# Patient Record
Sex: FEMALE | Race: BLACK OR AFRICAN AMERICAN | NOT HISPANIC OR LATINO | URBAN - METROPOLITAN AREA
[De-identification: names, ages, dates, MRNs, and addresses within clinical notes are randomized per-mention and may not be internally consistent; named-entity substitution may affect disease eponyms.]

---

## 2024-07-06 ENCOUNTER — HOSPITAL ENCOUNTER (INPATIENT)
Facility: HOSPITAL | Age: 79
End: 2024-07-06
Attending: GENERAL PRACTICE | Admitting: INTERNAL MEDICINE
Payer: COMMERCIAL

## 2024-07-06 ENCOUNTER — APPOINTMENT (OUTPATIENT)
Dept: RADIOLOGY | Facility: HOSPITAL | Age: 79
DRG: 233 | End: 2024-07-06
Payer: COMMERCIAL

## 2024-07-06 ENCOUNTER — APPOINTMENT (OUTPATIENT)
Dept: CARDIOLOGY | Facility: HOSPITAL | Age: 79
End: 2024-07-06
Payer: COMMERCIAL

## 2024-07-06 DIAGNOSIS — I25.119 ATHEROSCLEROTIC HEART DISEASE OF NATIVE CORONARY ARTERY WITH UNSPECIFIED ANGINA PECTORIS (CMS-HCC): ICD-10-CM

## 2024-07-06 DIAGNOSIS — I79.8 OTHER DISORDERS OF ARTERIES, ARTERIOLES AND CAPILLARIES IN DISEASES CLASSIFIED ELSEWHERE (CMS-HCC): ICD-10-CM

## 2024-07-06 DIAGNOSIS — I25.110 ATHEROSCLEROTIC HEART DISEASE OF NATIVE CORONARY ARTERY WITH UNSTABLE ANGINA PECTORIS (MULTI): ICD-10-CM

## 2024-07-06 DIAGNOSIS — I21.4 NSTEMI (NON-ST ELEVATED MYOCARDIAL INFARCTION) (MULTI): Primary | ICD-10-CM

## 2024-07-06 DIAGNOSIS — I65.23 OCCLUSION AND STENOSIS OF BILATERAL CAROTID ARTERIES: ICD-10-CM

## 2024-07-06 DIAGNOSIS — Z95.1 S/P CABG (CORONARY ARTERY BYPASS GRAFT): ICD-10-CM

## 2024-07-06 LAB
ALBUMIN SERPL BCP-MCNC: 3.5 G/DL (ref 3.4–5)
ALP SERPL-CCNC: 91 U/L (ref 33–136)
ALT SERPL W P-5'-P-CCNC: 11 U/L (ref 7–45)
ANION GAP SERPL CALC-SCNC: 16 MMOL/L (ref 10–20)
AST SERPL W P-5'-P-CCNC: 17 U/L (ref 9–39)
BASOPHILS # BLD AUTO: 0.02 X10*3/UL (ref 0–0.1)
BASOPHILS NFR BLD AUTO: 0.3 %
BILIRUB SERPL-MCNC: 0.3 MG/DL (ref 0–1.2)
BNP SERPL-MCNC: 283 PG/ML (ref 0–99)
BUN SERPL-MCNC: 36 MG/DL (ref 6–23)
CALCIUM SERPL-MCNC: 9.6 MG/DL (ref 8.6–10.3)
CARDIAC TROPONIN I PNL SERPL HS: 39 NG/L (ref 0–13)
CARDIAC TROPONIN I PNL SERPL HS: 69 NG/L (ref 0–13)
CHLORIDE SERPL-SCNC: 103 MMOL/L (ref 98–107)
CO2 SERPL-SCNC: 25 MMOL/L (ref 21–32)
CREAT SERPL-MCNC: 9.36 MG/DL (ref 0.5–1.05)
EGFRCR SERPLBLD CKD-EPI 2021: 4 ML/MIN/1.73M*2
EOSINOPHIL # BLD AUTO: 0.12 X10*3/UL (ref 0–0.4)
EOSINOPHIL NFR BLD AUTO: 1.6 %
ERYTHROCYTE [DISTWIDTH] IN BLOOD BY AUTOMATED COUNT: 16 % (ref 11.5–14.5)
GLUCOSE SERPL-MCNC: 121 MG/DL (ref 74–99)
HCT VFR BLD AUTO: 29.1 % (ref 36–46)
HGB BLD-MCNC: 9.6 G/DL (ref 12–16)
IMM GRANULOCYTES # BLD AUTO: 0.04 X10*3/UL (ref 0–0.5)
IMM GRANULOCYTES NFR BLD AUTO: 0.5 % (ref 0–0.9)
LIPASE SERPL-CCNC: 74 U/L (ref 9–82)
LYMPHOCYTES # BLD AUTO: 1.22 X10*3/UL (ref 0.8–3)
LYMPHOCYTES NFR BLD AUTO: 16.3 %
MCH RBC QN AUTO: 26.7 PG (ref 26–34)
MCHC RBC AUTO-ENTMCNC: 33 G/DL (ref 32–36)
MCV RBC AUTO: 81 FL (ref 80–100)
MONOCYTES # BLD AUTO: 0.38 X10*3/UL (ref 0.05–0.8)
MONOCYTES NFR BLD AUTO: 5.1 %
NEUTROPHILS # BLD AUTO: 5.72 X10*3/UL (ref 1.6–5.5)
NEUTROPHILS NFR BLD AUTO: 76.2 %
NRBC BLD-RTO: 0 /100 WBCS (ref 0–0)
PLATELET # BLD AUTO: 261 X10*3/UL (ref 150–450)
POTASSIUM SERPL-SCNC: 2.9 MMOL/L (ref 3.5–5.3)
PROT SERPL-MCNC: 7.1 G/DL (ref 6.4–8.2)
RBC # BLD AUTO: 3.59 X10*6/UL (ref 4–5.2)
SODIUM SERPL-SCNC: 141 MMOL/L (ref 136–145)
WBC # BLD AUTO: 7.5 X10*3/UL (ref 4.4–11.3)

## 2024-07-06 PROCEDURE — 2500000004 HC RX 250 GENERAL PHARMACY W/ HCPCS (ALT 636 FOR OP/ED): Performed by: GENERAL PRACTICE

## 2024-07-06 PROCEDURE — 83690 ASSAY OF LIPASE: CPT | Performed by: GENERAL PRACTICE

## 2024-07-06 PROCEDURE — 71250 CT THORAX DX C-: CPT | Mod: FOREIGN READ | Performed by: RADIOLOGY

## 2024-07-06 PROCEDURE — 99285 EMERGENCY DEPT VISIT HI MDM: CPT | Mod: 25

## 2024-07-06 PROCEDURE — 84484 ASSAY OF TROPONIN QUANT: CPT | Performed by: GENERAL PRACTICE

## 2024-07-06 PROCEDURE — 71250 CT THORAX DX C-: CPT

## 2024-07-06 PROCEDURE — 80053 COMPREHEN METABOLIC PANEL: CPT | Performed by: GENERAL PRACTICE

## 2024-07-06 PROCEDURE — 36415 COLL VENOUS BLD VENIPUNCTURE: CPT | Performed by: GENERAL PRACTICE

## 2024-07-06 PROCEDURE — 83880 ASSAY OF NATRIURETIC PEPTIDE: CPT | Performed by: GENERAL PRACTICE

## 2024-07-06 PROCEDURE — 96374 THER/PROPH/DIAG INJ IV PUSH: CPT

## 2024-07-06 PROCEDURE — 74176 CT ABD & PELVIS W/O CONTRAST: CPT | Mod: FOREIGN READ | Performed by: RADIOLOGY

## 2024-07-06 PROCEDURE — 85025 COMPLETE CBC W/AUTO DIFF WBC: CPT | Performed by: GENERAL PRACTICE

## 2024-07-06 PROCEDURE — 93005 ELECTROCARDIOGRAM TRACING: CPT

## 2024-07-06 RX ORDER — ONDANSETRON HYDROCHLORIDE 2 MG/ML
4 INJECTION, SOLUTION INTRAVENOUS ONCE
Status: COMPLETED | OUTPATIENT
Start: 2024-07-06 | End: 2024-07-06

## 2024-07-06 RX ORDER — POTASSIUM CHLORIDE 20 MEQ/1
40 TABLET, EXTENDED RELEASE ORAL ONCE
Status: DISCONTINUED | OUTPATIENT
Start: 2024-07-06 | End: 2024-07-11

## 2024-07-06 RX ADMIN — ONDANSETRON 4 MG: 2 INJECTION INTRAMUSCULAR; INTRAVENOUS at 22:03

## 2024-07-06 ASSESSMENT — COLUMBIA-SUICIDE SEVERITY RATING SCALE - C-SSRS
2. HAVE YOU ACTUALLY HAD ANY THOUGHTS OF KILLING YOURSELF?: NO
6. HAVE YOU EVER DONE ANYTHING, STARTED TO DO ANYTHING, OR PREPARED TO DO ANYTHING TO END YOUR LIFE?: NO
1. IN THE PAST MONTH, HAVE YOU WISHED YOU WERE DEAD OR WISHED YOU COULD GO TO SLEEP AND NOT WAKE UP?: NO

## 2024-07-06 ASSESSMENT — PAIN - FUNCTIONAL ASSESSMENT: PAIN_FUNCTIONAL_ASSESSMENT: 0-10

## 2024-07-06 ASSESSMENT — PAIN DESCRIPTION - DESCRIPTORS: DESCRIPTORS: OTHER (COMMENT)

## 2024-07-06 ASSESSMENT — PAIN SCALES - GENERAL: PAINLEVEL_OUTOF10: 0 - NO PAIN

## 2024-07-06 NOTE — Clinical Note
Closure device placed in the right radial artery. Site closed by radial compression system. 14ml air

## 2024-07-07 ENCOUNTER — APPOINTMENT (OUTPATIENT)
Dept: CARDIOLOGY | Facility: HOSPITAL | Age: 79
DRG: 233 | End: 2024-07-07
Payer: COMMERCIAL

## 2024-07-07 VITALS
BODY MASS INDEX: 19.06 KG/M2 | HEART RATE: 84 BPM | HEIGHT: 63 IN | RESPIRATION RATE: 16 BRPM | DIASTOLIC BLOOD PRESSURE: 70 MMHG | WEIGHT: 107.58 LBS | OXYGEN SATURATION: 100 % | TEMPERATURE: 98.5 F | SYSTOLIC BLOOD PRESSURE: 164 MMHG

## 2024-07-07 PROBLEM — I21.4 NSTEMI (NON-ST ELEVATED MYOCARDIAL INFARCTION) (MULTI): Status: ACTIVE | Noted: 2024-07-07

## 2024-07-07 LAB
ANION GAP SERPL CALC-SCNC: 16 MMOL/L (ref 10–20)
APPEARANCE UR: CLEAR
BACTERIA #/AREA URNS AUTO: ABNORMAL /HPF
BASOPHILS # BLD AUTO: 0.02 X10*3/UL (ref 0–0.1)
BASOPHILS NFR BLD AUTO: 0.3 %
BILIRUB UR STRIP.AUTO-MCNC: NEGATIVE MG/DL
BUN SERPL-MCNC: 42 MG/DL (ref 6–23)
CALCIUM SERPL-MCNC: 9.2 MG/DL (ref 8.6–10.3)
CARDIAC TROPONIN I PNL SERPL HS: 1411 NG/L (ref 0–13)
CARDIAC TROPONIN I PNL SERPL HS: ABNORMAL NG/L (ref 0–13)
CHLORIDE SERPL-SCNC: 103 MMOL/L (ref 98–107)
CHOLEST SERPL-MCNC: 129 MG/DL (ref 0–199)
CHOLESTEROL/HDL RATIO: 3.1
CO2 SERPL-SCNC: 25 MMOL/L (ref 21–32)
COLOR UR: ABNORMAL
CREAT SERPL-MCNC: 10.24 MG/DL (ref 0.5–1.05)
EGFRCR SERPLBLD CKD-EPI 2021: 4 ML/MIN/1.73M*2
EOSINOPHIL # BLD AUTO: 0.08 X10*3/UL (ref 0–0.4)
EOSINOPHIL NFR BLD AUTO: 1.4 %
ERYTHROCYTE [DISTWIDTH] IN BLOOD BY AUTOMATED COUNT: 16.1 % (ref 11.5–14.5)
GLUCOSE SERPL-MCNC: 69 MG/DL (ref 74–99)
GLUCOSE UR STRIP.AUTO-MCNC: ABNORMAL MG/DL
HCT VFR BLD AUTO: 25.6 % (ref 36–46)
HDLC SERPL-MCNC: 42.1 MG/DL
HGB BLD-MCNC: 8.4 G/DL (ref 12–16)
HOLD SPECIMEN: NORMAL
IMM GRANULOCYTES # BLD AUTO: 0.01 X10*3/UL (ref 0–0.5)
IMM GRANULOCYTES NFR BLD AUTO: 0.2 % (ref 0–0.9)
KETONES UR STRIP.AUTO-MCNC: NEGATIVE MG/DL
LDLC SERPL CALC-MCNC: 74 MG/DL
LEUKOCYTE ESTERASE UR QL STRIP.AUTO: NEGATIVE
LYMPHOCYTES # BLD AUTO: 1.09 X10*3/UL (ref 0.8–3)
LYMPHOCYTES NFR BLD AUTO: 19.1 %
MAGNESIUM SERPL-MCNC: 1.5 MG/DL (ref 1.6–2.4)
MAGNESIUM SERPL-MCNC: 1.6 MG/DL (ref 1.6–2.4)
MCH RBC QN AUTO: 27.2 PG (ref 26–34)
MCHC RBC AUTO-ENTMCNC: 32.8 G/DL (ref 32–36)
MCV RBC AUTO: 83 FL (ref 80–100)
MONOCYTES # BLD AUTO: 0.29 X10*3/UL (ref 0.05–0.8)
MONOCYTES NFR BLD AUTO: 5.1 %
NEUTROPHILS # BLD AUTO: 4.23 X10*3/UL (ref 1.6–5.5)
NEUTROPHILS NFR BLD AUTO: 73.9 %
NITRITE UR QL STRIP.AUTO: NEGATIVE
NON HDL CHOLESTEROL: 87 MG/DL (ref 0–149)
NRBC BLD-RTO: 0 /100 WBCS (ref 0–0)
PH UR STRIP.AUTO: 7.5 [PH]
PLATELET # BLD AUTO: 224 X10*3/UL (ref 150–450)
POTASSIUM SERPL-SCNC: 3 MMOL/L (ref 3.5–5.3)
PROT UR STRIP.AUTO-MCNC: ABNORMAL MG/DL
RBC # BLD AUTO: 3.09 X10*6/UL (ref 4–5.2)
RBC # UR STRIP.AUTO: ABNORMAL /UL
RBC #/AREA URNS AUTO: ABNORMAL /HPF
SODIUM SERPL-SCNC: 141 MMOL/L (ref 136–145)
SP GR UR STRIP.AUTO: 1.01
SQUAMOUS #/AREA URNS AUTO: ABNORMAL /HPF
TRIGL SERPL-MCNC: 64 MG/DL (ref 0–149)
UFH PPP CHRO-ACNC: 0.3 IU/ML
UFH PPP CHRO-ACNC: 0.4 IU/ML
UROBILINOGEN UR STRIP.AUTO-MCNC: NORMAL MG/DL
VLDL: 13 MG/DL (ref 0–40)
WBC # BLD AUTO: 5.7 X10*3/UL (ref 4.4–11.3)
WBC #/AREA URNS AUTO: ABNORMAL /HPF

## 2024-07-07 PROCEDURE — 2500000001 HC RX 250 WO HCPCS SELF ADMINISTERED DRUGS (ALT 637 FOR MEDICARE OP)

## 2024-07-07 PROCEDURE — 85520 HEPARIN ASSAY: CPT | Performed by: INTERNAL MEDICINE

## 2024-07-07 PROCEDURE — 99221 1ST HOSP IP/OBS SF/LOW 40: CPT | Performed by: INTERNAL MEDICINE

## 2024-07-07 PROCEDURE — 82374 ASSAY BLOOD CARBON DIOXIDE: CPT | Performed by: INTERNAL MEDICINE

## 2024-07-07 PROCEDURE — 84484 ASSAY OF TROPONIN QUANT: CPT | Performed by: GENERAL PRACTICE

## 2024-07-07 PROCEDURE — 93005 ELECTROCARDIOGRAM TRACING: CPT

## 2024-07-07 PROCEDURE — 2500000004 HC RX 250 GENERAL PHARMACY W/ HCPCS (ALT 636 FOR OP/ED): Performed by: INTERNAL MEDICINE

## 2024-07-07 PROCEDURE — 81001 URINALYSIS AUTO W/SCOPE: CPT | Performed by: GENERAL PRACTICE

## 2024-07-07 PROCEDURE — 36415 COLL VENOUS BLD VENIPUNCTURE: CPT | Performed by: EMERGENCY MEDICINE

## 2024-07-07 PROCEDURE — 83718 ASSAY OF LIPOPROTEIN: CPT

## 2024-07-07 PROCEDURE — 84484 ASSAY OF TROPONIN QUANT: CPT

## 2024-07-07 PROCEDURE — 83735 ASSAY OF MAGNESIUM: CPT | Performed by: GENERAL PRACTICE

## 2024-07-07 PROCEDURE — 6350000001 HC RX 635 EPOETIN >10,000 UNITS: Mod: JZ | Performed by: INTERNAL MEDICINE

## 2024-07-07 PROCEDURE — 2060000001 HC INTERMEDIATE ICU ROOM DAILY

## 2024-07-07 PROCEDURE — 36415 COLL VENOUS BLD VENIPUNCTURE: CPT | Performed by: GENERAL PRACTICE

## 2024-07-07 PROCEDURE — 2500000004 HC RX 250 GENERAL PHARMACY W/ HCPCS (ALT 636 FOR OP/ED): Performed by: EMERGENCY MEDICINE

## 2024-07-07 PROCEDURE — 85520 HEPARIN ASSAY: CPT | Performed by: EMERGENCY MEDICINE

## 2024-07-07 PROCEDURE — 96375 TX/PRO/DX INJ NEW DRUG ADDON: CPT

## 2024-07-07 PROCEDURE — 83735 ASSAY OF MAGNESIUM: CPT | Performed by: INTERNAL MEDICINE

## 2024-07-07 PROCEDURE — 2500000001 HC RX 250 WO HCPCS SELF ADMINISTERED DRUGS (ALT 637 FOR MEDICARE OP): Performed by: INTERNAL MEDICINE

## 2024-07-07 PROCEDURE — 85025 COMPLETE CBC W/AUTO DIFF WBC: CPT | Performed by: INTERNAL MEDICINE

## 2024-07-07 PROCEDURE — 99223 1ST HOSP IP/OBS HIGH 75: CPT | Performed by: STUDENT IN AN ORGANIZED HEALTH CARE EDUCATION/TRAINING PROGRAM

## 2024-07-07 RX ORDER — AMLODIPINE BESYLATE 10 MG/1
10 TABLET ORAL DAILY
Status: DISCONTINUED | OUTPATIENT
Start: 2024-07-07 | End: 2024-07-17

## 2024-07-07 RX ORDER — POTASSIUM CHLORIDE 20 MEQ/1
20 TABLET, EXTENDED RELEASE ORAL ONCE
Status: DISCONTINUED | OUTPATIENT
Start: 2024-07-07 | End: 2024-07-07

## 2024-07-07 RX ORDER — ACETAMINOPHEN 325 MG/1
650 TABLET ORAL EVERY 4 HOURS PRN
Status: DISCONTINUED | OUTPATIENT
Start: 2024-07-07 | End: 2024-07-17

## 2024-07-07 RX ORDER — ATORVASTATIN CALCIUM 20 MG/1
20 TABLET, FILM COATED ORAL DAILY
COMMUNITY
End: 2024-07-20 | Stop reason: HOSPADM

## 2024-07-07 RX ORDER — ATORVASTATIN CALCIUM 20 MG/1
20 TABLET, FILM COATED ORAL DAILY
Status: DISCONTINUED | OUTPATIENT
Start: 2024-07-07 | End: 2024-07-09

## 2024-07-07 RX ORDER — POLYETHYLENE GLYCOL 3350 17 G/17G
17 POWDER, FOR SOLUTION ORAL DAILY
Status: DISCONTINUED | OUTPATIENT
Start: 2024-07-07 | End: 2024-07-17

## 2024-07-07 RX ORDER — AMLODIPINE BESYLATE 10 MG/1
TABLET ORAL DAILY
Status: ON HOLD | COMMUNITY

## 2024-07-07 RX ORDER — POTASSIUM CHLORIDE 1.5 G/1.58G
POWDER, FOR SOLUTION ORAL
Status: COMPLETED
Start: 2024-07-07 | End: 2024-07-07

## 2024-07-07 RX ORDER — NAPROXEN SODIUM 220 MG/1
81 TABLET, FILM COATED ORAL DAILY
Status: DISCONTINUED | OUTPATIENT
Start: 2024-07-08 | End: 2024-07-09

## 2024-07-07 RX ORDER — LOSARTAN POTASSIUM 50 MG/1
100 TABLET ORAL DAILY
COMMUNITY
End: 2024-07-23 | Stop reason: ENTERED-IN-ERROR

## 2024-07-07 RX ORDER — ACETAMINOPHEN 650 MG/1
650 SUPPOSITORY RECTAL EVERY 4 HOURS PRN
Status: DISCONTINUED | OUTPATIENT
Start: 2024-07-07 | End: 2024-07-17

## 2024-07-07 RX ORDER — METOPROLOL TARTRATE 25 MG/1
12.5 TABLET, FILM COATED ORAL 2 TIMES DAILY
Status: DISCONTINUED | OUTPATIENT
Start: 2024-07-07 | End: 2024-07-08

## 2024-07-07 RX ORDER — HEPARIN SODIUM 10000 [USP'U]/100ML
0-4000 INJECTION, SOLUTION INTRAVENOUS CONTINUOUS
Status: DISCONTINUED | OUTPATIENT
Start: 2024-07-07 | End: 2024-07-08

## 2024-07-07 RX ORDER — LOSARTAN POTASSIUM 50 MG/1
100 TABLET ORAL DAILY
Status: DISCONTINUED | OUTPATIENT
Start: 2024-07-07 | End: 2024-07-17

## 2024-07-07 RX ORDER — ACETAMINOPHEN 160 MG/5ML
650 SOLUTION ORAL EVERY 4 HOURS PRN
Status: DISCONTINUED | OUTPATIENT
Start: 2024-07-07 | End: 2024-07-17

## 2024-07-07 RX ADMIN — POLYETHYLENE GLYCOL 3350 17 G: 17 POWDER, FOR SOLUTION ORAL at 08:33

## 2024-07-07 RX ADMIN — METOPROLOL TARTRATE 12.5 MG: 25 TABLET, FILM COATED ORAL at 22:00

## 2024-07-07 RX ADMIN — HEPARIN SODIUM 600 UNITS/HR: 10000 INJECTION, SOLUTION INTRAVENOUS at 02:26

## 2024-07-07 RX ADMIN — POTASSIUM CHLORIDE 40 MEQ: 1.5 POWDER, FOR SOLUTION ORAL at 02:20

## 2024-07-07 RX ADMIN — AMLODIPINE BESYLATE 10 MG: 10 TABLET ORAL at 08:33

## 2024-07-07 RX ADMIN — EPOETIN ALFA-EPBX 10000 UNITS: 10000 INJECTION, SOLUTION INTRAVENOUS; SUBCUTANEOUS at 16:19

## 2024-07-07 RX ADMIN — SODIUM CHLORIDE, SODIUM LACTATE, CALCIUM CHLORIDE, MAGNESIUM CHLORIDE AND DEXTROSE: 1.5; 538; 448; 18.3; 5.08 INJECTION, SOLUTION INTRAPERITONEAL at 11:16

## 2024-07-07 RX ADMIN — SODIUM CHLORIDE, SODIUM LACTATE, CALCIUM CHLORIDE, MAGNESIUM CHLORIDE AND DEXTROSE: 1.5; 538; 448; 18.3; 5.08 INJECTION, SOLUTION INTRAPERITONEAL at 18:16

## 2024-07-07 RX ADMIN — LOSARTAN POTASSIUM 100 MG: 50 TABLET, FILM COATED ORAL at 08:33

## 2024-07-07 SDOH — SOCIAL STABILITY: SOCIAL INSECURITY: WERE YOU ABLE TO COMPLETE ALL THE BEHAVIORAL HEALTH SCREENINGS?: YES

## 2024-07-07 SDOH — SOCIAL STABILITY: SOCIAL INSECURITY: HAS ANYONE EVER THREATENED TO HURT YOUR FAMILY OR YOUR PETS?: NO

## 2024-07-07 SDOH — SOCIAL STABILITY: SOCIAL INSECURITY: ARE YOU OR HAVE YOU BEEN THREATENED OR ABUSED PHYSICALLY, EMOTIONALLY, OR SEXUALLY BY ANYONE?: NO

## 2024-07-07 SDOH — SOCIAL STABILITY: SOCIAL INSECURITY: DOES ANYONE TRY TO KEEP YOU FROM HAVING/CONTACTING OTHER FRIENDS OR DOING THINGS OUTSIDE YOUR HOME?: NO

## 2024-07-07 SDOH — SOCIAL STABILITY: SOCIAL INSECURITY: HAVE YOU HAD ANY THOUGHTS OF HARMING ANYONE ELSE?: NO

## 2024-07-07 SDOH — SOCIAL STABILITY: SOCIAL INSECURITY: HAVE YOU HAD THOUGHTS OF HARMING ANYONE ELSE?: NO

## 2024-07-07 SDOH — SOCIAL STABILITY: SOCIAL INSECURITY: ABUSE: ADULT

## 2024-07-07 SDOH — SOCIAL STABILITY: SOCIAL INSECURITY: ARE THERE ANY APPARENT SIGNS OF INJURIES/BEHAVIORS THAT COULD BE RELATED TO ABUSE/NEGLECT?: NO

## 2024-07-07 SDOH — SOCIAL STABILITY: SOCIAL INSECURITY: DO YOU FEEL ANYONE HAS EXPLOITED OR TAKEN ADVANTAGE OF YOU FINANCIALLY OR OF YOUR PERSONAL PROPERTY?: NO

## 2024-07-07 SDOH — SOCIAL STABILITY: SOCIAL INSECURITY: DO YOU FEEL UNSAFE GOING BACK TO THE PLACE WHERE YOU ARE LIVING?: NO

## 2024-07-07 ASSESSMENT — ACTIVITIES OF DAILY LIVING (ADL)
BATHING: NEEDS ASSISTANCE
PATIENT'S MEMORY ADEQUATE TO SAFELY COMPLETE DAILY ACTIVITIES?: YES
HEARING - LEFT EAR: FUNCTIONAL
ADEQUATE_TO_COMPLETE_ADL: YES
GROOMING: INDEPENDENT
TOILETING: INDEPENDENT
LACK_OF_TRANSPORTATION: NO
HEARING - LEFT EAR: HEARING AID
PATIENT'S MEMORY ADEQUATE TO SAFELY COMPLETE DAILY ACTIVITIES?: YES
HEARING - RIGHT EAR: HEARING AID
WALKS IN HOME: INDEPENDENT
JUDGMENT_ADEQUATE_SAFELY_COMPLETE_DAILY_ACTIVITIES: YES
HEARING - RIGHT EAR: FUNCTIONAL
WALKS IN HOME: INDEPENDENT
GROOMING: NEEDS ASSISTANCE
ASSISTIVE_DEVICE: DENTURES LOWER;DENTURES UPPER;EYEGLASSES;HEARING AID - RIGHT;HEARING AID - LEFT
JUDGMENT_ADEQUATE_SAFELY_COMPLETE_DAILY_ACTIVITIES: YES
BATHING: INDEPENDENT
FEEDING YOURSELF: INDEPENDENT
ADEQUATE_TO_COMPLETE_ADL: YES
DRESSING YOURSELF: NEEDS ASSISTANCE
FEEDING YOURSELF: INDEPENDENT
DRESSING YOURSELF: INDEPENDENT

## 2024-07-07 ASSESSMENT — LIFESTYLE VARIABLES
HOW OFTEN DO YOU HAVE 6 OR MORE DRINKS ON ONE OCCASION: NEVER
AUDIT-C TOTAL SCORE: 0
SKIP TO QUESTIONS 9-10: 1
HOW OFTEN DO YOU HAVE A DRINK CONTAINING ALCOHOL: NEVER
AUDIT-C TOTAL SCORE: 0
HOW MANY STANDARD DRINKS CONTAINING ALCOHOL DO YOU HAVE ON A TYPICAL DAY: PATIENT DOES NOT DRINK

## 2024-07-07 ASSESSMENT — COGNITIVE AND FUNCTIONAL STATUS - GENERAL
CLIMB 3 TO 5 STEPS WITH RAILING: A LITTLE
WALKING IN HOSPITAL ROOM: A LITTLE
CLIMB 3 TO 5 STEPS WITH RAILING: A LITTLE
MOBILITY SCORE: 21
MOBILITY SCORE: 20
PATIENT BASELINE BEDBOUND: NO
MOBILITY SCORE: 21
HELP NEEDED FOR BATHING: A LITTLE
STANDING UP FROM CHAIR USING ARMS: A LITTLE
TOILETING: A LITTLE
WALKING IN HOSPITAL ROOM: A LITTLE
WALKING IN HOSPITAL ROOM: A LITTLE
STANDING UP FROM CHAIR USING ARMS: A LITTLE
TOILETING: A LITTLE
CLIMB 3 TO 5 STEPS WITH RAILING: A LITTLE
TOILETING: A LITTLE
DRESSING REGULAR LOWER BODY CLOTHING: A LITTLE
DAILY ACTIVITIY SCORE: 22
DAILY ACTIVITIY SCORE: 22
DRESSING REGULAR LOWER BODY CLOTHING: A LITTLE
DAILY ACTIVITIY SCORE: 21
MOVING TO AND FROM BED TO CHAIR: A LITTLE
STANDING UP FROM CHAIR USING ARMS: A LITTLE
DRESSING REGULAR LOWER BODY CLOTHING: A LITTLE

## 2024-07-07 ASSESSMENT — ENCOUNTER SYMPTOMS
RESPIRATORY NEGATIVE: 1
CONSTITUTIONAL NEGATIVE: 1
VOMITING: 1
EYES NEGATIVE: 1
CARDIOVASCULAR NEGATIVE: 1
NAUSEA: 1
MUSCULOSKELETAL NEGATIVE: 1
NEUROLOGICAL NEGATIVE: 1

## 2024-07-07 ASSESSMENT — PAIN - FUNCTIONAL ASSESSMENT
PAIN_FUNCTIONAL_ASSESSMENT: 0-10

## 2024-07-07 ASSESSMENT — PATIENT HEALTH QUESTIONNAIRE - PHQ9
2. FEELING DOWN, DEPRESSED OR HOPELESS: NOT AT ALL
1. LITTLE INTEREST OR PLEASURE IN DOING THINGS: NOT AT ALL
SUM OF ALL RESPONSES TO PHQ9 QUESTIONS 1 & 2: 0

## 2024-07-07 ASSESSMENT — PAIN SCALES - GENERAL
PAINLEVEL_OUTOF10: 0 - NO PAIN

## 2024-07-07 NOTE — ED PROVIDER NOTES
HPI   Chief Complaint   Patient presents with    Chest Pain     Began at 2040 today     Weakness, Gen       HPI: 79-year-old female with a history of ESRD on peritoneal dialysis presents with nausea, vomiting and chest pain.  She also reports that she has not had a bowel movement in approximately 5 days.  Her symptoms began with nausea and vomiting earlier today which was followed by chest pressure.  She had only 1 episode of nausea and vomiting and by the time she arrived to the ED her chest pain has largely subsided.  She denies rectal bleeding and does report that she is passing gas      Limitations to history: None  Independent Historians: Patient  External Records Reviewed: HIE, outpatient notes, inpatient notes  ------------------------------------------------------------------------------------------------------------------------------------------  ROS: a ten point review of systems was performed and was negative except as per HPI.  ------------------------------------------------------------------------------------------------------------------------------------------  PMH / PSH: as per HPI, otherwise reviewed in EMR  MEDS: as per HPI, otherwise reviewed in EMR  ALLERGIES: as per HPI, otherwise reviewed in EMR  SocH:  as per HPI, otherwise reviewed in EMR  FH:  as per HPI, otherwise reviewed in EMR  ------------------------------------------------------------------------------------------------------------------------------------------  Physical Exam:  VS: As documented in the triage note and EMR flowsheet from this visit was reviewed  General: Well appearing. No acute distress.   Eyes:  Extraocular movements grossly intact. No scleral icterus. No discharge  HEENT:  Normocephalic.  Atraumatic  Neck: Moves neck freely. No gross masses  CV: Regular rhythm. No murmurs, rubs or gallops   Resp: Clear to auscultation bilaterally. No respiratory distress.    GI: Soft, no masses, nontender. No rebound tenderness or  guarding.  Peritoneal dialysis catheter entry site is without discharge or surrounding erythema  MSK: Symmetric muscle bulk. No deformities. No lower extremity edema.    Skin: Warm, dry, intact.   Neuro: No focal deficits.  A&O x3.   Psych: Appropriate for situation  ------------------------------------------------------------------------------------------------------------------------------------------  Hospital Course / Medical Decision Making:  Independent Interpretations: CT chest, abd, pelvis  EKG as interpreted by me: EKG #1 shows a normal sinus rhythm 86 bpm with mild ST depressions in V5 and V6.  No ST elevation EKG #2 shows normal sinus rhythm 88 bpm with no signs of acute ischemia    MDM: 79-year-old female with a history of ESRD on peritoneal dialysis presents with nausea, vomiting and chest pain.  Troponin is mildly elevated.  There are mild ST depressions on the initial EKG which did disappear on a subsequent EKG. CT shows evidence of constipation.  No bowel obstruction.  I conducted shared decision-making with the patient and her family and due to her elevated troponins and chest pressure she was admitted for further management.    Discussion of Management with Other Providers:   I discussed the patient/results with: Emergency medicine team    Final diagnosis and disposition as below.    Results for orders placed or performed during the hospital encounter of 07/06/24  -CBC and Auto Differential:        Result                      Value             Ref Range           WBC                         7.5               4.4 - 11.3 x*       nRBC                        0.0               0.0 - 0.0 /1*       RBC                         3.59 (L)          4.00 - 5.20 *       Hemoglobin                  9.6 (L)           12.0 - 16.0 *       Hematocrit                  29.1 (L)          36.0 - 46.0 %       MCV                         81                80 - 100 fL         MCH                         26.7               26.0 - 34.0 *       MCHC                        33.0              32.0 - 36.0 *       RDW                         16.0 (H)          11.5 - 14.5 %       Platelets                   261               150 - 450 x1*       Neutrophils %               76.2              40.0 - 80.0 %       Immature Granulocytes *     0.5               0.0 - 0.9 %         Lymphocytes %               16.3              13.0 - 44.0 %       Monocytes %                 5.1               2.0 - 10.0 %        Eosinophils %               1.6               0.0 - 6.0 %         Basophils %                 0.3               0.0 - 2.0 %         Neutrophils Absolute        5.72 (H)          1.60 - 5.50 *       Immature Granulocytes *     0.04              0.00 - 0.50 *       Lymphocytes Absolute        1.22              0.80 - 3.00 *       Monocytes Absolute          0.38              0.05 - 0.80 *       Eosinophils Absolute        0.12              0.00 - 0.40 *       Basophils Absolute          0.02              0.00 - 0.10 *  -Comprehensive metabolic panel:        Result                      Value             Ref Range           Glucose                     121 (H)           74 - 99 mg/dL       Sodium                      141               136 - 145 mm*       Potassium                   2.9 (LL)          3.5 - 5.3 mm*       Chloride                    103               98 - 107 mmo*       Bicarbonate                 25                21 - 32 mmol*       Anion Gap                   16                10 - 20 mmol*       Urea Nitrogen               36 (H)            6 - 23 mg/dL        Creatinine                  9.36 (H)          0.50 - 1.05 *       eGFR                        4 (L)             >60 mL/min/1*       Calcium                     9.6               8.6 - 10.3 m*       Albumin                     3.5               3.4 - 5.0 g/*       Alkaline Phosphatase        91                33 - 136 U/L        Total Protein               7.1                6.4 - 8.2 g/*       AST                         17                9 - 39 U/L          Bilirubin, Total            0.3               0.0 - 1.2 mg*       ALT                         11                7 - 45 U/L     -Troponin I, High Sensitivity:        Result                      Value             Ref Range           Troponin I, High Sensi*     39 (H)            0 - 13 ng/L    -Lipase:        Result                      Value             Ref Range           Lipase                      74                9 - 82 U/L     -B-type natriuretic peptide:        Result                      Value             Ref Range           BNP                         283 (H)           0 - 99 pg/mL   -Troponin I, High Sensitivity:        Result                      Value             Ref Range           Troponin I, High Sensi*     69 (HH)           0 - 13 ng/L    CT chest abdomen pelvis wo IV contrast   Final Result    1.  No distinct acute intrathoracic process identified.    2.  Minimal streaky bibasilar atelectasis with pleural based right    lower lobe pulmonary nodule measuring 1.2 cm.  Suggest continued    follow-up as per clinical guidelines.    3.  Enlarged and heterogeneous appearance of the thyroid gland with    multiple bilateral nodules, left greater than right.  Suggest    nonemergent follow-up with ultrasound as clinically warranted.    4.  Nonobstructing nephrolithiasis of the left kidney.  No ureteral    calculus or evidence for obstructive uropathy bilaterally.    5.  Colonic diverticulosis without CT evidence for acute    diverticulitis.    6.  Large amount of fecal material within the rectosigmoid colon and    rectal vault.  No evidence of bowel obstruction.    Fleischner Society 2017 Guidelines for Management of Incidentally    Detected Pulmonary Nodules in Adults:    A.  SOLID NODULES*    Nodule Type and Size:    Single:    *Low Risk**     < 6 mm - No routine follow-up    6-8 mm - CT at 6-12 months, then consider CT at  18-24 months    > 8 mm - Consider CT at 3 months, PET/CT, or tissue sampling    *High Risk**    < 6 mm - Optional CT at 12 months    6-8 mm - CT at 6-12 months, then CT at 18-24 months    > 8 mm - Consider CT at 3 months, PET/CT, or tissue sampling    Multiple:    *Low Risk**     < 6 mm - No routine follow-up    6-8 mm - CT at 3-6 months, then consider CT at 18-24 months    > 8 mm - CT at 3-6 months, then consider CT at 18-24 months    *High Risk**    < 6 mm - Optional CT at 12 months    6-8 mm - CT at 3-6 months, then at 18-24 months    > 8 mm - CT at 3-6 months, then at 18-24 months    B. SUBSOLID NODULES*    Nodule Type and Size:    Single:      *Ground glass    < 6 mm - No routine follow-up    > 6 mm - CT at 6-12 months to confirm persistence, then CT every 2    years until 5 years    *Part Solid    < 6 mm - No routine follow-up    > 6 mm - CT at 3-6 months to confirm persistence.  If unchanged and    solid component remains < 6 mm, annual CT should be performed for 5    years.    Multiple:    < 6 mm - CT at 3-6 months.  If stable, consider CT at 2 and 4 years.    > 6 mm - CT at 3-6 months.  Subsequent management based on the most    suspicious nodule(s).    Note - These recommendations do not apply to lung cancer screening,    patients with immunosuppression, or patients with known primary    cancer.    * Dimensions are average of long and short axes, rounded to the    nearest millimeter.    ** Consider all relevant risk factors.    Signed by Girish Oshea MD                               Rahat Coma Scale Score: 14                     Patient History   No past medical history on file.  No past surgical history on file.  No family history on file.  Social History     Tobacco Use    Smoking status: Not on file    Smokeless tobacco: Not on file   Substance Use Topics    Alcohol use: Not on file    Drug use: Not on file       Physical Exam   ED Triage Vitals [07/06/24 2036]   Temperature Heart Rate Respirations  BP   36.6 °C (97.8 °F) 84 13 150/81      Pulse Ox Temp Source Heart Rate Source Patient Position   98 % Oral Monitor Lying      BP Location FiO2 (%)     Right arm --       Physical Exam    ED Course & MDM   Diagnoses as of 07/13/24 1230   NSTEMI (non-ST elevated myocardial infarction) (Multi)       Medical Decision Making      Procedure  Procedures     Josesito Trevino,   07/13/24 1238

## 2024-07-07 NOTE — H&P
INTERNAL MEDICINE     HISTORY AND PHYSICAL      Chief Complaint:  Nausea, dizziness    History Of Present Illness  Isis Geronimo is a 79 y.o. female presenting with nausea and dizziness yesterday while with family.  She had no chest pain to her recollection.  She had at least 1 bout of emesis.  She came to the emergency room for evaluation.  Lab work included troponin which was found to be significantly elevated.  Patient reports no prior cardiac history.  She was placed on a heparin drip and admitted for further treatment.  She continues to report no chest pain.     Past Medical History  She has a past medical history of Arthritis, Hypertension, and Kidney disease.  She has end-stage renal disease on daily peritoneal dialysis.    Surgical History  Dialysis access procedures only.     Social History  She reports that she has been smoking cigarettes. She has been exposed to tobacco smoke. She does not have any smokeless tobacco history on file. She reports that she does not currently use alcohol. No history on file for drug use.  Lives at home alone.    Family History  Hypertension     Allergies  Chlorothiazide, Metoprolol, Ibuprofen, and Penicillins    Home Medications:  Prior to Admission medications    Medication Sig Start Date End Date Taking? Authorizing Provider   amLODIPine (Norvasc) 10 mg tablet Take by mouth once daily.    Historical Provider, MD   atorvastatin (Lipitor) 20 mg tablet Take 1 tablet (20 mg) by mouth once daily.    Historical Provider, MD   losartan (Cozaar) 50 mg tablet Take 2 tablets (100 mg) by mouth once daily.    Historical Provider, MD        Review of Systems   Constitutional: Negative.    HENT: Negative.     Eyes: Negative.    Respiratory: Negative.     Cardiovascular: Negative.    Gastrointestinal:  Positive for nausea and vomiting.   Musculoskeletal: Negative.    Skin: Negative.    Neurological: Negative.        Last Recorded Vitals  Blood pressure 157/71, pulse 79, temperature  "36.7 °C (98 °F), temperature source Temporal, resp. rate 16, height 1.6 m (5' 3\"), weight 48.8 kg (107 lb 9.4 oz), SpO2 100%.    Physical Exam    Constitutional:       Appearance: Normal appearance.   HENT:      Head: Normocephalic and atraumatic.   Eyes:      Extraocular Movements: Extraocular movements intact.      Pupils: Pupils are equal, round, and reactive to light.   Cardiovascular:      Rate and Rhythm: Normal rate and regular rhythm.      Pulses: Normal pulses.      Heart sounds: Normal heart sounds.   Pulmonary:      Effort: Pulmonary effort is normal.      Breath sounds: Normal breath sounds.   Abdominal:      General: Abdomen is flat. Bowel sounds are normal.      Palpations: Abdomen is soft.   Musculoskeletal:         General: Normal range of motion.      Cervical back: Normal range of motion and neck supple.   Skin:     General: Skin is warm and dry.   Neurological:      General: No focal deficit present.      Mental Status: Alert and oriented to person, place, and time. Mental status is at baseline.       Relevant Results    Results for orders placed or performed during the hospital encounter of 07/06/24 (from the past 24 hour(s))   CBC and Auto Differential   Result Value Ref Range    WBC 7.5 4.4 - 11.3 x10*3/uL    nRBC 0.0 0.0 - 0.0 /100 WBCs    RBC 3.59 (L) 4.00 - 5.20 x10*6/uL    Hemoglobin 9.6 (L) 12.0 - 16.0 g/dL    Hematocrit 29.1 (L) 36.0 - 46.0 %    MCV 81 80 - 100 fL    MCH 26.7 26.0 - 34.0 pg    MCHC 33.0 32.0 - 36.0 g/dL    RDW 16.0 (H) 11.5 - 14.5 %    Platelets 261 150 - 450 x10*3/uL    Neutrophils % 76.2 40.0 - 80.0 %    Immature Granulocytes %, Automated 0.5 0.0 - 0.9 %    Lymphocytes % 16.3 13.0 - 44.0 %    Monocytes % 5.1 2.0 - 10.0 %    Eosinophils % 1.6 0.0 - 6.0 %    Basophils % 0.3 0.0 - 2.0 %    Neutrophils Absolute 5.72 (H) 1.60 - 5.50 x10*3/uL    Immature Granulocytes Absolute, Automated 0.04 0.00 - 0.50 x10*3/uL    Lymphocytes Absolute 1.22 0.80 - 3.00 x10*3/uL    Monocytes " Absolute 0.38 0.05 - 0.80 x10*3/uL    Eosinophils Absolute 0.12 0.00 - 0.40 x10*3/uL    Basophils Absolute 0.02 0.00 - 0.10 x10*3/uL   Comprehensive metabolic panel   Result Value Ref Range    Glucose 121 (H) 74 - 99 mg/dL    Sodium 141 136 - 145 mmol/L    Potassium 2.9 (LL) 3.5 - 5.3 mmol/L    Chloride 103 98 - 107 mmol/L    Bicarbonate 25 21 - 32 mmol/L    Anion Gap 16 10 - 20 mmol/L    Urea Nitrogen 36 (H) 6 - 23 mg/dL    Creatinine 9.36 (H) 0.50 - 1.05 mg/dL    eGFR 4 (L) >60 mL/min/1.73m*2    Calcium 9.6 8.6 - 10.3 mg/dL    Albumin 3.5 3.4 - 5.0 g/dL    Alkaline Phosphatase 91 33 - 136 U/L    Total Protein 7.1 6.4 - 8.2 g/dL    AST 17 9 - 39 U/L    Bilirubin, Total 0.3 0.0 - 1.2 mg/dL    ALT 11 7 - 45 U/L   Troponin I, High Sensitivity   Result Value Ref Range    Troponin I, High Sensitivity 39 (H) 0 - 13 ng/L   Lipase   Result Value Ref Range    Lipase 74 9 - 82 U/L   B-type natriuretic peptide   Result Value Ref Range     (H) 0 - 99 pg/mL   Troponin I, High Sensitivity   Result Value Ref Range    Troponin I, High Sensitivity 69 (HH) 0 - 13 ng/L   Troponin I, High Sensitivity   Result Value Ref Range    Troponin I, High Sensitivity 1,411 (HH) 0 - 13 ng/L   Magnesium   Result Value Ref Range    Magnesium 1.50 (L) 1.60 - 2.40 mg/dL   Urinalysis with Reflex Culture and Microscopic   Result Value Ref Range    Color, Urine Light-Yellow Light-Yellow, Yellow, Dark-Yellow    Appearance, Urine Clear Clear    Specific Gravity, Urine 1.009 1.005 - 1.035    pH, Urine 7.5 5.0, 5.5, 6.0, 6.5, 7.0, 7.5, 8.0    Protein, Urine 100 (2+) (A) NEGATIVE, 10 (TRACE), 20 (TRACE) mg/dL    Glucose, Urine 70 (1+) (A) Normal mg/dL    Blood, Urine 0.1 (1+) (A) NEGATIVE    Ketones, Urine NEGATIVE NEGATIVE mg/dL    Bilirubin, Urine NEGATIVE NEGATIVE    Urobilinogen, Urine Normal Normal mg/dL    Nitrite, Urine NEGATIVE NEGATIVE    Leukocyte Esterase, Urine NEGATIVE NEGATIVE   Urinalysis Microscopic   Result Value Ref Range    WBC,  Urine NONE 1-5, NONE /HPF    RBC, Urine NONE NONE, 1-2, 3-5 /HPF    Squamous Epithelial Cells, Urine 1-9 (SPARSE) Reference range not established. /HPF    Bacteria, Urine 1+ (A) NONE SEEN /HPF   Heparin Assay, UFH   Result Value Ref Range    Heparin Unfractionated 0.4 See Comment Below for Therapeutic Ranges IU/mL   CBC and Auto Differential   Result Value Ref Range    WBC 5.7 4.4 - 11.3 x10*3/uL    nRBC 0.0 0.0 - 0.0 /100 WBCs    RBC 3.09 (L) 4.00 - 5.20 x10*6/uL    Hemoglobin 8.4 (L) 12.0 - 16.0 g/dL    Hematocrit 25.6 (L) 36.0 - 46.0 %    MCV 83 80 - 100 fL    MCH 27.2 26.0 - 34.0 pg    MCHC 32.8 32.0 - 36.0 g/dL    RDW 16.1 (H) 11.5 - 14.5 %    Platelets 224 150 - 450 x10*3/uL    Neutrophils % 73.9 40.0 - 80.0 %    Immature Granulocytes %, Automated 0.2 0.0 - 0.9 %    Lymphocytes % 19.1 13.0 - 44.0 %    Monocytes % 5.1 2.0 - 10.0 %    Eosinophils % 1.4 0.0 - 6.0 %    Basophils % 0.3 0.0 - 2.0 %    Neutrophils Absolute 4.23 1.60 - 5.50 x10*3/uL    Immature Granulocytes Absolute, Automated 0.01 0.00 - 0.50 x10*3/uL    Lymphocytes Absolute 1.09 0.80 - 3.00 x10*3/uL    Monocytes Absolute 0.29 0.05 - 0.80 x10*3/uL    Eosinophils Absolute 0.08 0.00 - 0.40 x10*3/uL    Basophils Absolute 0.02 0.00 - 0.10 x10*3/uL   Basic metabolic panel   Result Value Ref Range    Glucose 69 (L) 74 - 99 mg/dL    Sodium 141 136 - 145 mmol/L    Potassium 3.0 (L) 3.5 - 5.3 mmol/L    Chloride 103 98 - 107 mmol/L    Bicarbonate 25 21 - 32 mmol/L    Anion Gap 16 10 - 20 mmol/L    Urea Nitrogen 42 (H) 6 - 23 mg/dL    Creatinine 10.24 (H) 0.50 - 1.05 mg/dL    eGFR 4 (L) >60 mL/min/1.73m*2    Calcium 9.2 8.6 - 10.3 mg/dL   Magnesium   Result Value Ref Range    Magnesium 1.60 1.60 - 2.40 mg/dL         Principal Problem:    NSTEMI (non-ST elevated myocardial infarction) (Multi)        Problem list:  Principal Problem:    NSTEMI (non-ST elevated myocardial infarction) (Multi)      ASSESSMENT AND PLAN:    Non-ST elevation MI -continue with heparin  drip.  Cardiology on consult.  ESRD -renal consult, continue with peritoneal dialysis daily.  Hypertension -monitor with resumption of home meds  Nausea -likely related to the above, resolved.    Patient seen with daughter at bedside.      Jacob Staples MD  07/07/2411:26 AM

## 2024-07-07 NOTE — PROGRESS NOTES
Patient is a 79-year-old female who presented to the emergency room with chief complaint of nausea, vomiting and chest pressure. She was initially seen and evaluated by Dr. Trevino and signed out to me pending a third troponin due to concern for possible NSTEMI.  Patient's initial EKG concerning for ST depressions in V5 and V6 per my conversation with Dr. Trevino and she was also found to be hypokalemic.  Repeat EKG showed improvements.  Please see his initial physician note for details.  Patient is currently without pain but her third troponin went from 69-1,400.  Given patient history and risk factors and per my conversation with Dr. Berry if she is started on heparinization as she denied any contraindications.  Patient remains in stable condition at this time and without chest pain awaiting admission.    Araseli العلي MD

## 2024-07-07 NOTE — CONSULTS
"    History Of Present Illness:    Isis Geronimo is a 79 y.o. female with a past medical history of ESRD on daily Peritoneal dialysis, Anemia, RA, nonsustained VT, hypertension, hyperlipidemia, vertigo, emphysema, carpal tunnel syndrome.  Patient presents with complaints of N/V/constipation and right chest reported gas pain.  Cardiology consulted for further evaluation of NSTEMI HS trop 39/69/1411/19,016.     [place] patient today [place] [facility] patient had her daughter present and gives permission for her to participate in history obtaining.  Patient is alert and oriented.  She reports that prior to yesterday that she was at her normal state of health.  She does admit that for the last 3-4 days she has been without a bowel movement/constipation.  She states that yesterday she ate breakfast and later went to a cookout.  At the party she had chicken and shortly after she developed right chest \"gas discomfort\" followed by emesis that was brown in color. D/t vomiting, family was concerned and decided to bring her to the ED for further evaluation.  While in the ED, patient was found to be hypokalemic K 2.8> potassium supplementation given. Troponins drawn that were initially low level elevation but then bumped to 1411.  The decision was made to consult cardiology for NSTEMI and patient was started on heparin infusion.  Currently, patient denies any complaints of chest pain.  States that she never had chest pain; it was gas pain that has subsided since she had a large BM while here in the hospital.     In addition, patient denies any past medical history of cardiac surgeries or coronary percutaneous interventions.  She reports compliance with daily peritoneal dialysis, her last session was 7/5/2024, she missed yesterday because she was in the hospital.     ED course:  Initial EKG: Sinus rhythm with ST and T wave abnormality, heart rate 86  Arrival vital signs: Afebrile 97.8, HR 84, /81, 98% on room air  Current " vital signs: /69, HR 61, 99% on room air  Pertinent imaging/Labs: HS trop 39/69/1411/19,016, WBC 5.7, Hgb 8.4, , , K 2.9/3.0, CR 10.24, EGFR 4, mag 1.60  ED medications: Heparin bolus and infusion, potassium supplementation        Home CV meds:  Norvasc 10 mg p.o. daily, atorvastatin 20 mg p.o. daily losartan 100 mg p.o. daily        All other systems reviewed and negative unless as mentioned in HPI.       Past Medical/ Surgical History:  ESRD on daily Peritoneal dialysis,  Anemia  Rheumatoid arthritis  Nonsustained ventricular tachycardia  Hypertension  Hyperlipidemia  Vertigo  Emphysema  Diverticular disease    Social History:  Current smoker 1 pack /week  Denies alcohol or illicit drug use     Family History:  Reviewed, not pertinent to presenting problem          Past Cardiology Tests (Last 3 Years):  Echocardiogram 3/31/2022:  CONCLUSIONS:   - Technically difficult exam due to COPD.   - Exam indication: Murmur   - The left ventricle is normal in size. There is moderate left ventricular   hypertrophy. Left ventricular systolic function is normal. EF = 62 ± 5% (2D   biplane) Grade I left ventricular diastolic dysfunction.   - The right ventricle is normal in size. Right ventricular systolic function is   normal.   - The patient has not had a prior CC echocardiographic exam for comparison.       Electronically signed by Star Barboza MD on 3/31/2022 at 4:42:04 PM          Allergies:  Chlorothiazide, Metoprolol, Ibuprofen, and Penicillins    ROS:  10 point review of systems including (Constitutional, Eyes, ENMT, Respiratory, Cardiac, Gastrointestinal, Neurological, Psychiatric, and Hematologic) was performed and is otherwise negative.    Objective Data:  Last Recorded Vitals:  Vitals:    07/07/24 0442 07/07/24 0845 07/07/24 0934 07/07/24 1125   BP:  157/71  158/69   BP Location:  Right arm  Right arm   Patient Position:  Lying  Lying   Pulse:  79  61   Resp:  16  16   Temp:  36.7 °C (98 °F)  37.4  "°C (99.3 °F)   TempSrc:  Temporal  Temporal   SpO2:  100%  99%   Weight: 48.8 kg (107 lb 9.4 oz)  48.8 kg (107 lb 9.4 oz)    Height: 1.6 m (5' 3\")        Medical Gas Therapy: None (Room air)  Weight  Av.9 kg (107 lb 11.6 oz)  Min: 48.8 kg (107 lb 9.4 oz)  Max: 49 kg (108 lb)      LABS:  CMP:  Results from last 7 days   Lab Units 24  08248 24   SODIUM mmol/L 141  --  141   POTASSIUM mmol/L 3.0*  --  2.9*   CHLORIDE mmol/L 103  --  103   CO2 mmol/L 25  --  25   ANION GAP mmol/L 16  --  16   BUN mg/dL 42*  --  36*   CREATININE mg/dL 10.24*  --  9.36*   EGFR mL/min/1.73m*2 4*  --  4*   MAGNESIUM mg/dL 1.60 1.50*  --    ALBUMIN g/dL  --   --  3.5   ALT U/L  --   --  11   AST U/L  --   --  17   BILIRUBIN TOTAL mg/dL  --   --  0.3   LIPASE U/L  --   --  74     CBC:  Results from last 7 days   Lab Units 24   WBC AUTO x10*3/uL 5.7 7.5   HEMOGLOBIN g/dL 8.4* 9.6*   HEMATOCRIT % 25.6* 29.1*   PLATELETS AUTO x10*3/uL 224 261   MCV fL 83 81     COAG:     ABO: No results found for: \"ABO\"  HEME/ENDO:     CARDIAC:   Results from last 7 days   Lab Units 24  0038 24   TROPHS ng/L 1,411* 69* 39*   BNP pg/mL  --   --  283*             Last I/O:    Intake/Output Summary (Last 24 hours) at 2024 1220  Last data filed at 2024 1136  Gross per 24 hour   Intake 50.89 ml   Output 501 ml   Net -450.11 ml     Net IO Since Admission: -450.11 mL [24 1220]         Inpatient Medications:  Scheduled medications   Medication Dose Route Frequency    amLODIPine  10 mg oral Daily    atorvastatin  20 mg oral Daily    dextrose 1.5% - LOW calcium 2.5mEq/L 2,000 mL peritoneal dialysate   intraperitoneal 4x daily    epoetin shawn or biosimilar  10,000 Units subcutaneous Once    losartan  100 mg oral Daily    polyethylene glycol  17 g oral Daily    potassium chloride CR  40 mEq oral Once     PRN medications   Medication    acetaminophen    Or    " acetaminophen    Or    acetaminophen    heparin     Continuous Medications   Medication Dose Last Rate    heparin  0-4,000 Units/hr 600 Units/hr (07/07/24 0226)       Inpatient Medications:  Scheduled medications   Medication Dose Route Frequency    amLODIPine  10 mg oral Daily    atorvastatin  20 mg oral Daily    dextrose 1.5% - LOW calcium 2.5mEq/L 2,000 mL peritoneal dialysate   intraperitoneal 4x daily    epoetin shawn or biosimilar  10,000 Units subcutaneous Once    losartan  100 mg oral Daily    polyethylene glycol  17 g oral Daily    potassium chloride CR  40 mEq oral Once     PRN medications   Medication    acetaminophen    Or    acetaminophen    Or    acetaminophen    heparin     Continuous Medications   Medication Dose Last Rate    heparin  0-4,000 Units/hr 600 Units/hr (07/07/24 0226)     Outpatient Medications:  Current Outpatient Medications   Medication Instructions    amLODIPine (Norvasc) 10 mg tablet oral, Daily    atorvastatin (LIPITOR) 20 mg, oral, Daily    losartan (COZAAR) 100 mg, oral, Daily       Physical Exam:  General: Agitated elderly female, alert and oriented, NAD  HEENT:  Pupils equal and reactive.  Normocephalic.  Moist mucosa.    Neck:  No thyromegaly.  Normal Jugular Venous Pressure.  Cardiovascular:  Regular rate and rhythm.  + Cardiac murmur.  Normal S1 and S2.  Pulmonary:  Clear to auscultation bilaterally.  Abdomen:  Soft. Non-tender.   Non-distended.  Positive bowel sounds.  Lower Extremities:  2+ pedal pulses. No LE edema.  Neurologic:  Cranial nerves intact.  No focal deficit.   Skin: Skin warm and dry, normal skin turgor.   Psychiatric: Agitated     Assessment/Plan   Isis Geronimo is a 79 y.o. female with a past medical history of ESRD on daily Peritoneal dialysis, Anemia, RA, nonsustained VT, hypertension, hyperlipidemia, vertigo, emphysema, carpal tunnel syndrome.  Patient presents with complaints of N/V/constipation and right chest reported gas pain.  Cardiology consulted  for further evaluation of NSTEMI HS trop 39/69/1411/19,016.     I reviewed EKGs, labs and all imaging reports  I reviewed telemetry, sinus rhythm with short runs of NSVT (3 beats)    1.  NSTEMI  CT of chest abdomen pelvis without IV contrast reveals  No pleural effusions, extensive atherosclerotic calcifications of the abdominal aorta and branch vessels, no aneurysms    2.  Hypertension. Suboptimal> monitoring    3.  Hyperlipidemia. Reports was not taking prescribed statin  Lipid panel pending  C/w statin as ordered    4.  Anemia> hgb 8.4 defer management to primary    5. ESRD on daily peritoneal dialysis> volume management per nephrology      Recommendations:  NSTEMI  Patient will benefit from ischemic evaluation  NPO after MN; will plan for left heart cath tomorrow pending scheduling and no further worsening of anemia >discussed with patient and she is in agreement with proceeding   C/w heparin infusion, BB, statin and aspirin as ordered> patient reports GI bleeding on ASA in past, willing to take tomorrow prior to [facility] and pending results will discuss continuation  Anemia will need to be addressed> consider blood transfusion/GI evaluation> will defer to primary  Echocardiogram ordered and pending    Code Status:  Full Code    Amaris Vann, APRN-CNP    Inpatient consult to Cardiology  Consult performed by: Dontae DICKEY MD  Consult ordered by: Jacob Staples MD  Reason for consult: NSTEMI        Thank you for allowing me to participate in their care.  Please feel free to call me with any further questions or concerns.    Dontae Gonzalez MD, FACC, [facility]  Division of Cardiovascular Medicine  System Director, Nuclear Cardiology   Medical Director, [facility] [place], [facility]   Clinical , [employer]  [email or web address]   Office:  [phone]          Both the ROSA and I  have had a face to face encounter with the patient today. I have examined the patient and edited the documented physical examination as necessary.  I personally reviewed the patient's vital signs, telemetry, recent labs, medications, orders, EKGs, and pertinent cardiac imaging/ echocardiography.  I have reviewed the ROSA's encounter note, approve the ROSA's documentation and have edited the note to reflect my diagnostic and therapeutic plan.

## 2024-07-07 NOTE — ED PROCEDURE NOTE
Procedure  Critical Care    Performed by: Araseli لاعلي MD  Authorized by: Josesito Trevino DO    Critical care provider statement:     Critical care time (minutes):  25    Critical care was necessary to treat or prevent imminent or life-threatening deterioration of the following conditions: NSTEMI.    Critical care was time spent personally by me on the following activities:  Blood draw for specimens, ordering and review of laboratory studies, pulse oximetry and re-evaluation of patient's condition               Araseli العلي MD  07/07/24 0215

## 2024-07-07 NOTE — ED NOTES
Pt's Daughters:     Jocelyn [phone]    Lisa [phone]     Rose Calloway, RN  07/07/24 9845

## 2024-07-07 NOTE — CONSULTS
CONSULT: NEPHROLOGY SERVICE    REASON FOR CONSULT: ESKD  Admit Date: 7/6/2024  8:28 PM       HPI: Patient is a 79 y.o. female admitted 7/6/2024 with h/o ESKD on PD, HTN, coming with nausea/vomiting and CP, noted to have elevated trop, felling better now  No SOB/edema, clear PD fluid     APD under care od Dr Zarate, [facility] home unit transportation Bl.     Past Medical History:   Diagnosis Date    Arthritis     Hypertension     Kidney disease      Allergies: Chlorothiazide, Metoprolol, Ibuprofen, and Penicillins     History reviewed. No pertinent surgical history.    No family history on file.    Social History  She reports that she has been smoking cigarettes. She has been exposed to tobacco smoke. She does not have any smokeless tobacco history on file. She reports that she does not currently use alcohol. No history on file for drug use.    Review of Systems  As above    CURRENT HOSP MEDS:    Current Facility-Administered Medications:     acetaminophen (Tylenol) tablet 650 mg, 650 mg, oral, q4h PRN **OR** acetaminophen (Tylenol) oral liquid 650 mg, 650 mg, oral, q4h PRN **OR** acetaminophen (Tylenol) suppository 650 mg, 650 mg, rectal, q4h PRN, Jacob Staples MD    amLODIPine (Norvasc) tablet 10 mg, 10 mg, oral, Daily, Jacob Staples MD, 10 mg at 07/07/24 0833    atorvastatin (Lipitor) tablet 20 mg, 20 mg, oral, Daily, Jacob Staples MD    heparin 25,000 Units in dextrose 5% 250 mL (100 Units/mL) infusion (premix), 0-4,000 Units/hr, intravenous, Continuous, Araseli العلي MD, Last Rate: 6 mL/hr at 07/07/24 0226, 600 Units/hr at 07/07/24 0226    heparin bolus from bag 1,500-3,000 Units, 1,500-3,000 Units, intravenous, q4h PRN, Araseli العلي MD    losartan (Cozaar) tablet 100 mg, 100 mg, oral, Daily, Jacob Staples MD, 100 mg at 07/07/24 0833    polyethylene glycol (Glycolax, Miralax) packet 17 g, 17 g, oral, Daily, Jacob Staples MD, 17 g at 07/07/24 7304    potassium  "chloride CR (Klor-Con M20) ER tablet 40 mEq, 40 mEq, oral, Once, Josesito M Magdalenafe, DO     PHYSICAL EXAM:  /71 (BP Location: Right arm, Patient Position: Lying)   Pulse 79   Temp 36.7 °C (98 °F) (Temporal)   Resp 16   Ht 1.6 m (5' 3\")   Wt 48.8 kg (107 lb 9.4 oz)   SpO2 100%   BMI 19.06 kg/m²     Intake/Output Summary (Last 24 hours) at 7/7/2024 0928  Last data filed at 7/7/2024 0615  Gross per 24 hour   Intake 50.89 ml   Output 300 ml   Net -249.11 ml     Gen: AAO, NAD  Neck: No JVD  Cardiac: RRR  Resp: clear BS  Abd: Soft, non tender, +BS, non distended   PD site OK  Ext: No edema   Neuro: moves 4 ext  Peripheral Pulses: Capillary refill <2secs, strong peripheral pulses.  Skin: Skin color, texture, turgor normal, no suspicious rashes or lesions.    LABS:   Results for orders placed or performed during the hospital encounter of 07/06/24 (from the past 24 hour(s))   CBC and Auto Differential   Result Value Ref Range    WBC 7.5 4.4 - 11.3 x10*3/uL    nRBC 0.0 0.0 - 0.0 /100 WBCs    RBC 3.59 (L) 4.00 - 5.20 x10*6/uL    Hemoglobin 9.6 (L) 12.0 - 16.0 g/dL    Hematocrit 29.1 (L) 36.0 - 46.0 %    MCV 81 80 - 100 fL    MCH 26.7 26.0 - 34.0 pg    MCHC 33.0 32.0 - 36.0 g/dL    RDW 16.0 (H) 11.5 - 14.5 %    Platelets 261 150 - 450 x10*3/uL    Neutrophils % 76.2 40.0 - 80.0 %    Immature Granulocytes %, Automated 0.5 0.0 - 0.9 %    Lymphocytes % 16.3 13.0 - 44.0 %    Monocytes % 5.1 2.0 - 10.0 %    Eosinophils % 1.6 0.0 - 6.0 %    Basophils % 0.3 0.0 - 2.0 %    Neutrophils Absolute 5.72 (H) 1.60 - 5.50 x10*3/uL    Immature Granulocytes Absolute, Automated 0.04 0.00 - 0.50 x10*3/uL    Lymphocytes Absolute 1.22 0.80 - 3.00 x10*3/uL    Monocytes Absolute 0.38 0.05 - 0.80 x10*3/uL    Eosinophils Absolute 0.12 0.00 - 0.40 x10*3/uL    Basophils Absolute 0.02 0.00 - 0.10 x10*3/uL   Comprehensive metabolic panel   Result Value Ref Range    Glucose 121 (H) 74 - 99 mg/dL    Sodium 141 136 - 145 mmol/L    Potassium 2.9 (LL) 3.5 " - 5.3 mmol/L    Chloride 103 98 - 107 mmol/L    Bicarbonate 25 21 - 32 mmol/L    Anion Gap 16 10 - 20 mmol/L    Urea Nitrogen 36 (H) 6 - 23 mg/dL    Creatinine 9.36 (H) 0.50 - 1.05 mg/dL    eGFR 4 (L) >60 mL/min/1.73m*2    Calcium 9.6 8.6 - 10.3 mg/dL    Albumin 3.5 3.4 - 5.0 g/dL    Alkaline Phosphatase 91 33 - 136 U/L    Total Protein 7.1 6.4 - 8.2 g/dL    AST 17 9 - 39 U/L    Bilirubin, Total 0.3 0.0 - 1.2 mg/dL    ALT 11 7 - 45 U/L   Troponin I, High Sensitivity   Result Value Ref Range    Troponin I, High Sensitivity 39 (H) 0 - 13 ng/L   Lipase   Result Value Ref Range    Lipase 74 9 - 82 U/L   B-type natriuretic peptide   Result Value Ref Range     (H) 0 - 99 pg/mL   Troponin I, High Sensitivity   Result Value Ref Range    Troponin I, High Sensitivity 69 (HH) 0 - 13 ng/L   Troponin I, High Sensitivity   Result Value Ref Range    Troponin I, High Sensitivity 1,411 (HH) 0 - 13 ng/L   Magnesium   Result Value Ref Range    Magnesium 1.50 (L) 1.60 - 2.40 mg/dL   Urinalysis with Reflex Culture and Microscopic   Result Value Ref Range    Color, Urine Light-Yellow Light-Yellow, Yellow, Dark-Yellow    Appearance, Urine Clear Clear    Specific Gravity, Urine 1.009 1.005 - 1.035    pH, Urine 7.5 5.0, 5.5, 6.0, 6.5, 7.0, 7.5, 8.0    Protein, Urine 100 (2+) (A) NEGATIVE, 10 (TRACE), 20 (TRACE) mg/dL    Glucose, Urine 70 (1+) (A) Normal mg/dL    Blood, Urine 0.1 (1+) (A) NEGATIVE    Ketones, Urine NEGATIVE NEGATIVE mg/dL    Bilirubin, Urine NEGATIVE NEGATIVE    Urobilinogen, Urine Normal Normal mg/dL    Nitrite, Urine NEGATIVE NEGATIVE    Leukocyte Esterase, Urine NEGATIVE NEGATIVE   Urinalysis Microscopic   Result Value Ref Range    WBC, Urine NONE 1-5, NONE /HPF    RBC, Urine NONE NONE, 1-2, 3-5 /HPF    Squamous Epithelial Cells, Urine 1-9 (SPARSE) Reference range not established. /HPF    Bacteria, Urine 1+ (A) NONE SEEN /HPF   Heparin Assay, UFH   Result Value Ref Range    Heparin Unfractionated 0.4 See Comment  Below for Therapeutic Ranges IU/mL   CBC and Auto Differential   Result Value Ref Range    WBC 5.7 4.4 - 11.3 x10*3/uL    nRBC 0.0 0.0 - 0.0 /100 WBCs    RBC 3.09 (L) 4.00 - 5.20 x10*6/uL    Hemoglobin 8.4 (L) 12.0 - 16.0 g/dL    Hematocrit 25.6 (L) 36.0 - 46.0 %    MCV 83 80 - 100 fL    MCH 27.2 26.0 - 34.0 pg    MCHC 32.8 32.0 - 36.0 g/dL    RDW 16.1 (H) 11.5 - 14.5 %    Platelets 224 150 - 450 x10*3/uL    Neutrophils % 73.9 40.0 - 80.0 %    Immature Granulocytes %, Automated 0.2 0.0 - 0.9 %    Lymphocytes % 19.1 13.0 - 44.0 %    Monocytes % 5.1 2.0 - 10.0 %    Eosinophils % 1.4 0.0 - 6.0 %    Basophils % 0.3 0.0 - 2.0 %    Neutrophils Absolute 4.23 1.60 - 5.50 x10*3/uL    Immature Granulocytes Absolute, Automated 0.01 0.00 - 0.50 x10*3/uL    Lymphocytes Absolute 1.09 0.80 - 3.00 x10*3/uL    Monocytes Absolute 0.29 0.05 - 0.80 x10*3/uL    Eosinophils Absolute 0.08 0.00 - 0.40 x10*3/uL    Basophils Absolute 0.02 0.00 - 0.10 x10*3/uL   Basic metabolic panel   Result Value Ref Range    Glucose 69 (L) 74 - 99 mg/dL    Sodium 141 136 - 145 mmol/L    Potassium 3.0 (L) 3.5 - 5.3 mmol/L    Chloride 103 98 - 107 mmol/L    Bicarbonate 25 21 - 32 mmol/L    Anion Gap 16 10 - 20 mmol/L    Urea Nitrogen 42 (H) 6 - 23 mg/dL    Creatinine 10.24 (H) 0.50 - 1.05 mg/dL    eGFR 4 (L) >60 mL/min/1.73m*2    Calcium 9.2 8.6 - 10.3 mg/dL   Magnesium   Result Value Ref Range    Magnesium 1.60 1.60 - 2.40 mg/dL       DATA:   Diagnostic tests reviewed for today's visit:    Labs and meds    ASSESSMENT AND PLAN:  - ESKD on PD: APD at home, 8h/3 cycles, dry abdomen during day, can't recall volume well but seems low, good UO  Euvolemic on exam  No signs of peritonitis  HTN: suboptimal control  Mild hypoK  Hb 8.4    PLAN:  - CAPD here, giving epogen, KCL today, stable from my stand point       Greatly appreciate the opportunity to assist in the care of this patient. Will continue to follow.     Signature: Lyle Florez MD  Division of  Nephrology and Hypertension

## 2024-07-07 NOTE — ED TRIAGE NOTES
Pt arrived to ED via EMS from home. Pt has complaint of generalized weakness and constipation x3 days. . Pt does nightly peritoneal dialysis at home. Pt is confused to place and time

## 2024-07-08 ENCOUNTER — APPOINTMENT (OUTPATIENT)
Dept: CARDIOLOGY | Facility: HOSPITAL | Age: 79
End: 2024-07-08
Payer: COMMERCIAL

## 2024-07-08 LAB
ALBUMIN SERPL BCP-MCNC: 2.9 G/DL (ref 3.4–5)
ANION GAP SERPL CALC-SCNC: 13 MMOL/L (ref 10–20)
AORTIC VALVE MEAN GRADIENT: 3 MMHG
AORTIC VALVE PEAK VELOCITY: 1.09 M/S
AV PEAK GRADIENT: 4.8 MMHG
AVA (PEAK VEL): 2.27 CM2
AVA (VTI): 2.03 CM2
BUN SERPL-MCNC: 40 MG/DL (ref 6–23)
CALCIUM SERPL-MCNC: 8.4 MG/DL (ref 8.6–10.3)
CARDIAC TROPONIN I PNL SERPL HS: ABNORMAL NG/L (ref 0–13)
CARDIAC TROPONIN I PNL SERPL HS: ABNORMAL NG/L (ref 0–13)
CHLORIDE SERPL-SCNC: 100 MMOL/L (ref 98–107)
CO2 SERPL-SCNC: 28 MMOL/L (ref 21–32)
CREAT SERPL-MCNC: 9.46 MG/DL (ref 0.5–1.05)
EGFRCR SERPLBLD CKD-EPI 2021: 4 ML/MIN/1.73M*2
EJECTION FRACTION APICAL 4 CHAMBER: 55.8
EJECTION FRACTION: 61 %
ERYTHROCYTE [DISTWIDTH] IN BLOOD BY AUTOMATED COUNT: 15.7 % (ref 11.5–14.5)
GLUCOSE SERPL-MCNC: 75 MG/DL (ref 74–99)
HCT VFR BLD AUTO: 25.8 % (ref 36–46)
HGB BLD-MCNC: 8.4 G/DL (ref 12–16)
LEFT VENTRICLE INTERNAL DIMENSION DIASTOLE: 4.1 CM (ref 3.5–6)
LEFT VENTRICULAR OUTFLOW TRACT DIAMETER: 2.07 CM
MCH RBC QN AUTO: 26.4 PG (ref 26–34)
MCHC RBC AUTO-ENTMCNC: 32.6 G/DL (ref 32–36)
MCV RBC AUTO: 81 FL (ref 80–100)
MITRAL VALVE E/A RATIO: 0.67
MITRAL VALVE E/E' RATIO: 12.4
NRBC BLD-RTO: 0 /100 WBCS (ref 0–0)
PHOSPHATE SERPL-MCNC: 4.2 MG/DL (ref 2.5–4.9)
PLATELET # BLD AUTO: 244 X10*3/UL (ref 150–450)
POTASSIUM SERPL-SCNC: 3 MMOL/L (ref 3.5–5.3)
POTASSIUM SERPL-SCNC: 3.5 MMOL/L (ref 3.5–5.3)
RBC # BLD AUTO: 3.18 X10*6/UL (ref 4–5.2)
SODIUM SERPL-SCNC: 138 MMOL/L (ref 136–145)
UFH PPP CHRO-ACNC: 0.3 IU/ML
WBC # BLD AUTO: 5.2 X10*3/UL (ref 4.4–11.3)

## 2024-07-08 PROCEDURE — 2500000002 HC RX 250 W HCPCS SELF ADMINISTERED DRUGS (ALT 637 FOR MEDICARE OP, ALT 636 FOR OP/ED): Performed by: NURSE PRACTITIONER

## 2024-07-08 PROCEDURE — B2111ZZ FLUOROSCOPY OF MULTIPLE CORONARY ARTERIES USING LOW OSMOLAR CONTRAST: ICD-10-PCS | Performed by: INTERNAL MEDICINE

## 2024-07-08 PROCEDURE — 36415 COLL VENOUS BLD VENIPUNCTURE: CPT | Performed by: INTERNAL MEDICINE

## 2024-07-08 PROCEDURE — 93306 TTE W/DOPPLER COMPLETE: CPT

## 2024-07-08 PROCEDURE — C1887 CATHETER, GUIDING: HCPCS | Performed by: INTERNAL MEDICINE

## 2024-07-08 PROCEDURE — 84484 ASSAY OF TROPONIN QUANT: CPT | Performed by: NURSE PRACTITIONER

## 2024-07-08 PROCEDURE — 2500000001 HC RX 250 WO HCPCS SELF ADMINISTERED DRUGS (ALT 637 FOR MEDICARE OP): Performed by: INTERNAL MEDICINE

## 2024-07-08 PROCEDURE — 99152 MOD SED SAME PHYS/QHP 5/>YRS: CPT | Performed by: INTERNAL MEDICINE

## 2024-07-08 PROCEDURE — 2060000001 HC INTERMEDIATE ICU ROOM DAILY

## 2024-07-08 PROCEDURE — 2720000007 HC OR 272 NO HCPCS: Performed by: INTERNAL MEDICINE

## 2024-07-08 PROCEDURE — 2500000001 HC RX 250 WO HCPCS SELF ADMINISTERED DRUGS (ALT 637 FOR MEDICARE OP)

## 2024-07-08 PROCEDURE — 93458 L HRT ARTERY/VENTRICLE ANGIO: CPT | Performed by: INTERNAL MEDICINE

## 2024-07-08 PROCEDURE — 2500000001 HC RX 250 WO HCPCS SELF ADMINISTERED DRUGS (ALT 637 FOR MEDICARE OP): Performed by: NURSE PRACTITIONER

## 2024-07-08 PROCEDURE — C1760 CLOSURE DEV, VASC: HCPCS | Performed by: INTERNAL MEDICINE

## 2024-07-08 PROCEDURE — 99222 1ST HOSP IP/OBS MODERATE 55: CPT | Performed by: NURSE PRACTITIONER

## 2024-07-08 PROCEDURE — 99153 MOD SED SAME PHYS/QHP EA: CPT | Performed by: INTERNAL MEDICINE

## 2024-07-08 PROCEDURE — 2550000001 HC RX 255 CONTRASTS: Performed by: INTERNAL MEDICINE

## 2024-07-08 PROCEDURE — 2500000005 HC RX 250 GENERAL PHARMACY W/O HCPCS: Performed by: NURSE PRACTITIONER

## 2024-07-08 PROCEDURE — 85027 COMPLETE CBC AUTOMATED: CPT | Performed by: INTERNAL MEDICINE

## 2024-07-08 PROCEDURE — 2500000004 HC RX 250 GENERAL PHARMACY W/ HCPCS (ALT 636 FOR OP/ED): Performed by: INTERNAL MEDICINE

## 2024-07-08 PROCEDURE — 2500000005 HC RX 250 GENERAL PHARMACY W/O HCPCS: Performed by: INTERNAL MEDICINE

## 2024-07-08 PROCEDURE — C1894 INTRO/SHEATH, NON-LASER: HCPCS | Performed by: INTERNAL MEDICINE

## 2024-07-08 PROCEDURE — C1769 GUIDE WIRE: HCPCS | Performed by: INTERNAL MEDICINE

## 2024-07-08 PROCEDURE — 2500000004 HC RX 250 GENERAL PHARMACY W/ HCPCS (ALT 636 FOR OP/ED): Performed by: NURSE PRACTITIONER

## 2024-07-08 PROCEDURE — 80069 RENAL FUNCTION PANEL: CPT | Performed by: INTERNAL MEDICINE

## 2024-07-08 PROCEDURE — 85520 HEPARIN ASSAY: CPT | Performed by: EMERGENCY MEDICINE

## 2024-07-08 PROCEDURE — 4A023N7 MEASUREMENT OF CARDIAC SAMPLING AND PRESSURE, LEFT HEART, PERCUTANEOUS APPROACH: ICD-10-PCS | Performed by: INTERNAL MEDICINE

## 2024-07-08 PROCEDURE — 93306 TTE W/DOPPLER COMPLETE: CPT | Performed by: STUDENT IN AN ORGANIZED HEALTH CARE EDUCATION/TRAINING PROGRAM

## 2024-07-08 PROCEDURE — 99233 SBSQ HOSP IP/OBS HIGH 50: CPT | Performed by: STUDENT IN AN ORGANIZED HEALTH CARE EDUCATION/TRAINING PROGRAM

## 2024-07-08 PROCEDURE — 36415 COLL VENOUS BLD VENIPUNCTURE: CPT | Performed by: NURSE PRACTITIONER

## 2024-07-08 PROCEDURE — 84132 ASSAY OF SERUM POTASSIUM: CPT | Performed by: NURSE PRACTITIONER

## 2024-07-08 RX ORDER — CARVEDILOL 12.5 MG/1
12.5 TABLET ORAL 2 TIMES DAILY
Status: DISCONTINUED | OUTPATIENT
Start: 2024-07-08 | End: 2024-07-09

## 2024-07-08 RX ORDER — LIDOCAINE HYDROCHLORIDE 10 MG/ML
INJECTION, SOLUTION EPIDURAL; INFILTRATION; INTRACAUDAL; PERINEURAL AS NEEDED
Status: DISCONTINUED | OUTPATIENT
Start: 2024-07-08 | End: 2024-07-08 | Stop reason: HOSPADM

## 2024-07-08 RX ORDER — HEPARIN SODIUM 1000 [USP'U]/ML
INJECTION, SOLUTION INTRAVENOUS; SUBCUTANEOUS AS NEEDED
Status: DISCONTINUED | OUTPATIENT
Start: 2024-07-08 | End: 2024-07-08 | Stop reason: HOSPADM

## 2024-07-08 RX ORDER — FENTANYL CITRATE 50 UG/ML
INJECTION, SOLUTION INTRAMUSCULAR; INTRAVENOUS AS NEEDED
Status: DISCONTINUED | OUTPATIENT
Start: 2024-07-08 | End: 2024-07-08 | Stop reason: HOSPADM

## 2024-07-08 RX ORDER — VERAPAMIL HYDROCHLORIDE 2.5 MG/ML
INJECTION, SOLUTION INTRAVENOUS AS NEEDED
Status: DISCONTINUED | OUTPATIENT
Start: 2024-07-08 | End: 2024-07-08 | Stop reason: HOSPADM

## 2024-07-08 RX ORDER — POTASSIUM CHLORIDE 20 MEQ/1
20 TABLET, EXTENDED RELEASE ORAL ONCE
Status: COMPLETED | OUTPATIENT
Start: 2024-07-08 | End: 2024-07-08

## 2024-07-08 RX ORDER — POTASSIUM CHLORIDE 20 MEQ/1
40 TABLET, EXTENDED RELEASE ORAL ONCE
Status: COMPLETED | OUTPATIENT
Start: 2024-07-08 | End: 2024-07-08

## 2024-07-08 RX ORDER — MIDAZOLAM HYDROCHLORIDE 1 MG/ML
INJECTION, SOLUTION INTRAMUSCULAR; INTRAVENOUS AS NEEDED
Status: DISCONTINUED | OUTPATIENT
Start: 2024-07-08 | End: 2024-07-08 | Stop reason: HOSPADM

## 2024-07-08 RX ADMIN — CARVEDILOL 12.5 MG: 12.5 TABLET, FILM COATED ORAL at 22:09

## 2024-07-08 RX ADMIN — METOPROLOL TARTRATE 12.5 MG: 25 TABLET, FILM COATED ORAL at 08:50

## 2024-07-08 RX ADMIN — SODIUM CHLORIDE, SODIUM LACTATE, CALCIUM CHLORIDE, MAGNESIUM CHLORIDE AND DEXTROSE: 1.5; 538; 448; 18.3; 5.08 INJECTION, SOLUTION INTRAPERITONEAL at 14:41

## 2024-07-08 RX ADMIN — Medication 1 L/MIN: at 15:00

## 2024-07-08 RX ADMIN — SODIUM CHLORIDE, SODIUM LACTATE, CALCIUM CHLORIDE, MAGNESIUM CHLORIDE AND DEXTROSE: 1.5; 538; 448; 18.3; 5.08 INJECTION, SOLUTION INTRAPERITONEAL at 22:51

## 2024-07-08 RX ADMIN — POTASSIUM CHLORIDE 20 MEQ: 1500 TABLET, EXTENDED RELEASE ORAL at 12:30

## 2024-07-08 RX ADMIN — AMLODIPINE BESYLATE 10 MG: 10 TABLET ORAL at 08:50

## 2024-07-08 RX ADMIN — ASPIRIN 81 MG 81 MG: 81 TABLET ORAL at 08:50

## 2024-07-08 RX ADMIN — POTASSIUM CHLORIDE 40 MEQ: 1500 TABLET, EXTENDED RELEASE ORAL at 08:50

## 2024-07-08 RX ADMIN — LOSARTAN POTASSIUM 100 MG: 50 TABLET, FILM COATED ORAL at 08:50

## 2024-07-08 RX ADMIN — ATORVASTATIN CALCIUM 20 MG: 20 TABLET, FILM COATED ORAL at 08:50

## 2024-07-08 ASSESSMENT — ENCOUNTER SYMPTOMS
PSYCHIATRIC NEGATIVE: 1
RESPIRATORY NEGATIVE: 1
NAUSEA: 1
HEMATOLOGIC/LYMPHATIC NEGATIVE: 1
DIZZINESS: 1
ENDOCRINE NEGATIVE: 1
VOMITING: 1
CARDIOVASCULAR NEGATIVE: 1
EYES NEGATIVE: 1
ALLERGIC/IMMUNOLOGIC NEGATIVE: 1
MUSCULOSKELETAL NEGATIVE: 1
CONSTITUTIONAL NEGATIVE: 1

## 2024-07-08 ASSESSMENT — PAIN - FUNCTIONAL ASSESSMENT
PAIN_FUNCTIONAL_ASSESSMENT: 0-10

## 2024-07-08 ASSESSMENT — COGNITIVE AND FUNCTIONAL STATUS - GENERAL
MOBILITY SCORE: 20
CLIMB 3 TO 5 STEPS WITH RAILING: A LITTLE
MOVING TO AND FROM BED TO CHAIR: A LITTLE
DAILY ACTIVITIY SCORE: 21
HELP NEEDED FOR BATHING: A LITTLE
DRESSING REGULAR LOWER BODY CLOTHING: A LITTLE
TOILETING: A LITTLE
WALKING IN HOSPITAL ROOM: A LITTLE
STANDING UP FROM CHAIR USING ARMS: A LITTLE

## 2024-07-08 ASSESSMENT — PAIN SCALES - GENERAL
PAINLEVEL_OUTOF10: 0 - NO PAIN

## 2024-07-08 ASSESSMENT — ACTIVITIES OF DAILY LIVING (ADL): LACK_OF_TRANSPORTATION: NO

## 2024-07-08 NOTE — PROGRESS NOTES
Isis Geronimo is a 79 y.o. female on day 1 of admission presenting with NSTEMI (non-ST elevated myocardial infarction) (Multi).       07/08/24 1113   Discharge Planning   Living Arrangements Alone   Support Systems Children   Assistance Needed Independent   Type of Residence Private residence   Do you have animals or pets at home? No   Who is requesting discharge planning? Provider   Patient expects to be discharged to: Home   Does the patient need discharge transport arranged? No   Financial Resource Strain   How hard is it for you to pay for the very basics like food, housing, medical care, and heating? Not hard   Housing Stability   In the last 12 months, was there a time when you were not able to pay the mortgage or rent on time? N   In the last 12 months, how many places have you lived? 1   In the last 12 months, was there a time when you did not have a steady place to sleep or slept in a shelter (including now)? N   Transportation Needs   In the past 12 months, has lack of transportation kept you from medical appointments or from getting medications? no   In the past 12 months, has lack of transportation kept you from meetings, work, or from getting things needed for daily living? No     Patient admitted from home for NSTEMI, down for Left heart cath this morning. Will follow up with patient when she returns to floor to assess for any discharge needs.     Dimple Cuevas RN

## 2024-07-08 NOTE — CONSULTS
".Subjective   Isis Geronimo is a 79 y.o. female with PMH of ESRD on PD, Anemia, RA, Emphysema Current smoker,  Vertigo, HTN, HLD. She came to ED w/complaints of n/v, and abdominal pain, constipation, in ED work up significant for elevated  troponin which [place] at [address]. TTE showed an LVEF of 60% with LV diastolic dysfunction, moderate concentric LVH and aortic valve sclerosis, Today she underwent LHC today with Dr Juárez, which showed Severe MVD. Cardiac Surgery consulted for evaluation for CABG.    Pt seen laying in bed, family at bedside. NAD, RA denies chest pain or SOB  Pt is independent with ADL's  lives alone and manages her medications and daily PD. Per family she is neurologically intact, however patient appears confused, which according to family she has been since admission.          PMH  Past Medical History:   Diagnosis Date    Arthritis     Hypertension     Kidney disease        PSH  History reviewed. No pertinent surgical history.    Family History  No family history on file.    Social History    Living situation: alone      Allergies   Allergen Reactions    Chlorothiazide Other    Metoprolol Headache    Ibuprofen Unknown and Rash     \"makes me feel faint\"    Penicillins Rash         Current Facility-Administered Medications:     acetaminophen (Tylenol) tablet 650 mg, 650 mg, oral, q4h PRN **OR** acetaminophen (Tylenol) oral liquid 650 mg, 650 mg, oral, q4h PRN **OR** acetaminophen (Tylenol) suppository 650 mg, 650 mg, rectal, q4h PRN, RACHELLE Winston    amLODIPine (Norvasc) tablet 10 mg, 10 mg, oral, Daily, RACHELLE Winston, 10 mg at 07/08/24 0850    aspirin chewable tablet 81 mg, 81 mg, oral, Daily, ALEXIS Winston-CNP, 81 mg at 07/08/24 0850    atorvastatin (Lipitor) tablet 20 mg, 20 mg, oral, Daily, ALEXIS Winston-CNP, 20 mg at 07/08/24 0850    carvedilol (Coreg) tablet 12.5 mg, 12.5 mg, oral, BID, ALEXIS Winston-CNP    dextrose 1.5% - LOW " calcium 2.5mEq/L 2,000 mL peritoneal dialysate, , intraperitoneal, 4x daily, RACHELLE Winston, Given at 07/08/24 1441    losartan (Cozaar) tablet 100 mg, 100 mg, oral, Daily, RACHELLE Winston, 100 mg at 07/08/24 0850    oxygen (O2) therapy, , inhalation, Continuous - 02/gases, RACHELLE Winston, 1 L/min at 07/08/24 1500    perflutren lipid microspheres (Definity) injection 0.5-10 mL of dilution, 0.5-10 mL of dilution, intravenous, Once in imaging, RACHELLE Winston    perflutren protein A microsphere (Optison) injection 0.5 mL, 0.5 mL, intravenous, Once in imaging, RACHELLE Winston    polyethylene glycol (Glycolax, Miralax) packet 17 g, 17 g, oral, Daily, RACHELLE Winston, 17 g at 07/07/24 0833    potassium chloride CR (Klor-Con M20) ER tablet 40 mEq, 40 mEq, oral, Once, RACHELLE Winston    sulfur hexafluoride microsphr (Lumason) injection 24.28 mg, 2 mL, intravenous, Once in imaging, RACHELLE Winston       Objective   Cardiac Studies  Cardiac catheterization, ECG, and ECHO   EF: >50%  Vessels: Left main disease: RCA and LAD    Physical Exam  Physical Exam  Constitutional:       Comments: Frail elderly   Pulmonary:      Effort: Pulmonary effort is normal. No respiratory distress.      Breath sounds: No stridor.      Comments: RA NAD  Musculoskeletal:         General: No swelling.   Neurological:      General: No focal deficit present.      Mental Status: She is disoriented.   Psychiatric:         Behavior: Behavior normal.         Cognition and Memory: She exhibits impaired recent memory.          LABS:  CMP:  Results from last 7 days   Lab Units 07/08/24  1006 07/08/24  0547 07/07/24  0830 07/07/24  0038 07/06/24  2053   SODIUM mmol/L  --  138 141  --  141   POTASSIUM mmol/L 3.5 3.0* 3.0*  --  2.9*   CHLORIDE mmol/L  --  100 103  --  103   CO2 mmol/L  --  28 25  --  25   ANION GAP mmol/L  --  13 16  --  16   BUN  mg/dL  --  40* 42*  --  36*   CREATININE mg/dL  --  9.46* 10.24*  --  9.36*   EGFR mL/min/1.73m*2  --  4* 4*  --  4*   MAGNESIUM mg/dL  --   --  1.60 1.50*  --    ALBUMIN g/dL  --  2.9*  --   --  3.5   ALT U/L  --   --   --   --  11   AST U/L  --   --   --   --  17   BILIRUBIN TOTAL mg/dL  --   --   --   --  0.3   LIPASE U/L  --   --   --   --  74     CBC:  Results from last 7 days   Lab Units 07/08/24  0547 07/07/24  0830 07/06/24  2053   WBC AUTO x10*3/uL 5.2 5.7 7.5   HEMOGLOBIN g/dL 8.4* 8.4* 9.6*   HEMATOCRIT % 25.8* 25.6* 29.1*   PLATELETS AUTO x10*3/uL 244 224 261   MCV fL 81 83 81     COAG:     HEME/ENDO:     CARDIAC:   Results from last 7 days   Lab Units 07/08/24  1006 07/08/24  0547 07/07/24  0830 07/07/24  0038 07/06/24  2155 07/06/24  2053   TROPHS ng/L 18,955* 21,094* 19,016* 1,411* 69* 39*      MICRO: .No results found for the last 90 days.      Transthoracic Echo (TTE) Complete   Final Result      CT chest abdomen pelvis wo IV contrast   Final Result   1.  No distinct acute intrathoracic process identified.   2.  Minimal streaky bibasilar atelectasis with pleural based right   lower lobe pulmonary nodule measuring 1.2 cm.  Suggest continued   follow-up as per clinical guidelines.   3.  Enlarged and heterogeneous appearance of the thyroid gland with   multiple bilateral nodules, left greater than right.  Suggest   nonemergent follow-up with ultrasound as clinically warranted.   4.  Nonobstructing nephrolithiasis of the left kidney.  No ureteral   calculus or evidence for obstructive uropathy bilaterally.   5.  Colonic diverticulosis without CT evidence for acute   diverticulitis.   6.  Large amount of fecal material within the rectosigmoid colon and   rectal vault.  No evidence of bowel obstruction.   Fleischner Society 2017 Guidelines for Management of Incidentally   Detected Pulmonary Nodules in Adults:   A.  SOLID NODULES*   Nodule Type and Size:   Single:   *Low Risk**    < 6 mm - No routine  follow-up   6-8 mm - CT at 6-12 months, then consider CT at 18-24 months   > 8 mm - Consider CT at 3 months, PET/CT, or tissue sampling   *High Risk**   < 6 mm - Optional CT at 12 months   6-8 mm - CT at 6-12 months, then CT at 18-24 months   > 8 mm - Consider CT at 3 months, PET/CT, or tissue sampling   Multiple:   *Low Risk**    < 6 mm - No routine follow-up   6-8 mm - CT at 3-6 months, then consider CT at 18-24 months   > 8 mm - CT at 3-6 months, then consider CT at 18-24 months   *High Risk**   < 6 mm - Optional CT at 12 months   6-8 mm - CT at 3-6 months, then at 18-24 months   > 8 mm - CT at 3-6 months, then at 18-24 months   B. SUBSOLID NODULES*   Nodule Type and Size:   Single:     *Ground glass   < 6 mm - No routine follow-up   > 6 mm - CT at 6-12 months to confirm persistence, then CT every 2   years until 5 years   *Part Solid   < 6 mm - No routine follow-up   > 6 mm - CT at 3-6 months to confirm persistence.  If unchanged and   solid component remains < 6 mm, annual CT should be performed for 5   years.   Multiple:   < 6 mm - CT at 3-6 months.  If stable, consider CT at 2 and 4 years.   > 6 mm - CT at 3-6 months.  Subsequent management based on the most   suspicious nodule(s).   Note - These recommendations do not apply to lung cancer screening,   patients with immunosuppression, or patients with known primary   cancer.   * Dimensions are average of long and short axes, rounded to the   nearest millimeter.   ** Consider all relevant risk factors.   Signed by Girish Oshea MD      Cardiac Catheterization Procedure    (Results Pending)        Assessment/Plan       CV: HTN, HLD, CAD with MVD  - continue asa 81 mg / statin / beta blocker  - Hold all P2Y12 inhibitors, oral anticoagulation, NSAIDs and alternative/herbal supplements.  - Hold ace/ arb 72 hours prior to surgery  - Preoperative education initiated, questions answered   - Notify cardiac surgery ROSA for chest pain/pressure, syncope or near syncope,  uncontrolled pain, or any new concerning symptoms      Pulm: Current smoker 45 year history, Emphysema   - bedside spirometry with RA ABG  - Respiratory Therapy for preop teaching     RENAL: ESRDS on PD  - RFP     GI: Gerd  - LFTs     HEME: Chronic Anemia  - CBC with diff & coags  - T & S; will need T & C 4 units prbc, 2 units plts, 2 units ffp on hold day before surgery  - Hold heparin infusion 4 hours prior to surgery     ENDO:   - A1C  - TSH     ID:   - ua & urine culture  - nasal swab for staph aures  - peridex mouthwash night before & morning of surgery  - chlorhexidine baths day before & morning of surgery  - bactroban nasally after nasal swab obtained    Pre Op Imaging:  ECHO, CT Chest w/o con, Carotids, and CXR      Dispo: Due to frailty, multiple co-morbidities, and age patient is high risk for surgical intervention, patient to be discussed at CHIP meeting tomorrow 7/9.   Discussed with Dr Cristina Clifton    Time spent 70 minutes     Shani Bain, APRN-CNP

## 2024-07-08 NOTE — POST-PROCEDURE NOTE
Physician Transition of Care Summary  Invasive Cardiovascular Lab    Procedure Date: 7/8/2024  Attending:    * Nicola Juárez - Primary  Resident/Fellow/Other Assistant: Surgeons and Role:  * No surgeons found with a matching role *    Indications:   Pre-op Diagnosis     * NSTEMI (non-ST elevated myocardial infarction) (Multi) [I21.4]    Post-procedure diagnosis:   Post-op Diagnosis     * NSTEMI (non-ST elevated myocardial infarction) (Multi) [I21.4]    Procedure(s):   Left Heart Cath with Coronary Angiography and LV  86025 - SD CATH PLMT L HRT & ARTS W/NJX & ANGIO IMG S&I        Procedure Findings:   Severe multivessel CAD.     Description of the Procedure:   R radial    Complications:   none    Stents/Implants:   Implants       No implant documentation for this case.            Anticoagulation/Antiplatelet Plan:   asa    Estimated Blood Loss:   5 mL    Anesthesia: Moderate Sedation Anesthesia Staff: No anesthesia staff entered.    Any Specimen(s) Removed:   No specimens collected during this procedure.    Disposition:   SDU      Electronically signed by: Nicola Juárez MD, 7/8/2024 2:13 PM

## 2024-07-08 NOTE — H&P
"History Of Present Illness  Isis Geronimo is a 79 y.o. female with PMHx significant for ESRD on daily Peritoneal dialysis, Anemia, RA, nonsustained VT, hypertension, hyperlipidemia, vertigo, emphysema, carpal tunnel syndrome presenting with elevated troponin; here for LHC with  7/8/24. Patient was admitted to [facility] after she presented 7/6/24 with c/o constipation, n/v, dizziness, right chest \"gas discomfort\". While in the ED, patient was found to be hypokalemic K 2.8> potassium supplementation given. HsTroponins drawn that were initially low level elevation (69) but then increased to 1411/19,016/21,094/ 18,955. Cardiology consulted for NSTEMI and patient was on ACS protocol including Heparin gtt. TTE 7/8/24 LVEF 61%, impaired relaxation diastolic dysfunction, moderate concentric LVH, IVSd 1.6 cm, increased LV mass, normal RV systolic fx, aortic valve sclerosis.     Past Medical History:  Past Medical History:   Diagnosis Date    Arthritis     Hypertension     Kidney disease         Past Surgical History:  History reviewed. No pertinent surgical history.       Social History:   reports that she has been smoking cigarettes. She has been exposed to tobacco smoke. She does not have any smokeless tobacco history on file. She reports that she does not currently use alcohol.     Family History:  No family history on file.     Allergies:  Allergies   Allergen Reactions    Chlorothiazide Other    Metoprolol Headache    Ibuprofen Unknown and Rash     \"makes me feel faint\"    Penicillins Rash        Home Medications:  Current Outpatient Medications   Medication Instructions    amLODIPine (Norvasc) 10 mg tablet oral, Daily    atorvastatin (LIPITOR) 20 mg, oral, Daily    losartan (COZAAR) 100 mg, oral, Daily       Inpatient Medications:  Scheduled medications   Medication Dose Route Frequency    amLODIPine  10 mg oral Daily    aspirin  81 mg oral Daily    atorvastatin  20 mg oral Daily    dextrose 1.5% - LOW calcium " "2.5mEq/L 2,000 mL peritoneal dialysate   intraperitoneal 4x daily    losartan  100 mg oral Daily    metoprolol tartrate  12.5 mg oral BID    perflutren lipid microspheres  0.5-10 mL of dilution intravenous Once in imaging    perflutren protein A microsphere  0.5 mL intravenous Once in imaging    polyethylene glycol  17 g oral Daily    potassium chloride CR  40 mEq oral Once    sulfur hexafluoride microsphr  2 mL intravenous Once in imaging     PRN medications   Medication    acetaminophen    Or    acetaminophen    Or    acetaminophen    heparin     Continuous Medications   Medication Dose Last Rate    heparin  0-4,000 Units/hr 600 Units/hr (07/08/24 0707)         Review of Systems   Constitutional: Negative.    HENT: Negative.     Eyes: Negative.    Respiratory: Negative.     Cardiovascular: Negative.    Gastrointestinal:  Positive for nausea and vomiting.   Endocrine: Negative.    Genitourinary: Negative.         On PD   Musculoskeletal: Negative.    Skin: Negative.    Allergic/Immunologic: Negative.    Neurological:  Positive for dizziness.   Hematological: Negative.    Psychiatric/Behavioral: Negative.            Physical Exam  General:  Patient is awake, alert, and oriented.  Patient is in no acute distress.  HEENT:  Pupils equal and reactive.  Normocephalic.  Moist mucosa.    Neck:  No JVD.   Cardiovascular:  Regular rate and rhythm.  +Murmur. Radial pulses 2+.   Pulmonary:  Clear to auscultation bilaterally.  Abdomen:  Soft. Non-tender.   Non-distended.  Positive bowel sounds.  Lower Extremities:  Pedal pulses 2+ No LE edema.  Neurologic:  Cranial nerves II-XII grossly intact.   No focal deficit.   Skin: Skin warm and dry, no lesions. Normal skin turgor.   Psychiatric: Normal affect.     Sedation Plan    ASA 3     Mallampati class: III.         Last Recorded Vitals  Blood pressure (!) 181/83, pulse 67, temperature 36.6 °C (97.9 °F), temperature source Temporal, resp. rate 18, height 1.6 m (5' 3\"), weight 49.9 " "kg (110 lb), SpO2 98%.         Vitals from the Past 24 Hours  Heart Rate:  [61-85]   Temp:  [36.6 °C (97.8 °F)-37.4 °C (99.3 °F)]   Resp:  [16-18]   BP: (152-181)/(63-83)   Weight:  [49.9 kg (110 lb)]   SpO2:  [97 %-100 %]          Relevant Results    Labs    CBC:   Recent Labs     07/08/24  0547 07/07/24 0830 07/06/24 2053   WBC 5.2 5.7 7.5   HGB 8.4* 8.4* 9.6*   HCT 25.8* 25.6* 29.1*    224 261   MCV 81 83 81     BMP/CMP:   Recent Labs     07/08/24  1006 07/08/24 0547 07/07/24 0830 07/06/24 2053   NA  --  138 141 141   K 3.5 3.0* 3.0* 2.9*   CL  --  100 103 103   BUN  --  40* 42* 36*   CREATININE  --  9.46* 10.24* 9.36*   CO2  --  28 25 25   CALCIUM  --  8.4* 9.2 9.6   PROT  --   --   --  7.1   BILITOT  --   --   --  0.3   ALKPHOS  --   --   --  91   ALT  --   --   --  11   AST  --   --   --  17   GLUCOSE  --  75 69* 121*      Lipid Panel:   Recent Labs     07/07/24  0038   CHOL 129   HDL 42.1   CHHDL 3.1   VLDL 13   TRIG 64   NHDL 87     Cardiac   Results from last 7 days   Lab Units 07/08/24  1006 07/08/24  0547 07/07/24 0830 07/07/24  0038 07/06/24 2155 07/06/24 2053   TROPHS ng/L 18,955* 21,094* 19,016* 1,411* 69* 39*   BNP pg/mL  --   --   --   --   --  283*      Hemoglobin A1C: No results for input(s): \"HGBA1C\" in the last 57896 hours.  TSH/ Free T4: No results for input(s): \"TSH\", \"FREET4\" in the last 94972 hours.  Iron:   Recent Labs     07/06/24 2053   *     Coag:     ABO: No results found for: \"ABO\"    Past Cardiology Tests (Last 3 Years):    EKG:  Recent Labs     07/07/24  0125 07/06/24 2050 06/21/16  1528   ATRRATE 88 86 56   VENTRATE 88 86 56   PRINT 164 158 140   QRSDUR 100 94 84   QTCFRED 452 449 428   QTCCALCB 481 476 422     Encounter Date: 07/06/24   Electrocardiogram, 12-lead PRN ACS symptoms   Result Value    Ventricular Rate 86    Atrial Rate 86    NV Interval 158    QRS Duration 94    QT Interval 398    QTC Calculation(Bazett) 476    P Axis 41    R Axis 30    T Axis " "127    QRS Count 14    Q Onset 218    P Onset 139    P Offset 191    T Offset 417    QTC Fredericia 449    Narrative    Normal sinus rhythm  Septal infarct , age undetermined  ST & T wave abnormality, consider lateral ischemia  Abnormal ECG  When compared with ECG of 21-JUN-2016 15:28,  Vent. rate has increased BY  30 BPM  ST now depressed in Inferior leads  ST now depressed in Lateral leads  T wave inversion no longer evident in Inferior leads     Echo:  Echocardiogram 7/8/24:   CONCLUSIONS:   1. Left ventricular ejection fraction is normal, calculated by Rasmussen's biplane at 61%.   2. Spectral Doppler shows an impaired relaxation pattern of left ventricular diastolic filling.   3. There is moderate concentric left ventricular hypertrophy.   4. Increased LV mass.   5. Moderately increased left ventricular septal thickness.   6. There is normal right ventricular global systolic function.   7. Aortic valve sclerosis.    Ejection Fractions:  No results found for: \"EF\"  Cath:  Coronary Angiography: No results found for this or any previous visit from the past 1800 days.    Right Heart Cath: No results found for this or any previous visit from the past 1800 days.    Stress Test:  Nuclear:No results found for this or any previous visit from the past 1800 days.    Metabolic Stress: No results found for this or any previous visit from the past 1800 days.    Cardiac Imaging:  Cardiac Scoring: No results found for this or any previous visit from the past 1800 days.    Cardiac MRI: No results found for this or any previous visit from the past 1800 days.         Assessment/Plan  Assessment/Plan   Principal Problem:    NSTEMI (non-ST elevated myocardial infarction) (Multi)        #Elevated Troponin   -TTE pre C  -[facility] 7/8/24 with Dr. Juárez  -Further mgmt per inpatient primary and consulting teams. Cardiology following.   -Heparin gtt discontinued pre-procedure in holding room     #Hypokalemia  -40 meq KCL PO pre-procedure, K " 3-->3.5, additional 20 meq KCL given PO    I spent 30 minutes in the professional and overall care of this patient.      Tamiko Eduardo, APRN-CNP

## 2024-07-08 NOTE — H&P (VIEW-ONLY)
".Subjective   Isis Geronimo is a 79 y.o. female with PMH of ESRD on PD, Anemia, RA, Emphysema Current smoker,  Vertigo, HTN, HLD. She came to ED w/complaints of n/v, and abdominal pain, constipation, in ED work up significant for elevated  troponin which peaked at [address]. TTE showed an LVEF of 60% with LV diastolic dysfunction, moderate concentric LVH and aortic valve sclerosis, Today she underwent LHC today with Dr Juárez, which showed Severe MVD. Cardiac Surgery consulted for evaluation for CABG.    Pt seen laying in bed, family at bedside. NAD, RA denies chest pain or SOB  Pt is independent with ADL's  lives alone and manages her medications and daily PD. Per family she is neurologically intact, however patient appears confused, which according to family she has been since admission.          PMH  Past Medical History:   Diagnosis Date    Arthritis     Hypertension     Kidney disease        PSH  History reviewed. No pertinent surgical history.    Family History  No family history on file.    Social History    Living situation: alone      Allergies   Allergen Reactions    Chlorothiazide Other    Metoprolol Headache    Ibuprofen Unknown and Rash     \"makes me feel faint\"    Penicillins Rash         Current Facility-Administered Medications:     acetaminophen (Tylenol) tablet 650 mg, 650 mg, oral, q4h PRN **OR** acetaminophen (Tylenol) oral liquid 650 mg, 650 mg, oral, q4h PRN **OR** acetaminophen (Tylenol) suppository 650 mg, 650 mg, rectal, q4h PRN, RACHELLE Winston    amLODIPine (Norvasc) tablet 10 mg, 10 mg, oral, Daily, RACHELLE Winston, 10 mg at 07/08/24 0850    aspirin chewable tablet 81 mg, 81 mg, oral, Daily, ALEXIS Winston-CNP, 81 mg at 07/08/24 0850    atorvastatin (Lipitor) tablet 20 mg, 20 mg, oral, Daily, ALEXIS Winston-CNP, 20 mg at 07/08/24 0850    carvedilol (Coreg) tablet 12.5 mg, 12.5 mg, oral, BID, ALEXIS Winston-CNP    dextrose 1.5% - LOW " calcium 2.5mEq/L 2,000 mL peritoneal dialysate, , intraperitoneal, 4x daily, RACHELLE Winston, Given at 07/08/24 1441    losartan (Cozaar) tablet 100 mg, 100 mg, oral, Daily, RACHELLE Winston, 100 mg at 07/08/24 0850    oxygen (O2) therapy, , inhalation, Continuous - 02/gases, RACHELLE Winston, 1 L/min at 07/08/24 1500    perflutren lipid microspheres (Definity) injection 0.5-10 mL of dilution, 0.5-10 mL of dilution, intravenous, Once in imaging, RACHELLE Winston    perflutren protein A microsphere (Optison) injection 0.5 mL, 0.5 mL, intravenous, Once in imaging, RACHELLE Winston    polyethylene glycol (Glycolax, Miralax) packet 17 g, 17 g, oral, Daily, RACHELLE Winston, 17 g at 07/07/24 0833    potassium chloride CR (Klor-Con M20) ER tablet 40 mEq, 40 mEq, oral, Once, RACHELLE Winston    sulfur hexafluoride microsphr (Lumason) injection 24.28 mg, 2 mL, intravenous, Once in imaging, RACHELLE Winston       Objective   Cardiac Studies  Cardiac catheterization, ECG, and ECHO   EF: >50%  Vessels: Left main disease: RCA and LAD    Physical Exam  Physical Exam  Constitutional:       Comments: Frail elderly   Pulmonary:      Effort: Pulmonary effort is normal. No respiratory distress.      Breath sounds: No stridor.      Comments: RA NAD  Musculoskeletal:         General: No swelling.   Neurological:      General: No focal deficit present.      Mental Status: She is disoriented.   Psychiatric:         Behavior: Behavior normal.         Cognition and Memory: She exhibits impaired recent memory.          LABS:  CMP:  Results from last 7 days   Lab Units 07/08/24  1006 07/08/24  0547 07/07/24  0830 07/07/24  0038 07/06/24  2053   SODIUM mmol/L  --  138 141  --  141   POTASSIUM mmol/L 3.5 3.0* 3.0*  --  2.9*   CHLORIDE mmol/L  --  100 103  --  103   CO2 mmol/L  --  28 25  --  25   ANION GAP mmol/L  --  13 16  --  16   BUN  mg/dL  --  40* 42*  --  36*   CREATININE mg/dL  --  9.46* 10.24*  --  9.36*   EGFR mL/min/1.73m*2  --  4* 4*  --  4*   MAGNESIUM mg/dL  --   --  1.60 1.50*  --    ALBUMIN g/dL  --  2.9*  --   --  3.5   ALT U/L  --   --   --   --  11   AST U/L  --   --   --   --  17   BILIRUBIN TOTAL mg/dL  --   --   --   --  0.3   LIPASE U/L  --   --   --   --  74     CBC:  Results from last 7 days   Lab Units 07/08/24  0547 07/07/24  0830 07/06/24  2053   WBC AUTO x10*3/uL 5.2 5.7 7.5   HEMOGLOBIN g/dL 8.4* 8.4* 9.6*   HEMATOCRIT % 25.8* 25.6* 29.1*   PLATELETS AUTO x10*3/uL 244 224 261   MCV fL 81 83 81     COAG:     HEME/ENDO:     CARDIAC:   Results from last 7 days   Lab Units 07/08/24  1006 07/08/24  0547 07/07/24  0830 07/07/24  0038 07/06/24  2155 07/06/24  2053   TROPHS ng/L 18,955* 21,094* 19,016* 1,411* 69* 39*      MICRO: .No results found for the last 90 days.      Transthoracic Echo (TTE) Complete   Final Result      CT chest abdomen pelvis wo IV contrast   Final Result   1.  No distinct acute intrathoracic process identified.   2.  Minimal streaky bibasilar atelectasis with pleural based right   lower lobe pulmonary nodule measuring 1.2 cm.  Suggest continued   follow-up as per clinical guidelines.   3.  Enlarged and heterogeneous appearance of the thyroid gland with   multiple bilateral nodules, left greater than right.  Suggest   nonemergent follow-up with ultrasound as clinically warranted.   4.  Nonobstructing nephrolithiasis of the left kidney.  No ureteral   calculus or evidence for obstructive uropathy bilaterally.   5.  Colonic diverticulosis without CT evidence for acute   diverticulitis.   6.  Large amount of fecal material within the rectosigmoid colon and   rectal vault.  No evidence of bowel obstruction.   Fleischner Society 2017 Guidelines for Management of Incidentally   Detected Pulmonary Nodules in Adults:   A.  SOLID NODULES*   Nodule Type and Size:   Single:   *Low Risk**    < 6 mm - No routine  follow-up   6-8 mm - CT at 6-12 months, then consider CT at 18-24 months   > 8 mm - Consider CT at 3 months, PET/CT, or tissue sampling   *High Risk**   < 6 mm - Optional CT at 12 months   6-8 mm - CT at 6-12 months, then CT at 18-24 months   > 8 mm - Consider CT at 3 months, PET/CT, or tissue sampling   Multiple:   *Low Risk**    < 6 mm - No routine follow-up   6-8 mm - CT at 3-6 months, then consider CT at 18-24 months   > 8 mm - CT at 3-6 months, then consider CT at 18-24 months   *High Risk**   < 6 mm - Optional CT at 12 months   6-8 mm - CT at 3-6 months, then at 18-24 months   > 8 mm - CT at 3-6 months, then at 18-24 months   B. SUBSOLID NODULES*   Nodule Type and Size:   Single:     *Ground glass   < 6 mm - No routine follow-up   > 6 mm - CT at 6-12 months to confirm persistence, then CT every 2   years until 5 years   *Part Solid   < 6 mm - No routine follow-up   > 6 mm - CT at 3-6 months to confirm persistence.  If unchanged and   solid component remains < 6 mm, annual CT should be performed for 5   years.   Multiple:   < 6 mm - CT at 3-6 months.  If stable, consider CT at 2 and 4 years.   > 6 mm - CT at 3-6 months.  Subsequent management based on the most   suspicious nodule(s).   Note - These recommendations do not apply to lung cancer screening,   patients with immunosuppression, or patients with known primary   cancer.   * Dimensions are average of long and short axes, rounded to the   nearest millimeter.   ** Consider all relevant risk factors.   Signed by Girish Oshea MD      Cardiac Catheterization Procedure    (Results Pending)        Assessment/Plan       CV: HTN, HLD, CAD with MVD  - continue asa 81 mg / statin / beta blocker  - Hold all P2Y12 inhibitors, oral anticoagulation, NSAIDs and alternative/herbal supplements.  - Hold ace/ arb 72 hours prior to surgery  - Preoperative education initiated, questions answered   - Notify cardiac surgery ROSA for chest pain/pressure, syncope or near syncope,  uncontrolled pain, or any new concerning symptoms      Pulm: Current smoker 45 year history, Emphysema   - bedside spirometry with RA ABG  - Respiratory Therapy for preop teaching     RENAL: ESRDS on PD  - RFP     GI: Gerd  - LFTs     HEME: Chronic Anemia  - CBC with diff & coags  - T & S; will need T & C 4 units prbc, 2 units plts, 2 units ffp on hold day before surgery  - Hold heparin infusion 4 hours prior to surgery     ENDO:   - A1C  - TSH     ID:   - ua & urine culture  - nasal swab for staph aures  - peridex mouthwash night before & morning of surgery  - chlorhexidine baths day before & morning of surgery  - bactroban nasally after nasal swab obtained    Pre Op Imaging:  ECHO, CT Chest w/o con, Carotids, and CXR      Dispo: Due to frailty, multiple co-morbidities, and age patient is high risk for surgical intervention, patient to be discussed at CHIP meeting tomorrow 7/9.   Discussed with Dr Cristina Clifton    Time spent 70 minutes     Shani Bain, APRN-CNP

## 2024-07-08 NOTE — PROGRESS NOTES
Isis Geronimo is a 79 y.o. female on day 1 of admission presenting with NSTEMI (non-ST elevated myocardial infarction) (Multi).      Subjective   Seen and examined.   Her abdomen was drained prior to cardiac intervention.   Denies CP or SOB.        Objective     Peritoneal Dialysis  Exchange Number: [id number]  Dianeal Solution: Dextrose 1.5% in 2500 mL  Dianeal Additive: Potassium  Bag Weight (g): 2000 g  Peritoneal Input Status: Completed  Peritoneal Output Status: Started  Effluent Appearance: Yellow  Effluent Volume Out (mL): 1400 ml    Vitals 24HR  Heart Rate:  [61-85]   Temp:  [36.6 °C (97.8 °F)-37.1 °C (98.7 °F)]   Resp:  [16-18]   BP: (152-181)/(63-83)   Weight:  [49.9 kg (110 lb)]   SpO2:  [97 %-100 %]     Intake/Output last 3 Shifts:    Intake/Output Summary (Last 24 hours) at 7/8/2024 1239  Last data filed at 7/8/2024 1000  Gross per 24 hour   Intake --   Output 3780 ml   Net -3780 ml       Physical Exam  General: Alert and oriented, in NAD  HEENT:  Pupils equal and reactive. Moist mucosa.    Neck: Normal Jugular Venous Pressure.  Cardiovascular: Regular rate and rhythm. Normal S1 and S2.  Pulmonary:  Clear to auscultation bilaterally.  No wheezes, rales or rhonchi  Abdomen:  Soft. Non-tender. Non-distended. Positive bowel sounds.  Lower Extremities:  2+ pedal pulses. No LE edema.  Neurologic:  Cranial nerves intact.  No focal deficit.   Skin: Skin warm and dry, normal skin turgor.   Psychiatric: Appropriate mood and behavior    Scheduled Medications  amLODIPine, 10 mg, oral, Daily  aspirin, 81 mg, oral, Daily  atorvastatin, 20 mg, oral, Daily  dextrose 1.5% - LOW calcium 2.5mEq/L 2,000 mL peritoneal dialysate, , intraperitoneal, 4x daily  losartan, 100 mg, oral, Daily  metoprolol tartrate, 12.5 mg, oral, BID  perflutren lipid microspheres, 0.5-10 mL of dilution, intravenous, Once in imaging  perflutren protein A microsphere, 0.5 mL, intravenous, Once in imaging  polyethylene glycol, 17 g, oral, Daily  potassium  chloride CR, 40 mEq, oral, Once  sulfur hexafluoride microsphr, 2 mL, intravenous, Once in imaging      Continuous medications  heparin, 0-4,000 Units/hr, Last Rate: 600 Units/hr (07/08/24 0707)        PRN medications: acetaminophen **OR** acetaminophen **OR** acetaminophen, heparin     Relevant Results  Results from last 7 days   Lab Units 07/08/24  0547 07/07/24  0830 07/06/24  2053   WBC AUTO x10*3/uL 5.2 5.7 7.5   HEMOGLOBIN g/dL 8.4* 8.4* 9.6*   HEMATOCRIT % 25.8* 25.6* 29.1*   PLATELETS AUTO x10*3/uL 244 224 261   NEUTROS PCT AUTO %  --  73.9 76.2   LYMPHS PCT AUTO %  --  19.1 16.3   MONOS PCT AUTO %  --  5.1 5.1   EOS PCT AUTO %  --  1.4 1.6     Results from last 7 days   Lab Units 07/08/24  1006 07/08/24  0547 07/07/24  0830 07/06/24 2053   SODIUM mmol/L  --  138 141 141   POTASSIUM mmol/L 3.5 3.0* 3.0* 2.9*   CHLORIDE mmol/L  --  100 103 103   CO2 mmol/L  --  28 25 25   BUN mg/dL  --  40* 42* 36*   CREATININE mg/dL  --  9.46* 10.24* 9.36*   GLUCOSE mg/dL  --  75 69* 121*   CALCIUM mg/dL  --  8.4* 9.2 9.6       Transthoracic Echo (TTE) Complete   Final Result      CT chest abdomen pelvis wo IV contrast   Final Result   1.  No distinct acute intrathoracic process identified.   2.  Minimal streaky bibasilar atelectasis with pleural based right   lower lobe pulmonary nodule measuring 1.2 cm.  Suggest continued   follow-up as per clinical guidelines.   3.  Enlarged and heterogeneous appearance of the thyroid gland with   multiple bilateral nodules, left greater than right.  Suggest   nonemergent follow-up with ultrasound as clinically warranted.   4.  Nonobstructing nephrolithiasis of the left kidney.  No ureteral   calculus or evidence for obstructive uropathy bilaterally.   5.  Colonic diverticulosis without CT evidence for acute   diverticulitis.   6.  Large amount of fecal material within the rectosigmoid colon and   rectal vault.  No evidence of bowel obstruction.   Fleischner Society 2017 Guidelines for  Management of Incidentally   Detected Pulmonary Nodules in Adults:   A.  SOLID NODULES*   Nodule Type and Size:   Single:   *Low Risk**    < 6 mm - No routine follow-up   6-8 mm - CT at 6-12 months, then consider CT at 18-24 months   > 8 mm - Consider CT at 3 months, PET/CT, or tissue sampling   *High Risk**   < 6 mm - Optional CT at 12 months   6-8 mm - CT at 6-12 months, then CT at 18-24 months   > 8 mm - Consider CT at 3 months, PET/CT, or tissue sampling   Multiple:   *Low Risk**    < 6 mm - No routine follow-up   6-8 mm - CT at 3-6 months, then consider CT at 18-24 months   > 8 mm - CT at 3-6 months, then consider CT at 18-24 months   *High Risk**   < 6 mm - Optional CT at 12 months   6-8 mm - CT at 3-6 months, then at 18-24 months   > 8 mm - CT at 3-6 months, then at 18-24 months   B. SUBSOLID NODULES*   Nodule Type and Size:   Single:     *Ground glass   < 6 mm - No routine follow-up   > 6 mm - CT at 6-12 months to confirm persistence, then CT every 2   years until 5 years   *Part Solid   < 6 mm - No routine follow-up   > 6 mm - CT at 3-6 months to confirm persistence.  If unchanged and   solid component remains < 6 mm, annual CT should be performed for 5   years.   Multiple:   < 6 mm - CT at 3-6 months.  If stable, consider CT at 2 and 4 years.   > 6 mm - CT at 3-6 months.  Subsequent management based on the most   suspicious nodule(s).   Note - These recommendations do not apply to lung cancer screening,   patients with immunosuppression, or patients with known primary   cancer.   * Dimensions are average of long and short axes, rounded to the   nearest millimeter.   ** Consider all relevant risk factors.   Signed by Girish Oshea MD      Cardiac Catheterization Procedure    (Results Pending)            Assessment/Plan      Isis Geronimo is a 79-year-old female with a past medical history of end-stage renal disease on peritoneal dialysis under the care of Dr. Zarate ([facility] transportation  donny). She has a history of anemia, rheumatoid arthritis, nonsustained V. tach, hypertension, hyperlipidemia, vertigo, emphysema, carpal tunnel syndrome who presented with nausea, emesis, constipation and right-sided chest discomfort.  She was admitted to [facility] with NSTEMI with high-sensitivity troponin peak of 21,094. 2 D ECHO showed an LVEF of 61% with LV diastolic dysfunction, moderate concentric LVH and aortic valve sclerosis.  She was seen by cardiology.  Heparin infusion, beta-blocker, statin and aspirin given.  She is planned for left heart cath tentatively today.  She was seen by Dr. Florez, she was ordered for daily exchanges x 6 hours of 1.5 % dextrose with a fill volume of 1.5 L each exchange.  We will have her sent to cardiac cath dry.  Will resume peritoneal dialysis after the procedure per the current prescription. Potassium replacement was given. I will order erythropoietin.  Nephrology will follow closely with her care.           Principal Problem:    NSTEMI (non-ST elevated myocardial infarction) (Multi)      I spent 40 minutes in the professional and overall care of this patient.      Gab Lynn, DO

## 2024-07-08 NOTE — PROGRESS NOTES
SUBJECTIVE DATA:  No issues/events overnight  Denies chest pain      Last Recorded Vitals:  Vitals:    24 0847 24 0900 24 1000 24 1053   BP: 152/63   (!) 181/83   BP Location: Right arm      Patient Position: Lying      Pulse: 64 73 69 67   Resp: 17   18   Temp: 36.6 °C (97.8 °F)   36.6 °C (97.9 °F)   TempSrc: Temporal   Temporal   SpO2: 99%   98%   Weight:       Height:           Last I/O:  I/O last 3 completed shifts:  In: 50.9 (1 mL/kg) [I.V.:50.9 (1 mL/kg)]  Out: 2681 (53.7 mL/kg) [Urine:780 (0.4 mL/kg/hr); Other:1900; Stool:1]  Weight: 49.9 kg     Objective   Most Recent Testing/Imaging:  Labs  CBC- 2024:  5:47 AM  5.2 8.4 244    25.8      BMP- 2024:  5:47 AM  138 40 100 75   3.5 9.46 28    Estimated Creatinine Clearance: 3.8 mL/min (A) (by C-G formula based on SCr of 9.46 mg/dL (H)).     CA: 8.4 PROTIEN: 7.1 ALT: 11 Total Bili: 0.3 M.60   PHOS: 4.2 ALBUMIN: 2.9 AST: 17   Alk Phos: 91      COAGS- No results in last year.  _   _ _     CV Labs  Troponin I, High Sensitivity   Date/Time Value Ref Range Status   2024 05:47 AM 21,094 (HH) 0 - 13 ng/L Final   2024 08:30 AM 19,016 (HH) 0 - 13 ng/L Final   2024 12:38 AM 1,411 (HH) 0 - 13 ng/L Final   2024 09:55 PM 69 (HH) 0 - 13 ng/L Final   2024 08:53 PM 39 (H) 0 - 13 ng/L Final     BNP   Date/Time Value Ref Range Status   2024 08:53  (H) 0 - 99 pg/mL Final     LDL Calculated   Date/Time Value Ref Range Status   2024 12:38 AM 74 <=99 mg/dL Final     VLDL   Date/Time Value Ref Range Status   2024 12:38 AM 13 0 - 40 mg/dL Final     CT chest abdomen pelvis wo IV contrast 2024  1. No distinct acute intrathoracic process identified.  2. Minimal streaky bibasilar atelectasis with pleural based right lower lobe pulmonary nodule measuring 1.2 cm.  Suggest continued follow-up as per clinical guidelines.  3. Enlarged and heterogeneous appearance of the thyroid gland with multiple  bilateral nodules, left greater than right.  Suggest non-emergent follow-up with ultrasound as clinically warranted.  4. Non-obstructing nephrolithiasis of the left kidney.  No ureteral calculus or evidence for obstructive uropathy bilaterally.  5. Colonic diverticulosis without CT evidence for acute diverticulitis.  6. Large amount of fecal material within the rectosigmoid colon and rectal vault. No evidence of bowel obstruction.    Cardiovascular Testing:  EKG:   EKG 12 Lead 7/6/24      Encounter Date: 07/06/24   Electrocardiogram, 12-lead PRN ACS symptoms   Result Value    Ventricular Rate 86    Atrial Rate 86    WI Interval 158    QRS Duration 94    QT Interval 398    QTC Calculation(Bazett) 476    P Axis 41    R Axis 30    T Axis 127    QRS Count 14    Q Onset 218    P Onset 139    P Offset 191    T Offset 417    QTC Fredericia 449    Narrative    Normal sinus rhythm  Septal infarct , age undetermined  ST & T wave abnormality, consider lateral ischemia  Abnormal ECG  When compared with ECG of 21-JUN-2016 15:28,  Vent. rate has increased BY  30 BPM  ST now depressed in Inferior leads  ST now depressed in Lateral leads  T wave inversion no longer evident in Inferior leads     Echo:  Transthoracic Echocardiogram 7/7/24   1. Left ventricular ejection fraction is normal, calculated by Rasmussen's biplane at 61%.   2. Spectral Doppler shows an impaired relaxation pattern of left ventricular diastolic filling.   3. There is moderate concentric left ventricular hypertrophy.   4. Increased LV mass.   5. Moderately increased left ventricular septal thickness.   6. There is normal right ventricular global systolic function.   7. Aortic valve sclerosis.    Transthoracic Echocardiogram 3/31/22  -The left ventricle is normal in size. There is moderate left ventricular   hypertrophy. Left ventricular systolic function is normal. EF = 62 ± 5% (2D   biplane) Grade I left ventricular diastolic dysfunction.   -The right ventricle  is normal in size. Right ventricular systolic function is   normal.     Cath:  Left Heart Catheterization 7/7/24  Pending    Stress Test: No results found for the last 3 years.   Cardiac Imaging: No results found for the last 3 years.    Inpatient Medications:  Scheduled medications   Medication Dose Route Frequency    amLODIPine  10 mg oral Daily    aspirin  81 mg oral Daily    atorvastatin  20 mg oral Daily    dextrose 1.5% - LOW calcium 2.5mEq/L 2,000 mL peritoneal dialysate   intraperitoneal 4x daily    losartan  100 mg oral Daily    metoprolol tartrate  12.5 mg oral BID    perflutren lipid microspheres  0.5-10 mL of dilution intravenous Once in imaging    perflutren protein A microsphere  0.5 mL intravenous Once in imaging    polyethylene glycol  17 g oral Daily    potassium chloride CR  40 mEq oral Once    sulfur hexafluoride microsphr  2 mL intravenous Once in imaging     PRN medications   Medication    acetaminophen    Or    acetaminophen    Or    acetaminophen    heparin     Continuous Medications   Medication Dose Last Rate    heparin  0-4,000 Units/hr 600 Units/hr (07/08/24 0707)     Outpatient Medications:  Current Outpatient Medications   Medication Instructions    amLODIPine (Norvasc) 10 mg tablet oral, Daily    atorvastatin (LIPITOR) 20 mg, oral, Daily    losartan (COZAAR) 100 mg, oral, Daily     Physical Exam:   GENERAL: alert, cooperative, pleasant; in no acute distress  SKIN: warm and dry, cap refill <2  NECK: PEERL, no JVD or hepatojugular reflex  CARDIAC: Regular rate and rhythm, S1S2, no murmurs or abnormal heart sounds  RESPIRATORY: Normal respiratory effort, no abnormal breath sounds  ABDOMEN: soft, non-distended, non-tender with palpation  EXTREMITIES: No lower extremity edema, normal pulses all 4 extremities  NEURO: Alert and oriented, mental status at baseline, no focal deficits  PSYCH: Behavior and mood as expected    Tele: NSR 70-80s  Code Status: Full Code    Assessment/Plan   Isis SMITH  Juanpablo is a 79 y.o. female with a past medical history of HTN, HLD, NSVT, ESRD on daily peritoneal dialysis, anemia, RA, vertigo, COPD, and carpal tunnel syndrome, who presented with N/V/constipation and right chest pain attributed to gas pain. Troponin was noted to be elevated 30>>69>>[id number]>>[id number] and cardiology consulted for further evaluation of NSTEMI.    #NSTEMI- Troponin peaked at 21,094, patient denies chest pain  #HTN- Suboptimal BP control on home Norvasc 10mg and Losartan 100mg  #HLD-On statin therapy    7/8/2024:   1. Left ventricular ejection fraction is normal, calculated by Rasmussen's biplane at 61%.   2. Spectral Doppler shows an impaired relaxation pattern of left ventricular diastolic filling.   3. There is moderate concentric left ventricular hypertrophy.   4. Increased LV mass.   5. Moderately increased left ventricular septal thickness.   6. There is normal right ventricular global systolic function.   7. Aortic valve sclerosis.       RECOMMENDATIONS:  -LHC today, Today she underwent LHC today with Dr Juárez, which showed Severe MVD. Cardiac Surgery consulted for evaluation for CABG.   -BB and ASA started, patient reports hx of GIB in the past while on ASA but willing to take and further discuss pending cath results  -Anemia evaluation per primary team  -c/w home CV medications as above, will change BB to Coreg given poor BP control      Tamy Santos, APRN-CNP      Thank you for allowing me to participate in their care.  Please feel free to call me with any further questions or concerns.    Dontae Gonzalez MD, FACC, [facility]  Division of Cardiovascular Medicine  System Director, Nuclear Cardiology   Medical Director, [facility] [place], [facility]   Clinical , [employer]  [email or web address]   Office:  [phone]          Both the ROSA and I have had a face to  face encounter with the patient today. I have examined the patient and edited the documented physical examination as necessary.  I personally reviewed the patient's vital signs, telemetry, recent labs, medications, orders, EKGs, and pertinent cardiac imaging/ echocardiography.  I have reviewed the ROSA's encounter note, approve the ROSA's documentation and have edited the note to reflect my diagnostic and therapeutic plan.

## 2024-07-09 ENCOUNTER — APPOINTMENT (OUTPATIENT)
Dept: VASCULAR MEDICINE | Facility: HOSPITAL | Age: 79
DRG: 233 | End: 2024-07-09
Payer: COMMERCIAL

## 2024-07-09 LAB
ANION GAP BLDA CALCULATED.4IONS-SCNC: 9 MMO/L (ref 10–25)
ANION GAP SERPL CALC-SCNC: 14 MMOL/L (ref 10–20)
BASE EXCESS BLDA CALC-SCNC: 5.1 MMOL/L (ref -2–3)
BASOPHILS # BLD AUTO: 0.02 X10*3/UL (ref 0–0.1)
BASOPHILS NFR BLD AUTO: 0.4 %
BODY TEMPERATURE: 37 DEGREES CELSIUS
BUN SERPL-MCNC: 38 MG/DL (ref 6–23)
CA-I BLDA-SCNC: 1.07 MMOL/L (ref 1.1–1.33)
CALCIUM SERPL-MCNC: 8.4 MG/DL (ref 8.6–10.3)
CHLORIDE BLDA-SCNC: 104 MMOL/L (ref 98–107)
CHLORIDE SERPL-SCNC: 103 MMOL/L (ref 98–107)
CO2 SERPL-SCNC: 25 MMOL/L (ref 21–32)
CREAT SERPL-MCNC: 9.11 MG/DL (ref 0.5–1.05)
EGFRCR SERPLBLD CKD-EPI 2021: 4 ML/MIN/1.73M*2
EOSINOPHIL # BLD AUTO: 0.14 X10*3/UL (ref 0–0.4)
EOSINOPHIL NFR BLD AUTO: 2.8 %
ERYTHROCYTE [DISTWIDTH] IN BLOOD BY AUTOMATED COUNT: 16.2 % (ref 11.5–14.5)
GLUCOSE BLDA-MCNC: 148 MG/DL (ref 74–99)
GLUCOSE SERPL-MCNC: 71 MG/DL (ref 74–99)
HCO3 BLDA-SCNC: 27.8 MMOL/L (ref 22–26)
HCT VFR BLD AUTO: 25.9 % (ref 36–46)
HCT VFR BLD EST: ABNORMAL %
HGB BLD-MCNC: 8.4 G/DL (ref 12–16)
HGB BLDA-MCNC: ABNORMAL G/DL
IMM GRANULOCYTES # BLD AUTO: 0.02 X10*3/UL (ref 0–0.5)
IMM GRANULOCYTES NFR BLD AUTO: 0.4 % (ref 0–0.9)
INHALED O2 CONCENTRATION: 21 %
LACTATE BLDA-SCNC: 1.6 MMOL/L (ref 0.4–2)
LYMPHOCYTES # BLD AUTO: 0.96 X10*3/UL (ref 0.8–3)
LYMPHOCYTES NFR BLD AUTO: 19.2 %
MCH RBC QN AUTO: 27.1 PG (ref 26–34)
MCHC RBC AUTO-ENTMCNC: 32.4 G/DL (ref 32–36)
MCV RBC AUTO: 84 FL (ref 80–100)
MONOCYTES # BLD AUTO: 0.25 X10*3/UL (ref 0.05–0.8)
MONOCYTES NFR BLD AUTO: 5 %
NEUTROPHILS # BLD AUTO: 3.6 X10*3/UL (ref 1.6–5.5)
NEUTROPHILS NFR BLD AUTO: 72.2 %
NRBC BLD-RTO: 0 /100 WBCS (ref 0–0)
OXYHGB MFR BLDA: ABNORMAL %
PCO2 BLDA: 34 MM HG (ref 38–42)
PH BLDA: 7.52 PH (ref 7.38–7.42)
PLATELET # BLD AUTO: 226 X10*3/UL (ref 150–450)
PO2 BLDA: 72 MM HG (ref 85–95)
POTASSIUM BLDA-SCNC: 3.4 MMOL/L (ref 3.5–5.3)
POTASSIUM SERPL-SCNC: 3.5 MMOL/L (ref 3.5–5.3)
RBC # BLD AUTO: 3.1 X10*6/UL (ref 4–5.2)
SAO2 % BLDA: ABNORMAL %
SODIUM BLDA-SCNC: 137 MMOL/L (ref 136–145)
SODIUM SERPL-SCNC: 138 MMOL/L (ref 136–145)
WBC # BLD AUTO: 5 X10*3/UL (ref 4.4–11.3)

## 2024-07-09 PROCEDURE — 36415 COLL VENOUS BLD VENIPUNCTURE: CPT | Performed by: NURSE PRACTITIONER

## 2024-07-09 PROCEDURE — 93880 EXTRACRANIAL BILAT STUDY: CPT | Performed by: INTERNAL MEDICINE

## 2024-07-09 PROCEDURE — 3E1M39Z IRRIGATION OF PERITONEAL CAVITY USING DIALYSATE, PERCUTANEOUS APPROACH: ICD-10-PCS | Performed by: INTERNAL MEDICINE

## 2024-07-09 PROCEDURE — 2060000001 HC INTERMEDIATE ICU ROOM DAILY

## 2024-07-09 PROCEDURE — 2500000001 HC RX 250 WO HCPCS SELF ADMINISTERED DRUGS (ALT 637 FOR MEDICARE OP): Performed by: NURSE PRACTITIONER

## 2024-07-09 PROCEDURE — 36600 WITHDRAWAL OF ARTERIAL BLOOD: CPT

## 2024-07-09 PROCEDURE — 85025 COMPLETE CBC W/AUTO DIFF WBC: CPT | Performed by: NURSE PRACTITIONER

## 2024-07-09 PROCEDURE — 2500000004 HC RX 250 GENERAL PHARMACY W/ HCPCS (ALT 636 FOR OP/ED): Performed by: NURSE PRACTITIONER

## 2024-07-09 PROCEDURE — 99233 SBSQ HOSP IP/OBS HIGH 50: CPT | Performed by: STUDENT IN AN ORGANIZED HEALTH CARE EDUCATION/TRAINING PROGRAM

## 2024-07-09 PROCEDURE — 94010 BREATHING CAPACITY TEST: CPT

## 2024-07-09 PROCEDURE — 84132 ASSAY OF SERUM POTASSIUM: CPT | Performed by: NURSE PRACTITIONER

## 2024-07-09 PROCEDURE — 2500000004 HC RX 250 GENERAL PHARMACY W/ HCPCS (ALT 636 FOR OP/ED): Performed by: INTERNAL MEDICINE

## 2024-07-09 PROCEDURE — 80048 BASIC METABOLIC PNL TOTAL CA: CPT | Performed by: NURSE PRACTITIONER

## 2024-07-09 PROCEDURE — 93880 EXTRACRANIAL BILAT STUDY: CPT

## 2024-07-09 RX ORDER — ATORVASTATIN CALCIUM 80 MG/1
80 TABLET, FILM COATED ORAL DAILY
Status: DISCONTINUED | OUTPATIENT
Start: 2024-07-10 | End: 2024-07-20 | Stop reason: HOSPADM

## 2024-07-09 RX ORDER — ASPIRIN 81 MG/1
81 TABLET ORAL DAILY
Status: DISCONTINUED | OUTPATIENT
Start: 2024-07-09 | End: 2024-07-20 | Stop reason: HOSPADM

## 2024-07-09 RX ORDER — CARVEDILOL 25 MG/1
25 TABLET ORAL 2 TIMES DAILY
Status: DISCONTINUED | OUTPATIENT
Start: 2024-07-09 | End: 2024-07-17

## 2024-07-09 RX ADMIN — ASPIRIN 81 MG 81 MG: 81 TABLET ORAL at 10:22

## 2024-07-09 RX ADMIN — SODIUM CHLORIDE, SODIUM LACTATE, CALCIUM CHLORIDE, MAGNESIUM CHLORIDE AND DEXTROSE: 1.5; 538; 448; 18.3; 5.08 INJECTION, SOLUTION INTRAPERITONEAL at 14:22

## 2024-07-09 RX ADMIN — SODIUM CHLORIDE, SODIUM LACTATE, CALCIUM CHLORIDE, MAGNESIUM CHLORIDE AND DEXTROSE: 1.5; 538; 448; 18.3; 5.08 INJECTION, SOLUTION INTRAPERITONEAL at 08:39

## 2024-07-09 RX ADMIN — LOSARTAN POTASSIUM 100 MG: 50 TABLET, FILM COATED ORAL at 10:22

## 2024-07-09 RX ADMIN — SODIUM CHLORIDE, SODIUM LACTATE, CALCIUM CHLORIDE, MAGNESIUM CHLORIDE AND DEXTROSE: 1.5; 538; 448; 18.3; 5.08 INJECTION, SOLUTION INTRAPERITONEAL at 20:04

## 2024-07-09 RX ADMIN — CARVEDILOL 12.5 MG: 12.5 TABLET, FILM COATED ORAL at 10:22

## 2024-07-09 RX ADMIN — ATORVASTATIN CALCIUM 20 MG: 20 TABLET, FILM COATED ORAL at 10:22

## 2024-07-09 RX ADMIN — CARVEDILOL 25 MG: 25 TABLET, FILM COATED ORAL at 20:25

## 2024-07-09 RX ADMIN — AMLODIPINE BESYLATE 10 MG: 10 TABLET ORAL at 10:22

## 2024-07-09 SDOH — SOCIAL STABILITY: SOCIAL NETWORK
IN A TYPICAL WEEK, HOW MANY TIMES DO YOU TALK ON THE PHONE WITH FAMILY, FRIENDS, OR NEIGHBORS?: MORE THAN THREE TIMES A WEEK

## 2024-07-09 SDOH — HEALTH STABILITY: MENTAL HEALTH
STRESS IS WHEN SOMEONE FEELS TENSE, NERVOUS, ANXIOUS, OR CAN'T SLEEP AT NIGHT BECAUSE THEIR MIND IS TROUBLED. HOW STRESSED ARE YOU?: ONLY A LITTLE

## 2024-07-09 SDOH — SOCIAL STABILITY: SOCIAL INSECURITY
WITHIN THE LAST YEAR, HAVE YOU BEEN KICKED, HIT, SLAPPED, OR OTHERWISE PHYSICALLY HURT BY YOUR PARTNER OR EX-PARTNER?: NO

## 2024-07-09 SDOH — HEALTH STABILITY: MENTAL HEALTH
HOW OFTEN DO YOU NEED TO HAVE SOMEONE HELP YOU WHEN YOU READ INSTRUCTIONS, PAMPHLETS, OR OTHER WRITTEN MATERIAL FROM YOUR DOCTOR OR PHARMACY?: NEVER

## 2024-07-09 SDOH — SOCIAL STABILITY: SOCIAL NETWORK: HOW OFTEN DO YOU ATTEND CHURCH OR RELIGIOUS SERVICES?: MORE THAN 4 TIMES PER YEAR

## 2024-07-09 SDOH — ECONOMIC STABILITY: FOOD INSECURITY: WITHIN THE PAST 12 MONTHS, THE FOOD YOU BOUGHT JUST DIDN'T LAST AND YOU DIDN'T HAVE MONEY TO GET MORE.: NEVER TRUE

## 2024-07-09 SDOH — HEALTH STABILITY: MENTAL HEALTH: HOW MANY STANDARD DRINKS CONTAINING ALCOHOL DO YOU HAVE ON A TYPICAL DAY?: PATIENT DOES NOT DRINK

## 2024-07-09 SDOH — SOCIAL STABILITY: SOCIAL NETWORK
DO YOU BELONG TO ANY CLUBS OR ORGANIZATIONS SUCH AS CHURCH GROUPS UNIONS, FRATERNAL OR ATHLETIC GROUPS, OR SCHOOL GROUPS?: NO

## 2024-07-09 SDOH — SOCIAL STABILITY: SOCIAL INSECURITY: WITHIN THE LAST YEAR, HAVE YOU BEEN AFRAID OF YOUR PARTNER OR EX-PARTNER?: NO

## 2024-07-09 SDOH — SOCIAL STABILITY: SOCIAL INSECURITY: WITHIN THE LAST YEAR, HAVE YOU BEEN HUMILIATED OR EMOTIONALLY ABUSED IN OTHER WAYS BY YOUR PARTNER OR EX-PARTNER?: NO

## 2024-07-09 SDOH — HEALTH STABILITY: MENTAL HEALTH: HOW OFTEN DO YOU HAVE A DRINK CONTAINING ALCOHOL?: MONTHLY OR LESS

## 2024-07-09 SDOH — ECONOMIC STABILITY: INCOME INSECURITY: IN THE PAST 12 MONTHS, HAS THE ELECTRIC, GAS, OIL, OR WATER COMPANY THREATENED TO SHUT OFF SERVICE IN YOUR HOME?: NO

## 2024-07-09 SDOH — ECONOMIC STABILITY: FOOD INSECURITY: WITHIN THE PAST 12 MONTHS, YOU WORRIED THAT YOUR FOOD WOULD RUN OUT BEFORE YOU GOT MONEY TO BUY MORE.: NEVER TRUE

## 2024-07-09 SDOH — SOCIAL STABILITY: SOCIAL NETWORK: HOW OFTEN DO YOU ATTENT MEETINGS OF THE CLUB OR ORGANIZATION YOU BELONG TO?: NEVER

## 2024-07-09 SDOH — HEALTH STABILITY: MENTAL HEALTH: HOW OFTEN DO YOU HAVE 6 OR MORE DRINKS ON ONE OCCASION?: NEVER

## 2024-07-09 SDOH — SOCIAL STABILITY: SOCIAL NETWORK: HOW OFTEN DO YOU GET TOGETHER WITH FRIENDS OR RELATIVES?: MORE THAN THREE TIMES A WEEK

## 2024-07-09 SDOH — SOCIAL STABILITY: SOCIAL INSECURITY
WITHIN THE LAST YEAR, HAVE TO BEEN RAPED OR FORCED TO HAVE ANY KIND OF SEXUAL ACTIVITY BY YOUR PARTNER OR EX-PARTNER?: NO

## 2024-07-09 SDOH — HEALTH STABILITY: PHYSICAL HEALTH: ON AVERAGE, HOW MANY MINUTES DO YOU ENGAGE IN EXERCISE AT THIS LEVEL?: 20 MIN

## 2024-07-09 SDOH — HEALTH STABILITY: PHYSICAL HEALTH: ON AVERAGE, HOW MANY DAYS PER WEEK DO YOU ENGAGE IN MODERATE TO STRENUOUS EXERCISE (LIKE A BRISK WALK)?: 1 DAY

## 2024-07-09 ASSESSMENT — COGNITIVE AND FUNCTIONAL STATUS - GENERAL
MOBILITY SCORE: 24
DAILY ACTIVITIY SCORE: 24

## 2024-07-09 ASSESSMENT — PAIN SCALES - GENERAL
PAINLEVEL_OUTOF10: 0 - NO PAIN

## 2024-07-09 ASSESSMENT — PAIN - FUNCTIONAL ASSESSMENT
PAIN_FUNCTIONAL_ASSESSMENT: 0-10

## 2024-07-09 ASSESSMENT — LIFESTYLE VARIABLES
SKIP TO QUESTIONS 9-10: 1
AUDIT-C TOTAL SCORE: 1

## 2024-07-09 NOTE — CONSULTS
Nutrition Assessment Note  Nutrition Assessment      Reason for Assessment  Reason for Assessment: Admission nursing screening MST- 2 wt loss and appetite changes    Chart reviewed and pt visited.    Isis is a 79 y.o. female admitted for NSTEMI    Past Medical History:   Diagnosis Date    Arthritis     Hypertension     Kidney disease      Pt states:   -no food allergies  -no difficulties chewing/swallowing  -UBW about 108# (bedscale reads 116# today)  -appetite changes since starting dialysis  -wt changes since starting dialysis  -agreeable to nutritional supplements    Scheduled medications  amLODIPine, 10 mg, oral, Daily  aspirin, 81 mg, oral, Daily  atorvastatin, 20 mg, oral, Daily  carvedilol, 25 mg, oral, BID  dextrose 1.5% - LOW calcium 2.5mEq/L 2,000 mL peritoneal dialysate, , intraperitoneal, 4x daily  epoetin shawn or biosimilar, 150 Units/kg, subcutaneous, Every Sunday  losartan, 100 mg, oral, Daily  oxygen, , inhalation, Continuous - 02/gases  perflutren lipid microspheres, 0.5-10 mL of dilution, intravenous, Once in imaging  perflutren protein A microsphere, 0.5 mL, intravenous, Once in imaging  polyethylene glycol, 17 g, oral, Daily  potassium chloride CR, 40 mEq, oral, Once  sulfur hexafluoride microsphr, 2 mL, intravenous, Once in imaging      Continuous medications     PRN medications  PRN medications: acetaminophen **OR** acetaminophen **OR** acetaminophen     Latest Reference Range & Units 07/08/24 05:47 07/08/24 10:06 07/09/24 06:54   GLUCOSE 74 - 99 mg/dL 75  71 (L)   SODIUM 136 - 145 mmol/L 138  138   POTASSIUM 3.5 - 5.3 mmol/L 3.0 (L) 3.5 3.5   CHLORIDE 98 - 107 mmol/L 100  103   Bicarbonate 21 - 32 mmol/L 28  25   Anion Gap 10 - 20 mmol/L 13  14   Blood Urea Nitrogen 6 - 23 mg/dL 40 (H)  38 (H)   Creatinine 0.50 - 1.05 mg/dL 9.46 (H)  9.11 (H)   EGFR >60 mL/min/1.73m*2 4 (L)  4 (L)   Calcium 8.6 - 10.3 mg/dL 8.4 (L)  8.4 (L)   PHOSPHORUS 2.5 - 4.9 mg/dL 4.2     Albumin 3.4 - 5.0 g/dL 2.9 (L)    "  Troponin I, High Sensitivity 0 - 13 ng/L 21,094 (HH) 18,955 (HH)    (HH): Data is critically high  (L): Data is abnormally low  (H): Data is abnormally high    Dietary Orders (From admission, onward)       Start     Ordered    07/08/24 1440  Adult diet Cardiac, Renal; 70 gm fat; 2 - 3 grams Sodium; Potassium Restricted 2 gm (50mEq)  Diet effective now        Question Answer Comment   Diet type Cardiac    Diet type Renal    Fat restriction: 70 gm fat    Sodium restriction: 2 - 3 grams Sodium    Potassium restriction: Potassium Restricted 2 gm (50mEq)        07/08/24 1439                  History:  Food and Nutrient History  Energy Intake: Fair 50-75 %, Good > 75 %  Food and Nutrient History: Pt states that she generally eats 2 meal daily at 100%  Vitamin/Herbal Supplement Use: Drinks Boost once daily       Anthropometrics:  Height: 160 cm (5' 2.99\")  Weight: 52.6 kg (116 lb)  BMI (Calculated): 20.55    Weight Change: 5.45    Wt Readings from Last 1 Encounters:   07/09/24 52.6 kg (116 lb)     Weight         7/6/2024 2036 7/7/2024  0442 7/7/2024  0934 7/8/2024  0434 7/9/2024  1330    Weight: 49 kg (108 lb) 48.8 kg (107 lb 9.4 oz) 48.8 kg (107 lb 9.4 oz) 49.9 kg (110 lb) 52.6 kg (116 lb)          Weight Change  Significant Weight Loss: No       IBW/kg (Dietitian Calculated): 52.3 kg  Percent of IBW: 101 %       Energy Needs:  Estimated Energy Needs  Total Energy Estimated Needs (kCal): 1575 kCal  Total Estimated Energy Need per Day (kCal/kg): 1850 kCal/kg  Method for Estimating Needs: 30-35kcals/kg    Estimated Protein Needs  Total Protein Estimated Needs (g): 65 g  Total Protein Estimated Needs (g/kg): 70 g/kg  Method for Estimating Needs: 1.2-1.3g/kg    Estimated Fluid Needs  Method for Estimating Needs: 1mL/kcal or MD recommendations       Nutrition Focused Physical Findings:  Subcutaneous Fat Loss  Orbital Fat Pads: Well nourished (slightly bulging fat pads)  Buccal Fat Pads: Well nourished (full, rounded " cheeks)  Triceps: Well nourished (ample fat tissue)    Muscle Wasting  Temporalis: Mild-Moderate (slight depression)  Pectoralis (Clavicular Region): Well nourished (clavicle not visible)  Deltoid/Trapezius: Well nourished (rounded appearance at arm, shoulder, neck)  Interosseous: Well nourished (muscle bulges)    Edema  Edema: none       Physical Findings (Nutrition Deficiency/Toxicity)  Skin: Negative       Nutrition Diagnosis        Patient has Nutrition Diagnosis: Yes  Nutrition Diagnosis 1: Increased nutrient needs  Diagnosis Status (1): New  Related to (1): chronic illness  As Evidenced by (1): need for dialysis       Nutrition Interventions/Recommendations      Food and/or Nutrient Delivery Interventions  Meals and Snacks: Mineral-modified diet, General healthful diet, Fat-modified diet     Medical Food Supplement: Commercial beverage  Goal: Nepro once daily    Additional Interventions: Should appetite remain sub-optimal, consider appetite stimulant.    Nutrition Monitoring and Evaluation   Food and Nutrient Related History  Energy Intake: Estimated energy intake  Criteria: >75% of EEN consumed via po    Fluid Intake: Estimated fluid intake    Amount of Food: Estimated amout of food, Medical food intake  Criteria: >75% of meals and nutritional supplements    Anthropometrics: Body Composition/Growth/Weight History  Weight: Estimated dry weight, Weight change    Weight Change: Weight gain, Weight loss, Weight change percentage    Body Mass: Body mass index (BMI)       Biochemical Data, Medical Tests and Procedures  Electrolyte and Renal Panel: BUN, Calcium, ionized, Calcium, serum, Creatinine, Potassium, Magnesium, Phosphorus, Sodium  Criteria: As clinically indicated       Glucose/Endocrine Profile: Glucose, casual  Criteria: As clinically indicated    Nutrition Focused Physical Findings     Digestive System: Vomiting, Nausea, Decrease in appetite    Muscles: Muscle atrophy       Other: overall appearance and  I/Os       Follow Up  Time Spent (min): 60 minutes  Last Date of Nutrition Visit: 07/09/24  Nutrition Follow-Up Needed?: Dietitian to reassess per policy  Follow up Comment: LEODAN Marvin

## 2024-07-09 NOTE — PROGRESS NOTES
INTERNAL MEDICINE PROGRESS NOTE      HPI:    Events noted.  Patient reports no chest pain.    Vital signs in last 24 hours:  Temp:  [36.6 °C (97.9 °F)-36.9 °C (98.5 °F)] 36.8 °C (98.2 °F)  Heart Rate:  [54-77] 77  Resp:  [10-18] 16  BP: (101-168)/(62-89) 166/65    Physical Examination:  Physical Exam      Constitutional:       Appearance: Normal appearance.   HENT:      Head: Normocephalic and atraumatic.   Eyes:      Extraocular Movements: Extraocular movements intact.      Pupils: Pupils are equal, round, and reactive to light.   Cardiovascular:      Rate and Rhythm: Normal rate and regular rhythm.      Pulses: Normal pulses.      Heart sounds: Normal heart sounds.   Pulmonary:      Effort: Pulmonary effort is normal.      Breath sounds: Normal breath sounds.   Abdominal:      General: Abdomen is flat. Bowel sounds are normal.      Palpations: Abdomen is soft.   Musculoskeletal:         General: Normal range of motion.      Cervical back: Normal range of motion and neck supple.   Skin:     General: Skin is warm and dry.   Neurological:      General: No focal deficit present.      Mental Status: Alert and oriented to person, place, and time. Mental status is at baseline.         Medications:    Current Facility-Administered Medications:     acetaminophen (Tylenol) tablet 650 mg, 650 mg, oral, q4h PRN **OR** acetaminophen (Tylenol) oral liquid 650 mg, 650 mg, oral, q4h PRN **OR** acetaminophen (Tylenol) suppository 650 mg, 650 mg, rectal, q4h PRN, RACHELLE Winston    amLODIPine (Norvasc) tablet 10 mg, 10 mg, oral, Daily, RACHELLE Winston, 10 mg at 07/09/24 1022    aspirin chewable tablet 81 mg, 81 mg, oral, Daily, RACHELLE Winston, 81 mg at 07/09/24 1022    atorvastatin (Lipitor) tablet 20 mg, 20 mg, oral, Daily, RACHELLE Winston, 20 mg at 07/09/24 1022    carvedilol (Coreg) tablet 12.5 mg, 12.5 mg, oral, BID, RACHELLE Winston, 12.5 mg at  "07/09/24 1022    dextrose 1.5% - LOW calcium 2.5mEq/L 2,000 mL peritoneal dialysate, , intraperitoneal, 4x daily, Gab Lynn DO    epoetin shawn-epbx (Retacrit) injection 8,000 Units, 150 Units/kg, subcutaneous, Every Sunday, Gab Lynn DO    losartan (Cozaar) tablet 100 mg, 100 mg, oral, Daily, ALEXIS Winston-CNP, 100 mg at 07/09/24 1022    oxygen (O2) therapy, , inhalation, Continuous - 02/gases, ALEXIS Winston-CNP, 1 L/min at 07/08/24 1500    perflutren lipid microspheres (Definity) injection 0.5-10 mL of dilution, 0.5-10 mL of dilution, intravenous, Once in imaging, RACHELLE Winston    perflutren protein A microsphere (Optison) injection 0.5 mL, 0.5 mL, intravenous, Once in imaging, RACHELLE Winston    polyethylene glycol (Glycolax, Miralax) packet 17 g, 17 g, oral, Daily, ALEXIS Winston-CNP, 17 g at 07/07/24 0833    potassium chloride CR (Klor-Con M20) ER tablet 40 mEq, 40 mEq, oral, Once, RACHELLE Winston    sulfur hexafluoride microsphr (Lumason) injection 24.28 mg, 2 mL, intravenous, Once in imaging, RACHELLE Winston    Laboratory Findings:  Lab Results   Component Value Date    WBC 5.0 07/09/2024    HGB 8.4 (L) 07/09/2024    HCT 25.9 (L) 07/09/2024    MCV 84 07/09/2024     07/09/2024     No results found for: \"INR\", \"PROTIME\"  Lab Results   Component Value Date    GLUCOSE 71 (L) 07/09/2024    CALCIUM 8.4 (L) 07/09/2024     07/09/2024    K 3.5 07/09/2024    CO2 25 07/09/2024     07/09/2024    BUN 38 (H) 07/09/2024    CREATININE 9.11 (H) 07/09/2024       Assessment and Plan:     Non-ST elevation MI -severe multivessel CAD noted.  Cardiothoracic surgery consulted, CABG being discussed.  ESRD -renal on consult, continue with peritoneal dialysis daily.  Hypertension -continue with antihypertensives.  Nausea -likely related to the above, resolved.     Patient seen with family at bedside       Jacob OLIVA" MD [place]  07/09/24  11:59 AM

## 2024-07-09 NOTE — PROCEDURES
"Patient receiving peritoneal dialysis.  Ms. Geronimo underwent left heart cath yesterday with findings of severe multivessel coronary artery disease.  CT surgery was consulted for revascularization options.  She is currently chest pain-free.  Denies shortness of breath as well.  I will continue the 1.5% dextrose dwells x 4 daily exchanges, 4 hours each dwell with a fill volume of 1.5 L each. I will leave her dry at night. I will order erythropoietin.  Her phosphorus level is acceptable not on a binder.  Blood pressures are acceptable.  She is tolerating PD.  Continue per orders.    /67 (BP Location: Right arm, Patient Position: Lying)   Pulse 77   Temp 36.8 °C (98.2 °F) (Skin)   Resp 17   Ht 1.6 m (5' 3\")   Wt 49.9 kg (110 lb)   SpO2 100%   BMI 19.49 kg/m²     General: Alert and oriented, in NAD  HEENT:  Pupils equal and reactive. Moist mucosa.    Neck: Normal Jugular Venous Pressure.  Cardiovascular: Regular rate and rhythm. Normal S1 and S2.  Pulmonary:  Clear to auscultation bilaterally.  No wheezes, rales or rhonchi  Abdomen:  Soft. Non-tender. Non-distended. Positive bowel sounds.  Lower Extremities:  2+ pedal pulses. No LE edema.  Neurologic:  Cranial nerves intact.  No focal deficit.   Skin: Skin warm and dry, normal skin turgor.     "

## 2024-07-09 NOTE — PROGRESS NOTES
"SUBJECTIVE DATA:  Cardiac surgery consulted for CABG eval d/t mvCAD, plan to present at [facility] meeting      Last Recorded Vitals:  Vitals:    24 1100 24 1119 24 1200 24 1330   BP:  166/65     BP Location:       Patient Position:       Pulse: 79  72    Resp:  16     Temp:  36.8 °C (98.2 °F)     TempSrc:  Oral     SpO2:  100%     Weight:    52.6 kg (116 lb)   Height:    1.6 m (5' 2.99\")       Last I/O:  I/O last 3 completed shifts:  In: - (0 mL/kg)   Out: 7585 (152 mL/kg) [Urine:880 (0.5 mL/kg/hr); Other:6700; Blood:5]  Weight: 49.9 kg     Objective   Most Recent Testing/Imaging:  Labs  CBC- 2024:  6:54 AM  5.0 8.4 226    25.9      BMP- 2024:  6:54 AM  138 38 103 71   3.5 9.11 25    Estimated Creatinine Clearance: 4.1 mL/min (A) (by C-G formula based on SCr of 9.11 mg/dL (H)).     CA: 8.4 PROTIEN: 7.1 ALT: 11 Total Bili: 0.3 M.60   PHOS: 4.2 ALBUMIN: 2.9 AST: 17   Alk Phos: 91      COAGS- No results in last year.  _   _ _     CV Labs  Troponin I, High Sensitivity   Date/Time Value Ref Range Status   2024 05:47 AM 21,094 (HH) 0 - 13 ng/L Final   2024 08:30 AM 19,016 (HH) 0 - 13 ng/L Final   2024 12:38 AM 1,411 (HH) 0 - 13 ng/L Final   2024 09:55 PM 69 (HH) 0 - 13 ng/L Final   2024 08:53 PM 39 (H) 0 - 13 ng/L Final     BNP   Date/Time Value Ref Range Status   2024 08:53  (H) 0 - 99 pg/mL Final     LDL Calculated   Date/Time Value Ref Range Status   2024 12:38 AM 74 <=99 mg/dL Final     VLDL   Date/Time Value Ref Range Status   2024 12:38 AM 13 0 - 40 mg/dL Final     CT chest abdomen pelvis wo IV contrast 2024  1. No distinct acute intrathoracic process identified.  2. Minimal streaky bibasilar atelectasis with pleural based right lower lobe pulmonary nodule measuring 1.2 cm.  Suggest continued follow-up as per clinical guidelines.  3. Enlarged and heterogeneous appearance of the thyroid gland with multiple bilateral nodules, " left greater than right.  Suggest non-emergent follow-up with ultrasound as clinically warranted.  4. Non-obstructing nephrolithiasis of the left kidney.  No ureteral calculus or evidence for obstructive uropathy bilaterally.  5. Colonic diverticulosis without CT evidence for acute diverticulitis.  6. Large amount of fecal material within the rectosigmoid colon and rectal vault. No evidence of bowel obstruction.    Cardiovascular Testing:  EKG:   EKG 12 Lead 7/6/24      Encounter Date: 07/06/24   Electrocardiogram, 12-lead PRN ACS symptoms   Result Value    Ventricular Rate 86    Atrial Rate 86    MT Interval 158    QRS Duration 94    QT Interval 398    QTC Calculation(Bazett) 476    P Axis 41    R Axis 30    T Axis 127    QRS Count 14    Q Onset 218    P Onset 139    P Offset 191    T Offset 417    QTC Fredericia 449    Narrative    Normal sinus rhythm  Septal infarct , age undetermined  ST & T wave abnormality, consider lateral ischemia  Abnormal ECG  When compared with ECG of 21-JUN-2016 15:28,  Vent. rate has increased BY  30 BPM  ST now depressed in Inferior leads  ST now depressed in Lateral leads  T wave inversion no longer evident in Inferior leads     Echo:  Transthoracic Echocardiogram 7/7/24   1. Left ventricular ejection fraction is normal, calculated by Rasmussen's biplane at 61%.   2. Spectral Doppler shows an impaired relaxation pattern of left ventricular diastolic filling.   3. There is moderate concentric left ventricular hypertrophy.   4. Increased LV mass.   5. Moderately increased left ventricular septal thickness.   6. There is normal right ventricular global systolic function.   7. Aortic valve sclerosis.    Transthoracic Echocardiogram 3/31/22  -The left ventricle is normal in size. There is moderate left ventricular   hypertrophy. Left ventricular systolic function is normal. EF = 62 ± 5% (2D   biplane) Grade I left ventricular diastolic dysfunction.   -The right ventricle is normal in size.  Right ventricular systolic function is   normal.     Cath:  Left Heart Catheterization 7/7/24  Pending    Stress Test: No results found for the last 3 years.   Cardiac Imaging: No results found for the last 3 years.    Inpatient Medications:  Scheduled medications   Medication Dose Route Frequency    amLODIPine  10 mg oral Daily    aspirin  81 mg oral Daily    atorvastatin  20 mg oral Daily    carvedilol  25 mg oral BID    dextrose 1.5% - LOW calcium 2.5mEq/L 2,000 mL peritoneal dialysate   intraperitoneal 4x daily    epoetin shawn or biosimilar  150 Units/kg subcutaneous Every Sunday    losartan  100 mg oral Daily    oxygen   inhalation Continuous - 02/gases    perflutren lipid microspheres  0.5-10 mL of dilution intravenous Once in imaging    perflutren protein A microsphere  0.5 mL intravenous Once in imaging    polyethylene glycol  17 g oral Daily    potassium chloride CR  40 mEq oral Once    sulfur hexafluoride microsphr  2 mL intravenous Once in imaging     PRN medications   Medication    acetaminophen    Or    acetaminophen    Or    acetaminophen     Continuous Medications   Medication Dose Last Rate     Outpatient Medications:  Current Outpatient Medications   Medication Instructions    amLODIPine (Norvasc) 10 mg tablet oral, Daily    atorvastatin (LIPITOR) 20 mg, oral, Daily    losartan (COZAAR) 100 mg, oral, Daily     Physical Exam:   GENERAL: alert, cooperative, pleasant; in no acute distress  SKIN: warm and dry, cap refill <2  NECK: PEERL, no JVD or hepatojugular reflex  CARDIAC: Regular rate and rhythm, S1S2, no murmurs or abnormal heart sounds  RESPIRATORY: Normal respiratory effort, no abnormal breath sounds  ABDOMEN: soft, non-distended, non-tender with palpation  EXTREMITIES: No lower extremity edema, normal pulses all 4 extremities  NEURO: Alert and oriented, mental status at baseline, no focal deficits  PSYCH: Behavior and mood as expected    Tele: NSR 70-80s  Code Status: Full  Code    Assessment/Plan   Isis Geronimo is a 79 y.o. female with a past medical history of HTN, HLD, NSVT, ESRD on daily peritoneal dialysis, anemia, RA, vertigo, COPD, and carpal tunnel syndrome, who presented with N/V/constipation and right chest pain attributed to gas pain. Troponin was noted to be elevated 30>>69>>[id number]>>[id number] and cardiology consulted for further evaluation of NSTEMI.    #NSTEMI- Troponin peaked at 21,094, patient denies chest pain  #mvCAD- c/w ASA, statin, and BB  -Cardiac surgery consult for consideration of CABG  #HTN- Suboptimal BP control on home Norvasc 10mg and Losartan 100mg  -BB changed to Coreg, uptitrate   #HLD-On statin therapy    TTE 7/8/2024:   1. Left ventricular ejection fraction is normal, calculated by Rasmussen's biplane at 61%.   2. Spectral Doppler shows an impaired relaxation pattern of left ventricular diastolic filling.   3. There is moderate concentric left ventricular hypertrophy.   4. Increased LV mass.   5. Moderately increased left ventricular septal thickness.   6. There is normal right ventricular global systolic function.   7. Aortic valve sclerosis.     [facility] 7/8/24: Multivessel CAD      RECOMMENDATIONS:  -Severe mvCAD, cardiac surgery consulted for evaluation for CABG  -Patient to be discussed at [facility] meeting this evening  -c/w ASA, patient agreeable to take  -Increase statin to high-intensity  -Coreg increased to 25mg BID given high BPs  -Anemia evaluation per primary team    Tamy Santos, APRN-CNP      Thank you for allowing me to participate in their care.  Please feel free to call me with any further questions or concerns.    Dontae Gonzalez MD, FACC, [facility]  Division of Cardiovascular Medicine  System Director, Nuclear Cardiology   Medical Director, [facility], [facility]   Clinical , [facility] [employer] [facility]  [email or web address]   Office:  [phone]          Both the ROSA and I have had a face to face encounter with the patient today. I have examined the patient and edited the documented physical examination as necessary.  I personally reviewed the patient's vital signs, telemetry, recent labs, medications, orders, EKGs, and pertinent cardiac imaging/ echocardiography.  I have reviewed the ROSA's encounter note, approve the ROSA's documentation and have edited the note to reflect my diagnostic and therapeutic plan.

## 2024-07-09 NOTE — SIGNIFICANT EVENT
Isis Geronimo is a 79 y.o. female with PMH of ESRD on PD, Anemia, RA, Emphysema Current smoker,  Vertigo, HTN, HLD. She came to ED w/complaints of n/v, and abdominal pain, constipation, in ED work up significant for elevated  troponin which [place] at [address]. TTE showed an LVEF of 60% with LV diastolic dysfunction, moderate concentric LVH and aortic valve sclerosis, Today she underwent LHC today with Dr Juárez, which showed Severe MVD. Cardiac Surgery consulted for evaluation for CABG.     No issues over night, denies chest pain  Obtain bedside PFT   Mrs Geronimo and her daughter updated on continued plan for case review this evening at [facility]    Update: Case  to be presented at  Wednesday's meeting    TTE: 7/8   1. Left ventricular ejection fraction is normal, calculated by Rasmussen's biplane at 61%.   2. Spectral Doppler shows an impaired relaxation pattern of left ventricular diastolic filling.   3. There is moderate concentric left ventricular hypertrophy.   4. Increased LV mass.   5. Moderately increased left ventricular septal thickness.   6. There is normal right ventricular global systolic function.   7. Aortic valve sclerosis    LHC: 7/8  Severe MVD: RCA, LAD, LMCA    Carotid duplex complete 7/9  Right Proximal ICA 50-69% % Subclavian artery  >50%  occlusion  Left Proximal ICA >70%,  ECA >50% occlusion with Elevated Velocities in Subclavian      CT Chest :7/6  Moderate atherosclerotic calcification of the thoracic aorta and Extensive atherosclerotic calcification of abdominal aorta and branch vessels without aneurysm.  Coronary arterial calcifications are noted.    Shani Bain, APRN-CNP

## 2024-07-09 NOTE — PROGRESS NOTES
Isis Geronimo is a 79 y.o. female on day 2 of admission presenting with NSTEMI (non-ST elevated myocardial infarction) (Multi).     07/09/24 0650   Discharge Planning   Patient expects to be discharged to: Home     Received message from [facility]  for patient asking for discharge summary upon DC from hospital:   Sunita-- p: [phone] f: [phone]    Patient lives home alone, independent, has 5 children and they assist with her transportation and grocery shopping etc. Does her PD independently at home nightly. Follows with DR. Leiva. Patient states she does not wish to have any open heart surgery and would like a less invasive alternative if that is possible. Will update care team regarding her preferences.    Dimple Cuevas RN

## 2024-07-10 ENCOUNTER — DOCUMENTATION (OUTPATIENT)
Dept: CARDIOLOGY | Facility: HOSPITAL | Age: 79
End: 2024-07-10
Payer: COMMERCIAL

## 2024-07-10 LAB
ANION GAP SERPL CALC-SCNC: 12 MMOL/L (ref 10–20)
BASOPHILS # BLD AUTO: 0.01 X10*3/UL (ref 0–0.1)
BASOPHILS NFR BLD AUTO: 0.1 %
BUN SERPL-MCNC: 34 MG/DL (ref 6–23)
CALCIUM SERPL-MCNC: 8.4 MG/DL (ref 8.6–10.3)
CHLORIDE SERPL-SCNC: 104 MMOL/L (ref 98–107)
CO2 SERPL-SCNC: 27 MMOL/L (ref 21–32)
CREAT SERPL-MCNC: 8.54 MG/DL (ref 0.5–1.05)
EGFRCR SERPLBLD CKD-EPI 2021: 4 ML/MIN/1.73M*2
EOSINOPHIL # BLD AUTO: 0.1 X10*3/UL (ref 0–0.4)
EOSINOPHIL NFR BLD AUTO: 1.5 %
ERYTHROCYTE [DISTWIDTH] IN BLOOD BY AUTOMATED COUNT: 16 % (ref 11.5–14.5)
GLUCOSE SERPL-MCNC: 84 MG/DL (ref 74–99)
HCT VFR BLD AUTO: 26 % (ref 36–46)
HGB BLD-MCNC: 8.4 G/DL (ref 12–16)
IMM GRANULOCYTES # BLD AUTO: 0.04 X10*3/UL (ref 0–0.5)
IMM GRANULOCYTES NFR BLD AUTO: 0.6 % (ref 0–0.9)
LYMPHOCYTES # BLD AUTO: 0.77 X10*3/UL (ref 0.8–3)
LYMPHOCYTES NFR BLD AUTO: 11.2 %
MCH RBC QN AUTO: 26.3 PG (ref 26–34)
MCHC RBC AUTO-ENTMCNC: 32.3 G/DL (ref 32–36)
MCV RBC AUTO: 82 FL (ref 80–100)
MONOCYTES # BLD AUTO: 0.39 X10*3/UL (ref 0.05–0.8)
MONOCYTES NFR BLD AUTO: 5.7 %
NEUTROPHILS # BLD AUTO: 5.55 X10*3/UL (ref 1.6–5.5)
NEUTROPHILS NFR BLD AUTO: 80.9 %
NRBC BLD-RTO: 0 /100 WBCS (ref 0–0)
PLATELET # BLD AUTO: 231 X10*3/UL (ref 150–450)
POTASSIUM SERPL-SCNC: 3.4 MMOL/L (ref 3.5–5.3)
RBC # BLD AUTO: 3.19 X10*6/UL (ref 4–5.2)
SODIUM SERPL-SCNC: 140 MMOL/L (ref 136–145)
WBC # BLD AUTO: 6.9 X10*3/UL (ref 4.4–11.3)

## 2024-07-10 PROCEDURE — 85025 COMPLETE CBC W/AUTO DIFF WBC: CPT | Performed by: INTERNAL MEDICINE

## 2024-07-10 PROCEDURE — 36415 COLL VENOUS BLD VENIPUNCTURE: CPT | Performed by: INTERNAL MEDICINE

## 2024-07-10 PROCEDURE — 2500000004 HC RX 250 GENERAL PHARMACY W/ HCPCS (ALT 636 FOR OP/ED): Performed by: INTERNAL MEDICINE

## 2024-07-10 PROCEDURE — 99233 SBSQ HOSP IP/OBS HIGH 50: CPT | Performed by: STUDENT IN AN ORGANIZED HEALTH CARE EDUCATION/TRAINING PROGRAM

## 2024-07-10 PROCEDURE — 2500000001 HC RX 250 WO HCPCS SELF ADMINISTERED DRUGS (ALT 637 FOR MEDICARE OP): Performed by: NURSE PRACTITIONER

## 2024-07-10 PROCEDURE — 2500000002 HC RX 250 W HCPCS SELF ADMINISTERED DRUGS (ALT 637 FOR MEDICARE OP, ALT 636 FOR OP/ED): Performed by: INTERNAL MEDICINE

## 2024-07-10 PROCEDURE — 80048 BASIC METABOLIC PNL TOTAL CA: CPT | Performed by: INTERNAL MEDICINE

## 2024-07-10 PROCEDURE — 2060000001 HC INTERMEDIATE ICU ROOM DAILY

## 2024-07-10 PROCEDURE — 2500000001 HC RX 250 WO HCPCS SELF ADMINISTERED DRUGS (ALT 637 FOR MEDICARE OP): Performed by: INTERNAL MEDICINE

## 2024-07-10 RX ORDER — HYDRALAZINE HYDROCHLORIDE 20 MG/ML
10 INJECTION INTRAMUSCULAR; INTRAVENOUS ONCE
Status: COMPLETED | OUTPATIENT
Start: 2024-07-10 | End: 2024-07-10

## 2024-07-10 RX ORDER — HYDRALAZINE HYDROCHLORIDE 25 MG/1
25 TABLET, FILM COATED ORAL 2 TIMES DAILY
Status: DISCONTINUED | OUTPATIENT
Start: 2024-07-10 | End: 2024-07-11

## 2024-07-10 RX ORDER — POTASSIUM CHLORIDE 20 MEQ/1
20 TABLET, EXTENDED RELEASE ORAL 2 TIMES DAILY
Status: COMPLETED | OUTPATIENT
Start: 2024-07-10 | End: 2024-07-10

## 2024-07-10 RX ADMIN — LOSARTAN POTASSIUM 100 MG: 50 TABLET, FILM COATED ORAL at 08:32

## 2024-07-10 RX ADMIN — POTASSIUM CHLORIDE 20 MEQ: 1500 TABLET, EXTENDED RELEASE ORAL at 20:50

## 2024-07-10 RX ADMIN — HYDRALAZINE HYDROCHLORIDE 25 MG: 25 TABLET ORAL at 08:32

## 2024-07-10 RX ADMIN — POTASSIUM CHLORIDE 20 MEQ: 1500 TABLET, EXTENDED RELEASE ORAL at 10:54

## 2024-07-10 RX ADMIN — SODIUM CHLORIDE, SODIUM LACTATE, CALCIUM CHLORIDE, MAGNESIUM CHLORIDE AND DEXTROSE: 1.5; 538; 448; 18.3; 5.08 INJECTION, SOLUTION INTRAPERITONEAL at 13:08

## 2024-07-10 RX ADMIN — SODIUM CHLORIDE, SODIUM LACTATE, CALCIUM CHLORIDE, MAGNESIUM CHLORIDE AND DEXTROSE: 1.5; 538; 448; 18.3; 5.08 INJECTION, SOLUTION INTRAPERITONEAL at 09:04

## 2024-07-10 RX ADMIN — CARVEDILOL 25 MG: 25 TABLET, FILM COATED ORAL at 20:50

## 2024-07-10 RX ADMIN — ACETAMINOPHEN 650 MG: 325 TABLET ORAL at 13:08

## 2024-07-10 RX ADMIN — CARVEDILOL 25 MG: 25 TABLET, FILM COATED ORAL at 08:32

## 2024-07-10 RX ADMIN — HYDRALAZINE HYDROCHLORIDE 25 MG: 25 TABLET ORAL at 20:50

## 2024-07-10 RX ADMIN — ASPIRIN 81 MG: 81 TABLET, COATED ORAL at 08:33

## 2024-07-10 RX ADMIN — ATORVASTATIN CALCIUM 80 MG: 80 TABLET, FILM COATED ORAL at 08:32

## 2024-07-10 RX ADMIN — SODIUM CHLORIDE, SODIUM LACTATE, CALCIUM CHLORIDE, MAGNESIUM CHLORIDE AND DEXTROSE: 1.5; 538; 448; 18.3; 5.08 INJECTION, SOLUTION INTRAPERITONEAL at 19:25

## 2024-07-10 RX ADMIN — HYDRALAZINE HYDROCHLORIDE 10 MG: 20 INJECTION INTRAMUSCULAR; INTRAVENOUS at 00:40

## 2024-07-10 RX ADMIN — AMLODIPINE BESYLATE 10 MG: 10 TABLET ORAL at 08:33

## 2024-07-10 ASSESSMENT — COGNITIVE AND FUNCTIONAL STATUS - GENERAL
MOBILITY SCORE: 24
DAILY ACTIVITIY SCORE: 24

## 2024-07-10 ASSESSMENT — PAIN DESCRIPTION - LOCATION: LOCATION: ABDOMEN

## 2024-07-10 ASSESSMENT — PAIN SCALES - GENERAL
PAINLEVEL_OUTOF10: 4
PAINLEVEL_OUTOF10: 0 - NO PAIN

## 2024-07-10 ASSESSMENT — PAIN DESCRIPTION - ORIENTATION: ORIENTATION: RIGHT

## 2024-07-10 ASSESSMENT — PAIN - FUNCTIONAL ASSESSMENT: PAIN_FUNCTIONAL_ASSESSMENT: 0-10

## 2024-07-10 NOTE — PROGRESS NOTES
Coronary angiography was reviewed by interventional cardiology and cardiac surgery teams at the high risk coronary intervention meeting on 7/10/2024.  Decision was made that CABG would be the best option for treatment, with the plan of [place] to LAD and possible graph to OM.

## 2024-07-10 NOTE — PROGRESS NOTES
INTERNAL MEDICINE PROGRESS NOTE      HPI:    Patient continues to complain of no chest pain.  Blood pressure elevated overnight.    Vital signs in last 24 hours:  Temp:  [35.7 °C (96.2 °F)-37.2 °C (98.9 °F)] 37.2 °C (98.9 °F)  Heart Rate:  [72-95] 72  Resp:  [16-20] 17  BP: (146-174)/(55-82) 159/65    Physical Examination:  Physical Exam    Constitutional:       Appearance: Elderly, well-built, in no distress.  HENT:      Head: Normocephalic and atraumatic.   Eyes:      Extraocular Movements: Extraocular movements intact.      Pupils: Pupils are equal, round, and reactive to light.   Cardiovascular:      Rate and Rhythm: Normal rate and regular rhythm.      Pulses: Normal pulses.      Heart sounds: Normal heart sounds.   Pulmonary:      Effort: Pulmonary effort is normal.      Breath sounds: Normal breath sounds.   Abdominal:      General: Abdomen is flat. Bowel sounds are normal.      Palpations: Abdomen is soft.   Musculoskeletal:         General: Normal range of motion.      Cervical back: Normal range of motion and neck supple.   Skin:     General: Skin is warm and dry.   Neurological:      General: No focal deficit present.      Mental Status: Alert and oriented to person, place, and time. Mental status is at baseline.         Medications:    Current Facility-Administered Medications:     acetaminophen (Tylenol) tablet 650 mg, 650 mg, oral, q4h PRN, 650 mg at 07/10/24 1308 **OR** acetaminophen (Tylenol) oral liquid 650 mg, 650 mg, oral, q4h PRN **OR** acetaminophen (Tylenol) suppository 650 mg, 650 mg, rectal, q4h PRN, RACHELLE Winston    amLODIPine (Norvasc) tablet 10 mg, 10 mg, oral, Daily, RACHELLE Winston, 10 mg at 07/10/24 0833    aspirin EC tablet 81 mg, 81 mg, oral, Daily, RACHELLE Galaviz, 81 mg at 07/10/24 0833    atorvastatin (Lipitor) tablet 80 mg, 80 mg, oral, Daily, RACHELLE Galaviz, 80 mg at 07/10/24 0832    carvedilol (Coreg) tablet 25  "mg, 25 mg, oral, BID, Tamy Santos, APRN-CNP, 25 mg at 07/10/24 0832    dextrose 1.5% - LOW calcium 2.5mEq/L 2,000 mL peritoneal dialysate, , intraperitoneal, 4x daily, Gab Lynn DO, Given at 07/10/24 1308    epoetin shawn-epbx (Retacrit) injection 8,000 Units, 150 Units/kg, subcutaneous, Every Sunday, Gab Lynn DO    hydrALAZINE (Apresoline) tablet 25 mg, 25 mg, oral, BID, Jacob Staples MD, 25 mg at 07/10/24 0832    losartan (Cozaar) tablet 100 mg, 100 mg, oral, Daily, ALEXIS Winston-CNP, 100 mg at 07/10/24 0832    oxygen (O2) therapy, , inhalation, Continuous - 02/gases, RACHELLE Winston, 1 L/min at 07/08/24 1500    perflutren lipid microspheres (Definity) injection 0.5-10 mL of dilution, 0.5-10 mL of dilution, intravenous, Once in imaging, RACHELLE Winston    perflutren protein A microsphere (Optison) injection 0.5 mL, 0.5 mL, intravenous, Once in imaging, ALEXIS Winston-CNP    polyethylene glycol (Glycolax, Miralax) packet 17 g, 17 g, oral, Daily, ALEXIS Winston-CNP, 17 g at 07/07/24 0833    potassium chloride CR (Klor-Con M20) ER tablet 20 mEq, 20 mEq, oral, BID, Gab Lynn DO, 20 mEq at 07/10/24 1054    potassium chloride CR (Klor-Con M20) ER tablet 40 mEq, 40 mEq, oral, Once, ALEXIS Winston-CNP    sulfur hexafluoride microsphr (Lumason) injection 24.28 mg, 2 mL, intravenous, Once in imaging, RACHELLE Winston    Laboratory Findings:  Lab Results   Component Value Date    WBC 6.9 07/10/2024    HGB 8.4 (L) 07/10/2024    HCT 26.0 (L) 07/10/2024    MCV 82 07/10/2024     07/10/2024     No results found for: \"INR\", \"PROTIME\"  Lab Results   Component Value Date    GLUCOSE 84 07/10/2024    CALCIUM 8.4 (L) 07/10/2024     07/10/2024    K 3.4 (L) 07/10/2024    CO2 27 07/10/2024     07/10/2024    BUN 34 (H) 07/10/2024    CREATININE 8.54 (H) 07/10/2024       Assessment and Plan:     Non-ST elevation " MI -cardiothoracic surgery discussing intervention.  Continue with medical management.  ESRD -continue with peritoneal dialysis exchanges  Hypertension -hydralazine added, monitor response.  Nausea -likely related to the above, resolved.     Patient seen and discussed with family at bedside.       Jacob Staples MD  07/10/24  1:23 PM

## 2024-07-10 NOTE — PROGRESS NOTES
SUBJECTIVE DATA:  Patient unable to be discussed at mini CHIP meeting yesterday as surgeon was unable to present, plan for patient to be presented today    No overnight events, patient denies chest pain      Last Recorded Vitals:  Vitals:    07/10/24 0024 07/10/24 0150 07/10/24 0415 07/10/24 0700   BP: 165/68 150/65 146/62 156/74   BP Location: Right arm  Right arm Right arm   Patient Position: Lying  Lying Sitting   Pulse:   95    Resp:   20 18   Temp:   36.9 °C (98.4 °F) 35.7 °C (96.2 °F)   TempSrc:   Temporal Temporal   SpO2:   100% 100%   Weight:    49.4 kg (108 lb 14.5 oz)   Height:         Temp:  [35.7 °C (96.2 °F)-36.9 °C (98.4 °F)] 35.7 °C (96.2 °F)  Heart Rate:  [72-95] 95  Resp:  [16-20] 18  BP: (146-174)/(55-82) 156/74      Last I/O:  I/O last 3 completed shifts:  In:  (39.2 mL/kg) [P.O.:60; Other:2000]  Out: 9600 (182.5 mL/kg) [Urine:700 (0.4 mL/kg/hr); Other:8900]  Weight: 52.6 kg     Objective   Most Recent Testing/Imaging:  Labs  CBC- 7/10/2024:  6:32 AM  6.9 8.4 231    26.0      BMP- 7/10/2024:  6:32 AM  140 34 104 84   3.4 8.54 27    Estimated Creatinine Clearance: 4.2 mL/min (A) (by C-G formula based on SCr of 8.54 mg/dL (H)).     CA: 8.4 PROTIEN: 7.1 ALT: 11 Total Bili: 0.3 M.60   PHOS: 4.2 ALBUMIN: 2.9 AST: 17   Alk Phos: 91      COAGS- No results in last year.  _   _ _     CV Labs  Troponin I, High Sensitivity   Date/Time Value Ref Range Status   2024 05:47 AM 21,094 () 0 - 13 ng/L Final   2024 08:30 AM 19,016 (HH) 0 - 13 ng/L Final   2024 12:38 AM 1,411 (HH) 0 - 13 ng/L Final   2024 09:55 PM 69 (HH) 0 - 13 ng/L Final   2024 08:53 PM 39 (H) 0 - 13 ng/L Final     BNP   Date/Time Value Ref Range Status   2024 08:53  (H) 0 - 99 pg/mL Final     LDL Calculated   Date/Time Value Ref Range Status   2024 12:38 AM 74 <=99 mg/dL Final     VLDL   Date/Time Value Ref Range Status   2024 12:38 AM 13 0 - 40 mg/dL Final     CT chest abdomen pelvis  What to Expect After a Cystoscopy with Stent Removal  For the next 24-48 hours, you may feel a mild burning when you urinate. This burning is normal and expected. Drink plenty of water to dilute the urine to help relieve the burning sensation. You may also see a small amount of blood in your urine after the procedure.    Unless you are already taking antibiotics, you may be given an antibiotic after the test to prevent infection.    Signs and Symptoms to Report  Call the [facility] at [phone] if you develop any of the following:  · Fever of 101 degrees or higher  · Chills or persistent bleeding  · Inability to urinate    After hours or on weekends, you may reach a urology resident on call at this number: [phone].   wo IV contrast 07/06/2024  1. No distinct acute intrathoracic process identified.  2. Minimal streaky bibasilar atelectasis with pleural based right lower lobe pulmonary nodule measuring 1.2 cm.  Suggest continued follow-up as per clinical guidelines.  3. Enlarged and heterogeneous appearance of the thyroid gland with multiple bilateral nodules, left greater than right.  Suggest non-emergent follow-up with ultrasound as clinically warranted.  4. Non-obstructing nephrolithiasis of the left kidney.  No ureteral calculus or evidence for obstructive uropathy bilaterally.  5. Colonic diverticulosis without CT evidence for acute diverticulitis.  6. Large amount of fecal material within the rectosigmoid colon and rectal vault. No evidence of bowel obstruction.    Cardiovascular Testing:  EKG:   EKG 12 Lead 7/6/24      Encounter Date: 07/06/24   Electrocardiogram, 12-lead PRN ACS symptoms   Result Value    Ventricular Rate 86    Atrial Rate 86    AK Interval 158    QRS Duration 94    QT Interval 398    QTC Calculation(Bazett) 476    P Axis 41    R Axis 30    T Axis 127    QRS Count 14    Q Onset 218    P Onset 139    P Offset 191    T Offset 417    QTC Fredericia 449    Narrative    Normal sinus rhythm  Septal infarct , age undetermined  ST & T wave abnormality, consider lateral ischemia  Abnormal ECG  When compared with ECG of 21-JUN-2016 15:28,  Vent. rate has increased BY  30 BPM  ST now depressed in Inferior leads  ST now depressed in Lateral leads  T wave inversion no longer evident in Inferior leads     Echo:  Transthoracic Echocardiogram 7/7/24   1. Left ventricular ejection fraction is normal, calculated by Rasmussen's biplane at 61%.   2. Spectral Doppler shows an impaired relaxation pattern of left ventricular diastolic filling.   3. There is moderate concentric left ventricular hypertrophy.   4. Increased LV mass.   5. Moderately increased left ventricular septal thickness.   6. There is normal right ventricular  global systolic function.   7. Aortic valve sclerosis.    Transthoracic Echocardiogram 3/31/22  -The left ventricle is normal in size. There is moderate left ventricular   hypertrophy. Left ventricular systolic function is normal. EF = 62 ± 5% (2D   biplane) Grade I left ventricular diastolic dysfunction.   -The right ventricle is normal in size. Right ventricular systolic function is   normal.     Cath:  Left Heart Catheterization 7/7/24  -Severe Multivessel CAD    Stress Test: No results found for the last 3 years.   Cardiac Imaging: No results found for the last 3 years.    Inpatient Medications:  Scheduled medications   Medication Dose Route Frequency    amLODIPine  10 mg oral Daily    aspirin  81 mg oral Daily    atorvastatin  80 mg oral Daily    carvedilol  25 mg oral BID    dextrose 1.5% - LOW calcium 2.5mEq/L 2,000 mL peritoneal dialysate   intraperitoneal 4x daily    epoetin shawn or biosimilar  150 Units/kg subcutaneous Every Sunday    hydrALAZINE  25 mg oral BID    losartan  100 mg oral Daily    oxygen   inhalation Continuous - 02/gases    perflutren lipid microspheres  0.5-10 mL of dilution intravenous Once in imaging    perflutren protein A microsphere  0.5 mL intravenous Once in imaging    polyethylene glycol  17 g oral Daily    potassium chloride CR  40 mEq oral Once    sulfur hexafluoride microsphr  2 mL intravenous Once in imaging     PRN medications   Medication    acetaminophen    Or    acetaminophen    Or    acetaminophen     Continuous Medications   Medication Dose Last Rate     Outpatient Medications:  Current Outpatient Medications   Medication Instructions    amLODIPine (Norvasc) 10 mg tablet oral, Daily    atorvastatin (LIPITOR) 20 mg, oral, Daily    losartan (COZAAR) 100 mg, oral, Daily     Physical Exam:   GENERAL: alert, cooperative, pleasant; in no acute distress  SKIN: warm and dry, cap refill <2  NECK: PEERL, no JVD or hepatojugular reflex  CARDIAC: Regular rate and rhythm, S1S2, no  murmurs or abnormal heart sounds  RESPIRATORY: Normal respiratory effort, no abnormal breath sounds  ABDOMEN: soft, non-distended, non-tender with palpation  EXTREMITIES: No lower extremity edema, normal pulses all 4 extremities  NEURO: Alert and oriented, mental status at baseline, no focal deficits  PSYCH: Behavior and mood as expected    Tele: NSR 70-80s  Code Status: Full Code    Assessment/Plan   Isis Geronimo is a 79 y.o. female with a past medical history of HTN, HLD, NSVT, ESRD on daily peritoneal dialysis, anemia, RA, vertigo, COPD, and carpal tunnel syndrome, who presented with N/V/constipation and right chest pain attributed to gas pain. Troponin was noted to be elevated 30>>69>>1411>>14883 and cardiology consulted for further evaluation of NSTEMI.    #NSTEMI- Troponin peaked at 21,094, patient denies chest pain  #mvCAD- c/w ASA, statin, and BB  -Cardiac surgery consulted for consideration of CABG  -Case to be discussed at [facility] meeting 7/10  #HTN- Suboptimal BP control on home Norvasc 10mg and Losartan 100mg  -c/w Coreg 25mg BID, Hydralazine added by primary team  #HLD-On statin therapy, increased to high intensity    TTE 7/8/2024:   1. Left ventricular ejection fraction is normal, calculated by Rasmussen's biplane at 61%.   2. Spectral Doppler shows an impaired relaxation pattern of left ventricular diastolic filling.   3. There is moderate concentric left ventricular hypertrophy.   4. Increased LV mass.   5. Moderately increased left ventricular septal thickness.   6. There is normal right ventricular global systolic function.   7. Aortic valve sclerosis.     [facility] 7/8/24: Multivessel CAD      RECOMMENDATIONS:  -Severe mvCAD, cardiac surgery consulted for evaluation for CABG  -Case to be discussed at [facility] meeting tonight  -c/w ASA, statin, BB  -Uptitrate Hydralazine as indicated, PRN Hydralazine for SBP >170  -Further supportive care per primary team    Tamy Santos, ALEXIS-CNP      Thank you for  allowing me to participate in their care.  Please feel free to call me with any further questions or concerns.    Dontae Gonzalez MD, FACC, [facility]  Division of Cardiovascular Medicine  System Director, Nuclear Cardiology   Medical Director, [facility] [place], [facility]   Clinical , [facility] [facility] [employer] [facility] [employer]  [email or web address]   Office:  [phone]          Both the ROSA and I have had a face to face encounter with the patient today. I have examined the patient and edited the documented physical examination as necessary.  I personally reviewed the patient's vital signs, telemetry, recent labs, medications, orders, EKGs, and pertinent cardiac imaging/ echocardiography.  I have reviewed the ROSA's encounter note, approve the ROSA's documentation and have edited the note to reflect my diagnostic and therapeutic plan.

## 2024-07-10 NOTE — PROCEDURES
"Patient is receiving peritoneal dialysis.  She was seen and examined on stepdown.  She is receiving 4 daily exchanges of 1.5% dextrose x 4 hours each dwell with a fill volume of 1.5 L as tolerated.  There is no overnight dwell currently.  She is being evaluated for potential CABG.  She is chest pain-free.  No shortness of breath.  Her PD fluid is clear.  No abdominal tenderness.  I have ordered erythropoietin but we will give her 2 doses of 20 mEq of potassium chloride.  Continue PD per orders.  We will await further CT surgery recommendations.     /74 (BP Location: Right arm, Patient Position: Sitting)   Pulse 95   Temp 35.7 °C (96.2 °F) (Temporal)   Resp 18   Ht 1.6 m (5' 2.99\")   Wt 49.4 kg (108 lb 14.5 oz)   SpO2 100%   BMI 19.30 kg/m²     General: Alert and oriented, in NAD  Neck: Normal Jugular Venous Pressure.  Cardiovascular: Regular rate and rhythm. Normal S1 and S2.  Pulmonary:  Clear to auscultation bilaterally.  No wheezes, rales or rhonchi  Abdomen:  Soft. Non-tender. Non-distended. Positive bowel sounds.  Lower Extremities: No LE edema.  Neurologic: No focal deficit.   Skin: Skin warm and dry  "

## 2024-07-11 ENCOUNTER — APPOINTMENT (OUTPATIENT)
Dept: RADIOLOGY | Facility: HOSPITAL | Age: 79
End: 2024-07-11
Payer: COMMERCIAL

## 2024-07-11 PROBLEM — I79.8 OTHER DISORDERS OF ARTERIES, ARTERIOLES AND CAPILLARIES IN DISEASES CLASSIFIED ELSEWHERE (CMS-HCC): Status: ACTIVE | Noted: 2024-07-06

## 2024-07-11 LAB
ANION GAP SERPL CALC-SCNC: 13 MMOL/L (ref 10–20)
BASOPHILS # BLD AUTO: 0.02 X10*3/UL (ref 0–0.1)
BASOPHILS NFR BLD AUTO: 0.4 %
BUN SERPL-MCNC: 25 MG/DL (ref 6–23)
CALCIUM SERPL-MCNC: 8.5 MG/DL (ref 8.6–10.3)
CHLORIDE SERPL-SCNC: 101 MMOL/L (ref 98–107)
CO2 SERPL-SCNC: 28 MMOL/L (ref 21–32)
CREAT SERPL-MCNC: 7.04 MG/DL (ref 0.5–1.05)
EGFRCR SERPLBLD CKD-EPI 2021: 6 ML/MIN/1.73M*2
EOSINOPHIL # BLD AUTO: 0.08 X10*3/UL (ref 0–0.4)
EOSINOPHIL NFR BLD AUTO: 1.5 %
ERYTHROCYTE [DISTWIDTH] IN BLOOD BY AUTOMATED COUNT: 16.4 % (ref 11.5–14.5)
EST. AVERAGE GLUCOSE BLD GHB EST-MCNC: 103 MG/DL
GLUCOSE SERPL-MCNC: 87 MG/DL (ref 74–99)
HBA1C MFR BLD: 5.2 %
HCT VFR BLD AUTO: 25.8 % (ref 36–46)
HGB BLD-MCNC: 8.3 G/DL (ref 12–16)
IMM GRANULOCYTES # BLD AUTO: 0.01 X10*3/UL (ref 0–0.5)
IMM GRANULOCYTES NFR BLD AUTO: 0.2 % (ref 0–0.9)
LYMPHOCYTES # BLD AUTO: 0.83 X10*3/UL (ref 0.8–3)
LYMPHOCYTES NFR BLD AUTO: 15.5 %
MCH RBC QN AUTO: 26.6 PG (ref 26–34)
MCHC RBC AUTO-ENTMCNC: 32.2 G/DL (ref 32–36)
MCV RBC AUTO: 83 FL (ref 80–100)
MONOCYTES # BLD AUTO: 0.35 X10*3/UL (ref 0.05–0.8)
MONOCYTES NFR BLD AUTO: 6.5 %
NEUTROPHILS # BLD AUTO: 4.06 X10*3/UL (ref 1.6–5.5)
NEUTROPHILS NFR BLD AUTO: 75.9 %
NRBC BLD-RTO: 0 /100 WBCS (ref 0–0)
PLATELET # BLD AUTO: 217 X10*3/UL (ref 150–450)
POTASSIUM SERPL-SCNC: 3.7 MMOL/L (ref 3.5–5.3)
RBC # BLD AUTO: 3.12 X10*6/UL (ref 4–5.2)
SODIUM SERPL-SCNC: 138 MMOL/L (ref 136–145)
TSH SERPL-ACNC: 1.97 MIU/L (ref 0.44–3.98)
WBC # BLD AUTO: 5.4 X10*3/UL (ref 4.4–11.3)

## 2024-07-11 PROCEDURE — 2060000001 HC INTERMEDIATE ICU ROOM DAILY

## 2024-07-11 PROCEDURE — 84443 ASSAY THYROID STIM HORMONE: CPT | Performed by: STUDENT IN AN ORGANIZED HEALTH CARE EDUCATION/TRAINING PROGRAM

## 2024-07-11 PROCEDURE — 71045 X-RAY EXAM CHEST 1 VIEW: CPT | Performed by: RADIOLOGY

## 2024-07-11 PROCEDURE — 2500000004 HC RX 250 GENERAL PHARMACY W/ HCPCS (ALT 636 FOR OP/ED): Performed by: INTERNAL MEDICINE

## 2024-07-11 PROCEDURE — 36415 COLL VENOUS BLD VENIPUNCTURE: CPT | Performed by: INTERNAL MEDICINE

## 2024-07-11 PROCEDURE — 85025 COMPLETE CBC W/AUTO DIFF WBC: CPT | Performed by: INTERNAL MEDICINE

## 2024-07-11 PROCEDURE — 71045 X-RAY EXAM CHEST 1 VIEW: CPT

## 2024-07-11 PROCEDURE — 80048 BASIC METABOLIC PNL TOTAL CA: CPT | Performed by: INTERNAL MEDICINE

## 2024-07-11 PROCEDURE — 2500000001 HC RX 250 WO HCPCS SELF ADMINISTERED DRUGS (ALT 637 FOR MEDICARE OP): Performed by: NURSE PRACTITIONER

## 2024-07-11 PROCEDURE — 87081 CULTURE SCREEN ONLY: CPT | Mod: AHULAB | Performed by: STUDENT IN AN ORGANIZED HEALTH CARE EDUCATION/TRAINING PROGRAM

## 2024-07-11 PROCEDURE — 99233 SBSQ HOSP IP/OBS HIGH 50: CPT | Performed by: STUDENT IN AN ORGANIZED HEALTH CARE EDUCATION/TRAINING PROGRAM

## 2024-07-11 PROCEDURE — 83036 HEMOGLOBIN GLYCOSYLATED A1C: CPT | Mod: AHULAB | Performed by: STUDENT IN AN ORGANIZED HEALTH CARE EDUCATION/TRAINING PROGRAM

## 2024-07-11 RX ORDER — HYDRALAZINE HYDROCHLORIDE 25 MG/1
25 TABLET, FILM COATED ORAL 2 TIMES DAILY
Status: DISCONTINUED | OUTPATIENT
Start: 2024-07-11 | End: 2024-07-17

## 2024-07-11 RX ORDER — CHLORHEXIDINE GLUCONATE ORAL RINSE 1.2 MG/ML
15 SOLUTION DENTAL 2 TIMES DAILY
Status: COMPLETED | OUTPATIENT
Start: 2024-07-16 | End: 2024-07-17

## 2024-07-11 RX ORDER — ISOSORBIDE MONONITRATE 30 MG/1
30 TABLET, EXTENDED RELEASE ORAL DAILY
Status: DISCONTINUED | OUTPATIENT
Start: 2024-07-11 | End: 2024-07-17

## 2024-07-11 RX ORDER — HYDRALAZINE HYDROCHLORIDE 25 MG/1
25 TABLET, FILM COATED ORAL 3 TIMES DAILY
Status: DISCONTINUED | OUTPATIENT
Start: 2024-07-11 | End: 2024-07-11

## 2024-07-11 RX ORDER — HYDRALAZINE HYDROCHLORIDE 20 MG/ML
10 INJECTION INTRAMUSCULAR; INTRAVENOUS EVERY 6 HOURS PRN
Status: DISCONTINUED | OUTPATIENT
Start: 2024-07-11 | End: 2024-07-17

## 2024-07-11 RX ADMIN — HYDRALAZINE HYDROCHLORIDE 25 MG: 25 TABLET, FILM COATED ORAL at 09:15

## 2024-07-11 RX ADMIN — CARVEDILOL 25 MG: 25 TABLET, FILM COATED ORAL at 08:58

## 2024-07-11 RX ADMIN — AMLODIPINE BESYLATE 10 MG: 10 TABLET ORAL at 08:58

## 2024-07-11 RX ADMIN — ATORVASTATIN CALCIUM 80 MG: 80 TABLET, FILM COATED ORAL at 08:58

## 2024-07-11 RX ADMIN — ASPIRIN 81 MG: 81 TABLET, COATED ORAL at 08:59

## 2024-07-11 RX ADMIN — SODIUM CHLORIDE, SODIUM LACTATE, CALCIUM CHLORIDE, MAGNESIUM CHLORIDE AND DEXTROSE: 1.5; 538; 448; 18.3; 5.08 INJECTION, SOLUTION INTRAPERITONEAL at 04:12

## 2024-07-11 RX ADMIN — ISOSORBIDE MONONITRATE 30 MG: 30 TABLET, EXTENDED RELEASE ORAL at 10:02

## 2024-07-11 RX ADMIN — HYDRALAZINE HYDROCHLORIDE 25 MG: 25 TABLET ORAL at 21:21

## 2024-07-11 RX ADMIN — ACETAMINOPHEN 650 MG: 325 TABLET ORAL at 23:54

## 2024-07-11 RX ADMIN — SODIUM CHLORIDE, SODIUM LACTATE, CALCIUM CHLORIDE, MAGNESIUM CHLORIDE AND DEXTROSE: 1.5; 538; 448; 18.3; 5.08 INJECTION, SOLUTION INTRAPERITONEAL at 18:39

## 2024-07-11 RX ADMIN — SODIUM CHLORIDE, SODIUM LACTATE, CALCIUM CHLORIDE, MAGNESIUM CHLORIDE AND DEXTROSE: 1.5; 538; 448; 18.3; 5.08 INJECTION, SOLUTION INTRAPERITONEAL at 09:03

## 2024-07-11 RX ADMIN — SODIUM CHLORIDE, SODIUM LACTATE, CALCIUM CHLORIDE, MAGNESIUM CHLORIDE AND DEXTROSE: 1.5; 538; 448; 18.3; 5.08 INJECTION, SOLUTION INTRAPERITONEAL at 13:48

## 2024-07-11 RX ADMIN — CARVEDILOL 25 MG: 25 TABLET, FILM COATED ORAL at 21:21

## 2024-07-11 ASSESSMENT — PAIN SCALES - GENERAL
PAINLEVEL_OUTOF10: 0 - NO PAIN
PAINLEVEL_OUTOF10: 0 - NO PAIN
PAINLEVEL_OUTOF10: 8
PAINLEVEL_OUTOF10: 0 - NO PAIN

## 2024-07-11 ASSESSMENT — COGNITIVE AND FUNCTIONAL STATUS - GENERAL
DAILY ACTIVITIY SCORE: 24
MOBILITY SCORE: 24
MOBILITY SCORE: 24
DAILY ACTIVITIY SCORE: 24

## 2024-07-11 ASSESSMENT — PAIN - FUNCTIONAL ASSESSMENT
PAIN_FUNCTIONAL_ASSESSMENT: 0-10

## 2024-07-11 ASSESSMENT — PAIN DESCRIPTION - LOCATION: LOCATION: ABDOMEN

## 2024-07-11 NOTE — PROGRESS NOTES
SUBJECTIVE DATA:  Case discussed at [facility] meeting yesterday evening, decision was made that CABG would be the best option for treatment, with the plan of [place] to LAD and possible graph to OM.    Tentative plan for OR Wednesday.      Last Recorded Vitals:  Vitals:    07/10/24 2059 24 0027 24 0457 24 0812   BP: 162/72 155/73 179/56 156/64   BP Location: Right arm Right arm Right arm Right arm   Patient Position: Lying Lying Lying Lying   Pulse: 81   76   Resp: 18 17  18   Temp: 36.8 °C (98.2 °F) 36.6 °C (97.8 °F) 36.6 °C (97.9 °F) 36.6 °C (97.8 °F)   TempSrc: Temporal Temporal Skin Temporal   SpO2: 100% 100% 99% 100%   Weight:       Height:         Temp:  [36.6 °C (97.8 °F)-37.2 °C (98.9 °F)] 36.6 °C (97.8 °F)  Heart Rate:  [72-81] 76  Resp:  [17-18] 18  BP: (155-179)/(56-73) 156/64      Last I/O:  I/O last 3 completed shifts:  In: 4000 (81 mL/kg) [Other:4000]  Out: 33020 (253 mL/kg) [Urine:600 (0.3 mL/kg/hr); Other:01246]  Weight: 49.4 kg     Objective   Most Recent Testing/Imaging:  Labs  CBC- 2024:  5:25 AM  5.4 8.3 217    25.8      BMP- 2024:  5:25 AM  138 25 101 87   3.7 7.04 28    Estimated Creatinine Clearance: 5.1 mL/min (A) (by C-G formula based on SCr of 7.04 mg/dL (H)).     CA: 8.5 PROTIEN: 7.1 ALT: 11 Total Bili: 0.3 M.60   PHOS: 4.2 ALBUMIN: 2.9 AST: 17   Alk Phos: 91      COAGS- No results in last year.  _   _ _     CV Labs  Troponin I, High Sensitivity   Date/Time Value Ref Range Status   2024 05:47 AM 21,094 (HH) 0 - 13 ng/L Final   2024 08:30 AM 19,016 (HH) 0 - 13 ng/L Final   2024 12:38 AM 1,411 (HH) 0 - 13 ng/L Final   2024 09:55 PM 69 (HH) 0 - 13 ng/L Final   2024 08:53 PM 39 (H) 0 - 13 ng/L Final     BNP   Date/Time Value Ref Range Status   2024 08:53  (H) 0 - 99 pg/mL Final     LDL Calculated   Date/Time Value Ref Range Status   2024 12:38 AM 74 <=99 mg/dL Final     VLDL   Date/Time Value Ref Range Status   2024  12:38 AM 13 0 - 40 mg/dL Final     CT chest abdomen pelvis wo IV contrast 07/06/2024  1. No distinct acute intrathoracic process identified.  2. Minimal streaky bibasilar atelectasis with pleural based right lower lobe pulmonary nodule measuring 1.2 cm.  Suggest continued follow-up as per clinical guidelines.  3. Enlarged and heterogeneous appearance of the thyroid gland with multiple bilateral nodules, left greater than right.  Suggest non-emergent follow-up with ultrasound as clinically warranted.  4. Non-obstructing nephrolithiasis of the left kidney.  No ureteral calculus or evidence for obstructive uropathy bilaterally.  5. Colonic diverticulosis without CT evidence for acute diverticulitis.  6. Large amount of fecal material within the rectosigmoid colon and rectal vault. No evidence of bowel obstruction.    Cardiovascular Testing:  EKG:   EKG 12 Lead 7/6/24      Encounter Date: 07/06/24   Electrocardiogram, 12-lead PRN ACS symptoms   Result Value    Ventricular Rate 86    Atrial Rate 86    PA Interval 158    QRS Duration 94    QT Interval 398    QTC Calculation(Bazett) 476    P Axis 41    R Axis 30    T Axis 127    QRS Count 14    Q Onset 218    P Onset 139    P Offset 191    T Offset 417    QTC Fredericia 449    Narrative    Normal sinus rhythm  Septal infarct , age undetermined  ST & T wave abnormality, consider lateral ischemia  Abnormal ECG  When compared with ECG of 21-JUN-2016 15:28,  Vent. rate has increased BY  30 BPM  ST now depressed in Inferior leads  ST now depressed in Lateral leads  T wave inversion no longer evident in Inferior leads     Echo:  Transthoracic Echocardiogram 7/7/24   1. Left ventricular ejection fraction is normal, calculated by Rasmussen's biplane at 61%.   2. Spectral Doppler shows an impaired relaxation pattern of left ventricular diastolic filling.   3. There is moderate concentric left ventricular hypertrophy.   4. Increased LV mass.   5. Moderately increased left ventricular  septal thickness.   6. There is normal right ventricular global systolic function.   7. Aortic valve sclerosis.    Transthoracic Echocardiogram 3/31/22  -The left ventricle is normal in size. There is moderate left ventricular   hypertrophy. Left ventricular systolic function is normal. EF = 62 ± 5% (2D   biplane) Grade I left ventricular diastolic dysfunction.   -The right ventricle is normal in size. Right ventricular systolic function is   normal.     Cath:  Left Heart Catheterization 7/7/24  -Severe Multivessel CAD    Stress Test: No results found for the last 3 years.   Cardiac Imaging: No results found for the last 3 years.    Inpatient Medications:  Scheduled medications   Medication Dose Route Frequency    amLODIPine  10 mg oral Daily    aspirin  81 mg oral Daily    atorvastatin  80 mg oral Daily    carvedilol  25 mg oral BID    dextrose 1.5% - LOW calcium 2.5mEq/L 2,000 mL peritoneal dialysate   intraperitoneal 4x daily    epoetin shawn or biosimilar  150 Units/kg subcutaneous Every Sunday    hydrALAZINE  25 mg oral BID    losartan  100 mg oral Daily    oxygen   inhalation Continuous - 02/gases    perflutren lipid microspheres  0.5-10 mL of dilution intravenous Once in imaging    perflutren protein A microsphere  0.5 mL intravenous Once in imaging    polyethylene glycol  17 g oral Daily    potassium chloride CR  40 mEq oral Once    sulfur hexafluoride microsphr  2 mL intravenous Once in imaging     PRN medications   Medication    acetaminophen    Or    acetaminophen    Or    acetaminophen     Continuous Medications   Medication Dose Last Rate     Outpatient Medications:  Current Outpatient Medications   Medication Instructions    amLODIPine (Norvasc) 10 mg tablet oral, Daily    atorvastatin (LIPITOR) 20 mg, oral, Daily    losartan (COZAAR) 100 mg, oral, Daily     Physical Exam:   GENERAL: alert, cooperative, pleasant; in no acute distress  SKIN: warm and dry, cap refill <2  NECK: PEERL, no JVD or  hepatojugular reflex  CARDIAC: Regular rate and rhythm, S1S2, no murmurs or abnormal heart sounds  RESPIRATORY: Normal respiratory effort, no abnormal breath sounds  ABDOMEN: soft, non-distended, non-tender with palpation  EXTREMITIES: No lower extremity edema, normal pulses all 4 extremities  NEURO: Alert and oriented, mental status at baseline, no focal deficits  PSYCH: Behavior and mood as expected    Tele: NSR 70-80s  Code Status: Full Code    Assessment/Plan   Isis Geronimo is a 79 y.o. female with a past medical history of HTN, HLD, NSVT, ESRD on daily peritoneal dialysis, anemia, RA, vertigo, COPD, and carpal tunnel syndrome, who presented with N/V/constipation and right chest pain attributed to gas pain. Troponin was noted to be elevated 30>>69>>[id number]>>[id number] and cardiology consulted for further evaluation of NSTEMI.    #NSTEMI- Troponin peaked at 21,094, patient denies chest pain  #mvCAD- c/w ASA, statin, and BB  -Cardiac surgery consulted for consideration of CABG  -Case to discussed at [facility] meeting 7/10, decision made to proceed with CABG  #HTN- Suboptimal BP control on home Norvasc 10mg and Losartan 100mg  -c/w Coreg 25mg BID, Hydralazine added by primary team  #HLD-On statin therapy, increased to high intensity    TTE 7/8/2024:   1. Left ventricular ejection fraction is normal, calculated by Rasmussen's biplane at 61%.   2. Spectral Doppler shows an impaired relaxation pattern of left ventricular diastolic filling.   3. There is moderate concentric left ventricular hypertrophy.   4. Increased LV mass.   5. Moderately increased left ventricular septal thickness.   6. There is normal right ventricular global systolic function.   7. Aortic valve sclerosis.     [facility] 7/8/24: Multivessel CAD      RECOMMENDATIONS:  -Severe mvCAD, cardiac surgery following for CABG evaluation  -c/w ASA, statin, BB  -Increase Hydralazine and add Imdur 30mg daily  -Further supportive care per primary team    Case discussed at [facility]  meeting yesterday evening, decision was made that CABG would be the best option for treatment, with the plan of [place] to LAD and possible graph to OM. Cardiac surgery team following for workup and timing of surgery. Tentative plan for Wednesday.    Cardiology will follow peripherally until patient undergoes CABG. Thank you for including us in the care of this patient and do not hesitate to call with questions.      Tamy Santos, APRN-CNP       Thank you for allowing me to participate in their care.  Please feel free to call me with any further questions or concerns.    Dontae Gonzalez MD, [facility], [facility]  Division of Cardiovascular Medicine  System Director, Nuclear Cardiology   Medical Director, [facility] [place], [facility]   Clinical , [employer]  [email or web address]   Office:  [phone]          Both the ROSA and I have had a face to face encounter with the patient today. I have examined the patient and edited the documented physical examination as necessary.  I personally reviewed the patient's vital signs, telemetry, recent labs, medications, orders, EKGs, and pertinent cardiac imaging/ echocardiography.  I have reviewed the ROSA's encounter note, approve the ROSA's documentation and have edited the note to reflect my diagnostic and therapeutic plan.

## 2024-07-11 NOTE — PROGRESS NOTES
Isis Geronimo is a 79 y.o. female on day 4 of admission presenting with NSTEMI (non-ST elevated myocardial infarction) (Multi).    Plan: Awaiting follow up with Cardiology surgery team regarding possible CABG as her case was discussed last night. Family in room this morning and followed up with thoracic NP. Possibility for need to transfer to [facility] for further surgical intervention  Disposition: Home [facility]?  Barrier: possible CABG  ADOD:  5-7 days     07/11/24 1123   Discharge Planning   Patient expects to be discharged to: Home         Dimple Cuevas RN

## 2024-07-11 NOTE — PROGRESS NOTES
Isis Geronimo is a 79 y.o. female on day 4 of admission presenting with NSTEMI (non-ST elevated myocardial infarction) (Multi).      Subjective   Seen and examined.   Receiving PD without issue.   No CP or SOB.        Objective     Peritoneal Dialysis  Exchange Number: 3  Dianeal Solution: Dextrose 1.5% in 2000 mL  Dianeal Additive: Potassium  Bag Weight (g): 2200 g  Peritoneal Input Status: Completed  Peritoneal Output Status: Completed  Effluent Appearance: Yellow  Peritoneal Dialysis Volume In (ml): 2000 ml  Dwell Time: 1200  Effluent Volume Out (mL): 2000 ml    Vitals 24HR  Heart Rate:  [72-81]   Temp:  [36.6 °C (97.8 °F)-37.2 °C (98.9 °F)]   Resp:  [17-18]   BP: (155-179)/(56-73)   SpO2:  [99 %-100 %]     Intake/Output last 3 Shifts:    Intake/Output Summary (Last 24 hours) at 7/11/2024 0935  Last data filed at 7/11/2024 0400  Gross per 24 hour   Intake --   Output 8500 ml   Net -8500 ml       Physical Exam  General: Alert and oriented, in NAD  HEENT:  Pupils equal and reactive. Moist mucosa.    Neck: Normal Jugular Venous Pressure.  Cardiovascular: Regular rate and rhythm. Normal S1 and S2.  Pulmonary:  Clear to auscultation bilaterally.  No wheezes, rales or rhonchi  Abdomen:  Soft. Non-tender. Non-distended. Positive bowel sounds.  Lower Extremities:  2+ pedal pulses. No LE edema.  Neurologic:  Cranial nerves intact.  No focal deficit.   Skin: Skin warm and dry, normal skin turgor.   Psychiatric: Appropriate mood and behavior    Scheduled Medications  amLODIPine, 10 mg, oral, Daily  aspirin, 81 mg, oral, Daily  atorvastatin, 80 mg, oral, Daily  carvedilol, 25 mg, oral, BID  dextrose 1.5% - LOW calcium 2.5mEq/L 2,000 mL peritoneal dialysate, , intraperitoneal, 4x daily  epoetin shawn or biosimilar, 150 Units/kg, subcutaneous, Every Sunday  hydrALAZINE, 25 mg, oral, TID  isosorbide mononitrate ER, 30 mg, oral, Daily  [Held by provider] losartan, 100 mg, oral, Daily  oxygen, , inhalation, Continuous -  02/gases  polyethylene glycol, 17 g, oral, Daily      Continuous medications         PRN medications: acetaminophen **OR** acetaminophen **OR** acetaminophen, hydrALAZINE     Relevant Results  Results from last 7 days   Lab Units 07/11/24  0525 07/10/24  0632 07/09/24  0654   WBC AUTO x10*3/uL 5.4 6.9 5.0   HEMOGLOBIN g/dL 8.3* 8.4* 8.4*   HEMATOCRIT % 25.8* 26.0* 25.9*   PLATELETS AUTO x10*3/uL 217 231 226   NEUTROS PCT AUTO % 75.9 80.9 72.2   LYMPHS PCT AUTO % 15.5 11.2 19.2   MONOS PCT AUTO % 6.5 5.7 5.0   EOS PCT AUTO % 1.5 1.5 2.8     Results from last 7 days   Lab Units 07/11/24  0525 07/10/24  0632 07/09/24  0654   SODIUM mmol/L 138 140 138   POTASSIUM mmol/L 3.7 3.4* 3.5   CHLORIDE mmol/L 101 104 103   CO2 mmol/L 28 27 25   BUN mg/dL 25* 34* 38*   CREATININE mg/dL 7.04* 8.54* 9.11*   GLUCOSE mg/dL 87 84 71*   CALCIUM mg/dL 8.5* 8.4* 8.4*       XR chest 1 view   Final Result   Mild cardiomegaly.  Currently without radiographic evidence of CHF or   pneumonia.        Horizontal curvilinear opacities with associated lucency at the right   base adjacent to the diaphragm. Suspect atelectasis rather than   pneumoperitoneum. Suggest dedicated supine and upright views of the   abdomen in follow-up.        MACRO:   None        Signed by: Jacob Arora 7/11/2024 7:40 AM   Dictation workstation:   [id number]      Vascular US carotid artery duplex bilateral   Final Result      Transthoracic Echo (TTE) Complete   Final Result      CT chest abdomen pelvis wo IV contrast   Final Result   1.  No distinct acute intrathoracic process identified.   2.  Minimal streaky bibasilar atelectasis with pleural based right   lower lobe pulmonary nodule measuring 1.2 cm.  Suggest continued   follow-up as per clinical guidelines.   3.  Enlarged and heterogeneous appearance of the thyroid gland with   multiple bilateral nodules, left greater than right.  Suggest   nonemergent follow-up with ultrasound as clinically warranted.   4.   Nonobstructing nephrolithiasis of the left kidney.  No ureteral   calculus or evidence for obstructive uropathy bilaterally.   5.  Colonic diverticulosis without CT evidence for acute   diverticulitis.   6.  Large amount of fecal material within the rectosigmoid colon and   rectal vault.  No evidence of bowel obstruction.   Fleischner Society 2017 Guidelines for Management of Incidentally   Detected Pulmonary Nodules in Adults:   A.  SOLID NODULES*   Nodule Type and Size:   Single:   *Low Risk**    < 6 mm - No routine follow-up   6-8 mm - CT at 6-12 months, then consider CT at 18-24 months   > 8 mm - Consider CT at 3 months, PET/CT, or tissue sampling   *High Risk**   < 6 mm - Optional CT at 12 months   6-8 mm - CT at 6-12 months, then CT at 18-24 months   > 8 mm - Consider CT at 3 months, PET/CT, or tissue sampling   Multiple:   *Low Risk**    < 6 mm - No routine follow-up   6-8 mm - CT at 3-6 months, then consider CT at 18-24 months   > 8 mm - CT at 3-6 months, then consider CT at 18-24 months   *High Risk**   < 6 mm - Optional CT at 12 months   6-8 mm - CT at 3-6 months, then at 18-24 months   > 8 mm - CT at 3-6 months, then at 18-24 months   B. SUBSOLID NODULES*   Nodule Type and Size:   Single:     *Ground glass   < 6 mm - No routine follow-up   > 6 mm - CT at 6-12 months to confirm persistence, then CT every 2   years until 5 years   *Part Solid   < 6 mm - No routine follow-up   > 6 mm - CT at 3-6 months to confirm persistence.  If unchanged and   solid component remains < 6 mm, annual CT should be performed for 5   years.   Multiple:   < 6 mm - CT at 3-6 months.  If stable, consider CT at 2 and 4 years.   > 6 mm - CT at 3-6 months.  Subsequent management based on the most   suspicious nodule(s).   Note - These recommendations do not apply to lung cancer screening,   patients with immunosuppression, or patients with known primary   cancer.   * Dimensions are average of long and short axes, rounded to the    nearest millimeter.   ** Consider all relevant risk factors.   Signed by Girish Oshea MD      Cardiac Catheterization Procedure    (Results Pending)            Assessment/Plan   This patient currently has cardiac telemetry ordered; if you would like to modify or discontinue the telemetry order, click here to go to the orders activity to modify/discontinue the order.  Isis Geronimo is a 79-year-old female with a past medical history of end-stage renal disease on peritoneal dialysis under the care of Dr. Zarate ([facility] transportation [facility]). She has a history of anemia, rheumatoid arthritis, nonsustained V. tach, hypertension, hyperlipidemia, vertigo, emphysema, carpal tunnel syndrome who presented with nausea, emesis, constipation and right-sided chest discomfort.  [facility] with high-sensitivity troponin peak of 21,094. 2 D ECHO showed an LVEF of 61% with LV diastolic dysfunction, moderate concentric LVH and aortic valve sclerosis.  She was seen by cardiology.  Heparin infusion, beta-blocker, statin and aspirin given.      Left heart cath showed severe multivessel coronary artery disease. CT surgery was consulted for CABG evaluation. We are awaiting further input. I will continue the 1.5% dextrose dwells x 4 daily exchanges, 4 hours each dwell with a fill volume of 1.5 L each. She is dry at night. Erythropoietin is ordered.  Her phosphorus level is acceptable not on a binder. Nephrology will follow closely with her care.           Principal Problem:    NSTEMI (non-ST elevated myocardial infarction) (Multi)      I spent 40 minutes in the professional and overall care of this patient.      Gab Lynn, DO

## 2024-07-11 NOTE — PROGRESS NOTES
INTERNAL MEDICINE PROGRESS NOTE      HPI:    Patient awaiting cardiothoracic surgery plan.  Denies chest pain.    Vital signs in last 24 hours:  Temp:  [36.6 °C (97.8 °F)-37.2 °C (98.9 °F)] 36.6 °C (97.8 °F)  Heart Rate:  [72-81] 76  Resp:  [17-18] 18  BP: (155-179)/(56-73) 156/64    Physical Examination:  Physical Exam    Constitutional:       Appearance: Elderly, well-built, in no distress.  HENT:      Head: Normocephalic and atraumatic.   Eyes:      Extraocular Movements: Extraocular movements intact.      Pupils: Pupils are equal, round, and reactive to light.   Cardiovascular:      Rate and Rhythm: Normal rate and regular rhythm.      Pulses: Normal pulses.      Heart sounds: Normal heart sounds.   Pulmonary:      Effort: Pulmonary effort is normal.      Breath sounds: Normal breath sounds.   Abdominal:      General: Abdomen is flat. Bowel sounds are normal.      Palpations: Abdomen is soft.   Musculoskeletal:         General: Normal range of motion.      Cervical back: Normal range of motion and neck supple.   Skin:     General: Skin is warm and dry.   Neurological:      General: No focal deficit present.      Mental Status: Alert and oriented to person, place, and time. Mental status is at baseline.         Medications:    Current Facility-Administered Medications:     acetaminophen (Tylenol) tablet 650 mg, 650 mg, oral, q4h PRN, 650 mg at 07/10/24 1308 **OR** acetaminophen (Tylenol) oral liquid 650 mg, 650 mg, oral, q4h PRN **OR** acetaminophen (Tylenol) suppository 650 mg, 650 mg, rectal, q4h PRN, RACHELLE Winston    amLODIPine (Norvasc) tablet 10 mg, 10 mg, oral, Daily, RACHELLE Winston, 10 mg at 07/11/24 0858    aspirin EC tablet 81 mg, 81 mg, oral, Daily, RACHELLE Galaviz, 81 mg at 07/11/24 0859    atorvastatin (Lipitor) tablet 80 mg, 80 mg, oral, Daily, RACHELLE Galaviz, 80 mg at 07/11/24 0858    carvedilol (Coreg) tablet 25 mg, 25 mg, oral,  "BID, ALEXIS Galaviz-CNP, 25 mg at 07/11/24 0858    dextrose 1.5% - LOW calcium 2.5mEq/L 2,000 mL peritoneal dialysate, , intraperitoneal, 4x daily, Gab Lynn DO, Given at 07/11/24 0903    epoetin shawn-epbx (Retacrit) injection 8,000 Units, 150 Units/kg, subcutaneous, Every Sunday, Gab Lynn DO    hydrALAZINE (Apresoline) injection 10 mg, 10 mg, intravenous, q6h PRN, Tamy Santos APRN-CNP    hydrALAZINE (Apresoline) tablet 25 mg, 25 mg, oral, TID, ALEXIS Galaviz-CNP, 25 mg at 07/11/24 0915    isosorbide mononitrate ER (Imdur) 24 hr tablet 30 mg, 30 mg, oral, Daily, ALEXIS Galaviz-CNP, 30 mg at 07/11/24 1002    [Held by provider] losartan (Cozaar) tablet 100 mg, 100 mg, oral, Daily, RACHELLE Winston, 100 mg at 07/10/24 0832    oxygen (O2) therapy, , inhalation, Continuous - 02/gases, ALEXIS Winston-CNP, 1 L/min at 07/08/24 1500    polyethylene glycol (Glycolax, Miralax) packet 17 g, 17 g, oral, Daily, RACHELLE Winston, 17 g at 07/07/24 0833    Laboratory Findings:  Lab Results   Component Value Date    WBC 5.4 07/11/2024    HGB 8.3 (L) 07/11/2024    HCT 25.8 (L) 07/11/2024    MCV 83 07/11/2024     07/11/2024     No results found for: \"INR\", \"PROTIME\"  Lab Results   Component Value Date    GLUCOSE 87 07/11/2024    CALCIUM 8.5 (L) 07/11/2024     07/11/2024    K 3.7 07/11/2024    CO2 28 07/11/2024     07/11/2024    BUN 25 (H) 07/11/2024    CREATININE 7.04 (H) 07/11/2024       Assessment and Plan:     Non-ST elevation MI -cardiothoracic surgery intervention being planned.  ESRD -continue with peritoneal dialysis exchanges  Hypertension -medications adjusted per nephrology.  Nausea -likely related to the above, resolved.     Patient seen and discussed with family at bedside.       Jacob Staples MD  07/11/24  11:14 AM         "

## 2024-07-11 NOTE — SIGNIFICANT EVENT
Seen at bedside with Dr. Cristina Clifton. Plan for midCABG on Wednesday 7/17/24 2nd case with Dr. Cristina Clifton.    - Case request complete  - Blood orders complete  - Peridex, CHG PM prior to and AM of surgery    Discussed with patient at bedside and daughter Lisa via telephone. Remain inpatient prior to surgery.

## 2024-07-12 LAB
ANION GAP SERPL CALC-SCNC: 13 MMOL/L (ref 10–20)
BASOPHILS # BLD AUTO: 0.02 X10*3/UL (ref 0–0.1)
BASOPHILS NFR BLD AUTO: 0.4 %
BUN SERPL-MCNC: 24 MG/DL (ref 6–23)
CALCIUM SERPL-MCNC: 8.3 MG/DL (ref 8.6–10.3)
CHLORIDE SERPL-SCNC: 102 MMOL/L (ref 98–107)
CO2 SERPL-SCNC: 28 MMOL/L (ref 21–32)
CREAT SERPL-MCNC: 7.5 MG/DL (ref 0.5–1.05)
EGFRCR SERPLBLD CKD-EPI 2021: 5 ML/MIN/1.73M*2
EOSINOPHIL # BLD AUTO: 0.11 X10*3/UL (ref 0–0.4)
EOSINOPHIL NFR BLD AUTO: 2.2 %
ERYTHROCYTE [DISTWIDTH] IN BLOOD BY AUTOMATED COUNT: 16.7 % (ref 11.5–14.5)
GLUCOSE SERPL-MCNC: 74 MG/DL (ref 74–99)
HCT VFR BLD AUTO: 27.6 % (ref 36–46)
HGB BLD-MCNC: 8.7 G/DL (ref 12–16)
IMM GRANULOCYTES # BLD AUTO: 0.01 X10*3/UL (ref 0–0.5)
IMM GRANULOCYTES NFR BLD AUTO: 0.2 % (ref 0–0.9)
LYMPHOCYTES # BLD AUTO: 1.05 X10*3/UL (ref 0.8–3)
LYMPHOCYTES NFR BLD AUTO: 21 %
MCH RBC QN AUTO: 26.7 PG (ref 26–34)
MCHC RBC AUTO-ENTMCNC: 31.5 G/DL (ref 32–36)
MCV RBC AUTO: 85 FL (ref 80–100)
MONOCYTES # BLD AUTO: 0.31 X10*3/UL (ref 0.05–0.8)
MONOCYTES NFR BLD AUTO: 6.2 %
NEUTROPHILS # BLD AUTO: 3.5 X10*3/UL (ref 1.6–5.5)
NEUTROPHILS NFR BLD AUTO: 70 %
NRBC BLD-RTO: 0 /100 WBCS (ref 0–0)
PLATELET # BLD AUTO: 248 X10*3/UL (ref 150–450)
POTASSIUM SERPL-SCNC: 3.7 MMOL/L (ref 3.5–5.3)
RBC # BLD AUTO: 3.26 X10*6/UL (ref 4–5.2)
SODIUM SERPL-SCNC: 139 MMOL/L (ref 136–145)
WBC # BLD AUTO: 5 X10*3/UL (ref 4.4–11.3)

## 2024-07-12 PROCEDURE — 2500000001 HC RX 250 WO HCPCS SELF ADMINISTERED DRUGS (ALT 637 FOR MEDICARE OP): Performed by: NURSE PRACTITIONER

## 2024-07-12 PROCEDURE — 82374 ASSAY BLOOD CARBON DIOXIDE: CPT | Performed by: INTERNAL MEDICINE

## 2024-07-12 PROCEDURE — 2500000004 HC RX 250 GENERAL PHARMACY W/ HCPCS (ALT 636 FOR OP/ED): Performed by: INTERNAL MEDICINE

## 2024-07-12 PROCEDURE — 36415 COLL VENOUS BLD VENIPUNCTURE: CPT | Performed by: INTERNAL MEDICINE

## 2024-07-12 PROCEDURE — 85025 COMPLETE CBC W/AUTO DIFF WBC: CPT | Performed by: INTERNAL MEDICINE

## 2024-07-12 PROCEDURE — 99232 SBSQ HOSP IP/OBS MODERATE 35: CPT | Performed by: STUDENT IN AN ORGANIZED HEALTH CARE EDUCATION/TRAINING PROGRAM

## 2024-07-12 PROCEDURE — 2060000001 HC INTERMEDIATE ICU ROOM DAILY

## 2024-07-12 RX ADMIN — CARVEDILOL 25 MG: 25 TABLET, FILM COATED ORAL at 20:43

## 2024-07-12 RX ADMIN — SODIUM CHLORIDE, SODIUM LACTATE, CALCIUM CHLORIDE, MAGNESIUM CHLORIDE AND DEXTROSE: 1.5; 538; 448; 18.3; 5.08 INJECTION, SOLUTION INTRAPERITONEAL at 12:47

## 2024-07-12 RX ADMIN — AMLODIPINE BESYLATE 10 MG: 10 TABLET ORAL at 09:39

## 2024-07-12 RX ADMIN — SODIUM CHLORIDE, SODIUM LACTATE, CALCIUM CHLORIDE, MAGNESIUM CHLORIDE AND DEXTROSE: 1.5; 538; 448; 18.3; 5.08 INJECTION, SOLUTION INTRAPERITONEAL at 08:08

## 2024-07-12 RX ADMIN — SODIUM CHLORIDE, SODIUM LACTATE, CALCIUM CHLORIDE, MAGNESIUM CHLORIDE AND DEXTROSE: 1.5; 538; 448; 18.3; 5.08 INJECTION, SOLUTION INTRAPERITONEAL at 17:30

## 2024-07-12 RX ADMIN — ACETAMINOPHEN 650 MG: 325 TABLET ORAL at 16:39

## 2024-07-12 RX ADMIN — HYDRALAZINE HYDROCHLORIDE 25 MG: 25 TABLET ORAL at 20:43

## 2024-07-12 RX ADMIN — CARVEDILOL 25 MG: 25 TABLET, FILM COATED ORAL at 09:39

## 2024-07-12 RX ADMIN — HYDRALAZINE HYDROCHLORIDE 25 MG: 25 TABLET ORAL at 09:39

## 2024-07-12 RX ADMIN — ASPIRIN 81 MG: 81 TABLET, COATED ORAL at 09:39

## 2024-07-12 RX ADMIN — ISOSORBIDE MONONITRATE 30 MG: 30 TABLET, EXTENDED RELEASE ORAL at 09:39

## 2024-07-12 RX ADMIN — ATORVASTATIN CALCIUM 80 MG: 80 TABLET, FILM COATED ORAL at 09:39

## 2024-07-12 ASSESSMENT — PAIN SCALES - GENERAL
PAINLEVEL_OUTOF10: 6
PAINLEVEL_OUTOF10: 0 - NO PAIN

## 2024-07-12 ASSESSMENT — PAIN - FUNCTIONAL ASSESSMENT
PAIN_FUNCTIONAL_ASSESSMENT: 0-10
PAIN_FUNCTIONAL_ASSESSMENT: VAS (VISUAL ANALOG SCALE)

## 2024-07-12 ASSESSMENT — COGNITIVE AND FUNCTIONAL STATUS - GENERAL
DAILY ACTIVITIY SCORE: 24
MOBILITY SCORE: 24

## 2024-07-12 ASSESSMENT — PAIN DESCRIPTION - LOCATION: LOCATION: ABDOMEN

## 2024-07-12 NOTE — PROGRESS NOTES
Isis Geronimo is a 79 y.o. female on day 5 of admission presenting with NSTEMI (non-ST elevated myocardial infarction) (Multi).      Subjective   Seen and examined.   Receiving PD without issue.   Reportedly had dyspepsia yesterday evening.  No current chest discomfort or shortness of breath.       Objective     Peritoneal Dialysis  Exchange Number: [id number]  Dianeal Solution: Dextrose 1.5% in 2000 mL  Dianeal Additive: Potassium  Bag Weight (g): 2200 g  Peritoneal Input Status: Started  Peritoneal Output Status: Completed  Effluent Appearance: Yellow (clear)  Peritoneal Dialysis Volume In (ml): 2000 ml  Dwell Time: 1200  Effluent Volume Out (mL): 1900 ml    Vitals 24HR  Heart Rate:  [62-79]   Temp:  [36.4 °C (97.5 °F)-37.3 °C (99.1 °F)]   Resp:  [16-18]   BP: (124-148)/(49-67)   SpO2:  [98 %-100 %]     Intake/Output last 3 Shifts:    Intake/Output Summary (Last 24 hours) at 7/12/2024 1037  Last data filed at 7/12/2024 0800  Gross per 24 hour   Intake --   Output 6400 ml   Net -6400 ml       Physical Exam  General: Alert and oriented, in NAD  HEENT:  Pupils equal and reactive. Moist mucosa.    Neck: Normal Jugular Venous Pressure.  Cardiovascular: Regular rate and rhythm. Normal S1 and S2.  Pulmonary:  Clear to auscultation bilaterally.  No wheezes, rales or rhonchi  Abdomen:  Soft. Non-tender. Non-distended. Positive bowel sounds.  Lower Extremities:  2+ pedal pulses. No LE edema.  Neurologic:  Cranial nerves intact.  No focal deficit.   Skin: Skin warm and dry, normal skin turgor.   Psychiatric: Appropriate mood and behavior    Scheduled Medications  amLODIPine, 10 mg, oral, Daily  aspirin, 81 mg, oral, Daily  atorvastatin, 80 mg, oral, Daily  carvedilol, 25 mg, oral, BID  [START ON 7/16/2024] chlorhexidine, 15 mL, Swish & Spit, BID  dextrose 1.5% - LOW calcium 2.5mEq/L 2,000 mL peritoneal dialysate, , intraperitoneal, 4x daily  epoetin shawn or biosimilar, 150 Units/kg, subcutaneous, Every Sunday  hydrALAZINE, 25 mg,  oral, BID  isosorbide mononitrate ER, 30 mg, oral, Daily  [Held by provider] losartan, 100 mg, oral, Daily  polyethylene glycol, 17 g, oral, Daily      Continuous medications         PRN medications: acetaminophen **OR** acetaminophen **OR** acetaminophen, hydrALAZINE     Relevant Results  Results from last 7 days   Lab Units 07/12/24  0738 07/11/24  0525 07/10/24  0632   WBC AUTO x10*3/uL 5.0 5.4 6.9   HEMOGLOBIN g/dL 8.7* 8.3* 8.4*   HEMATOCRIT % 27.6* 25.8* 26.0*   PLATELETS AUTO x10*3/uL 248 217 231   NEUTROS PCT AUTO % 70.0 75.9 80.9   LYMPHS PCT AUTO % 21.0 15.5 11.2   MONOS PCT AUTO % 6.2 6.5 5.7   EOS PCT AUTO % 2.2 1.5 1.5     Results from last 7 days   Lab Units 07/12/24  0738 07/11/24  0525 07/10/24  0632   SODIUM mmol/L 139 138 140   POTASSIUM mmol/L 3.7 3.7 3.4*   CHLORIDE mmol/L 102 101 104   CO2 mmol/L 28 28 27   BUN mg/dL 24* 25* 34*   CREATININE mg/dL 7.50* 7.04* 8.54*   GLUCOSE mg/dL 74 87 84   CALCIUM mg/dL 8.3* 8.5* 8.4*       XR chest 1 view   Final Result   Mild cardiomegaly.  Currently without radiographic evidence of CHF or   pneumonia.        Horizontal curvilinear opacities with associated lucency at the right   base adjacent to the diaphragm. Suspect atelectasis rather than   pneumoperitoneum. Suggest dedicated supine and upright views of the   abdomen in follow-up.        MACRO:   None        Signed by: Jacob Arora 7/11/2024 7:40 AM   Dictation workstation:   [id number]      Vascular US carotid artery duplex bilateral   Final Result      Transthoracic Echo (TTE) Complete   Final Result      CT chest abdomen pelvis wo IV contrast   Final Result   1.  No distinct acute intrathoracic process identified.   2.  Minimal streaky bibasilar atelectasis with pleural based right   lower lobe pulmonary nodule measuring 1.2 cm.  Suggest continued   follow-up as per clinical guidelines.   3.  Enlarged and heterogeneous appearance of the thyroid gland with   multiple bilateral nodules, left greater  than right.  Suggest   nonemergent follow-up with ultrasound as clinically warranted.   4.  Nonobstructing nephrolithiasis of the left kidney.  No ureteral   calculus or evidence for obstructive uropathy bilaterally.   5.  Colonic diverticulosis without CT evidence for acute   diverticulitis.   6.  Large amount of fecal material within the rectosigmoid colon and   rectal vault.  No evidence of bowel obstruction.   Fleischner Society 2017 Guidelines for Management of Incidentally   Detected Pulmonary Nodules in Adults:   A.  SOLID NODULES*   Nodule Type and Size:   Single:   *Low Risk**    < 6 mm - No routine follow-up   6-8 mm - CT at 6-12 months, then consider CT at 18-24 months   > 8 mm - Consider CT at 3 months, PET/CT, or tissue sampling   *High Risk**   < 6 mm - Optional CT at 12 months   6-8 mm - CT at 6-12 months, then CT at 18-24 months   > 8 mm - Consider CT at 3 months, PET/CT, or tissue sampling   Multiple:   *Low Risk**    < 6 mm - No routine follow-up   6-8 mm - CT at 3-6 months, then consider CT at 18-24 months   > 8 mm - CT at 3-6 months, then consider CT at 18-24 months   *High Risk**   < 6 mm - Optional CT at 12 months   6-8 mm - CT at 3-6 months, then at 18-24 months   > 8 mm - CT at 3-6 months, then at 18-24 months   B. SUBSOLID NODULES*   Nodule Type and Size:   Single:     *Ground glass   < 6 mm - No routine follow-up   > 6 mm - CT at 6-12 months to confirm persistence, then CT every 2   years until 5 years   *Part Solid   < 6 mm - No routine follow-up   > 6 mm - CT at 3-6 months to confirm persistence.  If unchanged and   solid component remains < 6 mm, annual CT should be performed for 5   years.   Multiple:   < 6 mm - CT at 3-6 months.  If stable, consider CT at 2 and 4 years.   > 6 mm - CT at 3-6 months.  Subsequent management based on the most   suspicious nodule(s).   Note - These recommendations do not apply to lung cancer screening,   patients with immunosuppression, or patients with  known primary   cancer.   * Dimensions are average of long and short axes, rounded to the   nearest millimeter.   ** Consider all relevant risk factors.   Signed by Girish Oshea MD      Cardiac Catheterization Procedure    (Results Pending)            Assessment/Plan   This patient currently has cardiac telemetry ordered; if you would like to modify or discontinue the telemetry order, click here to go to the orders activity to modify/discontinue the order.  Isis Geronimo is a 79-year-old female with a past medical history of end-stage renal disease on peritoneal dialysis under the care of Dr. Zarate ([facility] transportation [place]). She has a history of anemia, rheumatoid arthritis, nonsustained V. tach, hypertension, hyperlipidemia, vertigo, emphysema, carpal tunnel syndrome who presented with nausea, emesis, constipation and right-sided chest discomfort.  She was admitted to [facility] with NSTEMI with high-sensitivity troponin peak of 21,094. 2 D ECHO showed an LVEF of 61% with LV diastolic dysfunction, moderate concentric LVH and aortic valve sclerosis.  She was seen by cardiology.  Heparin infusion, beta-blocker, statin and aspirin given.      Left heart cath showed severe multivessel coronary artery disease. CT surgery was consulted for CABG evaluation tentatively planned for Wednesday. She is doing well with 1.5% dextrose dwells x 4 daily exchanges, 4 hours each dwell with a fill volume of 1.5 L each. She is dry at night. If her volume status worsens we can add an overnight dwell as well as 2.5% dextrose exchanges. No current need.  Erythropoietin is ordered.  Her phosphorus level is acceptable not on a binder. Nephrology will follow closely with her care.           Principal Problem:    NSTEMI (non-ST elevated myocardial infarction) (Multi)  Active Problems:    Other disorders of arteries, arterioles and capillaries in diseases classified elsewhere (CMS-HCC)      I spent 40 minutes in the  professional and overall care of this patient.      Gab Lynn, DO

## 2024-07-12 NOTE — PROGRESS NOTES
SUBJECTIVE DATA:  Case discussed at [facility]IP meeting yesterday evening, decision was made that CABG would be the best option for treatment, with the plan of [place] to LAD and possible graph to OM.    Tentative plan for OR Wednesday.      Last Recorded Vitals:  Vitals:    24 2350 24 0422 24 0830 24 1100   BP: (!) 139/49 145/62 148/67 119/66   BP Location: Right arm Right arm Right arm Right arm   Patient Position: Lying Lying Lying Lying   Pulse: 69 76 62 65   Resp: 17 16 16 16   Temp: 37.3 °C (99.1 °F) 36.4 °C (97.5 °F) 36.6 °C (97.8 °F) 37.2 °C (98.9 °F)   TempSrc: Temporal Skin Temporal Temporal   SpO2: 99% 100% 99% 98%   Weight:       Height:         Temp:  [36.4 °C (97.5 °F)-37.3 °C (99.1 °F)] 37.2 °C (98.9 °F)  Heart Rate:  [62-79] 65  Resp:  [16-18] 16  BP: (119-148)/(49-67) 119/66      Last I/O:  I/O last 3 completed shifts:  In:  (36.7 mL/kg) [Other:2000]  Out: 70391 (209.4 mL/kg) [Other:16394]  Weight: 54.4 kg     Objective   Most Recent Testing/Imaging:  Labs  CBC- 2024:  7:38 AM  5.0 8.7 248    27.6      BMP- 2024:  7:38 AM  139 24 102 74   3.7 7.50 28    Estimated Creatinine Clearance: 5 mL/min (A) (by C-G formula based on SCr of 7.5 mg/dL (H)).     CA: 8.3 PROTIEN: 7.1 ALT: 11 Total Bili: 0.3 M.60   PHOS: 4.2 ALBUMIN: 2.9 AST: 17   Alk Phos: 91      COAGS- No results in last year.  _   _ _     CV Labs  Troponin I, High Sensitivity   Date/Time Value Ref Range Status   2024 05:47 AM 21,094 (HH) 0 - 13 ng/L Final   2024 08:30 AM 19,016 (HH) 0 - 13 ng/L Final   2024 12:38 AM 1,411 (HH) 0 - 13 ng/L Final   2024 09:55 PM 69 (HH) 0 - 13 ng/L Final   2024 08:53 PM 39 (H) 0 - 13 ng/L Final     BNP   Date/Time Value Ref Range Status   2024 08:53  (H) 0 - 99 pg/mL Final     LDL Calculated   Date/Time Value Ref Range Status   2024 12:38 AM 74 <=99 mg/dL Final     VLDL   Date/Time Value Ref Range Status   2024 12:38 AM 13 0 -  40 mg/dL Final     CT chest abdomen pelvis wo IV contrast 07/06/2024  1. No distinct acute intrathoracic process identified.  2. Minimal streaky bibasilar atelectasis with pleural based right lower lobe pulmonary nodule measuring 1.2 cm.  Suggest continued follow-up as per clinical guidelines.  3. Enlarged and heterogeneous appearance of the thyroid gland with multiple bilateral nodules, left greater than right.  Suggest non-emergent follow-up with ultrasound as clinically warranted.  4. Non-obstructing nephrolithiasis of the left kidney.  No ureteral calculus or evidence for obstructive uropathy bilaterally.  5. Colonic diverticulosis without CT evidence for acute diverticulitis.  6. Large amount of fecal material within the rectosigmoid colon and rectal vault. No evidence of bowel obstruction.    Cardiovascular Testing:  EKG:   EKG 12 Lead 7/6/24      Encounter Date: 07/06/24   Electrocardiogram, 12-lead PRN ACS symptoms   Result Value    Ventricular Rate 86    Atrial Rate 86    CA Interval 158    QRS Duration 94    QT Interval 398    QTC Calculation(Bazett) 476    P Axis 41    R Axis 30    T Axis 127    QRS Count 14    Q Onset 218    P Onset 139    P Offset 191    T Offset 417    QTC Fredericia 449    Narrative    Normal sinus rhythm  Septal infarct , age undetermined  ST & T wave abnormality, consider lateral ischemia  Abnormal ECG  When compared with ECG of 21-JUN-2016 15:28,  Vent. rate has increased BY  30 BPM  ST now depressed in Inferior leads  ST now depressed in Lateral leads  T wave inversion no longer evident in Inferior leads     Echo:  Transthoracic Echocardiogram 7/7/24   1. Left ventricular ejection fraction is normal, calculated by Rasmussen's biplane at 61%.   2. Spectral Doppler shows an impaired relaxation pattern of left ventricular diastolic filling.   3. There is moderate concentric left ventricular hypertrophy.   4. Increased LV mass.   5. Moderately increased left ventricular septal  thickness.   6. There is normal right ventricular global systolic function.   7. Aortic valve sclerosis.    Transthoracic Echocardiogram 3/31/22  -The left ventricle is normal in size. There is moderate left ventricular   hypertrophy. Left ventricular systolic function is normal. EF = 62 ± 5% (2D   biplane) Grade I left ventricular diastolic dysfunction.   -The right ventricle is normal in size. Right ventricular systolic function is   normal.     Cath:  Left Heart Catheterization 7/7/24  -Severe Multivessel CAD    Stress Test: No results found for the last 3 years.   Cardiac Imaging: No results found for the last 3 years.    Inpatient Medications:  Scheduled medications   Medication Dose Route Frequency    amLODIPine  10 mg oral Daily    aspirin  81 mg oral Daily    atorvastatin  80 mg oral Daily    carvedilol  25 mg oral BID    [START ON 7/16/2024] chlorhexidine  15 mL Swish & Spit BID    dextrose 1.5% - LOW calcium 2.5mEq/L 2,000 mL peritoneal dialysate   intraperitoneal 4x daily    epoetin shawn or biosimilar  150 Units/kg subcutaneous Every Sunday    hydrALAZINE  25 mg oral BID    isosorbide mononitrate ER  30 mg oral Daily    [Held by provider] losartan  100 mg oral Daily    polyethylene glycol  17 g oral Daily     PRN medications   Medication    acetaminophen    Or    acetaminophen    Or    acetaminophen    hydrALAZINE     Continuous Medications   Medication Dose Last Rate     Outpatient Medications:  Current Outpatient Medications   Medication Instructions    amLODIPine (Norvasc) 10 mg tablet oral, Daily    atorvastatin (LIPITOR) 20 mg, oral, Daily    B complex-vitamin C-folic acid (Nephro-Batsheva) 0.8 mg tablet 0.8 mg, oral, Daily    losartan (COZAAR) 100 mg, oral, Daily     Physical Exam:   GENERAL: alert, cooperative, pleasant; in no acute distress  SKIN: warm and dry, cap refill <2  NECK: PEERL, no JVD or hepatojugular reflex  CARDIAC: Regular rate and rhythm, S1S2, no murmurs or abnormal heart  sounds  RESPIRATORY: Normal respiratory effort, no abnormal breath sounds  ABDOMEN: soft, non-distended, non-tender with palpation  EXTREMITIES: No lower extremity edema, normal pulses all 4 extremities  NEURO: Alert and oriented, mental status at baseline, no focal deficits  PSYCH: Behavior and mood as expected    Tele: NSR 70-80s  Code Status: Full Code    Assessment/Plan   Isis Geronimo is a 79 y.o. female with a past medical history of HTN, HLD, NSVT, ESRD on daily peritoneal dialysis, anemia, RA, vertigo, COPD, and carpal tunnel syndrome, who presented with N/V/constipation and right chest pain attributed to gas pain. Troponin was noted to be elevated 30>>69>>1411>>[id number] and cardiology consulted for further evaluation of NSTEMI.    #NSTEMI- Troponin peaked at 21,094, patient denies chest pain  #mvCAD- c/w ASA, statin, and BB  -Cardiac surgery consulted for consideration of CABG  -Case to discussed at [facility] meeting 7/10, decision made to proceed with CABG  #HTN- Suboptimal BP control on home Norvasc 10mg and Losartan 100mg  -c/w Coreg 25mg BID, Hydralazine added by primary team  #HLD-On statin therapy, increased to high intensity    TTE 7/8/2024:   1. Left ventricular ejection fraction is normal, calculated by Rasmussen's biplane at 61%.   2. Spectral Doppler shows an impaired relaxation pattern of left ventricular diastolic filling.   3. There is moderate concentric left ventricular hypertrophy.   4. Increased LV mass.   5. Moderately increased left ventricular septal thickness.   6. There is normal right ventricular global systolic function.   7. Aortic valve sclerosis.     [facility] 7/8/24: Multivessel CAD      RECOMMENDATIONS:  -Severe mvCAD, cardiac surgery following for CABG evaluation  -c/w ASA, statin, BB  -Continue Hydralazine and add Imdur 30mg daily    Case discussed at [facility] meeting yesterday evening, decision was made that CABG would be the best option for treatment, with the plan of [place] to LAD and possible  graph to OM. Cardiac surgery team following for workup and timing of surgery. Tentative plan for Wednesday.    Cardiology will follow peripherally until patient undergoes CABG. Thank you for including us in the care of this patient and do not hesitate to call with questions.    Thank you for allowing me to participate in their care.  Please feel free to call me with any further questions or concerns.    Dontae Gonzalez MD, [facility], [facility]  Division of Cardiovascular Medicine  System Director, Nuclear Cardiology   Medical Director, [facility] [place], [place]   Clinical , [facility] [facility] [employer]  [email or web address]   Office:  [phone]

## 2024-07-12 NOTE — NURSING NOTE
Resting in bed. Tolerated Pdialysis without c/o. Resp. Even and unlaBORED. dENIES CP OR SOB PRESENTLY.

## 2024-07-12 NOTE — PROGRESS NOTES
Nutrition Follow-up Note  Nutrition Assessment       Isis is a 79 y.o. female on day #6 of admission for NSTEMI    Visited with pt- pt sleeping at time of visit, unable to rouse pt by name.    Per flowsheets- pt ate around 50% of breakfast  Per chart review- CABG potentially scheduled for 7/17    Lunch tray seen in room- pt ate <25%    Scheduled medications  amLODIPine, 10 mg, oral, Daily  aspirin, 81 mg, oral, Daily  atorvastatin, 80 mg, oral, Daily  carvedilol, 25 mg, oral, BID  [START ON 7/16/2024] chlorhexidine, 15 mL, Swish & Spit, BID  dextrose 1.5% - LOW calcium 2.5mEq/L 2,000 mL peritoneal dialysate, , intraperitoneal, 4x daily  epoetin shawn or biosimilar, 150 Units/kg, subcutaneous, Every Sunday  hydrALAZINE, 25 mg, oral, BID  isosorbide mononitrate ER, 30 mg, oral, Daily  [Held by provider] losartan, 100 mg, oral, Daily  polyethylene glycol, 17 g, oral, Daily      Continuous medications     PRN medications  PRN medications: acetaminophen **OR** acetaminophen **OR** acetaminophen, hydrALAZINE     Latest Reference Range & Units 07/11/24 05:25 07/12/24 07:38   GLUCOSE 74 - 99 mg/dL 87 74   SODIUM 136 - 145 mmol/L 138 139   POTASSIUM 3.5 - 5.3 mmol/L 3.7 3.7   CHLORIDE 98 - 107 mmol/L 101 102   Bicarbonate 21 - 32 mmol/L 28 28   Anion Gap 10 - 20 mmol/L 13 13   Blood Urea Nitrogen 6 - 23 mg/dL 25 (H) 24 (H)   Creatinine 0.50 - 1.05 mg/dL 7.04 (H) 7.50 (H)   EGFR >60 mL/min/1.73m*2 6 (L) 5 (L)   Calcium 8.6 - 10.3 mg/dL 8.5 (L) 8.3 (L)   (H): Data is abnormally high  (L): Data is abnormally low    Dietary Orders (From admission, onward)       Start     Ordered    07/17/24 0001  NPO Diet; Effective midnight  Diet effective midnight         07/12/24 1237    07/09/24 1355  Oral nutritional supplements  Until discontinued        Question Answer Comment   Deliver with Dinner Chocolate   Select supplement: Nepro With Carbsteady        07/09/24 1354    07/08/24 1440  Adult diet Cardiac, Renal; 70 gm fat; 2 - 3 grams  "Sodium; Potassium Restricted 2 gm (50mEq)  Diet effective now        Question Answer Comment   Diet type Cardiac    Diet type Renal    Fat restriction: 70 gm fat    Sodium restriction: 2 - 3 grams Sodium    Potassium restriction: Potassium Restricted 2 gm (50mEq)        07/08/24 1439                  Anthropometrics:  Height: 160 cm (5' 2.99\")  Weight: 54.4 kg (120 lb)  BMI (Calculated): 21.26    Weight Change: 3.45    Wt Readings from Last 1 Encounters:   07/10/24 54.4 kg (120 lb)     Weight         7/7/2024  0442 7/7/2024  0934 7/8/2024  0434 7/9/2024  1330 7/10/2024  0700    Weight: 48.8 kg (107 lb 9.4 oz) 48.8 kg (107 lb 9.4 oz) 49.9 kg (110 lb) 52.6 kg (116 lb) 54.4 kg (120 lb)          Weight Change  Significant Weight Loss: No       IBW/kg (Dietitian Calculated): 52.3 kg  Percent of IBW: 104 %       Energy Needs:  Estimated Energy Needs  Total Energy Estimated Needs (kCal): 1625 kCal  Total Estimated Energy Need per Day (kCal/kg): 1900 kCal/kg  Method for Estimating Needs: 30-35kcals/kg    Estimated Protein Needs  Total Protein Estimated Needs (g): 65 g  Total Protein Estimated Needs (g/kg): 70 g/kg  Method for Estimating Needs: 1.2-1.3g/kg    Estimated Fluid Needs  Method for Estimating Needs: 1mL/kcal or MD recommendations       Nutrition Focused Physical Findings:  Subcutaneous Fat Loss  Orbital Fat Pads: Well nourished (slightly bulging fat pads)  Buccal Fat Pads: Well nourished (full, rounded cheeks)  Triceps: Well nourished (ample fat tissue)    Muscle Wasting  Temporalis: Mild-Moderate (slight depression)  Pectoralis (Clavicular Region): Well nourished (clavicle not visible)  Deltoid/Trapezius: Well nourished (rounded appearance at arm, shoulder, neck)  Interosseous: Well nourished (muscle bulges)    Edema  Edema: none       Physical Findings (Nutrition Deficiency/Toxicity)  Skin: Negative       Nutrition Diagnosis        Patient has Nutrition Diagnosis: Yes  Nutrition Diagnosis 1: Increased nutrient " needs  Diagnosis Status (1): Ongoing  Related to (1): chronic illness  As Evidenced by (1): need for dialysis       Nutrition Interventions/Recommendations      Food and/or Nutrient Delivery Interventions  Interventions: Meals and snacks, Medical food supplement    Meals and Snacks: Mineral-modified diet, General healthful diet, Fat-modified diet    Goal: Nepro once daily    Additional Interventions: Should appetite remain sub-optimal, consider appetite stimulant. Consider a renal MVI. Consider increasing nepro supplement to BID should po intake remain varying.    Nutrition Monitoring and Evaluation   Food and Nutrient Related History  Energy Intake: Estimated energy intake  Criteria: >75% of EEN consumed via po    Fluid Intake: Estimated fluid intake    Amount of Food: Estimated amout of food, Medical food intake  Criteria: >75% of meals and nutritional supplements    Anthropometrics: Body Composition/Growth/Weight History  Weight: Estimated dry weight, Weight change    Weight Change: Weight gain, Weight loss, Weight change percentage    Body Mass: Body mass index (BMI)       Biochemical Data, Medical Tests and Procedures  Electrolyte and Renal Panel: BUN, Calcium, ionized, Calcium, serum, Creatinine, Potassium, Magnesium, Phosphorus, Sodium  Criteria: As clinically indicated       Glucose/Endocrine Profile: Glucose, casual  Criteria: As clinically indicated    Nutrition Focused Physical Findings       Digestive System: Vomiting, Nausea, Decrease in appetite    Muscles: Muscle atrophy    Other: overall appearance and I/Os       Follow Up  Time Spent (min): 30 minutes  Last Date of Nutrition Visit: 07/12/24  Nutrition Follow-Up Needed?: Dietitian to reassess per policy  Follow up Comment: Rafi Marvin

## 2024-07-12 NOTE — PROGRESS NOTES
INTERNAL MEDICINE PROGRESS NOTE      HPI:    Patient is awake and alert.  She denies chest pain or shortness of breath.    Vital signs in last 24 hours:  Temp:  [36.4 °C (97.5 °F)-37.3 °C (99.1 °F)] 36.4 °C (97.5 °F)  Heart Rate:  [69-79] 76  Resp:  [16-18] 16  BP: (124-145)/(49-67) 145/62    Physical Examination:  Physical Exam    Constitutional:       Appearance: Elderly, well-built, in no distress.  HENT:      Head: Normocephalic and atraumatic.   Eyes:      Extraocular Movements: Extraocular movements intact.      Pupils: Pupils are equal, round, and reactive to light.   Cardiovascular:      Rate and Rhythm: Normal rate and regular rhythm.      Pulses: Normal pulses.      Heart sounds: Normal heart sounds.   Pulmonary:      Effort: Pulmonary effort is normal.      Breath sounds: Normal breath sounds.   Abdominal:      General: Abdomen is flat. Bowel sounds are normal.      Palpations: Abdomen is soft.   Musculoskeletal:         General: Normal range of motion.      Cervical back: Normal range of motion and neck supple.   Skin:     General: Skin is warm and dry.   Neurological:      General: No focal deficit present.      Mental Status: Alert and oriented to person, place, and time. Mental status is at baseline.         Medications:    Current Facility-Administered Medications:     acetaminophen (Tylenol) tablet 650 mg, 650 mg, oral, q4h PRN, 650 mg at 07/11/24 4164 **OR** acetaminophen (Tylenol) oral liquid 650 mg, 650 mg, oral, q4h PRN **OR** acetaminophen (Tylenol) suppository 650 mg, 650 mg, rectal, q4h PRN, RACHELLE Winston    amLODIPine (Norvasc) tablet 10 mg, 10 mg, oral, Daily, RACHELLE Winston, 10 mg at 07/11/24 0858    aspirin EC tablet 81 mg, 81 mg, oral, Daily, RACHELLE Galaviz, 81 mg at 07/11/24 0859    atorvastatin (Lipitor) tablet 80 mg, 80 mg, oral, Daily, RACHELLE Galaviz, 80 mg at 07/11/24 0858    carvedilol (Coreg) tablet 25 mg, 25  "mg, oral, BID, Tamy Alvarezm, APRN-CNP, 25 mg at 07/11/24 2121    [START ON 7/16/2024] chlorhexidine (Peridex) 0.12 % solution 15 mL, 15 mL, Swish & Spit, BID, Tenisha Monson PA-C    dextrose 1.5% - LOW calcium 2.5mEq/L 2,000 mL peritoneal dialysate, , intraperitoneal, 4x daily, Gab Lynn DO, Given at 07/12/24 0808    epoetin shawn-epbx (Retacrit) injection 8,000 Units, 150 Units/kg, subcutaneous, Every Sunday, Gab Lynn DO    hydrALAZINE (Apresoline) injection 10 mg, 10 mg, intravenous, q6h PRN, Tamy Alvarezm, APRN-CNP    hydrALAZINE (Apresoline) tablet 25 mg, 25 mg, oral, BID, Tamy Alvarezm, APRN-CNP, 25 mg at 07/11/24 2121    isosorbide mononitrate ER (Imdur) 24 hr tablet 30 mg, 30 mg, oral, Daily, Tamy Alvarezm, APRN-CNP, 30 mg at 07/11/24 1002    [Held by provider] losartan (Cozaar) tablet 100 mg, 100 mg, oral, Daily, Tamiko Eduardo APRN-CNP, 100 mg at 07/10/24 0832    polyethylene glycol (Glycolax, Miralax) packet 17 g, 17 g, oral, Daily, Tamiko Eduardo APRN-CNP, 17 g at 07/07/24 0833    Laboratory Findings:  Lab Results   Component Value Date    WBC 5.4 07/11/2024    HGB 8.3 (L) 07/11/2024    HCT 25.8 (L) 07/11/2024    MCV 83 07/11/2024     07/11/2024     No results found for: \"INR\", \"PROTIME\"  Lab Results   Component Value Date    GLUCOSE 87 07/11/2024    CALCIUM 8.5 (L) 07/11/2024     07/11/2024    K 3.7 07/11/2024    CO2 28 07/11/2024     07/11/2024    BUN 25 (H) 07/11/2024    CREATININE 7.04 (H) 07/11/2024       Assessment and Plan:     Non-ST elevation MI -cardiothoracic surgery intervention planned for next Wednesday.  Patient is required to remain inpatient until then per CTS.  ESRD -continue with peritoneal dialysis exchanges  Hypertension -medications adjusted per nephrology.  Nausea -likely related to the above, resolved.       Jacob Staples MD  07/12/24  8:14 AM         "

## 2024-07-12 NOTE — PROGRESS NOTES
"Pharmacy Medication History Review    Isis Geroinmo is a 79 y.o. female admitted for NSTEMI (non-ST elevated myocardial infarction) ([facility]). Pharmacy reviewed the patient's netos-iv-tvryulnrp medications and allergies for accuracy.    The list below reflectives the updated PTA list. Please review each medication in order reconciliation for additional clarification and justification.  Prior to Admission Medications   Prescriptions Last Dose Informant   B complex-vitamin C-folic acid (Nephro-Batsheva) 0.8 mg tablet Unknown Self   Sig: Take 1 tablet by mouth once daily.   amLODIPine (Norvasc) 10 mg tablet Unknown Self   Sig: Take by mouth once daily.   atorvastatin (Lipitor) 20 mg tablet     Sig: Take 1 tablet (20 mg) by mouth once daily.   losartan (Cozaar) 50 mg tablet Unknown Self   Sig: Take 2 tablets (100 mg) by mouth once daily.      Facility-Administered Medications: None         The list below reflectives the updated allergy list. Please review each documented allergy for additional clarification and justification.  Allergies  Reviewed by Zoya Murillo on 7/12/2024        Severity Reactions Comments    Chlorothiazide Not Specified Other     Metoprolol Not Specified Headache     Ibuprofen Low Unknown, Rash \"makes me feel faint\"    Penicillins Low Rash             Below are additional concerns with the patient's PTA list.  Spoke to patient- organizes medications by herself    Zoya Murillo    "

## 2024-07-13 LAB
ANION GAP SERPL CALC-SCNC: 14 MMOL/L (ref 10–20)
ATRIAL RATE: 86 BPM
BASOPHILS # BLD AUTO: 0.02 X10*3/UL (ref 0–0.1)
BASOPHILS NFR BLD AUTO: 0.4 %
BUN SERPL-MCNC: 24 MG/DL (ref 6–23)
CALCIUM SERPL-MCNC: 7.9 MG/DL (ref 8.6–10.3)
CHLORIDE SERPL-SCNC: 102 MMOL/L (ref 98–107)
CO2 SERPL-SCNC: 27 MMOL/L (ref 21–32)
CREAT SERPL-MCNC: 7.7 MG/DL (ref 0.5–1.05)
EGFRCR SERPLBLD CKD-EPI 2021: 5 ML/MIN/1.73M*2
EOSINOPHIL # BLD AUTO: 0.1 X10*3/UL (ref 0–0.4)
EOSINOPHIL NFR BLD AUTO: 1.8 %
ERYTHROCYTE [DISTWIDTH] IN BLOOD BY AUTOMATED COUNT: 16.6 % (ref 11.5–14.5)
GLUCOSE SERPL-MCNC: 73 MG/DL (ref 74–99)
HCT VFR BLD AUTO: 25.2 % (ref 36–46)
HGB BLD-MCNC: 8.2 G/DL (ref 12–16)
IMM GRANULOCYTES # BLD AUTO: 0.02 X10*3/UL (ref 0–0.5)
IMM GRANULOCYTES NFR BLD AUTO: 0.4 % (ref 0–0.9)
LYMPHOCYTES # BLD AUTO: 0.84 X10*3/UL (ref 0.8–3)
LYMPHOCYTES NFR BLD AUTO: 15.2 %
MCH RBC QN AUTO: 27.2 PG (ref 26–34)
MCHC RBC AUTO-ENTMCNC: 32.5 G/DL (ref 32–36)
MCV RBC AUTO: 83 FL (ref 80–100)
MONOCYTES # BLD AUTO: 0.33 X10*3/UL (ref 0.05–0.8)
MONOCYTES NFR BLD AUTO: 6 %
NEUTROPHILS # BLD AUTO: 4.23 X10*3/UL (ref 1.6–5.5)
NEUTROPHILS NFR BLD AUTO: 76.2 %
NRBC BLD-RTO: 0 /100 WBCS (ref 0–0)
P AXIS: 41 DEGREES
P OFFSET: 191 MS
P ONSET: 139 MS
PLATELET # BLD AUTO: 257 X10*3/UL (ref 150–450)
POTASSIUM SERPL-SCNC: 3.5 MMOL/L (ref 3.5–5.3)
PR INTERVAL: 158 MS
Q ONSET: 218 MS
QRS COUNT: 14 BEATS
QRS DURATION: 94 MS
QT INTERVAL: 398 MS
QTC CALCULATION(BAZETT): 476 MS
QTC FREDERICIA: 449 MS
R AXIS: 30 DEGREES
RBC # BLD AUTO: 3.02 X10*6/UL (ref 4–5.2)
SODIUM SERPL-SCNC: 139 MMOL/L (ref 136–145)
STAPHYLOCOCCUS SPEC CULT: NORMAL
T AXIS: 127 DEGREES
T OFFSET: 417 MS
VENTRICULAR RATE: 86 BPM
WBC # BLD AUTO: 5.5 X10*3/UL (ref 4.4–11.3)

## 2024-07-13 PROCEDURE — 2500000004 HC RX 250 GENERAL PHARMACY W/ HCPCS (ALT 636 FOR OP/ED): Performed by: INTERNAL MEDICINE

## 2024-07-13 PROCEDURE — 85025 COMPLETE CBC W/AUTO DIFF WBC: CPT | Performed by: INTERNAL MEDICINE

## 2024-07-13 PROCEDURE — 2060000001 HC INTERMEDIATE ICU ROOM DAILY

## 2024-07-13 PROCEDURE — 80048 BASIC METABOLIC PNL TOTAL CA: CPT | Performed by: INTERNAL MEDICINE

## 2024-07-13 PROCEDURE — 2500000001 HC RX 250 WO HCPCS SELF ADMINISTERED DRUGS (ALT 637 FOR MEDICARE OP): Performed by: NURSE PRACTITIONER

## 2024-07-13 PROCEDURE — 36415 COLL VENOUS BLD VENIPUNCTURE: CPT | Performed by: INTERNAL MEDICINE

## 2024-07-13 RX ADMIN — ISOSORBIDE MONONITRATE 30 MG: 30 TABLET, EXTENDED RELEASE ORAL at 08:56

## 2024-07-13 RX ADMIN — ATORVASTATIN CALCIUM 80 MG: 80 TABLET, FILM COATED ORAL at 08:56

## 2024-07-13 RX ADMIN — SODIUM CHLORIDE, SODIUM LACTATE, CALCIUM CHLORIDE, MAGNESIUM CHLORIDE AND DEXTROSE: 1.5; 538; 448; 18.3; 5.08 INJECTION, SOLUTION INTRAPERITONEAL at 21:45

## 2024-07-13 RX ADMIN — AMLODIPINE BESYLATE 10 MG: 10 TABLET ORAL at 08:56

## 2024-07-13 RX ADMIN — ASPIRIN 81 MG: 81 TABLET, COATED ORAL at 08:56

## 2024-07-13 RX ADMIN — SODIUM CHLORIDE, SODIUM LACTATE, CALCIUM CHLORIDE, MAGNESIUM CHLORIDE AND DEXTROSE: 1.5; 538; 448; 18.3; 5.08 INJECTION, SOLUTION INTRAPERITONEAL at 08:00

## 2024-07-13 RX ADMIN — HYDRALAZINE HYDROCHLORIDE 25 MG: 25 TABLET ORAL at 08:56

## 2024-07-13 RX ADMIN — SODIUM CHLORIDE, SODIUM LACTATE, CALCIUM CHLORIDE, MAGNESIUM CHLORIDE AND DEXTROSE: 1.5; 538; 448; 18.3; 5.08 INJECTION, SOLUTION INTRAPERITONEAL at 12:00

## 2024-07-13 RX ADMIN — HYDRALAZINE HYDROCHLORIDE 25 MG: 25 TABLET ORAL at 21:36

## 2024-07-13 RX ADMIN — SODIUM CHLORIDE, SODIUM LACTATE, CALCIUM CHLORIDE, MAGNESIUM CHLORIDE AND DEXTROSE: 1.5; 538; 448; 18.3; 5.08 INJECTION, SOLUTION INTRAPERITONEAL at 16:16

## 2024-07-13 RX ADMIN — CARVEDILOL 25 MG: 25 TABLET, FILM COATED ORAL at 21:36

## 2024-07-13 RX ADMIN — CARVEDILOL 25 MG: 25 TABLET, FILM COATED ORAL at 08:56

## 2024-07-13 ASSESSMENT — PAIN - FUNCTIONAL ASSESSMENT: PAIN_FUNCTIONAL_ASSESSMENT: 0-10

## 2024-07-13 ASSESSMENT — PAIN SCALES - GENERAL
PAINLEVEL_OUTOF10: 0 - NO PAIN
PAINLEVEL_OUTOF10: 0 - NO PAIN

## 2024-07-13 NOTE — CARE PLAN
Problem: Fall/Injury  Goal: Not fall by end of shift  Outcome: Progressing  Goal: Be free from injury by end of the shift  Outcome: Progressing  Goal: Use assistive devices by end of the shift  Outcome: Progressing  Goal: Pace activities to prevent fatigue by end of the shift  Outcome: Progressing     Problem: Nutrition  Goal: Less than 5 days NPO/clear liquids  Outcome: Progressing  Goal: Oral intake greater than 50%  Outcome: Progressing  Goal: Oral intake greater 75%  Outcome: Progressing  Goal: Consume prescribed supplement  Outcome: Progressing  Goal: Adequate PO fluid intake  Outcome: Progressing  Goal: Nutrition support goals are met within 48 hrs  Outcome: Progressing  Goal: Nutrition support is meeting 75% of nutrient needs  Outcome: Progressing  Goal: Tube feed tolerance  Outcome: Progressing  Goal: BG  mg/dL  Outcome: Progressing  Goal: Lab values WNL  Outcome: Progressing  Goal: Electrolytes WNL  Outcome: Progressing  Goal: Promote healing  Outcome: Progressing  Goal: Maintain stable weight  Outcome: Progressing  Goal: Reduce weight from edema/fluid  Outcome: Progressing  Goal: Gradual weight gain  Outcome: Progressing  Goal: Improve ostomy output  Outcome: Progressing   The patient's goals for the shift include      The clinical goals for the shift include pt will remain free of falls through end of shift    Over the shift, the patient did not make progress toward the following goals. Barriers to progression include . Recommendations to address these barriers include .

## 2024-07-13 NOTE — SIGNIFICANT EVENT
Seen at bedside with daughter in law and daughter. Preoperative education and questions answered. Patient denies chest pain/discomfort, SOB, other complaints currently. Continue plan as prior.

## 2024-07-13 NOTE — PROGRESS NOTES
Seen on peritoneal dialysis.  Isis Geronimo is a 79 y.o. female on day 6 of admission presenting with NSTEMI (non-ST elevated myocardial infarction) ([facility]).      Subjective   Seen on dialysis.  She claims that she does not tolerate having the fluid in for 4 hours.       Objective     Peritoneal Dialysis  Exchange Number: [id number]  Dianeal Solution: Dextrose 1.5% in 2000 mL  Dianeal Additive: Potassium  Bag Weight (g): 2200 g  Peritoneal Input Status: Started  Peritoneal Output Status: Completed  Effluent Appearance: Yellow (clear)  Peritoneal Dialysis Volume In (ml): 2000 ml  Dwell Time: 1200  Effluent Volume Out (mL): 1500 ml    Vitals 24HR  Heart Rate:  [65-88]   Temp:  [36.8 °C (98.2 °F)-37.2 °C (99 °F)]   Resp:  [16-19]   BP: (119-149)/(56-67)   SpO2:  [98 %-99 %]     Intake/Output last 3 Shifts:    Intake/Output Summary (Last 24 hours) at 7/13/2024 0919  Last data filed at 7/12/2024 2145  Gross per 24 hour   Intake --   Output 3300 ml   Net -3300 ml       Physical Exam  General: Alert and oriented, in NAD  HEENT:  Pupils equal and reactive. Moist mucosa.    Neck: Normal Jugular Venous Pressure.  Cardiovascular: Regular rate and rhythm. Normal S1 and S2.  Pulmonary:  Clear to auscultation bilaterally.  No wheezes, rales or rhonchi  Abdomen:  Soft. Non-tender. Non-distended. Positive bowel sounds.  Lower Extremities:  2+ pedal pulses. No LE edema.  Neurologic:  Cranial nerves intact.  No focal deficit.   Skin: Skin warm and dry, normal skin turgor.   Psychiatric: Appropriate mood and behavior    Scheduled Medications  amLODIPine, 10 mg, oral, Daily  aspirin, 81 mg, oral, Daily  atorvastatin, 80 mg, oral, Daily  carvedilol, 25 mg, oral, BID  [START ON 7/16/2024] chlorhexidine, 15 mL, Swish & Spit, BID  dextrose 1.5% - LOW calcium 2.5mEq/L 2,000 mL peritoneal dialysate, , intraperitoneal, 4x daily  epoetin shawn or biosimilar, 150 Units/kg, subcutaneous, Every Sunday  hydrALAZINE, 25 mg, oral, BID  isosorbide mononitrate  ER, 30 mg, oral, Daily  [Held by provider] losartan, 100 mg, oral, Daily  polyethylene glycol, 17 g, oral, Daily      Continuous medications         PRN medications: acetaminophen **OR** acetaminophen **OR** acetaminophen, hydrALAZINE     Relevant Results  Results from last 7 days   Lab Units 07/13/24  0542 07/12/24  0738 07/11/24  0525   WBC AUTO x10*3/uL 5.5 5.0 5.4   HEMOGLOBIN g/dL 8.2* 8.7* 8.3*   HEMATOCRIT % 25.2* 27.6* 25.8*   PLATELETS AUTO x10*3/uL 257 248 217   NEUTROS PCT AUTO % 76.2 70.0 75.9   LYMPHS PCT AUTO % 15.2 21.0 15.5   MONOS PCT AUTO % 6.0 6.2 6.5   EOS PCT AUTO % 1.8 2.2 1.5     Results from last 7 days   Lab Units 07/13/24  0542 07/12/24  0738 07/11/24  0525   SODIUM mmol/L 139 139 138   POTASSIUM mmol/L 3.5 3.7 3.7   CHLORIDE mmol/L 102 102 101   CO2 mmol/L 27 28 28   BUN mg/dL 24* 24* 25*   CREATININE mg/dL 7.70* 7.50* 7.04*   GLUCOSE mg/dL 73* 74 87   CALCIUM mg/dL 7.9* 8.3* 8.5*       XR chest 1 view   Final Result   Mild cardiomegaly.  Currently without radiographic evidence of CHF or   pneumonia.        Horizontal curvilinear opacities with associated lucency at the right   base adjacent to the diaphragm. Suspect atelectasis rather than   pneumoperitoneum. Suggest dedicated supine and upright views of the   abdomen in follow-up.        MACRO:   None        Signed by: Jacob Arora 7/11/2024 7:40 AM   Dictation workstation:   [id number]      Vascular US carotid artery duplex bilateral   Final Result      Cardiac Catheterization Procedure   Final Result      Transthoracic Echo (TTE) Complete   Final Result      CT chest abdomen pelvis wo IV contrast   Final Result   1.  No distinct acute intrathoracic process identified.   2.  Minimal streaky bibasilar atelectasis with pleural based right   lower lobe pulmonary nodule measuring 1.2 cm.  Suggest continued   follow-up as per clinical guidelines.   3.  Enlarged and heterogeneous appearance of the thyroid gland with   multiple bilateral  nodules, left greater than right.  Suggest   nonemergent follow-up with ultrasound as clinically warranted.   4.  Nonobstructing nephrolithiasis of the left kidney.  No ureteral   calculus or evidence for obstructive uropathy bilaterally.   5.  Colonic diverticulosis without CT evidence for acute   diverticulitis.   6.  Large amount of fecal material within the rectosigmoid colon and   rectal vault.  No evidence of bowel obstruction.   Fleischner Society 2017 Guidelines for Management of Incidentally   Detected Pulmonary Nodules in Adults:   A.  SOLID NODULES*   Nodule Type and Size:   Single:   *Low Risk**    < 6 mm - No routine follow-up   6-8 mm - CT at 6-12 months, then consider CT at 18-24 months   > 8 mm - Consider CT at 3 months, PET/CT, or tissue sampling   *High Risk**   < 6 mm - Optional CT at 12 months   6-8 mm - CT at 6-12 months, then CT at 18-24 months   > 8 mm - Consider CT at 3 months, PET/CT, or tissue sampling   Multiple:   *Low Risk**    < 6 mm - No routine follow-up   6-8 mm - CT at 3-6 months, then consider CT at 18-24 months   > 8 mm - CT at 3-6 months, then consider CT at 18-24 months   *High Risk**   < 6 mm - Optional CT at 12 months   6-8 mm - CT at 3-6 months, then at 18-24 months   > 8 mm - CT at 3-6 months, then at 18-24 months   B. SUBSOLID NODULES*   Nodule Type and Size:   Single:     *Ground glass   < 6 mm - No routine follow-up   > 6 mm - CT at 6-12 months to confirm persistence, then CT every 2   years until 5 years   *Part Solid   < 6 mm - No routine follow-up   > 6 mm - CT at 3-6 months to confirm persistence.  If unchanged and   solid component remains < 6 mm, annual CT should be performed for 5   years.   Multiple:   < 6 mm - CT at 3-6 months.  If stable, consider CT at 2 and 4 years.   > 6 mm - CT at 3-6 months.  Subsequent management based on the most   suspicious nodule(s).   Note - These recommendations do not apply to lung cancer screening,   patients with  immunosuppression, or patients with known primary   cancer.   * Dimensions are average of long and short axes, rounded to the   nearest millimeter.   ** Consider all relevant risk factors.   Signed by Girish Oshea MD               Assessment/Plan   This patient currently has cardiac telemetry ordered; if you would like to modify or discontinue the telemetry order, click here to go to the orders activity to modify/discontinue the order.  Isis Geronimo is a 79-year-old female with a past medical history of end-stage renal disease on peritoneal dialysis under the care of Dr. Zarate ([facility] transportation [place]). She has a history of anemia, rheumatoid arthritis, nonsustained V. tach, hypertension, hyperlipidemia, vertigo, emphysema, carpal tunnel syndrome who presented with nausea, emesis, constipation and right-sided chest discomfort.  She was admitted to [facility] with NSTEMI with high-sensitivity troponin peak of 21,094. 2 D ECHO showed an LVEF of 61% with LV diastolic dysfunction, moderate concentric LVH and aortic valve sclerosis.  She was seen by cardiology.  Heparin infusion, beta-blocker, statin and aspirin given.      Left heart cath showed severe multivessel coronary artery disease. CT surgery was consulted for CABG evaluation tentatively planned for Wednesday. She is doing well with 1.5% dextrose dwells x 4 daily exchanges, 4 hours each dwell with a fill volume of 1.5 L each. She is dry at night.  I am concerned as she is not keeping the fluid in for a full 4 hours.  He but was ordered, I will look for her prior iron stores.        Principal Problem:    NSTEMI (non-ST elevated myocardial infarction) (Multi)  Active Problems:    Other disorders of arteries, arterioles and capillaries in diseases classified elsewhere (CMS-HCC)      I spent 40 minutes in the professional and overall care of this patient.      Jacob Garrison MD

## 2024-07-13 NOTE — PROGRESS NOTES
INTERNAL MEDICINE PROGRESS NOTE      HPI:    Patient is doing well.  She is awaiting surgery.  She has had no chest pain.    Vital signs in last 24 hours:  Temp:  [36.8 °C (98.2 °F)-37.2 °C (99 °F)] 36.9 °C (98.4 °F)  Heart Rate:  [67-88] 67  Resp:  [16-19] 18  BP: (130-149)/(56-67) 130/60    Physical Examination:  Physical Exam    Constitutional:       Appearance: Elderly, well-built, in no distress.  HENT:      Head: Normocephalic and atraumatic.   Eyes:      Extraocular Movements: Extraocular movements intact.      Pupils: Pupils are equal, round, and reactive to light.   Cardiovascular:      Rate and Rhythm: Normal rate and regular rhythm.      Pulses: Normal pulses.      Heart sounds: Normal heart sounds.   Pulmonary:      Effort: Pulmonary effort is normal.      Breath sounds: Normal breath sounds.   Abdominal:      General: Abdomen is flat. Bowel sounds are normal.      Palpations: Abdomen is soft.   Musculoskeletal:         General: Normal range of motion.      Cervical back: Normal range of motion and neck supple.   Skin:     General: Skin is warm and dry.   Neurological:      General: No focal deficit present.      Mental Status: Alert and oriented to person, place, and time. Mental status is at baseline.         Medications:    Current Facility-Administered Medications:     acetaminophen (Tylenol) tablet 650 mg, 650 mg, oral, q4h PRN, 650 mg at 07/12/24 1639 **OR** acetaminophen (Tylenol) oral liquid 650 mg, 650 mg, oral, q4h PRN **OR** acetaminophen (Tylenol) suppository 650 mg, 650 mg, rectal, q4h PRN, RACHELLE Winston    amLODIPine (Norvasc) tablet 10 mg, 10 mg, oral, Daily, RACHELLE Winston, 10 mg at 07/13/24 0856    aspirin EC tablet 81 mg, 81 mg, oral, Daily, RACHELLE Galaviz, 81 mg at 07/13/24 0856    atorvastatin (Lipitor) tablet 80 mg, 80 mg, oral, Daily, RACHELLE Galaviz, 80 mg at 07/13/24 0856    carvedilol (Coreg) tablet 25 mg, 25  "mg, oral, BID, Tamy LUIS Alvarezm, APRN-CNP, 25 mg at 07/13/24 0856    [START ON 7/16/2024] chlorhexidine (Peridex) 0.12 % solution 15 mL, 15 mL, Swish & Spit, BID, Tenisha Monson PA-C    dextrose 1.5% - LOW calcium 2.5mEq/L 2,000 mL peritoneal dialysate, , intraperitoneal, 4x daily, Gab Lynn DO, Given at 07/13/24 1200    epoetin shawn-epbx (Retacrit) injection 8,000 Units, 150 Units/kg, subcutaneous, Every Sunday, Gab Lynn DO    hydrALAZINE (Apresoline) injection 10 mg, 10 mg, intravenous, q6h PRN, Tamy Aguileraeim, APRN-CNP    hydrALAZINE (Apresoline) tablet 25 mg, 25 mg, oral, BID, Tamy Alvarezm, APRN-CNP, 25 mg at 07/13/24 0856    isosorbide mononitrate ER (Imdur) 24 hr tablet 30 mg, 30 mg, oral, Daily, Tamyjennie Aguileraeim, APRN-CNP, 30 mg at 07/13/24 0856    [Held by provider] losartan (Cozaar) tablet 100 mg, 100 mg, oral, Daily, Tamiko Eduardo APRN-CNP, 100 mg at 07/10/24 0832    polyethylene glycol (Glycolax, Miralax) packet 17 g, 17 g, oral, Daily, Tamiko Eduardo APRN-CNP, 17 g at 07/07/24 0833    Laboratory Findings:  Lab Results   Component Value Date    WBC 5.5 07/13/2024    HGB 8.2 (L) 07/13/2024    HCT 25.2 (L) 07/13/2024    MCV 83 07/13/2024     07/13/2024     No results found for: \"INR\", \"PROTIME\"  Lab Results   Component Value Date    GLUCOSE 73 (L) 07/13/2024    CALCIUM 7.9 (L) 07/13/2024     07/13/2024    K 3.5 07/13/2024    CO2 27 07/13/2024     07/13/2024    BUN 24 (H) 07/13/2024    CREATININE 7.70 (H) 07/13/2024       Assessment and Plan:     Non-ST elevation MI -surgery planned for Wednesday  ESRD -continue with peritoneal dialysis  Hypertension -medications adjusted per nephrology.    Discussed with family at bedside.       Jacob Staples MD  07/13/24  2:36 PM         "

## 2024-07-14 LAB
BASOPHILS # BLD AUTO: 0.02 X10*3/UL (ref 0–0.1)
BASOPHILS NFR BLD AUTO: 0.4 %
EOSINOPHIL # BLD AUTO: 0.1 X10*3/UL (ref 0–0.4)
EOSINOPHIL NFR BLD AUTO: 1.9 %
ERYTHROCYTE [DISTWIDTH] IN BLOOD BY AUTOMATED COUNT: 16.8 % (ref 11.5–14.5)
FERRITIN SERPL-MCNC: 861 NG/ML (ref 8–150)
HCT VFR BLD AUTO: 26.3 % (ref 36–46)
HGB BLD-MCNC: 8.6 G/DL (ref 12–16)
IMM GRANULOCYTES # BLD AUTO: 0.01 X10*3/UL (ref 0–0.5)
IMM GRANULOCYTES NFR BLD AUTO: 0.2 % (ref 0–0.9)
IRON SATN MFR SERPL: 17 % (ref 25–45)
IRON SERPL-MCNC: 24 UG/DL (ref 35–150)
LYMPHOCYTES # BLD AUTO: 0.78 X10*3/UL (ref 0.8–3)
LYMPHOCYTES NFR BLD AUTO: 14.7 %
MCH RBC QN AUTO: 27 PG (ref 26–34)
MCHC RBC AUTO-ENTMCNC: 32.7 G/DL (ref 32–36)
MCV RBC AUTO: 82 FL (ref 80–100)
MONOCYTES # BLD AUTO: 0.35 X10*3/UL (ref 0.05–0.8)
MONOCYTES NFR BLD AUTO: 6.6 %
NEUTROPHILS # BLD AUTO: 4.03 X10*3/UL (ref 1.6–5.5)
NEUTROPHILS NFR BLD AUTO: 76.2 %
NRBC BLD-RTO: 0 /100 WBCS (ref 0–0)
PLATELET # BLD AUTO: 258 X10*3/UL (ref 150–450)
RBC # BLD AUTO: 3.19 X10*6/UL (ref 4–5.2)
TIBC SERPL-MCNC: 138 UG/DL (ref 240–445)
UIBC SERPL-MCNC: 114 UG/DL (ref 110–370)
WBC # BLD AUTO: 5.3 X10*3/UL (ref 4.4–11.3)

## 2024-07-14 PROCEDURE — 2060000001 HC INTERMEDIATE ICU ROOM DAILY

## 2024-07-14 PROCEDURE — 85025 COMPLETE CBC W/AUTO DIFF WBC: CPT | Performed by: INTERNAL MEDICINE

## 2024-07-14 PROCEDURE — 82728 ASSAY OF FERRITIN: CPT | Mod: AHULAB | Performed by: INTERNAL MEDICINE

## 2024-07-14 PROCEDURE — 2500000001 HC RX 250 WO HCPCS SELF ADMINISTERED DRUGS (ALT 637 FOR MEDICARE OP): Performed by: NURSE PRACTITIONER

## 2024-07-14 PROCEDURE — 2500000004 HC RX 250 GENERAL PHARMACY W/ HCPCS (ALT 636 FOR OP/ED): Performed by: INTERNAL MEDICINE

## 2024-07-14 PROCEDURE — 36415 COLL VENOUS BLD VENIPUNCTURE: CPT | Performed by: INTERNAL MEDICINE

## 2024-07-14 PROCEDURE — 6340000001 HC RX 634 EPOETIN <10,000 UNITS: Mod: JZ | Performed by: INTERNAL MEDICINE

## 2024-07-14 PROCEDURE — 83540 ASSAY OF IRON: CPT | Performed by: INTERNAL MEDICINE

## 2024-07-14 RX ADMIN — SODIUM CHLORIDE, SODIUM LACTATE, CALCIUM CHLORIDE, MAGNESIUM CHLORIDE AND DEXTROSE: 1.5; 538; 448; 18.3; 5.08 INJECTION, SOLUTION INTRAPERITONEAL at 08:23

## 2024-07-14 RX ADMIN — ISOSORBIDE MONONITRATE 30 MG: 30 TABLET, EXTENDED RELEASE ORAL at 08:14

## 2024-07-14 RX ADMIN — ATORVASTATIN CALCIUM 80 MG: 80 TABLET, FILM COATED ORAL at 08:14

## 2024-07-14 RX ADMIN — AMLODIPINE BESYLATE 10 MG: 10 TABLET ORAL at 08:14

## 2024-07-14 RX ADMIN — HYDRALAZINE HYDROCHLORIDE 25 MG: 25 TABLET ORAL at 20:55

## 2024-07-14 RX ADMIN — SODIUM CHLORIDE, SODIUM LACTATE, CALCIUM CHLORIDE, MAGNESIUM CHLORIDE AND DEXTROSE: 1.5; 538; 448; 18.3; 5.08 INJECTION, SOLUTION INTRAPERITONEAL at 16:51

## 2024-07-14 RX ADMIN — ACETAMINOPHEN 650 MG: 325 TABLET ORAL at 01:56

## 2024-07-14 RX ADMIN — SODIUM CHLORIDE, SODIUM LACTATE, CALCIUM CHLORIDE, MAGNESIUM CHLORIDE AND DEXTROSE: 1.5; 538; 448; 18.3; 5.08 INJECTION, SOLUTION INTRAPERITONEAL at 20:54

## 2024-07-14 RX ADMIN — EPOETIN ALFA-EPBX 8000 UNITS: 4000 INJECTION, SOLUTION INTRAVENOUS; SUBCUTANEOUS at 10:13

## 2024-07-14 RX ADMIN — CARVEDILOL 25 MG: 25 TABLET, FILM COATED ORAL at 08:14

## 2024-07-14 RX ADMIN — ASPIRIN 81 MG: 81 TABLET, COATED ORAL at 08:14

## 2024-07-14 RX ADMIN — ACETAMINOPHEN 650 MG: 325 TABLET ORAL at 12:27

## 2024-07-14 RX ADMIN — SODIUM CHLORIDE, SODIUM LACTATE, CALCIUM CHLORIDE, MAGNESIUM CHLORIDE AND DEXTROSE: 1.5; 538; 448; 18.3; 5.08 INJECTION, SOLUTION INTRAPERITONEAL at 11:53

## 2024-07-14 RX ADMIN — CARVEDILOL 25 MG: 25 TABLET, FILM COATED ORAL at 20:55

## 2024-07-14 RX ADMIN — HYDRALAZINE HYDROCHLORIDE 25 MG: 25 TABLET ORAL at 08:14

## 2024-07-14 ASSESSMENT — COGNITIVE AND FUNCTIONAL STATUS - GENERAL
CLIMB 3 TO 5 STEPS WITH RAILING: A LITTLE
WALKING IN HOSPITAL ROOM: A LITTLE
TOILETING: A LITTLE
MOBILITY SCORE: 24
MOBILITY SCORE: 22
DRESSING REGULAR LOWER BODY CLOTHING: A LITTLE
DAILY ACTIVITIY SCORE: 22
DAILY ACTIVITIY SCORE: 24

## 2024-07-14 ASSESSMENT — PAIN SCALES - GENERAL
PAINLEVEL_OUTOF10: 0 - NO PAIN
PAINLEVEL_OUTOF10: 8
PAINLEVEL_OUTOF10: 6
PAINLEVEL_OUTOF10: 0 - NO PAIN
PAINLEVEL_OUTOF10: 6

## 2024-07-14 ASSESSMENT — PAIN SCALES - PAIN ASSESSMENT IN ADVANCED DEMENTIA (PAINAD)
TOTALSCORE: 1
TOTALSCORE: MEDICATION (SEE MAR)
BREATHING: NORMAL
FACIALEXPRESSION: SMILING OR INEXPRESSIVE
CONSOLABILITY: DISTRACTED OR REASSURED BY VOICE/TOUCH
BODYLANGUAGE: RELAXED

## 2024-07-14 ASSESSMENT — PAIN SCALES - WONG BAKER: WONGBAKER_NUMERICALRESPONSE: HURTS WHOLE LOT

## 2024-07-14 ASSESSMENT — PAIN DESCRIPTION - LOCATION: LOCATION: ABDOMEN

## 2024-07-14 ASSESSMENT — PAIN - FUNCTIONAL ASSESSMENT
PAIN_FUNCTIONAL_ASSESSMENT: 0-10

## 2024-07-14 NOTE — CARE PLAN
Problem: Fall/Injury  Goal: Not fall by end of shift  Outcome: Progressing  Goal: Be free from injury by end of the shift  Outcome: Progressing  Goal: Use assistive devices by end of the shift  Outcome: Progressing  Goal: Pace activities to prevent fatigue by end of the shift  Outcome: Progressing     Problem: Nutrition  Goal: Less than 5 days NPO/clear liquids  Outcome: Progressing  Goal: Oral intake greater than 50%  Outcome: Progressing  Goal: Oral intake greater 75%  Outcome: Progressing  Goal: Consume prescribed supplement  Outcome: Progressing  Goal: Adequate PO fluid intake  Outcome: Progressing  Goal: Nutrition support goals are met within 48 hrs  Outcome: Progressing  Goal: Nutrition support is meeting 75% of nutrient needs  Outcome: Progressing  Goal: Tube feed tolerance  Outcome: Progressing  Goal: BG  mg/dL  Outcome: Progressing  Goal: Lab values WNL  Outcome: Progressing  Goal: Electrolytes WNL  Outcome: Progressing  Goal: Promote healing  Outcome: Progressing  Goal: Maintain stable weight  Outcome: Progressing  Goal: Reduce weight from edema/fluid  Outcome: Progressing  Goal: Gradual weight gain  Outcome: Progressing  Goal: Improve ostomy output  Outcome: Progressing   The patient's goals for the shift include      The clinical goals for the shift include pt will remain free of falls through end of shift    Over the shift, the patient did not make progress toward the following goals. Barriers to progression include chest pain. Recommendations to address these barriers include 1:1 and hourly rounding.

## 2024-07-14 NOTE — PROCEDURES
Seen on dialysis.  Ms. Geronimo has end-stage kidney disease, rheumatoid arthritis, hypertension, hyperlipidemia.  She came in with chest pain, NSTEMI, left heart cath revealed multivessel coronary artery disease for which she will have a bypass this Wednesday.  We are having issue with the fluid flowing in today, it may be the Ffrangible.  We will get a new set, I asked her to try to dwell for as long over the 4 hours that she can.  Hemoglobin noted, iron stores are pending, she is on erythropoietin.

## 2024-07-14 NOTE — PROGRESS NOTES
INTERNAL MEDICINE PROGRESS NOTE      HPI:    Had some issues with the dialysis flow earlier which resolved.  No chest pain.    Vital signs in last 24 hours:  Temp:  [36.8 °C (98.3 °F)-37.1 °C (98.8 °F)] 37.1 °C (98.8 °F)  Heart Rate:  [76] 76  Resp:  [17-18] 18  BP: (121-150)/(56-68) 150/66    Physical Examination:  Physical Exam    Constitutional:       Appearance: Elderly, well-built, in no distress.  HENT:      Head: Normocephalic and atraumatic.   Eyes:      Extraocular Movements: Extraocular movements intact.      Pupils: Pupils are equal, round, and reactive to light.   Cardiovascular:      Rate and Rhythm: Normal rate and regular rhythm.      Pulses: Normal pulses.      Heart sounds: Normal heart sounds.   Pulmonary:      Effort: Pulmonary effort is normal.      Breath sounds: Normal breath sounds.   Abdominal:      General: Abdomen is flat. Bowel sounds are normal.      Palpations: Abdomen is soft.   Musculoskeletal:         General: Normal range of motion.      Cervical back: Normal range of motion and neck supple.   Skin:     General: Skin is warm and dry.   Neurological:      General: No focal deficit present.      Mental Status: Alert and oriented to person, place, and time. Mental status is at baseline.         Medications:    Current Facility-Administered Medications:     acetaminophen (Tylenol) tablet 650 mg, 650 mg, oral, q4h PRN, 650 mg at 07/14/24 1227 **OR** acetaminophen (Tylenol) oral liquid 650 mg, 650 mg, oral, q4h PRN **OR** acetaminophen (Tylenol) suppository 650 mg, 650 mg, rectal, q4h PRN, RACHELLE Winston    amLODIPine (Norvasc) tablet 10 mg, 10 mg, oral, Daily, RACHELLE Winston, 10 mg at 07/14/24 0814    aspirin EC tablet 81 mg, 81 mg, oral, Daily, RACHELLE Galaviz, 81 mg at 07/14/24 0814    atorvastatin (Lipitor) tablet 80 mg, 80 mg, oral, Daily, RACHELLE Galaviz, 80 mg at 07/14/24 0814    carvedilol (Coreg) tablet 25 mg, 25  "mg, oral, BID, Tamy Alvarezm, APRN-CNP, 25 mg at 07/14/24 0814    [START ON 7/16/2024] chlorhexidine (Peridex) 0.12 % solution 15 mL, 15 mL, Swish & Spit, BID, Tenisha Monson PA-C    dextrose 1.5% - LOW calcium 2.5mEq/L 2,000 mL peritoneal dialysate, , intraperitoneal, 4x daily, Gab Lynn DO, Given at 07/14/24 1651    epoetin shawn-epbx (Retacrit) injection 8,000 Units, 150 Units/kg, subcutaneous, Every Sunday, Gab Lynn DO, 8,000 Units at 07/14/24 1013    hydrALAZINE (Apresoline) injection 10 mg, 10 mg, intravenous, q6h PRN, Tamy Alvarezm, APRN-CNP    hydrALAZINE (Apresoline) tablet 25 mg, 25 mg, oral, BID, Tamy Alvarezm, APRN-CNP, 25 mg at 07/14/24 0814    isosorbide mononitrate ER (Imdur) 24 hr tablet 30 mg, 30 mg, oral, Daily, Tamy Alvarezm, APRN-CNP, 30 mg at 07/14/24 0814    [Held by provider] losartan (Cozaar) tablet 100 mg, 100 mg, oral, Daily, Tamiko Eduardo APRN-CNP, 100 mg at 07/10/24 0832    polyethylene glycol (Glycolax, Miralax) packet 17 g, 17 g, oral, Daily, Tamiko Eduardo APRN-CNP, 17 g at 07/07/24 0833    Laboratory Findings:  Lab Results   Component Value Date    WBC 5.3 07/14/2024    HGB 8.6 (L) 07/14/2024    HCT 26.3 (L) 07/14/2024    MCV 82 07/14/2024     07/14/2024     No results found for: \"INR\", \"PROTIME\"  Lab Results   Component Value Date    GLUCOSE 73 (L) 07/13/2024    CALCIUM 7.9 (L) 07/13/2024     07/13/2024    K 3.5 07/13/2024    CO2 27 07/13/2024     07/13/2024    BUN 24 (H) 07/13/2024    CREATININE 7.70 (H) 07/13/2024       Assessment and Plan:     Non-ST elevation MI -monitor on telemetry, surgery soon.  ESRD -continue with peritoneal dialysis  Hypertension -better blood pressure control on current regimen.    Patient seen with family at bedside.       Jacob Staples MD  07/14/24  6:30 PM         "

## 2024-07-15 LAB
ATRIAL RATE: 88 BPM
BASOPHILS # BLD AUTO: 0.03 X10*3/UL (ref 0–0.1)
BASOPHILS NFR BLD AUTO: 0.5 %
EOSINOPHIL # BLD AUTO: 0.09 X10*3/UL (ref 0–0.4)
EOSINOPHIL NFR BLD AUTO: 1.4 %
ERYTHROCYTE [DISTWIDTH] IN BLOOD BY AUTOMATED COUNT: 16.6 % (ref 11.5–14.5)
HCT VFR BLD AUTO: 27 % (ref 36–46)
HGB BLD-MCNC: 8.7 G/DL (ref 12–16)
HOLD SPECIMEN: NORMAL
IMM GRANULOCYTES # BLD AUTO: 0.02 X10*3/UL (ref 0–0.5)
IMM GRANULOCYTES NFR BLD AUTO: 0.3 % (ref 0–0.9)
LYMPHOCYTES # BLD AUTO: 0.83 X10*3/UL (ref 0.8–3)
LYMPHOCYTES NFR BLD AUTO: 13.1 %
MCH RBC QN AUTO: 26.9 PG (ref 26–34)
MCHC RBC AUTO-ENTMCNC: 32.2 G/DL (ref 32–36)
MCV RBC AUTO: 83 FL (ref 80–100)
MONOCYTES # BLD AUTO: 0.42 X10*3/UL (ref 0.05–0.8)
MONOCYTES NFR BLD AUTO: 6.6 %
NEUTROPHILS # BLD AUTO: 4.93 X10*3/UL (ref 1.6–5.5)
NEUTROPHILS NFR BLD AUTO: 78.1 %
NRBC BLD-RTO: 0 /100 WBCS (ref 0–0)
P AXIS: 36 DEGREES
P OFFSET: 192 MS
P ONSET: 139 MS
PLATELET # BLD AUTO: 289 X10*3/UL (ref 150–450)
PR INTERVAL: 164 MS
Q ONSET: 221 MS
QRS COUNT: 14 BEATS
QRS DURATION: 100 MS
QT INTERVAL: 398 MS
QTC CALCULATION(BAZETT): 481 MS
QTC FREDERICIA: 452 MS
R AXIS: 12 DEGREES
RBC # BLD AUTO: 3.24 X10*6/UL (ref 4–5.2)
T AXIS: 90 DEGREES
T OFFSET: 420 MS
VENTRICULAR RATE: 88 BPM
WBC # BLD AUTO: 6.3 X10*3/UL (ref 4.4–11.3)

## 2024-07-15 PROCEDURE — 2500000001 HC RX 250 WO HCPCS SELF ADMINISTERED DRUGS (ALT 637 FOR MEDICARE OP): Performed by: NURSE PRACTITIONER

## 2024-07-15 PROCEDURE — 2500000004 HC RX 250 GENERAL PHARMACY W/ HCPCS (ALT 636 FOR OP/ED): Performed by: INTERNAL MEDICINE

## 2024-07-15 PROCEDURE — 2500000001 HC RX 250 WO HCPCS SELF ADMINISTERED DRUGS (ALT 637 FOR MEDICARE OP): Performed by: INTERNAL MEDICINE

## 2024-07-15 PROCEDURE — 85025 COMPLETE CBC W/AUTO DIFF WBC: CPT | Performed by: INTERNAL MEDICINE

## 2024-07-15 PROCEDURE — 36415 COLL VENOUS BLD VENIPUNCTURE: CPT | Performed by: INTERNAL MEDICINE

## 2024-07-15 PROCEDURE — 2060000001 HC INTERMEDIATE ICU ROOM DAILY

## 2024-07-15 ASSESSMENT — COGNITIVE AND FUNCTIONAL STATUS - GENERAL
TURNING FROM BACK TO SIDE WHILE IN FLAT BAD: A LITTLE
DRESSING REGULAR UPPER BODY CLOTHING: A LITTLE
MOBILITY SCORE: 19
WALKING IN HOSPITAL ROOM: A LITTLE
MOVING TO AND FROM BED TO CHAIR: A LITTLE
HELP NEEDED FOR BATHING: A LITTLE
TOILETING: A LITTLE
STANDING UP FROM CHAIR USING ARMS: A LITTLE
CLIMB 3 TO 5 STEPS WITH RAILING: A LITTLE
DRESSING REGULAR LOWER BODY CLOTHING: A LITTLE
DAILY ACTIVITIY SCORE: 20

## 2024-07-15 ASSESSMENT — PAIN - FUNCTIONAL ASSESSMENT
PAIN_FUNCTIONAL_ASSESSMENT: 0-10

## 2024-07-15 ASSESSMENT — PAIN SCALES - GENERAL
PAINLEVEL_OUTOF10: 0 - NO PAIN
PAINLEVEL_OUTOF10: 3
PAINLEVEL_OUTOF10: 0 - NO PAIN

## 2024-07-15 NOTE — PROGRESS NOTES
Isis Geronimo is a 79 y.o. female on day 8 of admission presenting with NSTEMI (non-ST elevated myocardial infarction) (Multi).    Plan: Tentative plan for CABG Wednesday.   Disposition: Home, home care  Barrier: cardiac surgery  ADOD:  7-10 days       07/15/24 0710   Discharge Planning   Expected Discharge Disposition  Services         Dimple Cuevas RN

## 2024-07-15 NOTE — PROGRESS NOTES
INTERNAL MEDICINE PROGRESS NOTE      HPI:    Awaiting surgery.  Reports some discomfort with dialysis.    Vital signs in last 24 hours:  Temp:  [36.4 °C (97.6 °F)-37.4 °C (99.3 °F)] 36.7 °C (98 °F)  Heart Rate:  [68-81] 68  Resp:  [16-18] 16  BP: (104-150)/(50-66) 104/50    Physical Examination:  Physical Exam    Constitutional:       Appearance: Elderly, well-built, in no distress.  HENT:      Head: Normocephalic and atraumatic.   Eyes:      Extraocular Movements: Extraocular movements intact.      Pupils: Pupils are equal, round, and reactive to light.   Cardiovascular:      Rate and Rhythm: Normal rate and regular rhythm.      Pulses: Normal pulses.      Heart sounds: Normal heart sounds.   Pulmonary:      Effort: Pulmonary effort is normal.      Breath sounds: Normal breath sounds.   Abdominal:      General: Abdomen is flat. Bowel sounds are normal.      Palpations: Abdomen is soft.   Musculoskeletal:         General: Normal range of motion.      Cervical back: Normal range of motion and neck supple.   Skin:     General: Skin is warm and dry.   Neurological:      General: No focal deficit present.      Mental Status: Alert and oriented to person, place, and time. Mental status is at baseline.         Medications:    Current Facility-Administered Medications:     acetaminophen (Tylenol) tablet 650 mg, 650 mg, oral, q4h PRN, 650 mg at 07/15/24 0110 **OR** acetaminophen (Tylenol) oral liquid 650 mg, 650 mg, oral, q4h PRN **OR** acetaminophen (Tylenol) suppository 650 mg, 650 mg, rectal, q4h PRN, RACHELLE Winston    amLODIPine (Norvasc) tablet 10 mg, 10 mg, oral, Daily, RACHELLE Winston, 10 mg at 07/15/24 0907    aspirin EC tablet 81 mg, 81 mg, oral, Daily, RACHELLE Galaviz, 81 mg at 07/15/24 0907    atorvastatin (Lipitor) tablet 80 mg, 80 mg, oral, Daily, RACHELLE Galaviz, 80 mg at 07/15/24 0907    carvedilol (Coreg) tablet 25 mg, 25 mg, oral, BID,  "Tamy Santos, APRN-CNP, 25 mg at 07/15/24 0907    [START ON 7/16/2024] chlorhexidine (Peridex) 0.12 % solution 15 mL, 15 mL, Swish & Spit, BID, Tenisha Monson PA-C    dextrose 1.5% - LOW calcium 2.5mEq/L 2,000 mL peritoneal dialysate, , intraperitoneal, 4x daily, Gab Lynn DO, Given at 07/14/24 2054    epoetin shawn-epbx (Retacrit) injection 8,000 Units, 150 Units/kg, subcutaneous, Every Sunday, Gab Lynn DO, 8,000 Units at 07/14/24 1013    hydrALAZINE (Apresoline) injection 10 mg, 10 mg, intravenous, q6h PRN, Tamy Santos APRN-CNP    hydrALAZINE (Apresoline) tablet 25 mg, 25 mg, oral, BID, Tamy Santos APRN-CNP, 25 mg at 07/15/24 0907    isosorbide mononitrate ER (Imdur) 24 hr tablet 30 mg, 30 mg, oral, Daily, Tamy Santos APRN-CNP, 30 mg at 07/15/24 0907    [Held by provider] losartan (Cozaar) tablet 100 mg, 100 mg, oral, Daily, Tamiko Eduardo APRN-CNP, 100 mg at 07/10/24 0832    polyethylene glycol (Glycolax, Miralax) packet 17 g, 17 g, oral, Daily, Tamiko Eduardo APRN-CNP, 17 g at 07/07/24 0833    Laboratory Findings:  Lab Results   Component Value Date    WBC 6.3 07/15/2024    HGB 8.7 (L) 07/15/2024    HCT 27.0 (L) 07/15/2024    MCV 83 07/15/2024     07/15/2024     No results found for: \"INR\", \"PROTIME\"  Lab Results   Component Value Date    GLUCOSE 73 (L) 07/13/2024    CALCIUM 7.9 (L) 07/13/2024     07/13/2024    K 3.5 07/13/2024    CO2 27 07/13/2024     07/13/2024    BUN 24 (H) 07/13/2024    CREATININE 7.70 (H) 07/13/2024       Assessment and Plan:     Non-ST elevation MI -asymptomatic, monitor on telemetry.  ESRD -continue with peritoneal dialysis  Hypertension -better blood pressure control on current regimen.    Patient seen with family at bedside.  Surgery on Wednesday.       Jacob Staples MD  07/15/24  12:22 PM         "

## 2024-07-15 NOTE — PROCEDURES
Seen on peritoneal dialysis.  Ms. Geronimo is uncomfortable with more than a 2-hour dwell.  I sat with her and I explained the difference between a cycler versus doing manual exchanges.  She still makes urine, I will have to review her dialysis adequacy.  She is having 4 exchanges here, is getting only 8 hours of PD fluid contact time with the peritoneum, she is under dialyze.  But as she makes this much urine, she may have adequate clearance.  Her volume status certainly looks good.  Blood pressures noted, multifactorial anemia on erythropoietin, I will check another phosphorus tomorrow.  Surgery on Wednesday.

## 2024-07-15 NOTE — PROGRESS NOTES
Isis Geronimo is a 79 y.o. female on day 8 of admission presenting with NSTEMI (non-ST elevated myocardial infarction) (Multi).    Plan: tentative plan for CABG Wednesday. Discussed home health care options. Patient/family provided disclosure statement and agency listing. [facility] selected. Informed MD/NP and home care liaison. Final home care orders need to be sent upon DC from hospital.   Disposition: Home [facility]  Barrier: cardio, surgery  ADOD:  7-9 days     07/15/24 1412   Discharge Planning   Patient expects to be discharged to: Home/ [facility]       Dimple Cuevas RN

## 2024-07-15 NOTE — CARE PLAN
Problem: Fall/Injury  Goal: Not fall by end of shift  Outcome: Progressing  Goal: Be free from injury by end of the shift  Outcome: Progressing  Goal: Use assistive devices by end of the shift  Outcome: Progressing  Goal: Pace activities to prevent fatigue by end of the shift  Outcome: Progressing     Problem: Nutrition  Goal: Less than 5 days NPO/clear liquids  Outcome: Progressing  Goal: Oral intake greater than 50%  Outcome: Progressing  Goal: Oral intake greater 75%  Outcome: Progressing  Goal: Consume prescribed supplement  Outcome: Progressing  Goal: Adequate PO fluid intake  Outcome: Progressing  Goal: Nutrition support goals are met within 48 hrs  Outcome: Progressing  Goal: Nutrition support is meeting 75% of nutrient needs  Outcome: Progressing  Goal: Tube feed tolerance  Outcome: Progressing  Goal: BG  mg/dL  Outcome: Progressing  Goal: Lab values WNL  Outcome: Progressing  Goal: Electrolytes WNL  Outcome: Progressing  Goal: Promote healing  Outcome: Progressing  Goal: Maintain stable weight  Outcome: Progressing  Goal: Reduce weight from edema/fluid  Outcome: Progressing  Goal: Gradual weight gain  Outcome: Progressing  Goal: Improve ostomy output  Outcome: Progressing   The patient's goals for the shift include      The clinical goals for the shift include Pt will remain free from chest pain throughout the shift.    Over the shift, the patient did not make progress toward the following goals. Barriers to progression include . Recommendations to address these barriers include .

## 2024-07-16 ENCOUNTER — ANESTHESIA EVENT (OUTPATIENT)
Dept: OPERATING ROOM | Facility: HOSPITAL | Age: 79
End: 2024-07-16
Payer: COMMERCIAL

## 2024-07-16 LAB
ABO GROUP (TYPE) IN BLOOD: NORMAL
ABO GROUP (TYPE) IN BLOOD: NORMAL
ALBUMIN SERPL BCP-MCNC: 2.3 G/DL (ref 3.4–5)
ANION GAP SERPL CALC-SCNC: 12 MMOL/L (ref 10–20)
ANTIBODY SCREEN: NORMAL
BASOPHILS # BLD AUTO: 0.01 X10*3/UL (ref 0–0.1)
BASOPHILS NFR BLD AUTO: 0.2 %
BUN SERPL-MCNC: 17 MG/DL (ref 6–23)
CALCIUM SERPL-MCNC: 7.6 MG/DL (ref 8.6–10.3)
CHLORIDE SERPL-SCNC: 101 MMOL/L (ref 98–107)
CO2 SERPL-SCNC: 29 MMOL/L (ref 21–32)
CREAT SERPL-MCNC: 6.98 MG/DL (ref 0.5–1.05)
EGFRCR SERPLBLD CKD-EPI 2021: 6 ML/MIN/1.73M*2
EOSINOPHIL # BLD AUTO: 0.1 X10*3/UL (ref 0–0.4)
EOSINOPHIL NFR BLD AUTO: 1.8 %
ERYTHROCYTE [DISTWIDTH] IN BLOOD BY AUTOMATED COUNT: 16.4 % (ref 11.5–14.5)
GLUCOSE SERPL-MCNC: 73 MG/DL (ref 74–99)
HCT VFR BLD AUTO: 26.5 % (ref 36–46)
HGB BLD-MCNC: 8.6 G/DL (ref 12–16)
IMM GRANULOCYTES # BLD AUTO: 0.01 X10*3/UL (ref 0–0.5)
IMM GRANULOCYTES NFR BLD AUTO: 0.2 % (ref 0–0.9)
LYMPHOCYTES # BLD AUTO: 0.89 X10*3/UL (ref 0.8–3)
LYMPHOCYTES NFR BLD AUTO: 16.3 %
MCH RBC QN AUTO: 27.2 PG (ref 26–34)
MCHC RBC AUTO-ENTMCNC: 32.5 G/DL (ref 32–36)
MCV RBC AUTO: 84 FL (ref 80–100)
MONOCYTES # BLD AUTO: 0.41 X10*3/UL (ref 0.05–0.8)
MONOCYTES NFR BLD AUTO: 7.5 %
NEUTROPHILS # BLD AUTO: 4.05 X10*3/UL (ref 1.6–5.5)
NEUTROPHILS NFR BLD AUTO: 74 %
NRBC BLD-RTO: 0 /100 WBCS (ref 0–0)
PHOSPHATE SERPL-MCNC: 3.9 MG/DL (ref 2.5–4.9)
PLATELET # BLD AUTO: 251 X10*3/UL (ref 150–450)
POTASSIUM SERPL-SCNC: 3 MMOL/L (ref 3.5–5.3)
RBC # BLD AUTO: 3.16 X10*6/UL (ref 4–5.2)
RH FACTOR (ANTIGEN D): NORMAL
RH FACTOR (ANTIGEN D): NORMAL
SODIUM SERPL-SCNC: 139 MMOL/L (ref 136–145)
WBC # BLD AUTO: 5.5 X10*3/UL (ref 4.4–11.3)

## 2024-07-16 PROCEDURE — 2500000001 HC RX 250 WO HCPCS SELF ADMINISTERED DRUGS (ALT 637 FOR MEDICARE OP): Performed by: STUDENT IN AN ORGANIZED HEALTH CARE EDUCATION/TRAINING PROGRAM

## 2024-07-16 PROCEDURE — 86920 COMPATIBILITY TEST SPIN: CPT

## 2024-07-16 PROCEDURE — 36415 COLL VENOUS BLD VENIPUNCTURE: CPT | Performed by: INTERNAL MEDICINE

## 2024-07-16 PROCEDURE — 2500000004 HC RX 250 GENERAL PHARMACY W/ HCPCS (ALT 636 FOR OP/ED): Performed by: INTERNAL MEDICINE

## 2024-07-16 PROCEDURE — 84100 ASSAY OF PHOSPHORUS: CPT | Performed by: INTERNAL MEDICINE

## 2024-07-16 PROCEDURE — 85025 COMPLETE CBC W/AUTO DIFF WBC: CPT | Performed by: INTERNAL MEDICINE

## 2024-07-16 PROCEDURE — 2500000001 HC RX 250 WO HCPCS SELF ADMINISTERED DRUGS (ALT 637 FOR MEDICARE OP): Performed by: NURSE PRACTITIONER

## 2024-07-16 PROCEDURE — 2500000002 HC RX 250 W HCPCS SELF ADMINISTERED DRUGS (ALT 637 FOR MEDICARE OP, ALT 636 FOR OP/ED): Performed by: INTERNAL MEDICINE

## 2024-07-16 PROCEDURE — 86901 BLOOD TYPING SEROLOGIC RH(D): CPT | Performed by: STUDENT IN AN ORGANIZED HEALTH CARE EDUCATION/TRAINING PROGRAM

## 2024-07-16 PROCEDURE — 2060000001 HC INTERMEDIATE ICU ROOM DAILY

## 2024-07-16 RX ORDER — POTASSIUM CHLORIDE 20 MEQ/1
20 TABLET, EXTENDED RELEASE ORAL EVERY 4 HOURS
Status: COMPLETED | OUTPATIENT
Start: 2024-07-16 | End: 2024-07-16

## 2024-07-16 ASSESSMENT — PAIN - FUNCTIONAL ASSESSMENT
PAIN_FUNCTIONAL_ASSESSMENT: 0-10

## 2024-07-16 ASSESSMENT — COGNITIVE AND FUNCTIONAL STATUS - GENERAL
CLIMB 3 TO 5 STEPS WITH RAILING: A LITTLE
DAILY ACTIVITIY SCORE: 24
MOBILITY SCORE: 23
CLIMB 3 TO 5 STEPS WITH RAILING: A LITTLE
CLIMB 3 TO 5 STEPS WITH RAILING: A LITTLE
DAILY ACTIVITIY SCORE: 24
MOBILITY SCORE: 23
MOBILITY SCORE: 23
DAILY ACTIVITIY SCORE: 24

## 2024-07-16 ASSESSMENT — PAIN SCALES - GENERAL
PAINLEVEL_OUTOF10: 0 - NO PAIN

## 2024-07-16 NOTE — PROGRESS NOTES
INTERNAL MEDICINE PROGRESS NOTE      HPI:    Patient is doing well.  Surgery is planned for tomorrow.  CABG tomorrow.    Vital signs in last 24 hours:  Temp:  [36.9 °C (98.4 °F)-37.4 °C (99.4 °F)] 37.1 °C (98.8 °F)  Heart Rate:  [65-81] 65  Resp:  [12-17] 17  BP: (112-141)/(50-61) 112/50    Physical Examination:  Physical Exam    Constitutional:       Appearance: Elderly, well-built, in no distress.  HENT:      Head: Normocephalic and atraumatic.   Eyes:      Extraocular Movements: Extraocular movements intact.      Pupils: Pupils are equal, round, and reactive to light.   Cardiovascular:      Rate and Rhythm: Normal rate and regular rhythm.      Pulses: Normal pulses.      Heart sounds: Normal heart sounds.   Pulmonary:      Effort: Pulmonary effort is normal.      Breath sounds: Normal breath sounds.   Abdominal:      General: Abdomen is flat. Bowel sounds are normal.      Palpations: Abdomen is soft.   Musculoskeletal:         General: Normal range of motion.      Cervical back: Normal range of motion and neck supple.   Skin:     General: Skin is warm and dry.   Neurological:      General: No focal deficit present.      Mental Status: Alert and oriented to person, place, and time. Mental status is at baseline.         Medications:    Current Facility-Administered Medications:     acetaminophen (Tylenol) tablet 650 mg, 650 mg, oral, q4h PRN, 650 mg at 07/15/24 0110 **OR** acetaminophen (Tylenol) oral liquid 650 mg, 650 mg, oral, q4h PRN **OR** acetaminophen (Tylenol) suppository 650 mg, 650 mg, rectal, q4h PRN, RACHELLE Winston    amLODIPine (Norvasc) tablet 10 mg, 10 mg, oral, Daily, RACHELLE Winston, 10 mg at 07/16/24 0825    aspirin EC tablet 81 mg, 81 mg, oral, Daily, RACHELLE Galaviz, 81 mg at 07/16/24 0825    atorvastatin (Lipitor) tablet 80 mg, 80 mg, oral, Daily, RACHELLE Galaviz, 80 mg at 07/16/24 0825    carvedilol (Coreg) tablet 25 mg, 25 mg,  "oral, BID, Tamy Santos, APRN-CNP, 25 mg at 07/16/24 0825    chlorhexidine (Peridex) 0.12 % solution 15 mL, 15 mL, Swish & Spit, BID, Tenisha Monson PA-C    dextrose 1.5% - LOW calcium 2.5mEq/L 2,000 mL peritoneal dialysate, , intraperitoneal, 4x daily, Gab Lynn DO, Given at 07/16/24 0829    epoetin shawn-epbx (Retacrit) injection 8,000 Units, 150 Units/kg, subcutaneous, Every Sunday, Gab Lynn DO, 8,000 Units at 07/14/24 1013    hydrALAZINE (Apresoline) injection 10 mg, 10 mg, intravenous, q6h PRN, Tamy Alvarezm, APRN-CNP    hydrALAZINE (Apresoline) tablet 25 mg, 25 mg, oral, BID, Tamy Alvarezm APRN-CNP, 25 mg at 07/16/24 0825    isosorbide mononitrate ER (Imdur) 24 hr tablet 30 mg, 30 mg, oral, Daily, Tamy Santos, APRN-CNP, 30 mg at 07/16/24 0825    [Held by provider] losartan (Cozaar) tablet 100 mg, 100 mg, oral, Daily, ALEXIS Winston-CNP, 100 mg at 07/10/24 0832    phenyleph-min oil-petrolatum (Preparation H) 0.25-14-74.9 % rectal ointment, , rectal, 4x daily PRN, Jacob Staples MD, Given at 07/15/24 1618    [Held by provider] polyethylene glycol (Glycolax, Miralax) packet 17 g, 17 g, oral, Daily, ALEXIS Winston-CNP, 17 g at 07/07/24 0833    potassium chloride CR (Klor-Con M20) ER tablet 20 mEq, 20 mEq, oral, q4h, Jacob Garrison MD, 20 mEq at 07/16/24 0934    Laboratory Findings:  Lab Results   Component Value Date    WBC 5.5 07/16/2024    HGB 8.6 (L) 07/16/2024    HCT 26.5 (L) 07/16/2024    MCV 84 07/16/2024     07/16/2024     No results found for: \"INR\", \"PROTIME\"  Lab Results   Component Value Date    GLUCOSE 73 (L) 07/16/2024    CALCIUM 7.6 (L) 07/16/2024     07/16/2024    K 3.0 (L) 07/16/2024    CO2 29 07/16/2024     07/16/2024    BUN 17 07/16/2024    CREATININE 6.98 (H) 07/16/2024       Assessment and Plan:     Non-ST elevation MI -CABG tomorrow  ESRD -continue with peritoneal dialysis  Hypertension -better blood pressure " control on current regimen.       Jacob Staples MD  07/16/24  12:36 PM

## 2024-07-16 NOTE — CARE PLAN
Problem: Fall/Injury  Goal: Not fall by end of shift  Outcome: Progressing  Goal: Be free from injury by end of the shift  Outcome: Progressing  Goal: Use assistive devices by end of the shift  Outcome: Progressing  Goal: Pace activities to prevent fatigue by end of the shift  Outcome: Progressing     Problem: Nutrition  Goal: Less than 5 days NPO/clear liquids  Outcome: Progressing  Goal: Oral intake greater than 50%  Outcome: Progressing  Goal: Oral intake greater 75%  Outcome: Progressing  Goal: Consume prescribed supplement  Outcome: Progressing  Goal: Adequate PO fluid intake  Outcome: Progressing  Goal: Nutrition support goals are met within 48 hrs  Outcome: Progressing  Goal: Nutrition support is meeting 75% of nutrient needs  Outcome: Progressing  Goal: Tube feed tolerance  Outcome: Progressing  Goal: BG  mg/dL  Outcome: Progressing  Goal: Lab values WNL  Outcome: Progressing  Goal: Electrolytes WNL  Outcome: Progressing  Goal: Promote healing  Outcome: Progressing  Goal: Maintain stable weight  Outcome: Progressing  Goal: Reduce weight from edema/fluid  Outcome: Progressing  Goal: Gradual weight gain  Outcome: Progressing  Goal: Improve ostomy output  Outcome: Progressing   The patient's goals for the shift include      The clinical goals for the shift include pt will remain free of falls and injury, VSS, pt will have no complications with PD.    Over the shift, the patient did not make progress toward the following goals. Barriers to progression include . Recommendations to address these barriers include .

## 2024-07-16 NOTE — ANESTHESIA PREPROCEDURE EVALUATION
"Patient: Isis Geronimo    Procedure Information       Date/Time: 07/17/24 1400    Procedure: Off Pump Coronary Artery Bypass Graft x 1; LIMA; LAD (Chest)    Location: [facility] A OR 10 / Virtual [facility] A OR    Surgeons: Lg Clifton MD            Relevant Problems   Cardiac   (+) NSTEMI (non-ST elevated myocardial infarction) (Multi)       Clinical information reviewed:   Tobacco  Allergies  Meds   Med Hx  Surg Hx   Fam Hx  Soc Hx         Past Medical History:   Diagnosis Date    Arthritis     CAD (coronary artery disease)     COPD (chronic obstructive pulmonary disease) (Multi)     ESRD (end stage renal disease) (Multi)     on peritoneal dialysis    Hypertension     Non-sustained ventricular tachycardia (Multi)     Rheumatoid arthritis (Multi)       Past Surgical History:   Procedure Laterality Date    CARDIAC CATHETERIZATION N/A 07/08/2024    Procedure: Left Heart Cath with Coronary Angiography and LV;  Surgeon: Nicola Juárez MD;  Location: [facility] Cardiac Cath Lab;  Service: Cardiovascular;  Laterality: N/A;    COLONOSCOPY      DILATION AND CURETTAGE OF UTERUS      ESOPHAGOGASTRODUODENOSCOPY      PERITONEAL CATHETER INSERTION  05/23/2022     Social History     Tobacco Use    Smoking status: Some Days     Types: Cigarettes     Passive exposure: Current   Substance Use Topics    Alcohol use: Not Currently      Current Outpatient Medications   Medication Instructions    amLODIPine (Norvasc) 10 mg tablet oral, Daily    atorvastatin (LIPITOR) 20 mg, oral, Daily    B complex-vitamin C-folic acid (Nephro-Batsheva) 0.8 mg tablet 0.8 mg, oral, Daily    losartan (COZAAR) 100 mg, oral, Daily      Allergies   Allergen Reactions    Chlorothiazide Other    Metoprolol Headache    Ibuprofen Unknown and Rash     \"makes me feel faint\"    Penicillins Rash        Chemistry    Lab Results   Component Value Date/Time     07/17/2024 0535    K 3.2 (L) 07/17/2024 0535     07/17/2024 0535    CO2 27 07/17/2024 0535    BUN 16 " "07/17/2024 0535    CREATININE 7.43 (H) 07/17/2024 0535    Lab Results   Component Value Date/Time    CALCIUM 8.0 (L) 07/17/2024 0535    ALKPHOS 91 07/06/2024 2053    AST 17 07/06/2024 2053    ALT 11 07/06/2024 2053    BILITOT 0.3 07/06/2024 2053          Lab Results   Component Value Date    HGBA1C 5.2 07/11/2024     Lab Results   Component Value Date/Time    WBC 5.4 07/17/2024 0535    HGB 8.2 (L) 07/17/2024 0535    HCT 25.9 (L) 07/17/2024 0535     07/17/2024 0535     No results found for: \"PROTIME\", \"PTT\", \"INR\"  No results found for: \"ABORH\"  Encounter Date: 07/06/24   Electrocardiogram, 12-lead PRN ACS symptoms   Result Value    Ventricular Rate 86    Atrial Rate 86    KS Interval 158    QRS Duration 94    QT Interval 398    QTC Calculation(Bazett) 476    P Axis 41    R Axis 30    T Axis 127    QRS Count 14    Q Onset 218    P Onset 139    P Offset 191    T Offset 417    QTC Fredericia 449    Narrative    Normal sinus rhythm  Septal infarct , age undetermined  ST & T wave abnormality, consider lateral ischemia  Abnormal ECG  When compared with ECG of 21-JUN-2016 15:28,  Vent. rate has increased BY  30 BPM  ST now depressed in Inferior leads  ST now depressed in Lateral leads  T wave inversion no longer evident in Inferior leads  See ED provider note for full interpretation and clinical correlation  Confirmed by Lien Angela ([id number]) on 7/13/2024 7:38:06 PM     No results found for this or any previous visit from the past 1095 days.     Echo 7/8/2024:  Left Ventricle: Left ventricular ejection fraction is normal, calculated by Rasmussen's biplane at 61%. Estimated left ventricular mass is increased. There are no regional wall motion abnormalities. The left ventricular cavity size is normal. The left ventricular septal wall thickness is moderately increased. There is mildly increased left ventricular posterior wall thickness. There is moderate concentric left ventricular hypertrophy. Spectral Doppler " shows an impaired relaxation pattern of left ventricular diastolic filling.  Left Atrium: The left atrium is normal in size.  Right Ventricle: The right ventricle is normal in size. There is normal right ventricular global systolic function.  Right Atrium: The right atrium is normal in size.  Aortic Valve: The aortic valve appears structurally normal. The aortic valve appears tricuspid. There is mild to moderate aortic valve cusp calcification. There is evidence of mildly elevated transaortic gradients consistent with sclerosis of the aortic valve. The aortic valve dimensionless index is 0.60. There is no evidence of aortic valve regurgitation. The peak instantaneous gradient of the aortic valve is 4.8 mmHg. The mean gradient of the aortic valve is 3.0 mmHg.  Mitral Valve: The mitral valve is normal in structure. There is no evidence of mitral valve regurgitation.  Tricuspid Valve: The tricuspid valve is structurally normal. No evidence of tricuspid regurgitation.  Pulmonic Valve: The pulmonic valve is structurally normal. There is no indication of pulmonic valve regurgitation.  Pericardium: There is a trivial to small pericardial effusion.  Aorta: The aortic root is normal.  Systemic Veins: The inferior vena cava appears to be of normal size. There is IVC inspiratory collapse greater than 50%.  In comparison to the previous echocardiogram(s): Compared with study dated 6/21/2016, there is increased LVH and aortic sclerosis.     CONCLUSIONS:   1. Left ventricular ejection fraction is normal, calculated by Rasmussen's biplane at 61%.   2. Spectral Doppler shows an impaired relaxation pattern of left ventricular diastolic filling.   3. There is moderate concentric left ventricular hypertrophy.   4. Increased LV mass.   5. Moderately increased left ventricular septal thickness.   6. There is normal right ventricular global systolic function.   7. Aortic valve sclerosis.    Cath 7/8/2024:  Left Main Coronary Artery:  The  left main coronary artery is a normal caliber vessel. The left main arises normally from the left coronary sinus of Valsalva and bifurcates into the LAD and circumflex coronary arteries. The left main coronary artery showed severe calcification. The distal left main coronary artery showed 80% stenosis.     Left Anterior Descending Coronary Artery Distribution:  The left anterior descending coronary artery is a normal caliber vessel. The LAD arises normally from the left main coronary artery. The LAD demonstrated severe calcification. The proximal left anterior descending coronary artery showed 80% stenosis. The 1st diagonal branch is a large caliber vessel. The proximal 1st diagonal branch revealed 30% stenosis. The 2nd diagonal branch is a very small caliber vessel. The 2nd diagonal branch demonstrated no significant disease or stenosis greater than 30%. The 3rd diagonal branch is a small caliber vessel. The 3rd diagonal branch revealed no significant disease or stenosis greater than 30%.     Circumflex Coronary Artery Distribution:  The circumflex coronary artery is a normal caliber vessel. The circumflex arises normally from the left main coronary artery and terminates in the AV groove. The circumflex revealed severe calcification. The proximal circumflex coronary artery showed 30% stenosis. An additional lesion, located at the mid circumflex coronary artery, demonstrated 40% stenosis. The 1st obtuse marginal branch is a large caliber vessel. The 1st obtuse marginal branch showed severe calcification. The proximal to mid 1st obtuse marginal branch showed 50% stenosis.     Ramus Intermedius:  The ramus intermedius is a small caliber vessel. The ramus intermedius arises normally from the left main coronary artery. The ramus intermedius showed no significant disease or stenosis greater than 30%.     Right Coronary Artery Distribution:     The right coronary artery is a normal caliber vessel. The RCA arises normally  "from the right sinus of Valsalva. The RCA showed severe calcification. The ostial right coronary artery showed 80% stenosis. An additional RCA lesion, located at the proximal to mid right coronary artery, revealed 70% stenosis.    Coronary Lesion Summary:  Vessel         Stenosis   Vessel Segment  Left Main    80% stenosis     distal  LAD          80% stenosis    proximal  1st Diagonal 30% stenosis    proximal  Circumflex   30% stenosis    proximal  Circumflex   40% stenosis       mid  OM 1         50% stenosis proximal to mid  RCA          80% stenosis     ostial  RCA          70% stenosis proximal to mid    CONCLUSIONS   1. Severe multivessel CAD in a right dominant system.   2. Elevatedl LVEDP.   3. No evidence of aortic stenosis.    Visit Vitals  /64 (BP Location: Left arm, Patient Position: Lying)   Pulse 80   Temp 37.1 °C (98.7 °F) (Oral)   Resp 15   Ht 1.6 m (5' 2.99\")   Wt 53.3 kg (117 lb 8.1 oz)   SpO2 100%   BMI 20.82 kg/m²   Smoking Status Some Days   BSA 1.54 m²     No data recorded     Physical Exam    Airway  Mallampati: III  TM distance: >3 FB  Neck ROM: full     Cardiovascular   Rhythm: regular  Rate: normal     Dental - normal exam  (+) upper dentures, lower dentures     Pulmonary   Breath sounds clear to auscultation     Abdominal - normal exam       Other findings: 2022: easy mask, mac 3.5, grade 1, 1 attempt           Anesthesia Plan    History of general anesthesia?: yes  History of complications of general anesthesia?: no    ASA 4     general   (GA with ETT, Holiday, CVP, YURIY)  intravenous induction   Postoperative administration of opioids is intended.  Anesthetic plan and risks discussed with patient.  Use of blood products discussed with patient who.    Plan discussed with CAA and CRNA.      "

## 2024-07-16 NOTE — PROCEDURES
Seen on dialysis.  Ms. Geronimo had some nausea when the PD fluid was instilled today.  She was complaining of loose stools, I held her polyethylene glycol.  Blood pressures look okay, antihypertensives are noted.  She will get a dose of intravenous iron, potassium chloride today as well.  She is on erythropoietin.

## 2024-07-17 ENCOUNTER — ANESTHESIA (OUTPATIENT)
Dept: OPERATING ROOM | Facility: HOSPITAL | Age: 79
End: 2024-07-17
Payer: COMMERCIAL

## 2024-07-17 ENCOUNTER — APPOINTMENT (OUTPATIENT)
Dept: CARDIOLOGY | Facility: HOSPITAL | Age: 79
End: 2024-07-17
Payer: COMMERCIAL

## 2024-07-17 ENCOUNTER — HOSPITAL ENCOUNTER (OUTPATIENT)
Dept: CARDIOLOGY | Facility: HOSPITAL | Age: 79
Discharge: HOME | End: 2024-07-17
Payer: COMMERCIAL

## 2024-07-17 ENCOUNTER — APPOINTMENT (OUTPATIENT)
Dept: RADIOLOGY | Facility: HOSPITAL | Age: 79
End: 2024-07-17
Payer: COMMERCIAL

## 2024-07-17 LAB
ACT BLD: 361 SEC (ref 82–174)
ACT BLD: 87 SEC (ref 82–174)
ACT BLD: 90 SEC (ref 82–174)
ALBUMIN SERPL BCP-MCNC: 2.3 G/DL (ref 3.4–5)
ALBUMIN SERPL BCP-MCNC: 2.9 G/DL (ref 3.4–5)
ANION GAP BLDA CALCULATED.4IONS-SCNC: 11 MMO/L (ref 10–25)
ANION GAP BLDA CALCULATED.4IONS-SCNC: 12 MMO/L (ref 10–25)
ANION GAP BLDA CALCULATED.4IONS-SCNC: 7 MMO/L (ref 10–25)
ANION GAP BLDA CALCULATED.4IONS-SCNC: 9 MMO/L (ref 10–25)
ANION GAP BLDA CALCULATED.4IONS-SCNC: ABNORMAL MMOL/L
ANION GAP SERPL CALC-SCNC: 12 MMOL/L (ref 10–20)
ANION GAP SERPL CALC-SCNC: 15 MMOL/L (ref 10–20)
ANION GAP SERPL CALC-SCNC: 15 MMOL/L (ref 10–20)
APTT PPP: 34 SECONDS (ref 27–38)
BASE EXCESS BLDA CALC-SCNC: 1.6 MMOL/L (ref -2–3)
BASE EXCESS BLDA CALC-SCNC: 3.1 MMOL/L (ref -2–3)
BASE EXCESS BLDA CALC-SCNC: 3.2 MMOL/L (ref -2–3)
BASE EXCESS BLDA CALC-SCNC: 3.6 MMOL/L (ref -2–3)
BASE EXCESS BLDA CALC-SCNC: 3.8 MMOL/L (ref -2–3)
BODY TEMPERATURE: 37 DEGREES CELSIUS
BUN SERPL-MCNC: 15 MG/DL (ref 6–23)
BUN SERPL-MCNC: 16 MG/DL (ref 6–23)
BUN SERPL-MCNC: 17 MG/DL (ref 6–23)
CA-I BLD-SCNC: 1.07 MMOL/L (ref 1.1–1.33)
CA-I BLDA-SCNC: 1.07 MMOL/L (ref 1.1–1.33)
CA-I BLDA-SCNC: 1.11 MMOL/L (ref 1.1–1.33)
CA-I BLDA-SCNC: 1.13 MMOL/L (ref 1.1–1.33)
CA-I BLDA-SCNC: 1.16 MMOL/L (ref 1.1–1.33)
CA-I BLDA-SCNC: 1.17 MMOL/L (ref 1.1–1.33)
CALCIUM SERPL-MCNC: 7.6 MG/DL (ref 8.6–10.3)
CALCIUM SERPL-MCNC: 8 MG/DL (ref 8.6–10.3)
CALCIUM SERPL-MCNC: 8.4 MG/DL (ref 8.6–10.3)
CHLORIDE BLDA-SCNC: 102 MMOL/L (ref 98–107)
CHLORIDE BLDA-SCNC: 102 MMOL/L (ref 98–107)
CHLORIDE BLDA-SCNC: 103 MMOL/L (ref 98–107)
CHLORIDE BLDA-SCNC: 106 MMOL/L (ref 98–107)
CHLORIDE BLDA-SCNC: ABNORMAL MMOL/L
CHLORIDE SERPL-SCNC: 100 MMOL/L (ref 98–107)
CHLORIDE SERPL-SCNC: 102 MMOL/L (ref 98–107)
CHLORIDE SERPL-SCNC: 102 MMOL/L (ref 98–107)
CO2 SERPL-SCNC: 26 MMOL/L (ref 21–32)
CO2 SERPL-SCNC: 26 MMOL/L (ref 21–32)
CO2 SERPL-SCNC: 27 MMOL/L (ref 21–32)
CREAT SERPL-MCNC: 6.75 MG/DL (ref 0.5–1.05)
CREAT SERPL-MCNC: 7.25 MG/DL (ref 0.5–1.05)
CREAT SERPL-MCNC: 7.43 MG/DL (ref 0.5–1.05)
EGFRCR SERPLBLD CKD-EPI 2021: 5 ML/MIN/1.73M*2
EGFRCR SERPLBLD CKD-EPI 2021: 5 ML/MIN/1.73M*2
EGFRCR SERPLBLD CKD-EPI 2021: 6 ML/MIN/1.73M*2
EJECTION FRACTION: 63 %
ERYTHROCYTE [DISTWIDTH] IN BLOOD BY AUTOMATED COUNT: 15.9 % (ref 11.5–14.5)
ERYTHROCYTE [DISTWIDTH] IN BLOOD BY AUTOMATED COUNT: 16.6 % (ref 11.5–14.5)
FIBRINOGEN PPP-MCNC: 293 MG/DL (ref 200–400)
GLUCOSE BLD MANUAL STRIP-MCNC: 172 MG/DL (ref 74–99)
GLUCOSE BLD MANUAL STRIP-MCNC: 66 MG/DL (ref 74–99)
GLUCOSE BLD MANUAL STRIP-MCNC: 69 MG/DL (ref 74–99)
GLUCOSE BLD MANUAL STRIP-MCNC: 89 MG/DL (ref 74–99)
GLUCOSE BLDA-MCNC: 165 MG/DL (ref 74–99)
GLUCOSE BLDA-MCNC: 174 MG/DL (ref 74–99)
GLUCOSE BLDA-MCNC: 194 MG/DL (ref 74–99)
GLUCOSE BLDA-MCNC: 90 MG/DL (ref 74–99)
GLUCOSE BLDA-MCNC: 95 MG/DL (ref 74–99)
GLUCOSE SERPL-MCNC: 156 MG/DL (ref 74–99)
GLUCOSE SERPL-MCNC: 56 MG/DL (ref 74–99)
GLUCOSE SERPL-MCNC: 73 MG/DL (ref 74–99)
HCO3 BLDA-SCNC: 25.7 MMOL/L (ref 22–26)
HCO3 BLDA-SCNC: 26.7 MMOL/L (ref 22–26)
HCO3 BLDA-SCNC: 26.8 MMOL/L (ref 22–26)
HCO3 BLDA-SCNC: 27.6 MMOL/L (ref 22–26)
HCO3 BLDA-SCNC: 27.7 MMOL/L (ref 22–26)
HCT VFR BLD AUTO: 25.9 % (ref 36–46)
HCT VFR BLD AUTO: 28.9 % (ref 36–46)
HCT VFR BLD EST: 22 % (ref 36–46)
HCT VFR BLD EST: 23 % (ref 36–46)
HCT VFR BLD EST: 24 % (ref 36–46)
HCT VFR BLD EST: 24 % (ref 36–46)
HCT VFR BLD EST: 28 % (ref 36–46)
HGB BLD-MCNC: 8.2 G/DL (ref 12–16)
HGB BLD-MCNC: 9.5 G/DL (ref 12–16)
HGB BLDA-MCNC: 7.3 G/DL (ref 12–16)
HGB BLDA-MCNC: 7.8 G/DL (ref 12–16)
HGB BLDA-MCNC: 8 G/DL (ref 12–16)
HGB BLDA-MCNC: 8.1 G/DL (ref 12–16)
HGB BLDA-MCNC: 9.4 G/DL (ref 12–16)
INHALED O2 CONCENTRATION: 30 %
INR PPP: 1.2 (ref 0.9–1.1)
LACTATE BLDA-SCNC: 0.7 MMOL/L (ref 0.4–2)
LACTATE BLDA-SCNC: 0.8 MMOL/L (ref 0.4–2)
LACTATE BLDA-SCNC: 0.9 MMOL/L (ref 0.4–2)
LACTATE BLDA-SCNC: 1 MMOL/L (ref 0.4–2)
LACTATE BLDA-SCNC: 1.5 MMOL/L (ref 0.4–2)
MAGNESIUM SERPL-MCNC: 1.2 MG/DL (ref 1.6–2.4)
MAGNESIUM SERPL-MCNC: 2.2 MG/DL (ref 1.6–2.4)
MCH RBC QN AUTO: 26.4 PG (ref 26–34)
MCH RBC QN AUTO: 27.1 PG (ref 26–34)
MCHC RBC AUTO-ENTMCNC: 31.7 G/DL (ref 32–36)
MCHC RBC AUTO-ENTMCNC: 32.9 G/DL (ref 32–36)
MCV RBC AUTO: 83 FL (ref 80–100)
MCV RBC AUTO: 83 FL (ref 80–100)
NRBC BLD-RTO: 0 /100 WBCS (ref 0–0)
NRBC BLD-RTO: 0 /100 WBCS (ref 0–0)
OXYHGB MFR BLDA: 97.2 % (ref 94–98)
OXYHGB MFR BLDA: 97.4 % (ref 94–98)
OXYHGB MFR BLDA: 97.6 % (ref 94–98)
OXYHGB MFR BLDA: 97.9 % (ref 94–98)
OXYHGB MFR BLDA: 98.2 % (ref 94–98)
PCO2 BLDA: 35 MM HG (ref 38–42)
PCO2 BLDA: 36 MM HG (ref 38–42)
PCO2 BLDA: 37 MM HG (ref 38–42)
PCO2 BLDA: 37 MM HG (ref 38–42)
PCO2 BLDA: 39 MM HG (ref 38–42)
PH BLDA: 7.45 PH (ref 7.38–7.42)
PH BLDA: 7.46 PH (ref 7.38–7.42)
PH BLDA: 7.48 PH (ref 7.38–7.42)
PH BLDA: 7.48 PH (ref 7.38–7.42)
PH BLDA: 7.49 PH (ref 7.38–7.42)
PHOSPHATE SERPL-MCNC: 2.9 MG/DL (ref 2.5–4.9)
PHOSPHATE SERPL-MCNC: 3.6 MG/DL (ref 2.5–4.9)
PLATELET # BLD AUTO: 149 X10*3/UL (ref 150–450)
PLATELET # BLD AUTO: 243 X10*3/UL (ref 150–450)
PO2 BLDA: 111 MM HG (ref 85–95)
PO2 BLDA: 142 MM HG (ref 85–95)
PO2 BLDA: 172 MM HG (ref 85–95)
PO2 BLDA: 189 MM HG (ref 85–95)
PO2 BLDA: 501 MM HG (ref 85–95)
POTASSIUM BLDA-SCNC: 3.6 MMOL/L (ref 3.5–5.3)
POTASSIUM BLDA-SCNC: 3.9 MMOL/L (ref 3.5–5.3)
POTASSIUM BLDA-SCNC: 3.9 MMOL/L (ref 3.5–5.3)
POTASSIUM BLDA-SCNC: 4 MMOL/L (ref 3.5–5.3)
POTASSIUM BLDA-SCNC: 4 MMOL/L (ref 3.5–5.3)
POTASSIUM SERPL-SCNC: 3.2 MMOL/L (ref 3.5–5.3)
POTASSIUM SERPL-SCNC: 3.6 MMOL/L (ref 3.5–5.3)
POTASSIUM SERPL-SCNC: 3.7 MMOL/L (ref 3.5–5.3)
PROTHROMBIN TIME: 13.8 SECONDS (ref 9.8–12.8)
RBC # BLD AUTO: 3.11 X10*6/UL (ref 4–5.2)
RBC # BLD AUTO: 3.5 X10*6/UL (ref 4–5.2)
SAO2 % BLDA: 100 % (ref 94–100)
SODIUM BLDA-SCNC: 136 MMOL/L (ref 136–145)
SODIUM SERPL-SCNC: 137 MMOL/L (ref 136–145)
SODIUM SERPL-SCNC: 138 MMOL/L (ref 136–145)
SODIUM SERPL-SCNC: 139 MMOL/L (ref 136–145)
WBC # BLD AUTO: 5.4 X10*3/UL (ref 4.4–11.3)
WBC # BLD AUTO: 7.2 X10*3/UL (ref 4.4–11.3)

## 2024-07-17 PROCEDURE — 93010 ELECTROCARDIOGRAM REPORT: CPT | Performed by: INTERNAL MEDICINE

## 2024-07-17 PROCEDURE — 84520 ASSAY OF UREA NITROGEN: CPT

## 2024-07-17 PROCEDURE — 85384 FIBRINOGEN ACTIVITY: CPT | Performed by: NURSE PRACTITIONER

## 2024-07-17 PROCEDURE — 2500000005 HC RX 250 GENERAL PHARMACY W/O HCPCS: Performed by: ANESTHESIOLOGIST ASSISTANT

## 2024-07-17 PROCEDURE — 36415 COLL VENOUS BLD VENIPUNCTURE: CPT | Performed by: INTERNAL MEDICINE

## 2024-07-17 PROCEDURE — 85347 COAGULATION TIME ACTIVATED: CPT

## 2024-07-17 PROCEDURE — 2500000004 HC RX 250 GENERAL PHARMACY W/ HCPCS (ALT 636 FOR OP/ED): Performed by: INTERNAL MEDICINE

## 2024-07-17 PROCEDURE — 80069 RENAL FUNCTION PANEL: CPT | Performed by: INTERNAL MEDICINE

## 2024-07-17 PROCEDURE — 99292 CRITICAL CARE ADDL 30 MIN: CPT | Performed by: NURSE PRACTITIONER

## 2024-07-17 PROCEDURE — 2500000001 HC RX 250 WO HCPCS SELF ADMINISTERED DRUGS (ALT 637 FOR MEDICARE OP): Performed by: NURSE PRACTITIONER

## 2024-07-17 PROCEDURE — 2500000001 HC RX 250 WO HCPCS SELF ADMINISTERED DRUGS (ALT 637 FOR MEDICARE OP): Performed by: STUDENT IN AN ORGANIZED HEALTH CARE EDUCATION/TRAINING PROGRAM

## 2024-07-17 PROCEDURE — 2500000002 HC RX 250 W HCPCS SELF ADMINISTERED DRUGS (ALT 637 FOR MEDICARE OP, ALT 636 FOR OP/ED): Performed by: NURSE PRACTITIONER

## 2024-07-17 PROCEDURE — 83735 ASSAY OF MAGNESIUM: CPT

## 2024-07-17 PROCEDURE — 84100 ASSAY OF PHOSPHORUS: CPT | Performed by: NURSE PRACTITIONER

## 2024-07-17 PROCEDURE — 84132 ASSAY OF SERUM POTASSIUM: CPT

## 2024-07-17 PROCEDURE — 2500000004 HC RX 250 GENERAL PHARMACY W/ HCPCS (ALT 636 FOR OP/ED): Performed by: NURSE PRACTITIONER

## 2024-07-17 PROCEDURE — 99291 CRITICAL CARE FIRST HOUR: CPT | Performed by: NURSE PRACTITIONER

## 2024-07-17 PROCEDURE — 2500000004 HC RX 250 GENERAL PHARMACY W/ HCPCS (ALT 636 FOR OP/ED)

## 2024-07-17 PROCEDURE — 93005 ELECTROCARDIOGRAM TRACING: CPT

## 2024-07-17 PROCEDURE — 3600000012 HC PERFUSION TIME - EACH INCREMENTAL 1 MINUTE: Performed by: THORACIC SURGERY (CARDIOTHORACIC VASCULAR SURGERY)

## 2024-07-17 PROCEDURE — 37799 UNLISTED PX VASCULAR SURGERY: CPT | Performed by: NURSE PRACTITIONER

## 2024-07-17 PROCEDURE — 83735 ASSAY OF MAGNESIUM: CPT | Performed by: NURSE PRACTITIONER

## 2024-07-17 PROCEDURE — 85610 PROTHROMBIN TIME: CPT | Performed by: NURSE PRACTITIONER

## 2024-07-17 PROCEDURE — 85027 COMPLETE CBC AUTOMATED: CPT | Performed by: INTERNAL MEDICINE

## 2024-07-17 PROCEDURE — 2500000004 HC RX 250 GENERAL PHARMACY W/ HCPCS (ALT 636 FOR OP/ED): Performed by: ANESTHESIOLOGY

## 2024-07-17 PROCEDURE — P9045 ALBUMIN (HUMAN), 5%, 250 ML: HCPCS | Mod: JZ

## 2024-07-17 PROCEDURE — 2020000001 HC ICU ROOM DAILY

## 2024-07-17 PROCEDURE — 3700000001 HC GENERAL ANESTHESIA TIME - INITIAL BASE CHARGE: Performed by: THORACIC SURGERY (CARDIOTHORACIC VASCULAR SURGERY)

## 2024-07-17 PROCEDURE — 85027 COMPLETE CBC AUTOMATED: CPT | Performed by: NURSE PRACTITIONER

## 2024-07-17 PROCEDURE — 02100Z9 BYPASS CORONARY ARTERY, ONE ARTERY FROM LEFT INTERNAL MAMMARY, OPEN APPROACH: ICD-10-PCS | Performed by: THORACIC SURGERY (CARDIOTHORACIC VASCULAR SURGERY)

## 2024-07-17 PROCEDURE — 2500000005 HC RX 250 GENERAL PHARMACY W/O HCPCS: Performed by: ANESTHESIOLOGY

## 2024-07-17 PROCEDURE — 2500000004 HC RX 250 GENERAL PHARMACY W/ HCPCS (ALT 636 FOR OP/ED): Performed by: ANESTHESIOLOGIST ASSISTANT

## 2024-07-17 PROCEDURE — 36430 TRANSFUSION BLD/BLD COMPNT: CPT | Performed by: ANESTHESIOLOGIST ASSISTANT

## 2024-07-17 PROCEDURE — 71045 X-RAY EXAM CHEST 1 VIEW: CPT | Performed by: STUDENT IN AN ORGANIZED HEALTH CARE EDUCATION/TRAINING PROGRAM

## 2024-07-17 PROCEDURE — 82330 ASSAY OF CALCIUM: CPT | Performed by: NURSE PRACTITIONER

## 2024-07-17 PROCEDURE — 84132 ASSAY OF SERUM POTASSIUM: CPT | Performed by: NURSE PRACTITIONER

## 2024-07-17 PROCEDURE — 2500000004 HC RX 250 GENERAL PHARMACY W/ HCPCS (ALT 636 FOR OP/ED): Mod: JZ

## 2024-07-17 PROCEDURE — 3600000017 HC OR TIME - EACH INCREMENTAL 1 MINUTE - PROCEDURE LEVEL SIX: Performed by: THORACIC SURGERY (CARDIOTHORACIC VASCULAR SURGERY)

## 2024-07-17 PROCEDURE — 3700000002 HC GENERAL ANESTHESIA TIME - EACH INCREMENTAL 1 MINUTE: Performed by: THORACIC SURGERY (CARDIOTHORACIC VASCULAR SURGERY)

## 2024-07-17 PROCEDURE — 3600000011 HC PERFUSION TIME - INITIAL BASE CHARGE: Performed by: THORACIC SURGERY (CARDIOTHORACIC VASCULAR SURGERY)

## 2024-07-17 PROCEDURE — P9016 RBC LEUKOCYTES REDUCED: HCPCS

## 2024-07-17 PROCEDURE — 3600000018 HC OR TIME - INITIAL BASE CHARGE - PROCEDURE LEVEL SIX: Performed by: THORACIC SURGERY (CARDIOTHORACIC VASCULAR SURGERY)

## 2024-07-17 PROCEDURE — P9045 ALBUMIN (HUMAN), 5%, 250 ML: HCPCS | Mod: JZ | Performed by: ANESTHESIOLOGIST ASSISTANT

## 2024-07-17 PROCEDURE — 94002 VENT MGMT INPAT INIT DAY: CPT

## 2024-07-17 PROCEDURE — 82947 ASSAY GLUCOSE BLOOD QUANT: CPT

## 2024-07-17 PROCEDURE — 2720000007 HC OR 272 NO HCPCS: Performed by: THORACIC SURGERY (CARDIOTHORACIC VASCULAR SURGERY)

## 2024-07-17 PROCEDURE — 2500000005 HC RX 250 GENERAL PHARMACY W/O HCPCS: Performed by: NURSE PRACTITIONER

## 2024-07-17 PROCEDURE — 2500000002 HC RX 250 W HCPCS SELF ADMINISTERED DRUGS (ALT 637 FOR MEDICARE OP, ALT 636 FOR OP/ED): Performed by: INTERNAL MEDICINE

## 2024-07-17 PROCEDURE — 2500000004 HC RX 250 GENERAL PHARMACY W/ HCPCS (ALT 636 FOR OP/ED): Performed by: THORACIC SURGERY (CARDIOTHORACIC VASCULAR SURGERY)

## 2024-07-17 PROCEDURE — 71045 X-RAY EXAM CHEST 1 VIEW: CPT

## 2024-07-17 RX ORDER — OXYCODONE HYDROCHLORIDE 5 MG/1
10 TABLET ORAL EVERY 4 HOURS PRN
Status: DISCONTINUED | OUTPATIENT
Start: 2024-07-17 | End: 2024-07-20 | Stop reason: HOSPADM

## 2024-07-17 RX ORDER — PROCHLORPERAZINE MALEATE 10 MG
10 TABLET ORAL EVERY 6 HOURS PRN
Status: DISCONTINUED | OUTPATIENT
Start: 2024-07-17 | End: 2024-07-20 | Stop reason: HOSPADM

## 2024-07-17 RX ORDER — MAGNESIUM SULFATE HEPTAHYDRATE 40 MG/ML
INJECTION, SOLUTION INTRAVENOUS
Status: COMPLETED
Start: 2024-07-17 | End: 2024-07-17

## 2024-07-17 RX ORDER — ALBUMIN HUMAN 50 G/1000ML
12.5 SOLUTION INTRAVENOUS ONCE
Status: COMPLETED | OUTPATIENT
Start: 2024-07-17 | End: 2024-07-17

## 2024-07-17 RX ORDER — HYDROMORPHONE HYDROCHLORIDE 0.2 MG/ML
0.2 INJECTION INTRAMUSCULAR; INTRAVENOUS; SUBCUTANEOUS
Status: DISCONTINUED | OUTPATIENT
Start: 2024-07-17 | End: 2024-07-17

## 2024-07-17 RX ORDER — VANCOMYCIN HYDROCHLORIDE 500 MG/10ML
INJECTION, POWDER, LYOPHILIZED, FOR SOLUTION INTRAVENOUS AS NEEDED
Status: DISCONTINUED | OUTPATIENT
Start: 2024-07-17 | End: 2024-07-17

## 2024-07-17 RX ORDER — FENTANYL CITRATE 50 UG/ML
INJECTION, SOLUTION INTRAMUSCULAR; INTRAVENOUS AS NEEDED
Status: DISCONTINUED | OUTPATIENT
Start: 2024-07-17 | End: 2024-07-17

## 2024-07-17 RX ORDER — MAGNESIUM SULFATE HEPTAHYDRATE 40 MG/ML
2 INJECTION, SOLUTION INTRAVENOUS ONCE
Status: COMPLETED | OUTPATIENT
Start: 2024-07-17 | End: 2024-07-17

## 2024-07-17 RX ORDER — PHENYLEPHRINE 10 MG/250 ML(40 MCG/ML)IN 0.9 % SOD.CHLORIDE INTRAVENOUS
0-2 CONTINUOUS
Status: DISCONTINUED | OUTPATIENT
Start: 2024-07-17 | End: 2024-07-17

## 2024-07-17 RX ORDER — TRANEXAMIC ACID 10 MG/ML
5000 INJECTION, SOLUTION INTRAVENOUS ONCE
Status: DISCONTINUED | OUTPATIENT
Start: 2024-07-17 | End: 2024-07-17 | Stop reason: SDUPTHER

## 2024-07-17 RX ORDER — PANTOPRAZOLE SODIUM 40 MG/10ML
40 INJECTION, POWDER, LYOPHILIZED, FOR SOLUTION INTRAVENOUS
Status: DISCONTINUED | OUTPATIENT
Start: 2024-07-18 | End: 2024-07-17 | Stop reason: SDUPTHER

## 2024-07-17 RX ORDER — PROPOFOL 10 MG/ML
0-50 INJECTION, EMULSION INTRAVENOUS CONTINUOUS
Status: DISCONTINUED | OUTPATIENT
Start: 2024-07-17 | End: 2024-07-17

## 2024-07-17 RX ORDER — EPINEPHRINE HCL IN DEXTROSE 5% 4MG/250ML
0-.2 PLASTIC BAG, INJECTION (ML) INTRAVENOUS CONTINUOUS
Status: DISCONTINUED | OUTPATIENT
Start: 2024-07-17 | End: 2024-07-17

## 2024-07-17 RX ORDER — POLYETHYLENE GLYCOL 3350 17 G/17G
17 POWDER, FOR SOLUTION ORAL DAILY
Status: DISCONTINUED | OUTPATIENT
Start: 2024-07-17 | End: 2024-07-20 | Stop reason: HOSPADM

## 2024-07-17 RX ORDER — PAPAVERINE HYDROCHLORIDE 30 MG/ML
INJECTION INTRAMUSCULAR; INTRAVENOUS AS NEEDED
Status: DISCONTINUED | OUTPATIENT
Start: 2024-07-17 | End: 2024-07-17 | Stop reason: HOSPADM

## 2024-07-17 RX ORDER — SODIUM CHLORIDE, SODIUM LACTATE, POTASSIUM CHLORIDE, CALCIUM CHLORIDE 600; 310; 30; 20 MG/100ML; MG/100ML; MG/100ML; MG/100ML
20 INJECTION, SOLUTION INTRAVENOUS CONTINUOUS
Status: DISCONTINUED | OUTPATIENT
Start: 2024-07-17 | End: 2024-07-17

## 2024-07-17 RX ORDER — POTASSIUM CHLORIDE 20 MEQ/1
20 TABLET, EXTENDED RELEASE ORAL
Status: COMPLETED | OUTPATIENT
Start: 2024-07-17 | End: 2024-07-17

## 2024-07-17 RX ORDER — LIDOCAINE 560 MG/1
1 PATCH PERCUTANEOUS; TOPICAL; TRANSDERMAL EVERY 24 HOURS
Status: COMPLETED | OUTPATIENT
Start: 2024-07-17 | End: 2024-07-20

## 2024-07-17 RX ORDER — ONDANSETRON HYDROCHLORIDE 2 MG/ML
4 INJECTION, SOLUTION INTRAVENOUS EVERY 6 HOURS PRN
Status: DISCONTINUED | OUTPATIENT
Start: 2024-07-17 | End: 2024-07-20 | Stop reason: HOSPADM

## 2024-07-17 RX ORDER — NOREPINEPHRINE BITARTRATE/D5W 8 MG/250ML
0-.5 PLASTIC BAG, INJECTION (ML) INTRAVENOUS CONTINUOUS
Status: DISCONTINUED | OUTPATIENT
Start: 2024-07-17 | End: 2024-07-17 | Stop reason: SDUPTHER

## 2024-07-17 RX ORDER — AMOXICILLIN 250 MG
2 CAPSULE ORAL 2 TIMES DAILY
Status: DISCONTINUED | OUTPATIENT
Start: 2024-07-17 | End: 2024-07-20 | Stop reason: HOSPADM

## 2024-07-17 RX ORDER — PROPOFOL 10 MG/ML
INJECTION, EMULSION INTRAVENOUS AS NEEDED
Status: DISCONTINUED | OUTPATIENT
Start: 2024-07-17 | End: 2024-07-17

## 2024-07-17 RX ORDER — PHENYLEPHRINE 10 MG/250 ML(40 MCG/ML)IN 0.9 % SOD.CHLORIDE INTRAVENOUS
0-9 CONTINUOUS
Status: DISCONTINUED | OUTPATIENT
Start: 2024-07-17 | End: 2024-07-17 | Stop reason: SDUPTHER

## 2024-07-17 RX ORDER — PHENYLEPHRINE HCL IN 0.9% NACL 0.4MG/10ML
SYRINGE (ML) INTRAVENOUS AS NEEDED
Status: DISCONTINUED | OUTPATIENT
Start: 2024-07-17 | End: 2024-07-17

## 2024-07-17 RX ORDER — PROCHLORPERAZINE 25 MG/1
25 SUPPOSITORY RECTAL EVERY 12 HOURS PRN
Status: DISCONTINUED | OUTPATIENT
Start: 2024-07-17 | End: 2024-07-20 | Stop reason: HOSPADM

## 2024-07-17 RX ORDER — LIDOCAINE HYDROCHLORIDE 20 MG/ML
INJECTION, SOLUTION INFILTRATION; PERINEURAL AS NEEDED
Status: DISCONTINUED | OUTPATIENT
Start: 2024-07-17 | End: 2024-07-17

## 2024-07-17 RX ORDER — NOREPINEPHRINE BITARTRATE/D5W 8 MG/250ML
0-.2 PLASTIC BAG, INJECTION (ML) INTRAVENOUS CONTINUOUS
Status: DISCONTINUED | OUTPATIENT
Start: 2024-07-17 | End: 2024-07-17

## 2024-07-17 RX ORDER — DEXTROSE, SODIUM CHLORIDE, SODIUM LACTATE, POTASSIUM CHLORIDE, AND CALCIUM CHLORIDE 5; .6; .31; .03; .02 G/100ML; G/100ML; G/100ML; G/100ML; G/100ML
30 INJECTION, SOLUTION INTRAVENOUS CONTINUOUS
Status: DISCONTINUED | OUTPATIENT
Start: 2024-07-18 | End: 2024-07-18

## 2024-07-17 RX ORDER — ROCURONIUM BROMIDE 10 MG/ML
INJECTION, SOLUTION INTRAVENOUS AS NEEDED
Status: DISCONTINUED | OUTPATIENT
Start: 2024-07-17 | End: 2024-07-17

## 2024-07-17 RX ORDER — NOREPINEPHRINE BITARTRATE/D5W 8 MG/250ML
0-.2 PLASTIC BAG, INJECTION (ML) INTRAVENOUS CONTINUOUS
Status: DISCONTINUED | OUTPATIENT
Start: 2024-07-17 | End: 2024-07-18

## 2024-07-17 RX ORDER — PROTAMINE SULFATE 10 MG/ML
INJECTION, SOLUTION INTRAVENOUS AS NEEDED
Status: DISCONTINUED | OUTPATIENT
Start: 2024-07-17 | End: 2024-07-17

## 2024-07-17 RX ORDER — PANTOPRAZOLE SODIUM 40 MG/10ML
40 INJECTION, POWDER, LYOPHILIZED, FOR SOLUTION INTRAVENOUS
Status: DISCONTINUED | OUTPATIENT
Start: 2024-07-18 | End: 2024-07-18

## 2024-07-17 RX ORDER — HEPARIN SODIUM 1000 [USP'U]/ML
INJECTION, SOLUTION INTRAVENOUS; SUBCUTANEOUS AS NEEDED
Status: DISCONTINUED | OUTPATIENT
Start: 2024-07-17 | End: 2024-07-17

## 2024-07-17 RX ORDER — HYDROMORPHONE HYDROCHLORIDE 0.2 MG/ML
0.2 INJECTION INTRAMUSCULAR; INTRAVENOUS; SUBCUTANEOUS
Status: DISCONTINUED | OUTPATIENT
Start: 2024-07-17 | End: 2024-07-18

## 2024-07-17 RX ORDER — OXYCODONE HYDROCHLORIDE 5 MG/1
5 TABLET ORAL EVERY 4 HOURS PRN
Status: DISCONTINUED | OUTPATIENT
Start: 2024-07-17 | End: 2024-07-20 | Stop reason: HOSPADM

## 2024-07-17 RX ORDER — POTASSIUM CHLORIDE 14.9 MG/ML
20 INJECTION INTRAVENOUS ONCE
Status: COMPLETED | OUTPATIENT
Start: 2024-07-18 | End: 2024-07-18

## 2024-07-17 RX ORDER — INSULIN LISPRO 100 [IU]/ML
0-15 INJECTION, SOLUTION INTRAVENOUS; SUBCUTANEOUS EVERY 4 HOURS
Status: DISCONTINUED | OUTPATIENT
Start: 2024-07-17 | End: 2024-07-18

## 2024-07-17 RX ORDER — CEFAZOLIN SODIUM 2 G/100ML
2 INJECTION, SOLUTION INTRAVENOUS EVERY 24 HOURS
Status: COMPLETED | OUTPATIENT
Start: 2024-07-18 | End: 2024-07-19

## 2024-07-17 RX ORDER — ALBUMIN HUMAN 50 G/1000ML
SOLUTION INTRAVENOUS AS NEEDED
Status: DISCONTINUED | OUTPATIENT
Start: 2024-07-17 | End: 2024-07-17

## 2024-07-17 RX ORDER — NALOXONE HYDROCHLORIDE 1 MG/ML
0.2 INJECTION INTRAMUSCULAR; INTRAVENOUS; SUBCUTANEOUS EVERY 5 MIN PRN
Status: DISCONTINUED | OUTPATIENT
Start: 2024-07-17 | End: 2024-07-20 | Stop reason: HOSPADM

## 2024-07-17 RX ORDER — CEFAZOLIN 1 G/1
INJECTION, POWDER, FOR SOLUTION INTRAVENOUS AS NEEDED
Status: DISCONTINUED | OUTPATIENT
Start: 2024-07-17 | End: 2024-07-17

## 2024-07-17 RX ORDER — ONDANSETRON 4 MG/1
4 TABLET, ORALLY DISINTEGRATING ORAL EVERY 6 HOURS PRN
Status: DISCONTINUED | OUTPATIENT
Start: 2024-07-17 | End: 2024-07-20 | Stop reason: HOSPADM

## 2024-07-17 RX ORDER — PHENYLEPHRINE HCL IN 0.9% NACL 1 MG/10 ML
SYRINGE (ML) INTRAVENOUS AS NEEDED
Status: DISCONTINUED | OUTPATIENT
Start: 2024-07-17 | End: 2024-07-17

## 2024-07-17 RX ORDER — SODIUM CHLORIDE, SODIUM GLUCONATE, SODIUM ACETATE, POTASSIUM CHLORIDE AND MAGNESIUM CHLORIDE 30; 37; 368; 526; 502 MG/100ML; MG/100ML; MG/100ML; MG/100ML; MG/100ML
INJECTION, SOLUTION INTRAVENOUS CONTINUOUS PRN
Status: DISCONTINUED | OUTPATIENT
Start: 2024-07-17 | End: 2024-07-17

## 2024-07-17 RX ORDER — PANTOPRAZOLE SODIUM 40 MG/1
40 TABLET, DELAYED RELEASE ORAL
Status: DISCONTINUED | OUTPATIENT
Start: 2024-07-18 | End: 2024-07-17 | Stop reason: SDUPTHER

## 2024-07-17 RX ORDER — ACETAMINOPHEN 325 MG/1
650 TABLET ORAL EVERY 6 HOURS
Status: DISCONTINUED | OUTPATIENT
Start: 2024-07-17 | End: 2024-07-20 | Stop reason: HOSPADM

## 2024-07-17 RX ORDER — PANTOPRAZOLE SODIUM 40 MG/1
40 TABLET, DELAYED RELEASE ORAL
Status: DISCONTINUED | OUTPATIENT
Start: 2024-07-18 | End: 2024-07-18

## 2024-07-17 RX ORDER — PROCHLORPERAZINE EDISYLATE 5 MG/ML
10 INJECTION INTRAMUSCULAR; INTRAVENOUS EVERY 6 HOURS PRN
Status: DISCONTINUED | OUTPATIENT
Start: 2024-07-17 | End: 2024-07-20 | Stop reason: HOSPADM

## 2024-07-17 RX ORDER — DEXTROSE 50 % IN WATER (D50W) INTRAVENOUS SYRINGE
25
Status: DISCONTINUED | OUTPATIENT
Start: 2024-07-17 | End: 2024-07-20 | Stop reason: HOSPADM

## 2024-07-17 RX ORDER — MIDAZOLAM HYDROCHLORIDE 1 MG/ML
INJECTION INTRAMUSCULAR; INTRAVENOUS AS NEEDED
Status: DISCONTINUED | OUTPATIENT
Start: 2024-07-17 | End: 2024-07-17

## 2024-07-17 ASSESSMENT — PAIN SCALES - GENERAL
PAINLEVEL_OUTOF10: 0 - NO PAIN
PAINLEVEL_OUTOF10: 6
PAINLEVEL_OUTOF10: 0 - NO PAIN

## 2024-07-17 ASSESSMENT — PAIN SCALES - PAIN ASSESSMENT IN ADVANCED DEMENTIA (PAINAD)
BODYLANGUAGE: RELAXED
TOTALSCORE: 0
TOTALSCORE: REPOSITIONED
BREATHING: NORMAL
FACIALEXPRESSION: SMILING OR INEXPRESSIVE
CONSOLABILITY: NO NEED TO CONSOLE

## 2024-07-17 ASSESSMENT — PAIN - FUNCTIONAL ASSESSMENT
PAIN_FUNCTIONAL_ASSESSMENT: 0-10
PAIN_FUNCTIONAL_ASSESSMENT: CPOT (CRITICAL CARE PAIN OBSERVATION TOOL)
PAIN_FUNCTIONAL_ASSESSMENT: 0-10

## 2024-07-17 ASSESSMENT — PAIN SCALES - WONG BAKER: WONGBAKER_NUMERICALRESPONSE: NO HURT

## 2024-07-17 ASSESSMENT — PAIN DESCRIPTION - DESCRIPTORS: DESCRIPTORS: ACHING

## 2024-07-17 NOTE — ANESTHESIA PROCEDURE NOTES
Central Venous Line:    Date/Time: 7/17/2024 3:50 AM    A central venous line was placed in the OR for the following indication(s): central venous access and CVP monitoring.  Staffing  Performed: ELIU   Authorized by: Jesus Shirley MD    Performed by: RHEA Huber    Sterility preparation included the following: provider hand hygiene performed prior to central venous catheter insertion, all 5 sterile barriers used (gloves, gown, cap, mask, large sterile drape) during central venous catheter insertion, antiseptic used during central venous catheter insertion and skin prep agent completely dried prior to procedure.  The patient was placed in Trendelenburg position.    Right internal jugular vein was prepped.    The site was prepped with Chlorhexidine.  Size: 9 Fr   Length: 11.5  Catheter type: introducer   Number of Lumens: double lumen      During the procedure, the following specific steps were taken: target vein identified, needle advanced into vein and blood aspirated and guidewire advanced into vein.  Seldinger technique used.  Procedure performed using ultrasound guidance.  Sterile gel and probe cover used in ultrasound-guided central venous catheter insertion.    Intravenous verification was obtained by ultrasound.      Post insertion care included: all ports aspirated, all ports flushed easily, guidewire removed intact, Biopatch applied, line sutured in place and dressing applied.    During the procedure the patient experienced: patient tolerated procedure well with no complications.           images stored in chart

## 2024-07-17 NOTE — PROGRESS NOTES
INTERNAL MEDICINE PROGRESS NOTE      HPI:    Patient has had no chest pain.  CABG today.    Vital signs in last 24 hours:  Temp:  [36.7 °C (98.1 °F)-37.9 °C (100.2 °F)] 37.1 °C (98.7 °F)  Heart Rate:  [65-88] 80  Resp:  [15-19] 15  BP: (109-149)/(50-64) 144/64    Physical Examination:  Physical Exam    Constitutional:       Appearance: Elderly, well-built, in no distress.  HENT:      Head: Normocephalic and atraumatic.   Eyes:      Extraocular Movements: Extraocular movements intact.      Pupils: Pupils are equal, round, and reactive to light.   Cardiovascular:      Rate and Rhythm: Normal rate and regular rhythm.      Pulses: Normal pulses.      Heart sounds: Normal heart sounds.   Pulmonary:      Effort: Pulmonary effort is normal.      Breath sounds: Normal breath sounds.   Abdominal:      General: Abdomen is flat. Bowel sounds are normal.      Palpations: Abdomen is soft.   Musculoskeletal:         General: Normal range of motion.      Cervical back: Normal range of motion and neck supple.   Skin:     General: Skin is warm and dry.   Neurological:      General: No focal deficit present.      Mental Status: Alert and oriented to person, place, and time. Mental status is at baseline.         Medications:    Current Facility-Administered Medications:     acetaminophen (Tylenol) tablet 650 mg, 650 mg, oral, q4h PRN, 650 mg at 07/15/24 0110 **OR** acetaminophen (Tylenol) oral liquid 650 mg, 650 mg, oral, q4h PRN **OR** acetaminophen (Tylenol) suppository 650 mg, 650 mg, rectal, q4h PRN, RACHELLE Winston    amLODIPine (Norvasc) tablet 10 mg, 10 mg, oral, Daily, RACHELLE Winston, 10 mg at 07/17/24 0847    aspirin EC tablet 81 mg, 81 mg, oral, Daily, RACHELLE Galaviz, 81 mg at 07/17/24 0847    atorvastatin (Lipitor) tablet 80 mg, 80 mg, oral, Daily, RACHELLE Galaviz, 80 mg at 07/17/24 0848    carvedilol (Coreg) tablet 25 mg, 25 mg, oral, BID, Tamy SMITH  ALEXIS Santos-CNP, 25 mg at 07/17/24 0847    dextrose 1.5% - LOW calcium 2.5mEq/L 2,000 mL peritoneal dialysate, , intraperitoneal, 4x daily, Gab Lynn DO, Given at 07/17/24 0948    EPINEPHrine (Adrenalin) 4 mg in dextrose 5% 250 mL (0.016 mg/mL) infusion, 0-0.2 mcg/kg/min, intravenous, Continuous, Jesus Shirley MD    epoetin shawn-epbx (Retacrit) injection 8,000 Units, 150 Units/kg, subcutaneous, Every Sunday, Gab Lynn DO, 8,000 Units at 07/14/24 1013    hydrALAZINE (Apresoline) injection 10 mg, 10 mg, intravenous, q6h PRN, ALEXIS Galaviz-CNP    hydrALAZINE (Apresoline) tablet 25 mg, 25 mg, oral, BID, ALEXIS Galaviz-CNP, 25 mg at 07/17/24 0848    insulin regular 100 unit/100 mL (1 unit/mL) in 0.9 % NaCl infusion, 0-15 Units/hr, intravenous, Continuous, Jesus Shirley MD    isosorbide mononitrate ER (Imdur) 24 hr tablet 30 mg, 30 mg, oral, Daily, ALEXIS Galaviz-CNP, 30 mg at 07/17/24 0848    [Held by provider] losartan (Cozaar) tablet 100 mg, 100 mg, oral, Daily, ALEXIS Winston-CNP, 100 mg at 07/10/24 0832    norepinephrine (Levophed) 8 mg in dextrose 5% 250 mL (0.032 mg/mL) infusion (premix), 0-0.2 mcg/kg/min, intravenous, Continuous, Jesus Shirley MD    phenyleph-min oil-petrolatum (Preparation H) 0.25-14-74.9 % rectal ointment, , rectal, 4x daily PRN, Jacob Staples MD, Given at 07/15/24 1618    phenylephrine (Wilber-Synephrine) 10 mg in sodium chloride 0.9% 250 mL (0.04 mg/mL) infusion (premix), 0-2 mcg/kg/min, intravenous, Continuous, Jesus Shirley MD    [Held by provider] polyethylene glycol (Glycolax, Miralax) packet 17 g, 17 g, oral, Daily, Tamiko Eduardo, APRN-CNP, 17 g at 07/07/24 0833    potassium chloride CR (Klor-Con M20) ER tablet 20 mEq, 20 mEq, oral, q2h, Jacob Garrison MD, 20 mEq at 07/17/24 0900    tranexamic acid in NS 5,000 mg/250 mL IV 5,000 mg, 5,000 mg, intravenous, Once, Jesus Shirley MD    Laboratory Findings:  Lab Results   Component Value  "Date    WBC 5.4 07/17/2024    HGB 8.2 (L) 07/17/2024    HCT 25.9 (L) 07/17/2024    MCV 83 07/17/2024     07/17/2024     No results found for: \"INR\", \"PROTIME\"  Lab Results   Component Value Date    GLUCOSE 73 (L) 07/17/2024    CALCIUM 8.0 (L) 07/17/2024     07/17/2024    K 3.2 (L) 07/17/2024    CO2 27 07/17/2024     07/17/2024    BUN 16 07/17/2024    CREATININE 7.43 (H) 07/17/2024       Assessment and Plan:     Non-ST elevation MI -CABG planned for today  ESRD -continue with peritoneal dialysis  Hypertension -better blood pressure control on current regimen.       Jacob Staples MD  07/17/24  11:11 AM         "

## 2024-07-17 NOTE — ANESTHESIA PROCEDURE NOTES
Arterial Line:    Date/Time: 7/17/2024 1:36 PM    Staffing  Performed: ELIU   Authorized by: Jesus Shirley MD    Performed by: RHEA Huber    An arterial line was placed. Procedure performed using ultrasound guidance.in the OR for the following indication(s): continuous blood pressure monitoring and blood sampling needed.    A 20 gauge (size), 12 cm (length), Arrow (type) catheter was placed into the Left brachial artery, secured by Tegaderm,   Seldinger technique used.  Events:  patient tolerated procedure well with no complications.             c/w dorzolemide-timolol eye drop BID  on cyclosporine eye 0.05% qd for dry eye (pharmacy doesn't carry)

## 2024-07-17 NOTE — ANESTHESIA PROCEDURE NOTES
Airway  Date/Time: 7/17/2024 1:34 PM  Urgency: elective    Airway not difficult    Staffing  Performed: RHEA   Authorized by: Jesus Shirley MD    Performed by: RHEA Huber  Patient location during procedure: OR    Indications and Patient Condition  Indications for airway management: anesthesia  Spontaneous ventilation: present  Sedation level: deep  Preoxygenated: yes  Patient position: sniffing  Mask difficulty assessment: 1 - vent by mask    Final Airway Details  Final airway type: endotracheal airway      Successful airway: ETT - double lumen left  Cuffed: yes   Successful intubation technique: direct laryngoscopy  Facilitating devices/methods: intubating stylet and cricoid pressure  Endotracheal tube insertion site: oral  Blade: Godfrey  Blade size: #3  ETT DL size (fr): 37  Cormack-Lehane Classification: grade I - full view of glottis  Placement verified by: chest auscultation, bronchoscopy, capnometry and single lung ventilation   Number of attempts at approach: 1

## 2024-07-17 NOTE — PROGRESS NOTES
.CTICU History and Physical    Subjective   HPI:  Isis Geronimo is a 79 y.o. female with PMH of ESRD on PD, Anemia, RA, Emphysema Current smoker,  Vertigo, HTN, HLD. She came to ED w/complaints of n/v, and abdominal pain, constipation, in ED work up significant for elevated  troponin which [place] at [address]. TTE showed an LVEF of 60% with LV diastolic dysfunction, moderate concentric LVH and aortic valve sclerosis, Today she underwent LHC today with Dr Juárez, which showed Severe MVD   Case reviewed at [facility] plan for CABG x1 midcab off pump with Dr Cristina Clifton    She is admitted to the ICU s/p Left Anterior Thoracatomy  Off Pump MIDCAB (LIMA to LAD)    OR course:   CPB: n/a , XC:  n/a  ECHO:  LV function: EF 60%  Chest Tubes/Drains: 1 left pleural  Temporary wires: none  Airway: Easy intubation     Fluids:   Crystalloid: 2000 ml     Cellsaver:  130ml  Product: 1 unit PRBC  EBL: 250ml  UOP: 75ml    Infusions: Norepinephrine and Propofol      Past Medical History   Past Medical History:   Diagnosis Date    Arthritis     CAD (coronary artery disease)     COPD (chronic obstructive pulmonary disease) (Multi)     ESRD (end stage renal disease) (Multi)     on peritoneal dialysis    Hypertension     Non-sustained ventricular tachycardia (Multi)     Rheumatoid arthritis (Multi)         Surgical History  Past Surgical History:   Procedure Laterality Date    CARDIAC CATHETERIZATION N/A 07/08/2024    Procedure: Left Heart Cath with Coronary Angiography and LV;  Surgeon: Nicola Juárez MD;  Location: [facility] Card[facility];  Service: Cardiovascular;  Laterality: N/A;    COLONOSCOPY      DILATION AND CURETTAGE OF UTERUS      ESOPHAGOGASTRODUODENOSCOPY      PERITONEAL CATHETER INSERTION  05/23/2022        Social History   reports that she has been smoking cigarettes. She has been exposed to tobacco smoke. She does not have any smokeless tobacco history on file. She reports that she does not currently use alcohol. She reports that she  does not use drugs.    Family History   No family history on file.    Allergies  Chlorothiazide, Metoprolol, Ibuprofen, and Penicillins     Home Meds  Medications Prior to Admission   Medication Sig Dispense Refill Last Dose    amLODIPine (Norvasc) 10 mg tablet Take by mouth once daily.   Unknown    atorvastatin (Lipitor) 20 mg tablet Take 1 tablet (20 mg) by mouth once daily.       B complex-vitamin C-folic acid (Nephro-Batsheva) 0.8 mg tablet Take 1 tablet by mouth once daily.   Unknown    losartan (Cozaar) 50 mg tablet Take 2 tablets (100 mg) by mouth once daily.   Unknown       Review of Systems   Unable to perform ROS: Intubated       Scheduled Medications:   acetaminophen, 650 mg, oral, q6h  aspirin, 81 mg, oral, Daily  atorvastatin, 80 mg, oral, Daily  [START ON 7/18/2024] ceFAZolin, 2 g, intravenous, q24h  dextrose 1.5% - LOW calcium 2.5mEq/L 2,000 mL peritoneal dialysate, , intraperitoneal, 4x daily  epoetin shawn or biosimilar, 150 Units/kg, subcutaneous, Every Sunday  insulin lispro, 0-15 Units, subcutaneous, q4h  lidocaine, 1 patch, transdermal, q24h  oxygen, , inhalation, Continuous - Inhalation  [START ON 7/18/2024] pantoprazole, 40 mg, oral, Daily before breakfast   Or  [START ON 7/18/2024] pantoprazole, 40 mg, intravenous, Daily before breakfast  polyethylene glycol, 17 g, oral, Daily  sennosides-docusate sodium, 2 tablet, oral, BID       Continuous Medications:   EPINEPHrine, 0-0.2 mcg/kg/min, Last Rate: Stopped (07/17/24 1541)  lactated Ringer's, 20 mL/hr, Last Rate: 20 mL/hr (07/17/24 1630)  norepinephrine, 0-0.2 mcg/kg/min, Last Rate: 0.03 mcg/kg/min (07/17/24 1618)  propofol, 0-50 mcg/kg/min, Last Rate: 25 mcg/kg/min (07/17/24 1630)         PRN Medications:   PRN medications: alteplase, dextrose **OR** glucagon, HYDROmorphone, naloxone, ondansetron ODT **OR** ondansetron, oxyCODONE, oxyCODONE, oxygen, phenyleph-min oil-petrolatum, prochlorperazine **OR** prochlorperazine **OR**  prochlorperazine    Objective   Vitals:  Most Recent:  Vitals:    07/17/24 1645   BP: 143/60   Pulse: 71   Resp: 16   Temp: 34.9 °C (94.8 °F)   SpO2: 100%       24hr Min/Max:  Temp  Min: 34.9 °C (94.8 °F)  Max: 37.9 °C (100.2 °F)  Pulse  Min: 68  Max: 88  BP  Min: 136/51  Max: 149/55  Resp  Min: 15  Max: 19  SpO2  Min: 98 %  Max: 100 %    LDA:   CVC 07/17/24 Double lumen Right Internal jugular (Active)   Placement Date/Time: 07/17/24 (c) 0350   Hand Hygiene Performed Prior to CVC Insertion: Yes  Site Prep: Chlorhexidine   Site Prep Agent has Completely Dried Before Insertion: Yes  All 5 Sterile Barriers Used (Gloves, Gown, Cap, Mask, Large Sterile Maryam...   Number of days: 0       Arterial Line 07/17/24 Left Brachial (Active)   Placement Date/Time: 07/17/24 (c) 1336   Size: 20 G  Orientation: Left  Location: Brachial  Securement Method: Transparent dressing  Patient Tolerance: Tolerated well   Number of days: 0       ETT  37 Fr (Active)   Placement Date/Time: 07/17/24 (c) 1334   Mask Ventilation: Vent by mask  Technique: Direct laryngoscopy  ETT Type: ETT - double lumen left  Double Lumen Tube Size: 37 Fr  Cuffed: Yes  Laryngoscope: Godfrey  Blade Size: 3  Location: Oral  Grade View:...   Number of days: 0       Urethral Catheter Temperature probe (Active)   Placement Date: 07/17/24   Placed by: AN  Hand Hygiene Completed: Yes  Catheter Type: Temperature probe  Catheter Balloon Size: 10 mL  Urine Returned: Yes   Number of days: 0         Vent settings:  Vent Mode: Assist control/Volume control plus  FiO2 (%):  [50 %] 50 %  S RR:  [16] 16  S VT:  [440 mL] 440 mL  PEEP/CPAP (cm H2O):  [5 cm H20] 5 cm H20  MAP (cm H2O):  [8.1] 8.1    Hemodynamic parameters for last 24 hours:  CVP:  [8 mmHg] 8 mmHg    I/O:  I/O last 2 completed shifts:  In: - (0 mL/kg)   Out: 2900 (54.4 mL/kg) [Urine:500 (0.4 mL/kg/hr); Other:2400]  Weight: 53.3 kg     Physical Exam  Vitals reviewed.   Constitutional:       Comments: Sedated post op  anesthesia    HENT:      Mouth/Throat:      Mouth: Mucous membranes are moist.   Cardiovascular:      Rate and Rhythm: Normal rate and regular rhythm.      Pulses: Normal pulses.      Comments: Left breast thoracotomy incision dressing c/d/i  Pulmonary:      Comments: Intubated  Equal chest rise  Left pleural chest tube minimal drainage w/o air leak or crepitus   Abdominal:      General: Abdomen is flat.      Palpations: Abdomen is soft.      Comments: PD cath capped- drained prior to OR   Genitourinary:     Comments: Indwelling weiner  Skin:     General: Skin is warm and dry.   Neurological:      Comments: marco         Lab/Radiology/Diagnostic Review:  CMP:  Results from last 7 days   Lab Units 07/17/24  0535 07/16/24  0459 07/13/24  0542 07/12/24  0738 07/11/24  0525   SODIUM mmol/L 138 139 139 139 138   POTASSIUM mmol/L 3.2* 3.0* 3.5 3.7 3.7   CHLORIDE mmol/L 102 101 102 102 101   CO2 mmol/L 27 29 27 28 28   ANION GAP mmol/L 12 12 14 13 13   BUN mg/dL 16 17 24* 24* 25*   CREATININE mg/dL 7.43* 6.98* 7.70* 7.50* 7.04*   EGFR mL/min/1.73m*2 5* 6* 5* 5* 6*   ALBUMIN g/dL 2.3* 2.3*  --   --   --      CBC:  Results from last 7 days   Lab Units 07/17/24  0535 07/16/24  0459 07/15/24  0534 07/14/24  0542 07/13/24  0542 07/12/24  0738 07/11/24  0525   WBC AUTO x10*3/uL 5.4 5.5 6.3 5.3 5.5 5.0 5.4   HEMOGLOBIN g/dL 8.2* 8.6* 8.7* 8.6* 8.2* 8.7* 8.3*   HEMATOCRIT % 25.9* 26.5* 27.0* 26.3* 25.2* 27.6* 25.8*   PLATELETS AUTO x10*3/uL 243 251 289 258 257 248 217   MCV fL 83 84 83 82 83 85 83     COAG:     HEME/ENDO:  Results from last 7 days   Lab Units 07/11/24  0525   TSH mIU/L 1.97   HEMOGLOBIN A1C % 5.2      CARDIAC:        XR chest 1 view    Result Date: 7/17/2024  Interpreted By:  Gennaro Koehler, STUDY: XR CHEST 1 VIEW; 7/17/2024 4:38 pm   INDICATION: Signs/Symptoms:Post op cardiac surgery   COMPARISON: Preoperative radiographs 07/11/2024   ACCESSION NUMBER(S): [id number]   ORDERING CLINICIAN: VENKAT LYLE    TECHNIQUE: Single frontal view of the chest performed.   FINDINGS: LINES AND DEVICES: Tip of endotracheal tube approximately 3.8 cm above the lovely. Tip of left chest tube in the left lung apex. Tip of right IJ CVC in the inferior SVC.   LUNGS: No focal consolidation, pulmonary edema, pleural effusion or significant pneumothorax.   CARDIOMEDIASTINAL SILHOUETTE: The cardiomediastinal silhouette is within normal limits. Status post recent CABG through anterior thoracotomy.       No significant pneumothorax.   Tip of endotracheal tube 3.8 cm above the lovely.   MACRO None   Signed by: Gennaro Koehler 7/17/2024 4:53 PM Dictation workstation:   [id number]    Anesthesia Intraoperative Transesophageal Echocardiogram    Result Date: 7/17/2024  [facility] - Dept of Anesthesiology   [address] [place] [address]     Tel [phone] and Fax [phone] TRANSESOPHAGEAL ECHOCARDIOGRAM REPORT  Patient Name:      REJI Kong Physician: [id number] Saroj Rojas MD Study Date:        7/17/2024            Ordering Provider: [id number] SAROJ ROJAS MRN/PID:           [id number]             Fellow: Accession#:        [id number]         Nurse: Date of Birth/Age: 1945 / 79 years Sonographer: Gender:            F                    Additional Staff: BSA / BMI:         m2 / kg/m2           Encounter#:        [id number] Study Type:    ANESTHESIA INTRAOPERATIVE YURIY Diagnosis/ICD: Atherosclerotic heart disease of native coronary artery with                unstable angina pectoris-I25.110 Indication:    Intraop CABG CPT Code:      YURIY Complete-34911; Doppler Limited-19638; Color Doppler-02552 PHYSICIAN INTERPRETATION: Left Ventricle: The left ventricular systolic function is normal, with a visually estimated ejection fraction of 60-65%. The left ventricular cavity size is normal. Left ventricular diastolic filling was not assessed. Left Atrium: The left atrium is normal in size. There is no evidence of a patent  foramen ovale. There is no thrombus visualized in the left atrial appendage. Right Ventricle: The right ventricle is normal in size. There is normal right ventricular global systolic function. Right Atrium: The right atrium is normal in size. Aortic Valve: The aortic valve appears structurally normal. The aortic valve appears tricuspid and non-restricted. There is moderate aortic valve cusp calcification. There is no evidence of aortic valve regurgitation. Mitral Valve: The mitral valve is normal in structure. There is trace mitral valve regurgitation. Tricuspid Valve: The tricuspid valve is structurally normal. There is trace tricuspid regurgitation. Pulmonic Valve: The pulmonic valve is not well visualized. There is trace pulmonic valve regurgitation. Pericardium: There is a trivial pericardial effusion. Aorta: The aortic root is normal. Severe atheromas seen throughout ascending, transverse and descending aorta. Multiple small mobile atheromas seen in ascending aorta. In comparison to the previous echocardiogram(s): Not performed on intraoperative study.  CONCLUSIONS:  1. The left ventricular systolic function is normal, with a visually estimated ejection fraction of 60-65%.  2. There is normal right ventricular global systolic function.  3. There is moderate aortic valve cusp calcification.  4. There is no evidence of a patent foramen ovale. POST CARDIOPULMONARY BYPASS REPORT: Patient has a normal sinus rhythm. Patient is on an epinephrine drip. Global LV function is normal. Global RV function is normal. All transesophageal echocardiogram findings discussed with surgeon. Off pump CABG performed.  QUANTITATIVE DATA SUMMARY: LV SYSTOLIC FUNCTION BY 2D PLANIMETRY (MOD):                      Normal Ranges: EF-Visual:      63 % LV EF Reported: 63 %  [id number] Jesus Shirley MD Electronically signed on 7/17/2024 at 4:23:34 PM  ** Final **      This patient has a central line   Reason for the central line remaining today?  Hemodynamic monitoring and Parenteral medication    This patient has a urinary catheter   Reason for the urinary catheter remaining today? critically ill patient who need accurate urinary output measurements    This patient is intubated   Reason for patient to remain intubated today? they are planned for extubation trial later today/tonight             Past Cardiac Tests:  EKG:  ECG 12 lead 07/07/2024      Electrocardiogram, 12-lead PRN ACS symptoms 07/06/2024    Echo:  Transthoracic Echo (TTE) Complete 07/08/2024    Ejection Fractions:  EF   Date/Time Value Ref Range Status   07/17/2024 06:52 AM 63 %    07/08/2024 11:24 AM 61 %      Cath:  Cardiac Catheterization Procedure 07/08/2024    Stress Test:  No results found for this or any previous visit from the past 1095 days.    Cardiac Imaging:  No results found for this or any previous visit from the past 1095 days.      Assessment /Plan      NEURO: Patient is intubated and sedated on propofol infusion. Expected acute postop pain  - Serial neuro and pain assessments   - wean sedation for extubation once hemodynamically stable  - Prn dilaudid for pain  - Scheduled Tylenol   - Lidoderm patches  - PT/OT Consult, OOB to chair once extubated, MITT precautions     CV:  HLD,  CAD with MVD S/p Off Pump MIDCAB (LIMA to LAD)  HTN base line -160's/60-70's  LV function EF normal pre and post, no EP wires  - Maintain MAP goal MAP    - Levophed and Propofol  - maintain & monitor CTs, CT to wall sxn @ -20cm  - asa/ statin , BB when hemodynamics permit  - Resume home antihypertensives as HD permits    PULM:  Currently intubated on ventilator  #Current tobacco use  # Emphysema   Postop CXR negative for acute process    - Begin CPAP trials and extubate when criteria met  - Wean FiO2 maintaining SpO2 >92%.   - ABGs PRN  - Acapella, IS once extubated  - Smoking cessation    :   #ESRD on PD  - Resume PD tomorrow according to nephrology   - Continue weiner catheter for strict  I/Os.    - Check renal function panel and replete electrolytes as needed.  - Maintain a potassium > 4, magnesium > 2.    GI:    #GERD  - NPO  - Swallow eval prior to PO  - PPI  - Bowel regimen- senokot and miralax  - Zofran PRN     HEME:  Acute on chronic post op blood loss anemia     - Monitor drain output volume and characteristics  - CBC, coags, and fibrinogen as clinically indicated  - Start 81mg ASA  - SCDs for DVT prophylaxis     - start heparin tomorrow     ENDO:  No history of DM  - Maintain BG <180, insulin SSI per cardiac surgery protocol.    ID:  Afebrile, no current indications of infection. MRSA negative.  - Renal dosed Ancef 2g q 24 hours x 3 doses      Dispo: Admit to CTICU    Lines:  RIJ MAC with PAC  L brachial a line   Indwelling weiner      This critically ill patient continues to be at-risk for clinically significant deterioration / failure due to the above mentioned dysfunctional, unstable organ systems.  I have personally identified and managed all complex critical care issues with efforts to prevent aforementioned clinical deterioration.  Critical care time is spent at bedside and/or the immediate area and has included, but is not limited to, the review of diagnostic tests, labs, radiographs, serial assessments of hemodynamics, respiratory status, ventilatory management, and family updates.  Time spent in procedures and teaching are reported separately.    Time 70 minutes    ALEXIS Mae-CNP

## 2024-07-17 NOTE — BRIEF OP NOTE
Date: 2024 - 2024  OR Location: [facility] OR    Name: Isis Geronimo, : 1945, Age: 79 y.o., MRN: [id number], Sex: female    Diagnosis  Pre-op Diagnosis      * Other disorders of arteries, arterioles and capillaries in diseases classified elsewhere (CMS-[facility]) [I79.8] Post-op Diagnosis     * Other disorders of arteries, arterioles and capillaries in diseases classified elsewhere (CMS-[facility]) [I79.8]     Procedures  Off Pump Coronary Artery Bypass Graft x 1; LIMA; LAD  68271 - MI CABG W/ARTERIAL GRAFT SINGLE ARTERIAL GRAFT  Left Anterior Thoracatomy   Off Pump MIDCAB (LIMA to LAD)    Chest Tubes/Drains: 1 left pleural austin drain        Temporary Pacing Wires: none     -Settings:   -Underlying Rhythm:    Permanent pacer/ICD: No   -Preoperative settings:    -Intra-op/ Postoperative settings:    Sternotomy performed by: n/a    Conduit Harvested by: n/a    Sternal Wires placed by: n/a    Arm/Leg/Groin Closure/Cutdown performed by: Iris/Mark Anthony    Cardio Pulmonary Bypass Time: n/a (Off pump)  Cross-clamp Time: n/a  Circulatory Arrest: No Time:     Is patient candidate for Emergency Re-sternotomy? No   -If yes, POD #10 is -       Surgeons      * Lg Clifton - Primary    Resident/Fellow/Other Assistant:  Surgeons and Role:     * Robb Burks MD - Assisting  Mark Anthony Simmons  Procedure Summary  Anesthesia: General  ASA: IV  Anesthesia Staff: Anesthesiologist: Jesus Shirley MD  C-AA: RHEA Huber; RHEA Curtis  Perfusionist: Pancho Wilson  ELIU: Ian Victoria  Estimated Blood Loss: 250mL  Intra-op Medications:   Administrations occurring from 1400 to 1930 on 24:   Medication Name Total Dose   dextrose 1.5% - LOW calcium 2.5mEq/L 2,000 mL peritoneal dialysate Cannot be calculated   EPINEPHrine (Adrenalin) 4 mg in dextrose 5% 250 mL (0.016 mg/mL) infusion 0.55 mg   norepinephrine (Levophed) 8 mg in dextrose 5% 250 mL (0.032 mg/mL) infusion (premix) 1.89 mg   amLODIPine (Norvasc) tablet 10 mg Cannot be  calculated   carvedilol (Coreg) tablet 25 mg Cannot be calculated   hydrALAZINE (Apresoline) injection 10 mg Cannot be calculated   hydrALAZINE (Apresoline) tablet 25 mg Cannot be calculated   isosorbide mononitrate ER (Imdur) 24 hr tablet 30 mg Cannot be calculated   losartan (Cozaar) tablet 100 mg Cannot be calculated   polyethylene glycol (Glycolax, Miralax) packet 17 g Cannot be calculated              Anesthesia Record               Intraprocedure I/O Totals          Intake    PRBC 350.00 mL    Norepinephrine Drip 0.00 mL    The total shown is the total volume documented since Anesthesia Start was filed.    Epinephrine Drip 0.00 mL    The total shown is the total volume documented since Anesthesia Start was filed.    Total Intake 350 mL          Specimen: No specimens collected     Staff:   Circulator: Amy Fajardo Person: Dudley  Circulator: Patsy          Findings: CAD    Complications:  None; patient tolerated the procedure well.     Disposition: ICU - intubated and hemodynamically stable.  Condition: stable  Specimens Collected: No specimens collected  Attending Attestation: I was present and scrubbed for the key portions of the procedure.    Lg Clifton  Phone Number: [phone]

## 2024-07-17 NOTE — PROCEDURES
Seen on dialysis.  She will get potassium chloride today, we will do 1 exchange now and then drain her dry for the heart procedure at 1 PM today.  She is receiving erythropoietin but I will give her another dose of intravenous iron.  She will also get 40 mill equivalents of potassium chloride before the procedure.

## 2024-07-17 NOTE — CARE PLAN
The patient's goals for the shift include Pt. will have a safe, restful and uneventful evening    The clinical goals for the shift include Pt. will have a decrease in bowel movements this shift      Problem: Fall/Injury  Goal: Not fall by end of shift  Outcome: Progressing  Goal: Be free from injury by end of the shift  Outcome: Progressing  Goal: Use assistive devices by end of the shift  Outcome: Progressing  Goal: Pace activities to prevent fatigue by end of the shift  Outcome: Progressing     Problem: Nutrition  Goal: Less than 5 days NPO/clear liquids  Outcome: Progressing  Goal: Oral intake greater than 50%  Outcome: Progressing  Goal: Oral intake greater 75%  Outcome: Progressing  Goal: Consume prescribed supplement  Outcome: Progressing  Goal: Adequate PO fluid intake  Outcome: Progressing  Goal: Nutrition support goals are met within 48 hrs  Outcome: Progressing  Goal: Nutrition support is meeting 75% of nutrient needs  Outcome: Progressing  Goal: Tube feed tolerance  Outcome: Progressing  Goal: BG  mg/dL  Outcome: Progressing  Goal: Lab values WNL  Outcome: Progressing  Goal: Electrolytes WNL  Outcome: Progressing  Goal: Promote healing  Outcome: Progressing  Goal: Maintain stable weight  Outcome: Progressing  Goal: Reduce weight from edema/fluid  Outcome: Progressing  Goal: Gradual weight gain  Outcome: Progressing  Goal: Improve ostomy output  Outcome: Progressing

## 2024-07-17 NOTE — ANESTHESIA POSTPROCEDURE EVALUATION
Patient: Isis Geronimo    Procedure Summary       Date: 07/17/24 Room / Location: [facility] / [facility]    Anesthesia Start: 1253 Anesthesia Stop: 1629    Procedure: Off Pump Coronary Artery Bypass Graft x 1; LIMA; LAD (Chest) Diagnosis:       Other disorders of arteries, arterioles and capillaries in diseases classified elsewhere (CMS-H[facility])      (Other disorders of arteries, arterioles and capillaries in diseases classified elsewhere (CMS-HCC) [I79.8])    Surgeons: Lg Clifton MD Responsible Provider: Jesus Shirley MD    Anesthesia Type: general ASA Status: 4            Anesthesia Type: general    Vitals Value Taken Time   /60 07/17/24 1645   Temp 34.9 °C (94.8 °F) 07/17/24 1645   Pulse 74 07/17/24 1702   Resp 16 07/17/24 1702   SpO2 100 % 07/17/24 1645   Vitals shown include unfiled device data.    Anesthesia Post Evaluation    Patient location during evaluation: ICU  Patient participation: complete - patient cannot participate  Level of consciousness: sedated  Pain management: adequate  Airway patency: patent  Cardiovascular status: acceptable and hemodynamically stable  Respiratory status: ETT, intubated, ventilator and acceptable  Hydration status: acceptable  Postoperative Nausea and Vomiting: none  Comments: Transported to ICU with monitors, ETT, hand bag ventilated, sedated with propofol.  Handoff given to ICU team. Stable at transfer.        No notable events documented.

## 2024-07-17 NOTE — OP NOTE
Off Pump Coronary Artery Bypass Graft x 1; LIMA; LAD Operative Note     Date: 2024  OR Location: [facility] OR    Name: Isis Geronimo, : 1945, Age: 79 y.o., MRN: [id number], Sex: female    Diagnosis  Pre-op Diagnosis      * Other disorders of arteries, arterioles and capillaries in diseases classified elsewhere (CMS-[facility]) [I79.8] Post-op Diagnosis     * Other disorders of arteries, arterioles and capillaries in diseases classified elsewhere (CMS-[facility]) [I79.8]     Procedures  Off Pump Coronary Artery Bypass Graft x 1; LIMA; LAD  86430 - ND CABG W/ARTERIAL GRAFT SINGLE ARTERIAL GRAFT  1.  Off-pump MIDCAB with a pedicle LIMA to LAD    Surgeons      * Lg Clifton - Primary    Resident/Fellow/Other Assistant:  Surgeons and Role:     * Robb Burks MD - Assisting  Yemi Simmons and Ping Hopson  No resident available   Procedure Summary  Anesthesia: General  ASA: IV  Anesthesia Staff: Anesthesiologist: Jesus Shirley MD  C-AA: RHEA Huber; RHEA Curtis  Perfusionist: Pancho Wilson  ELIU: Ian Victoria  Estimated Blood Loss: 50 mL  Intra-op Medications:   Administrations occurring from 1400 to 1930 on 24:   Medication Name Total Dose   dextrose 1.5% - LOW calcium 2.5mEq/L 2,000 mL peritoneal dialysate Cannot be calculated   EPINEPHrine (Adrenalin) 4 mg in dextrose 5% 250 mL (0.016 mg/mL) infusion 0.06 mg   norepinephrine (Levophed) 8 mg in dextrose 5% 250 mL (0.032 mg/mL) infusion (premix) 0.78 mg   amLODIPine (Norvasc) tablet 10 mg Cannot be calculated   carvedilol (Coreg) tablet 25 mg Cannot be calculated   hydrALAZINE (Apresoline) injection 10 mg Cannot be calculated   hydrALAZINE (Apresoline) tablet 25 mg Cannot be calculated   isosorbide mononitrate ER (Imdur) 24 hr tablet 30 mg Cannot be calculated   losartan (Cozaar) tablet 100 mg Cannot be calculated   polyethylene glycol (Glycolax, Miralax) packet 17 g Cannot be calculated              Anesthesia Record               Intraprocedure  I/O Totals          Intake    PRBC 350.00 mL    Norepinephrine Drip 0.00 mL    The total shown is the total volume documented since Anesthesia Start was filed.    Epinephrine Drip 0.00 mL    The total shown is the total volume documented since Anesthesia Start was filed.    Cell Saver 130 mL    Total Intake 480 mL       Output    Urine 75 mL    Est. Blood Loss 250 mL    Total Output 325 mL       Net    Net Volume 155 mL          Specimen: No specimens collected     Staff:   Circulator: Amy  Scrub Person: Dudley  Circulator: Patsy         Drains and/or Catheters:   Chest Tube 1 Mediastinal (Active)   Output (mL) 10 mL 07/17/24 1618       NG/OG/Feeding Tube NG - [place] sump Center mouth (Active)       Urethral Catheter Temperature probe (Active)   Output (mL) 15 mL 07/17/24 1618       Tourniquet Times:         Implants:     Findings:   1.There were no pericardial adhesions or effusions.   2.  Porcelain ascending aorta .   3. The heart was normal sinus rhythm and demonstrated normal left ventricular function.    4.The patient had severe diffuse coronary artery disease, however we were able to find locations on the LAD that was suitable for grafting.  5.  The conduits were suitable for grafting.  6.  The flow of the graft after the protamine were acceptable.     Indications: Isis Geronimo is an 79 y.o. female who is having surgery for Other disorders of arteries, arterioles and capillaries in diseases classified elsewhere (CMS-HCC) [I79.8]. Isis Geronimo is a 79 y.o. female with PMH of ESRD on PD, Anemia, RA, Emphysema Current smoker, Vertigo, HTN, HLD. She came to ED w/complaints of n/v, and abdominal pain, constipation, in ED work up significant for elevated troponin which peaked at [address]. TTE showed an LVEF of 60% with LV diastolic dysfunction, moderate concentric LVH and aortic valve sclerosis, the left heart cath showed multivessel disease.  The patient have a extremely large LAD with a very small RCA  ungraftable.  Because of the risk we discussed the patient in our Chip meeting, where we decided the best approach will be mini invasive LIMA to LAD with subsequent if needed stenting of the left main into the circumflex.  The surgery was scheduled for today.  We extensively discussed with the patient and the family all the alternative including medical treatment for the sternotomy mini invasive approach and stenting and we agree to perform the mini invasive LIMA to LAD and if needed stenting down the road.  Ms. Isis Geronimo Is considered for a MIDCAB, instead of a traditional sternotomy, based on the following reasons:    If needed a Planned hybrid approach with LIMA to LAD followed by PCI. (Cardiologist: Franck)   The patient was seen in the preoperative area. The risks, benefits, complications, treatment options, non-operative alternatives, expected recovery and outcomes were discussed with the patient. The possibilities of reaction to medication, pulmonary aspiration, injury to surrounding structures, bleeding, recurrent infection, the need for additional procedures, failure to diagnose a condition, and creating a complication requiring transfusion or operation were discussed with the patient. The patient concurred with the proposed plan, giving informed consent.  The site of surgery was properly noted/marked if necessary per policy. The patient has been actively warmed in preoperative area. Preoperative antibiotics have been ordered and given within 1 hours of incision. Venous thrombosis prophylaxis have been ordered including chemical prophylaxis    Procedure Details:   The patient was taken to the operating room by anesthesia, placed supine on the operating table, intubated  with a double lumen tube and placed under general anesthesia.  A central line and henry were placed. YURIY was performed which confirmed the preoperative findings of reduced EF without significant valvular disease.  The patient was  positioned with a bump under the left side and was prepped and draped in the usual sterile fashion.  A time out was performed. The patient was placed on single right lung ventilation. A 5cm anterior thoracotomy was made in the 4th intercostal space.  The thoratrack retractor was placed and the mammary was harvested as a small pedicle.  Heparin was administered and the mammary was divided distally.  The long thoratrack blade was exchanged for a standard blade, a wound protector was placed and the retractor placed back in the 4th intercostal space.  The pericardium was opened and the LAD identified.  The stabilizer device was inserted from a sub-xyphoid position and placed around the LAD. The mammary was prepared.  The LAD was opened and a 2.5 mm shunt was placed.  The LIMA was anastomosed using a running 7-0 prolene suture. We used micro Kor nott to tied down the 7-0 Prolene. The bulldog was removed from the mammary and flows were assess with the flow probe.  There was good flow with a low PI.  The stabilizer was removed and the flows were confirmed and noted to still be appropriate.  Protamine was administered.  Hemostasis was meticulously secured. The protamine was given an chest tube was placed through the track where the stabilizer device had been inserted.  The ribs were reapproximated with #5 vicryl sutures.  The fascia was closed with running 0-vicryl suture.  The subcutaneous tissue was closed in layers with the skin closed with a running 4-0 vicryl suture.  A dry sterile dressing was applied.  All instrument, needle and sponge counts were correct at the end of the case.  The patient was extubated and transferred to the ICU in stable condition.    LIMA LAD flow: 50 ml, PI 2,5  Complications:  None; patient tolerated the procedure well.    Disposition: ICU - intubated and hemodynamically stable.  Condition: stable         Additional Details:  Dr Virgil Burks performed the thoracotomy, hemostasis and harvest of  the lima    Attending Attestation: I was present and scrubbed for the entire procedure.    Lg Clifton  Phone Number: [phone]

## 2024-07-17 NOTE — CARE PLAN
The patient's goals for the shift include Pt. will have a safe, restful and uneventful evening    The clinical goals for the shift include Pt. will have a decrease in bowel movements this shift      Problem: Fall/Injury  Goal: Not fall by end of shift  7/17/2024 0351 by Hortencia Saavedra RN  Outcome: Progressing  7/17/2024 0350 by Hortencia Saavedra RN  Outcome: Progressing  Goal: Be free from injury by end of the shift  7/17/2024 0351 by Hortencia Saavedra RN  Outcome: Progressing  7/17/2024 0350 by Hortencia Saavedra RN  Outcome: Progressing  Goal: Use assistive devices by end of the shift  7/17/2024 0351 by Hortencia Saavedra RN  Outcome: Progressing  7/17/2024 0350 by Hortencia Saavedra RN  Outcome: Progressing  Goal: Pace activities to prevent fatigue by end of the shift  7/17/2024 0351 by Hortencia Saavedra RN  Outcome: Progressing  7/17/2024 0350 by Hortencia Saavedra RN  Outcome: Progressing     Problem: Nutrition  Goal: Less than 5 days NPO/clear liquids  7/17/2024 0351 by Hortencia Saavedra RN  Outcome: Progressing  7/17/2024 0350 by Hortencia Saavedra RN  Outcome: Progressing  Goal: Oral intake greater than 50%  7/17/2024 0351 by Hortencia Saavedra RN  Outcome: Progressing  7/17/2024 0350 by Hortencia Saavedra RN  Outcome: Progressing  Goal: Oral intake greater 75%  7/17/2024 0351 by Hortencia Saavedra RN  Outcome: Progressing  7/17/2024 0350 by Hortencia Saavedra RN  Outcome: Progressing  Goal: Consume prescribed supplement  7/17/2024 0351 by Hortencia Saavedra RN  Outcome: Progressing  7/17/2024 0350 by Hortencia Saavedra RN  Outcome: Progressing  Goal: Adequate PO fluid intake  7/17/2024 0351 by Hortencia Saavedra RN  Outcome: Progressing  7/17/2024 0350 by Hortencia Saavedra RN  Outcome: Progressing  Goal: Nutrition support goals are met within 48 hrs  7/17/2024 0351 by Hortencia Saavedra RN  Outcome: Progressing  7/17/2024 0350 by Hortencia Saavedra RN  Outcome: Progressing  Goal: Nutrition support is meeting 75% of nutrient  needs  7/17/2024 0351 by Hortencia Saavedra RN  Outcome: Progressing  7/17/2024 0350 by Hortencia Saavedra RN  Outcome: Progressing  Goal: Tube feed tolerance  7/17/2024 0351 by Hortencia Saavedra RN  Outcome: Progressing  7/17/2024 0350 by Hortencia Saavedra RN  Outcome: Progressing  Goal: BG  mg/dL  7/17/2024 0351 by Hortencia Saavedra RN  Outcome: Progressing  7/17/2024 0350 by Hortencia Saavedra RN  Outcome: Progressing  Goal: Lab values WNL  7/17/2024 0351 by Hortencia Saavedra RN  Outcome: Progressing  7/17/2024 0350 by Hortencia Saavedra RN  Outcome: Progressing  Goal: Electrolytes WNL  7/17/2024 0351 by Hortencia Saavedra RN  Outcome: Progressing  7/17/2024 0350 by Hortencia Saavedra RN  Outcome: Progressing  Goal: Promote healing  7/17/2024 0351 by Hortencia Saavedra RN  Outcome: Progressing  7/17/2024 0350 by Hortencia Saavedra RN  Outcome: Progressing  Goal: Maintain stable weight  7/17/2024 0351 by Hortencia Saavedra RN  Outcome: Progressing  7/17/2024 0350 by Hortencia Saavedra RN  Outcome: Progressing  Goal: Reduce weight from edema/fluid  7/17/2024 0351 by Hortencia Saavedra RN  Outcome: Progressing  7/17/2024 0350 by Hortencia Saavedra RN  Outcome: Progressing  Goal: Gradual weight gain  7/17/2024 0351 by Hortencia Saavedra RN  Outcome: Progressing  7/17/2024 0350 by Hortencia Saavedra RN  Outcome: Progressing  Goal: Improve ostomy output  7/17/2024 0351 by Hortencia Saavedra RN  Outcome: Progressing  7/17/2024 0350 by Hortencia Saavedra RN  Outcome: Progressing     Problem: Pain - Adult  Goal: Verbalizes/displays adequate comfort level or baseline comfort level  Outcome: Progressing     Problem: Safety - Adult  Goal: Free from fall injury  Outcome: Progressing     Problem: Discharge Planning  Goal: Discharge to home or other facility with appropriate resources  Outcome: Progressing     Problem: Chronic Conditions and Co-morbidities  Goal: Patient's chronic conditions and co-morbidity symptoms are monitored and maintained or  improved  Outcome: Progressing

## 2024-07-18 ENCOUNTER — DOCUMENTATION (OUTPATIENT)
Dept: RESEARCH | Age: 79
End: 2024-07-18

## 2024-07-18 ENCOUNTER — APPOINTMENT (OUTPATIENT)
Dept: RADIOLOGY | Facility: HOSPITAL | Age: 79
End: 2024-07-18
Payer: COMMERCIAL

## 2024-07-18 LAB
ALBUMIN SERPL BCP-MCNC: 3.2 G/DL (ref 3.4–5)
ANION GAP BLDA CALCULATED.4IONS-SCNC: 12 MMO/L (ref 10–25)
ANION GAP SERPL CALC-SCNC: 17 MMOL/L (ref 10–20)
BASE EXCESS BLDA CALC-SCNC: 0 MMOL/L (ref -2–3)
BODY TEMPERATURE: 37 DEGREES CELSIUS
BUN SERPL-MCNC: 18 MG/DL (ref 6–23)
CA-I BLD-SCNC: 1.17 MMOL/L (ref 1.1–1.33)
CA-I BLDA-SCNC: 1.15 MMOL/L (ref 1.1–1.33)
CALCIUM SERPL-MCNC: 8.4 MG/DL (ref 8.6–10.3)
CHLORIDE BLDA-SCNC: 102 MMOL/L (ref 98–107)
CHLORIDE SERPL-SCNC: 102 MMOL/L (ref 98–107)
CO2 SERPL-SCNC: 23 MMOL/L (ref 21–32)
CREAT SERPL-MCNC: 7.49 MG/DL (ref 0.5–1.05)
EGFRCR SERPLBLD CKD-EPI 2021: 5 ML/MIN/1.73M*2
ERYTHROCYTE [DISTWIDTH] IN BLOOD BY AUTOMATED COUNT: 15.8 % (ref 11.5–14.5)
GLUCOSE BLD MANUAL STRIP-MCNC: 114 MG/DL (ref 74–99)
GLUCOSE BLD MANUAL STRIP-MCNC: 124 MG/DL (ref 74–99)
GLUCOSE BLD MANUAL STRIP-MCNC: 133 MG/DL (ref 74–99)
GLUCOSE BLD MANUAL STRIP-MCNC: 134 MG/DL (ref 74–99)
GLUCOSE BLD MANUAL STRIP-MCNC: 134 MG/DL (ref 74–99)
GLUCOSE BLD MANUAL STRIP-MCNC: 138 MG/DL (ref 74–99)
GLUCOSE BLD MANUAL STRIP-MCNC: 160 MG/DL (ref 74–99)
GLUCOSE BLD MANUAL STRIP-MCNC: 64 MG/DL (ref 74–99)
GLUCOSE BLDA-MCNC: 144 MG/DL (ref 74–99)
GLUCOSE SERPL-MCNC: 105 MG/DL (ref 74–99)
HCO3 BLDA-SCNC: 24.8 MMOL/L (ref 22–26)
HCT VFR BLD AUTO: 27.9 % (ref 36–46)
HCT VFR BLD EST: 29 % (ref 36–46)
HGB BLD-MCNC: 9 G/DL (ref 12–16)
HGB BLDA-MCNC: 9.7 G/DL (ref 12–16)
HOLD SPECIMEN: NORMAL
INHALED O2 CONCENTRATION: 30 %
LACTATE BLDA-SCNC: 0.5 MMOL/L (ref 0.4–2)
MAGNESIUM SERPL-MCNC: 2 MG/DL (ref 1.6–2.4)
MCH RBC QN AUTO: 26.9 PG (ref 26–34)
MCHC RBC AUTO-ENTMCNC: 32.3 G/DL (ref 32–36)
MCV RBC AUTO: 83 FL (ref 80–100)
NRBC BLD-RTO: 0 /100 WBCS (ref 0–0)
OXYHGB MFR BLDA: 97.9 % (ref 94–98)
PCO2 BLDA: 40 MM HG (ref 38–42)
PH BLDA: 7.4 PH (ref 7.38–7.42)
PHOSPHATE SERPL-MCNC: 4.1 MG/DL (ref 2.5–4.9)
PLATELET # BLD AUTO: 205 X10*3/UL (ref 150–450)
PO2 BLDA: 109 MM HG (ref 85–95)
POTASSIUM BLDA-SCNC: 3.8 MMOL/L (ref 3.5–5.3)
POTASSIUM SERPL-SCNC: 4.3 MMOL/L (ref 3.5–5.3)
RBC # BLD AUTO: 3.35 X10*6/UL (ref 4–5.2)
SAO2 % BLDA: 99 % (ref 94–100)
SODIUM BLDA-SCNC: 135 MMOL/L (ref 136–145)
SODIUM SERPL-SCNC: 138 MMOL/L (ref 136–145)
WBC # BLD AUTO: 9.5 X10*3/UL (ref 4.4–11.3)

## 2024-07-18 PROCEDURE — 85027 COMPLETE CBC AUTOMATED: CPT | Performed by: NURSE PRACTITIONER

## 2024-07-18 PROCEDURE — 84132 ASSAY OF SERUM POTASSIUM: CPT

## 2024-07-18 PROCEDURE — 82330 ASSAY OF CALCIUM: CPT | Performed by: NURSE PRACTITIONER

## 2024-07-18 PROCEDURE — 9420000001 HC RT PATIENT EDUCATION 5 MIN

## 2024-07-18 PROCEDURE — 71045 X-RAY EXAM CHEST 1 VIEW: CPT | Performed by: STUDENT IN AN ORGANIZED HEALTH CARE EDUCATION/TRAINING PROGRAM

## 2024-07-18 PROCEDURE — 84132 ASSAY OF SERUM POTASSIUM: CPT | Performed by: NURSE PRACTITIONER

## 2024-07-18 PROCEDURE — 2500000005 HC RX 250 GENERAL PHARMACY W/O HCPCS: Performed by: NURSE PRACTITIONER

## 2024-07-18 PROCEDURE — 2500000001 HC RX 250 WO HCPCS SELF ADMINISTERED DRUGS (ALT 637 FOR MEDICARE OP): Performed by: NURSE PRACTITIONER

## 2024-07-18 PROCEDURE — 94668 MNPJ CHEST WALL SBSQ: CPT

## 2024-07-18 PROCEDURE — 94660 CPAP INITIATION&MGMT: CPT

## 2024-07-18 PROCEDURE — 83735 ASSAY OF MAGNESIUM: CPT | Performed by: NURSE PRACTITIONER

## 2024-07-18 PROCEDURE — 37799 UNLISTED PX VASCULAR SURGERY: CPT | Performed by: NURSE PRACTITIONER

## 2024-07-18 PROCEDURE — 2500000005 HC RX 250 GENERAL PHARMACY W/O HCPCS

## 2024-07-18 PROCEDURE — 2500000004 HC RX 250 GENERAL PHARMACY W/ HCPCS (ALT 636 FOR OP/ED): Mod: JZ | Performed by: NURSE PRACTITIONER

## 2024-07-18 PROCEDURE — 2500000001 HC RX 250 WO HCPCS SELF ADMINISTERED DRUGS (ALT 637 FOR MEDICARE OP)

## 2024-07-18 PROCEDURE — 99291 CRITICAL CARE FIRST HOUR: CPT | Performed by: NURSE PRACTITIONER

## 2024-07-18 PROCEDURE — 97161 PT EVAL LOW COMPLEX 20 MIN: CPT | Mod: GP

## 2024-07-18 PROCEDURE — 94003 VENT MGMT INPAT SUBQ DAY: CPT

## 2024-07-18 PROCEDURE — 2500000004 HC RX 250 GENERAL PHARMACY W/ HCPCS (ALT 636 FOR OP/ED)

## 2024-07-18 PROCEDURE — 71045 X-RAY EXAM CHEST 1 VIEW: CPT

## 2024-07-18 PROCEDURE — 97165 OT EVAL LOW COMPLEX 30 MIN: CPT | Mod: GO

## 2024-07-18 PROCEDURE — 2500000004 HC RX 250 GENERAL PHARMACY W/ HCPCS (ALT 636 FOR OP/ED): Performed by: NURSE PRACTITIONER

## 2024-07-18 PROCEDURE — 94667 MNPJ CHEST WALL 1ST: CPT

## 2024-07-18 PROCEDURE — 82947 ASSAY GLUCOSE BLOOD QUANT: CPT

## 2024-07-18 PROCEDURE — 2500000002 HC RX 250 W HCPCS SELF ADMINISTERED DRUGS (ALT 637 FOR MEDICARE OP, ALT 636 FOR OP/ED): Performed by: NURSE PRACTITIONER

## 2024-07-18 PROCEDURE — 2020000001 HC ICU ROOM DAILY

## 2024-07-18 RX ORDER — HEPARIN SODIUM 5000 [USP'U]/ML
5000 INJECTION, SOLUTION INTRAVENOUS; SUBCUTANEOUS EVERY 8 HOURS
Status: DISCONTINUED | OUTPATIENT
Start: 2024-07-19 | End: 2024-07-20 | Stop reason: HOSPADM

## 2024-07-18 RX ORDER — CLOPIDOGREL BISULFATE 75 MG/1
75 TABLET ORAL DAILY
Status: DISCONTINUED | OUTPATIENT
Start: 2024-07-19 | End: 2024-07-20 | Stop reason: HOSPADM

## 2024-07-18 RX ORDER — INSULIN LISPRO 100 [IU]/ML
0-15 INJECTION, SOLUTION INTRAVENOUS; SUBCUTANEOUS
Status: DISCONTINUED | OUTPATIENT
Start: 2024-07-18 | End: 2024-07-19

## 2024-07-18 RX ORDER — HYDROMORPHONE HYDROCHLORIDE 0.2 MG/ML
0.2 INJECTION INTRAMUSCULAR; INTRAVENOUS; SUBCUTANEOUS
Status: COMPLETED | OUTPATIENT
Start: 2024-07-18 | End: 2024-07-18

## 2024-07-18 RX ORDER — CARVEDILOL 12.5 MG/1
12.5 TABLET ORAL 2 TIMES DAILY
Status: DISCONTINUED | OUTPATIENT
Start: 2024-07-18 | End: 2024-07-20 | Stop reason: HOSPADM

## 2024-07-18 ASSESSMENT — PAIN DESCRIPTION - LOCATION
LOCATION: CHEST
LOCATION: CHEST
LOCATION: ABDOMEN

## 2024-07-18 ASSESSMENT — PAIN SCALES - GENERAL
PAINLEVEL_OUTOF10: 7
PAINLEVEL_OUTOF10: 10 - WORST POSSIBLE PAIN
PAINLEVEL_OUTOF10: 6
PAINLEVEL_OUTOF10: 10 - WORST POSSIBLE PAIN
PAINLEVEL_OUTOF10: 0 - NO PAIN
PAINLEVEL_OUTOF10: 0 - NO PAIN
PAINLEVEL_OUTOF10: 9
PAINLEVEL_OUTOF10: 3
PAINLEVEL_OUTOF10: 7
PAINLEVEL_OUTOF10: 8

## 2024-07-18 ASSESSMENT — COGNITIVE AND FUNCTIONAL STATUS - GENERAL
DRESSING REGULAR LOWER BODY CLOTHING: A LITTLE
MOVING FROM LYING ON BACK TO SITTING ON SIDE OF FLAT BED WITH BEDRAILS: A LITTLE
HELP NEEDED FOR BATHING: A LITTLE
STANDING UP FROM CHAIR USING ARMS: A LITTLE
DRESSING REGULAR LOWER BODY CLOTHING: A LITTLE
MOVING FROM LYING ON BACK TO SITTING ON SIDE OF FLAT BED WITH BEDRAILS: A LITTLE
TURNING FROM BACK TO SIDE WHILE IN FLAT BAD: A LITTLE
WALKING IN HOSPITAL ROOM: A LITTLE
MOBILITY SCORE: 18
STANDING UP FROM CHAIR USING ARMS: A LITTLE
PERSONAL GROOMING: A LITTLE
TOILETING: A LITTLE
MOVING TO AND FROM BED TO CHAIR: A LITTLE
DAILY ACTIVITIY SCORE: 18
HELP NEEDED FOR BATHING: A LITTLE
DRESSING REGULAR UPPER BODY CLOTHING: A LITTLE
CLIMB 3 TO 5 STEPS WITH RAILING: A LOT
WALKING IN HOSPITAL ROOM: A LITTLE
TOILETING: A LITTLE
CLIMB 3 TO 5 STEPS WITH RAILING: A LITTLE
DAILY ACTIVITIY SCORE: 20
DRESSING REGULAR UPPER BODY CLOTHING: A LITTLE
MOBILITY SCORE: 17
EATING MEALS: A LITTLE
MOVING TO AND FROM BED TO CHAIR: A LITTLE
TURNING FROM BACK TO SIDE WHILE IN FLAT BAD: A LITTLE

## 2024-07-18 ASSESSMENT — PAIN DESCRIPTION - ORIENTATION
ORIENTATION: LEFT

## 2024-07-18 ASSESSMENT — PAIN - FUNCTIONAL ASSESSMENT
PAIN_FUNCTIONAL_ASSESSMENT: 0-10

## 2024-07-18 ASSESSMENT — PAIN DESCRIPTION - DESCRIPTORS: DESCRIPTORS: SORE;PRESSURE

## 2024-07-18 ASSESSMENT — ACTIVITIES OF DAILY LIVING (ADL)
BATHING_ASSISTANCE: MODERATE
ADL_ASSISTANCE: INDEPENDENT
ADL_ASSISTANCE: INDEPENDENT

## 2024-07-18 NOTE — PROGRESS NOTES
Occupational Therapy    Evaluation    Patient Name: Isis Geronimo  MRN: [id number]  Today's Date: 7/18/2024  Time Calculation  Start Time: 1336  Stop Time: 1351  Time Calculation (min): 15 min    Assessment  IP OT Assessment  OT Assessment: Pt seen for OT eval. Pt demonstrates with decreased endurance, assist for mobility and ADLS. Pt would benefit from low intnesity therapy at d/c  Prognosis: Good  Barriers to Discharge: None  Evaluation/Treatment Tolerance: Patient limited by fatigue  Medical Staff Made Aware: Yes  End of Session Communication: Bedside nurse  End of Session Patient Position: Up in chair, Alarm on  Plan:  Treatment Interventions: ADL retraining, Functional transfer training, Endurance training, Equipment evaluation/education  OT Frequency: 3 times per week  OT Discharge Recommendations: Low intensity level of continued care  OT Recommended Transfer Status: Stand by assist  OT - OK to Discharge: Yes    Subjective   Current Problem:  1. NSTEMI (non-ST elevated myocardial infarction) (Multi)  Transthoracic Echo (TTE) Complete    Transthoracic Echo (TTE) Complete    Case Request Cath Lab: Left Heart Cath    Case Request Cath Lab: Left Heart Cath    Cardiac Catheterization Procedure    Cardiac Catheterization Procedure    Vascular US carotid artery duplex bilateral    Vascular US carotid artery duplex bilateral      2. Other disorders of arteries, arterioles and capillaries in diseases classified elsewhere (CMS-HCC)  Vascular US carotid artery duplex bilateral    Vascular US carotid artery duplex bilateral    Case Request Operating Room: CABG, 1 VESSEL    Case Request Operating Room: CABG, 1 VESSEL      3. Occlusion and stenosis of bilateral carotid arteries  Vascular US carotid artery duplex bilateral      4. Atherosclerotic heart disease of native coronary artery with unspecified angina pectoris (CMS-HCC)  Cardiac Catheterization Procedure    Anesthesia Intraoperative Transesophageal Echocardiogram     Anesthesia Intraoperative Transesophageal Echocardiogram      5. Atherosclerotic heart disease of native coronary artery with unstable angina pectoris (Multi)  Anesthesia Intraoperative Transesophageal Echocardiogram        General:  General  Reason for Referral: Pt admitted from home with c/o N/V, now s/p CABG POD #1.  Referred By: HAZEL Bain CNP  Past Medical History Relevant to Rehab: ESRD on PD, anemia, RA, emphysema, vertigo, HTN, HLD  Family/Caregiver Present: No  Co-Treatment: OT  Co-Treatment Reason: Co-eval to maximize pt safety and participation  Prior to Session Communication: Bedside nurse  Patient Position Received: Bed, 3 rail up, Alarm on  Preferred Learning Style: auditory, kinesthetic  General Comment: Pt agreeable to OT eval agreeable to get to chair. declined ambulation  Precautions:  Medical Precautions: Fall precautions  Post-Surgical Precautions: Move in the Tube  Precautions Comment: MITT prec and logroll reviewed with pt, handout provided.  Vital Signs:     Pain:  Pain Assessment  Pain Assessment: 0-10  0-10 (Numeric) Pain Score: 7  Pain Type: Surgical pain  Pain Location: Chest  Pain Orientation: Left    Objective   Cognition:  Overall Cognitive Status: Within Functional Limits  Orientation Level: Oriented X4  Following Commands: Follows all commands and directions without difficulty  Memory: Within Funtional Limits  Insight: Within function limits  Impulsive: Within functional limits           Home Living:  Type of Home: Apartment  Lives With: Alone  Home Adaptive Equipment: None  Home Layout: One level  Home Access: No concerns  Home Living Comments: has 3 dtr that can assist   Prior Function:  Level of [place]: Independent with ADLs and functional transfers, Independent with homemaking with ambulation  ADL Assistance: Independent  Homemaking Assistance: Independent  Ambulatory Assistance: Independent  Vocational: Retired (nurse)  Hand Dominance: Right  IADL History:  Homemaking  Responsibilities: Yes  Meal Prep Responsibility: Primary  Laundry Responsibility: Primary  Cleaning Responsibility: Primary  Bill Paying/Finance Responsibility: Primary  Shopping Responsibility: Primary  Homemaking Comments: Pt Independent with IADLS  Current License: Yes  Mode of Transportation: Car  ADL:  Eating Assistance: Independent  Grooming Assistance: Stand by  Grooming Deficit: Setup, Increased time to complete, Wash/dry face, Teeth care, Brushing hair  Bathing Assistance: Moderate  Bathing Deficit: Left lower leg including foot, Right lower leg including foot, Buttocks  UE Dressing Assistance: Minimal  UE Dressing Deficit: Pull over head, Pull around back, Pull down in back  LE Dressing Assistance: Moderate  LE Dressing Deficit: Don/doff R sock, Don/doff L sock, Thread RLE into pants, Thread LLE into pants  Activity Tolerance:  Endurance: Tolerates 10 - 20 min exercise with multiple rests  Bed Mobility/Transfers: Bed Mobility  Bed Mobility: Yes  Bed Mobility 1  Bed Mobility 1: Supine to sitting  Level of Assistance 1: Contact guard  Bed Mobility Comments 1: Pt instructed in log roll, declined wanting to get up on her own Proceeds to log sit and mve legs off of bed    Transfers  Transfer: Yes  Transfer 1  Technique 1: Sit to stand, Stand to sit  Transfer Device 1:  (NO AD)  Transfer Level of Assistance 1: Contact guard     Sitting Balance:  Static Sitting Balance  Static Sitting-Balance Support: Feet supported  Static Sitting-Level of Assistance: Close supervision  Dynamic Sitting Balance  Dynamic Sitting-Balance Support: Feet supported  Dynamic Sitting-Balance: Reaching for objects  Dynamic Sitting-Comments: close supervisoin    IADL's:   Homemaking Responsibilities: Yes  Meal Prep Responsibility: Primary  Laundry Responsibility: Primary  Cleaning Responsibility: Primary  Bill Paying/Finance Responsibility: Primary  Shopping Responsibility: Primary  Homemaking Comments: Pt Independent with IADLS  Current  License: Yes  Mode of Transportation: Car  Vision: Vision - Basic Assessment  Current Vision: No visual deficits  Sensation:  Light Touch: No apparent deficits  Strength:  Strength Comments: B UE WFL  Perception:  Inattention/Neglect: Appears intact  Coordination:  Movements are Fluid and Coordinated: Yes  Finger to Nose: Intact  Rapid Alternating Movements: Intact  Coordination Comment: intact   Hand Function:  Hand Function  Gross Grasp: Functional  Coordination: Functional  Extremities: RUE   RUE : Within Functional Limits and LUE   LUE: Within Functional Limits    Outcome Measures: [facility] Daily Activity  Putting on and taking off regular lower body clothing: A little  Bathing (including washing, rinsing, drying): A little  Putting on and taking off regular upper body clothing: A little  Toileting, which includes using toilet, bedpan or urinal: A little  Taking care of personal grooming such as brushing teeth: None  Eating Meals: None  Daily Activity - Total Score: 20      Education Documentation  Handouts, taught by Charlotte Fabian OT at 7/18/2024  3:27 PM.  Learner: Patient  Readiness: Acceptance  Method: Explanation, Handout  Response: Verbalizes Understanding, Needs Reinforcement    Precautions, taught by Charlotte Fabian OT at 7/18/2024  3:27 PM.  Learner: Patient  Readiness: Acceptance  Method: Explanation, Handout  Response: Verbalizes Understanding, Needs Reinforcement    Home Exercise Program, taught by Charlotte Fabian OT at 7/18/2024  3:27 PM.  Learner: Patient  Readiness: Acceptance  Method: Explanation, Handout  Response: Verbalizes Understanding, Needs Reinforcement    ADL Training, taught by Charlotte Fabian OT at 7/18/2024  3:27 PM.  Learner: Patient  Readiness: Acceptance  Method: Explanation, Handout  Response: Verbalizes Understanding, Needs Reinforcement    Education Comments  No comments found.      Goals:   Encounter Problems       Encounter Problems (Active)       ADLs        Patient will perform UB and LB bathing 90%  with contact guard assist level of assistance and adaptive equipment prn . (Progressing)       Start:  07/18/24    Expected End:  08/01/24            Patient with complete upper body dressing with independent level of assistance donning and doffing all UE clothes with no adaptive equipment while supported sitting (Progressing)       Start:  07/18/24    Expected End:  08/01/24            Patient with complete lower body dressing with minimal assist  level of assistance donning and doffing all LE clothes  with PRN adaptive equipment while supported sitting (Progressing)       Start:  07/18/24    Expected End:  08/01/24               COGNITION/SAFETY       Patient will recall and adhere to MITT precautions during all functional mobility/ADL tasks in order to demonstrate improved understanding and promote healing post op (Progressing)       Start:  07/18/24    Expected End:  08/01/24               TRANSFERS       Patient will perform bed mobility supervision level of assistance and bed rails in order to improve safety and independence with mobility (Progressing)       Start:  07/18/24    Expected End:  08/01/24            Patient will complete functional transfer to chair  with least restrictive device with modified independent level of assistance. (Progressing)       Start:  07/18/24    Expected End:  08/01/24

## 2024-07-18 NOTE — PROGRESS NOTES
"Isis Geronimo is a 79 y.o. female on day 11 of admission presenting with NSTEMI (non-ST elevated myocardial infarction) (Multi)    Subjective   Extubated over night  Up to chair this morning, off pressors HD stable, Ct with minimal output w/o air leak    Objective     Physical Exam  Vitals reviewed.   Constitutional:       Comments: Elderly frail   HENT:      Mouth/Throat:      Mouth: Mucous membranes are moist.   Neck:      Comments: RIJ CVC in place dressing c/d/i  Cardiovascular:      Rate and Rhythm: Normal rate and regular rhythm.      Comments: Left surgical incision dressing c/d/I   NSR 70-80's  Pulmonary:      Comments: Shallow respirations, Equal chest rise, Chest tube w/o air leak or crepitus    Abdominal:      General: Abdomen is flat.      Palpations: Abdomen is soft.      Comments: PD cath in place/ capped   Genitourinary:     Comments: Indwelling weiner  Musculoskeletal:         General: No swelling.   Skin:     General: Skin is warm and dry.   Neurological:      General: No focal deficit present.      Mental Status: She is alert and oriented to person, place, and time.   Psychiatric:         Mood and Affect: Mood normal.         Behavior: Behavior normal.         Review of Systems  Denies chest pain, palpations, light headedness, dizziness sob, n/v fever, chills    Last Recorded Vitals   Blood pressure 133/57, pulse 79, temperature 36.6 °C (97.9 °F), temperature source Temporal, resp. rate 18, height 1.6 m (5' 3\"), weight 46.7 kg (102 lb 15.3 oz), SpO2 99%.    Intake/Output Last 3 Shifts  I/O last 3 completed shifts:  In: 1836.5 (39.3 mL/kg) [I.V.:106.5 (2.3 mL/kg); Blood:480; IV Piggyback:1250]  Out: 2650 (56.7 mL/kg) [Urine:560 (0.3 mL/kg/hr); Other:1700; Blood:250; Chest Tube:140]  Weight: 46.7 kg     Lines  CVC 07/17/24 Double lumen Right Internal jugular (Active)   Placement Date/Time: 07/17/24 (c) 0350   Hand Hygiene Performed Prior to CVC Insertion: Yes  Site Prep: Chlorhexidine   Site Prep " Agent has Completely Dried Before Insertion: Yes  All 5 Sterile Barriers Used (Gloves, Gown, Cap, Mask, Large Sterile Maryam...   Number of days: 1       Arterial Line 07/17/24 Left Brachial (Active)   Placement Date/Time: 07/17/24 (c) 1336   Size: 20 G  Orientation: Left  Location: Brachial  Securement Method: Transparent dressing  Patient Tolerance: Tolerated well   Number of days: 0       Urethral Catheter Temperature probe (Active)   Placement Date: 07/17/24   Placed by: AN  Hand Hygiene Completed: Yes  Catheter Type: Temperature probe  Catheter Balloon Size: 10 mL  Urine Returned: Yes   Number of days: 1       Chest Tube 1 Mediastinal (Active)   Placement Date: 07/17/24   Tube Number: 1  Chest Tube Location: Mediastinal  Chest Tube Drainage System: Suction   Number of days: 1        Recent Labs  CMP:  Results from last 7 days   Lab Units 07/18/24  0550 07/17/24  2150 07/17/24  1704 07/17/24  0535 07/16/24  0459 07/13/24  0542 07/12/24  0738   SODIUM mmol/L 138 139 137 138 139 139 139   POTASSIUM mmol/L 4.3 3.6 3.7 3.2* 3.0* 3.5 3.7   CHLORIDE mmol/L 102 102 100 102 101 102 102   CO2 mmol/L 23 26 26 27 29 27 28   ANION GAP mmol/L 17 15 15 12 12 14 13   BUN mg/dL 18 17 15 16 17 24* 24*   CREATININE mg/dL 7.49* 7.25* 6.75* 7.43* 6.98* 7.70* 7.50*   EGFR mL/min/1.73m*2 5* 5* 6* 5* 6* 5* 5*   MAGNESIUM mg/dL 2.00 2.20 1.20*  --   --   --   --    ALBUMIN g/dL 3.2*  --  2.9* 2.3* 2.3*  --   --      CBC:  Results from last 7 days   Lab Units 07/18/24  0550 07/17/24  1704 07/17/24  0535 07/16/24  0459 07/15/24  0534 07/14/24  0542 07/13/24  0542 07/12/24  0738   WBC AUTO x10*3/uL 9.5 7.2 5.4 5.5 6.3 5.3 5.5 5.0   HEMOGLOBIN g/dL 9.0* 9.5* 8.2* 8.6* 8.7* 8.6* 8.2* 8.7*   HEMATOCRIT % 27.9* 28.9* 25.9* 26.5* 27.0* 26.3* 25.2* 27.6*   PLATELETS AUTO x10*3/uL 205 149* 243 251 289 258 257 248   MCV fL 83 83 83 84 83 82 83 85     COAG:   Results from last 7 days   Lab Units 07/17/24  1704   INR  1.2*     HEME/ENDO:     CARDIAC:         Imaging  XR chest 1 view   Final Result   Mild left basilar atelectasis.        MACRO   None        Signed by: Gennaro Koehler 7/18/2024 7:56 AM   Dictation workstation:   [id number]      XR chest 1 view   Final Result   No significant pneumothorax.        Tip of endotracheal tube 3.8 cm above the lovely.        MACRO   None        Signed by: Gennaro Koehler 7/17/2024 4:53 PM   Dictation workstation:   [id number]      Anesthesia Intraoperative Transesophageal Echocardiogram   Final Result      XR chest 1 view   Final Result   Mild cardiomegaly.  Currently without radiographic evidence of CHF or   pneumonia.        Horizontal curvilinear opacities with associated lucency at the right   base adjacent to the diaphragm. Suspect atelectasis rather than   pneumoperitoneum. Suggest dedicated supine and upright views of the   abdomen in follow-up.        MACRO:   None        Signed by: Jacob Arora 7/11/2024 7:40 AM   Dictation workstation:   [id number]      Vascular US carotid artery duplex bilateral   Final Result      Cardiac Catheterization Procedure   Final Result      Transthoracic Echo (TTE) Complete   Final Result      CT chest abdomen pelvis wo IV contrast   Final Result   1.  No distinct acute intrathoracic process identified.   2.  Minimal streaky bibasilar atelectasis with pleural based right   lower lobe pulmonary nodule measuring 1.2 cm.  Suggest continued   follow-up as per clinical guidelines.   3.  Enlarged and heterogeneous appearance of the thyroid gland with   multiple bilateral nodules, left greater than right.  Suggest   nonemergent follow-up with ultrasound as clinically warranted.   4.  Nonobstructing nephrolithiasis of the left kidney.  No ureteral   calculus or evidence for obstructive uropathy bilaterally.   5.  Colonic diverticulosis without CT evidence for acute   diverticulitis.   6.  Large amount of fecal material within the rectosigmoid colon and   rectal vault.  No evidence of  bowel obstruction.   Fleischner Society 2017 Guidelines for Management of Incidentally   Detected Pulmonary Nodules in Adults:   A.  SOLID NODULES*   Nodule Type and Size:   Single:   *Low Risk**    < 6 mm - No routine follow-up   6-8 mm - CT at 6-12 months, then consider CT at 18-24 months   > 8 mm - Consider CT at 3 months, PET/CT, or tissue sampling   *High Risk**   < 6 mm - Optional CT at 12 months   6-8 mm - CT at 6-12 months, then CT at 18-24 months   > 8 mm - Consider CT at 3 months, PET/CT, or tissue sampling   Multiple:   *Low Risk**    < 6 mm - No routine follow-up   6-8 mm - CT at 3-6 months, then consider CT at 18-24 months   > 8 mm - CT at 3-6 months, then consider CT at 18-24 months   *High Risk**   < 6 mm - Optional CT at 12 months   6-8 mm - CT at 3-6 months, then at 18-24 months   > 8 mm - CT at 3-6 months, then at 18-24 months   B. SUBSOLID NODULES*   Nodule Type and Size:   Single:     *Ground glass   < 6 mm - No routine follow-up   > 6 mm - CT at 6-12 months to confirm persistence, then CT every 2   years until 5 years   *Part Solid   < 6 mm - No routine follow-up   > 6 mm - CT at 3-6 months to confirm persistence.  If unchanged and   solid component remains < 6 mm, annual CT should be performed for 5   years.   Multiple:   < 6 mm - CT at 3-6 months.  If stable, consider CT at 2 and 4 years.   > 6 mm - CT at 3-6 months.  Subsequent management based on the most   suspicious nodule(s).   Note - These recommendations do not apply to lung cancer screening,   patients with immunosuppression, or patients with known primary   cancer.   * Dimensions are average of long and short axes, rounded to the   nearest millimeter.   ** Consider all relevant risk factors.   Signed by Girish Oshea MD           Medications  Scheduled medications  acetaminophen, 650 mg, oral, q6h  aspirin, 81 mg, oral, Daily  atorvastatin, 80 mg, oral, Daily  ceFAZolin, 2 g, intravenous, q24h  dextrose 1.5% - LOW calcium 2.5mEq/L  2,000 mL peritoneal dialysate, , intraperitoneal, 4x daily  epoetin shawn or biosimilar, 150 Units/kg, subcutaneous, Every Sunday  insulin lispro, 0-15 Units, subcutaneous, q4h  lidocaine, 1 patch, transdermal, q24h  polyethylene glycol, 17 g, oral, Daily  sennosides-docusate sodium, 2 tablet, oral, BID      Continuous medications  dextrose 5 % and lactated Ringer's, 30 mL/hr, Last Rate: 30 mL/hr (07/18/24 0338)  EPINEPHrine, 0-0.2 mcg/kg/min, Last Rate: Stopped (07/17/24 1541)  norepinephrine, 0-0.2 mcg/kg/min, Last Rate: Stopped (07/17/24 2330)      PRN medications  PRN medications: alteplase, dextrose **OR** glucagon, HYDROmorphone, naloxone, ondansetron ODT **OR** ondansetron, oxyCODONE, oxyCODONE, oxygen, phenyleph-min oil-petrolatum, prochlorperazine **OR** prochlorperazine **OR** prochlorperazine    Assessment/Plan   Principal Problem:    NSTEMI (non-ST elevated myocardial infarction) (Multi)  Active Problems:    Other disorders of arteries, arterioles and capillaries in diseases classified elsewhere (CMS-HCC)    Isis Geronimo is a 79 y.o. female with PMH of ESRD on PD, Anemia, RA, Emphysema Current smoker,  Vertigo, HTN, HLD. She came to ED w/complaints of n/v, and abdominal pain, constipation, in ED work up significant for elevated  troponin which [place] [address]. TTE showed an LVEF of 60% with LV diastolic dysfunction, moderate concentric LVH and aortic valve sclerosis, [facility] today with Dr Juárez, which showed Severe MVD   Case reviewed at [facility] ([facility])   7/17 s/p Left Anterior Thoracatomy  Off Pump MIDCAB (ALCARAZ to LAD) Dr Cristina Clifton     NEURO:    Expected acute postop pain  - Serial neuro and pain assessments   - Prn dilaudid for pain while ct in place  - Scheduled Tylenol PRN oxy  - Lidoderm patches  - PT/OT Consult, OOB to chair, MITT precautions      CV:  HLD,  CAD with MVD S/p Off Pump MIDCAB (LIMA to LAD)  HTN base line -160's/60-70's  LV function EF normal pre and post, no EP wires  - Maintain MAP goal MAP     - DC CTs  - asa/ statin , Coreg d/t metoprolol intolerance  - Resume home antihypertensives as HD permits     PULM:    #Current tobacco use  # Emphysema   Postop CXR negative for acute process    - Begin CPAP trials and extubate when criteria met  - Wean FiO2 maintaining SpO2 >92%.   - ABGs PRN  - Acapella, IS   - Smoking cessation     :   #ESRD on PD  - Resume PD today according to nephrology   - DC weiner cathete.    - Check renal function panel and replete electrolytes as needed.  - Maintain a potassium > 4, magnesium > 2.     GI:    #GERD  - diet  - PPI  - Bowel regimen- senokot and miralax  - Zofran PRN      HEME:  Acute on chronic post op blood loss anemia     - CBC, coags, and fibrinogen as clinically indicated  - SCDs for DVT prophylaxis     - start heparin tomorrow      ENDO:  No history of DM  - Maintain BG <180, insulin SSI per cardiac surgery protocol.  - Hypoglycemia protocol     ID:  Afebrile, no current indications of infection. MRSA negative.  - Renal dosed Ancef 2g q 24 hours x 3 doses        Dispo: SDU this afternoon once lines and tubes removed     Lines:  RIJ MAC with PAC  L brachial a line   Indwelling weiner     Discussed with Dr Miller during bedside rounds     This critically ill patient continues to be at-risk for clinically significant deterioration / failure due to the above mentioned dysfunctional, unstable organ systems.  I have personally identified and managed all complex critical care issues with efforts to prevent aforementioned clinical deterioration.  Critical care time is spent at bedside and/or the immediate area and has included, but is not limited to, the review of diagnostic tests, labs, radiographs, serial assessments of hemodynamics, respiratory status, ventilatory management, and family updates.  Time spent in procedures and teaching are reported separately.     Time 60  minutes      Shani Bain, APRN-CNP

## 2024-07-18 NOTE — PROGRESS NOTES
INTERNAL MEDICINE PROGRESS NOTE      HPI:    Sitting up in chair.  Underwent bypass yesterday.  Reports fair pain control.    Vital signs in last 24 hours:  Temp:  [34.8 °C (94.6 °F)-37.2 °C (99 °F)] 36.6 °C (97.9 °F)  Heart Rate:  [68-87] 73  Resp:  [12-24] 21  BP: (133-143)/(46-60) 133/57  Arterial Line BP 1: ()/(41-71) 176/62  FiO2 (%):  [30 %-50 %] 30 %    Physical Examination:  Physical Exam    Constitutional:       Appearance: Elderly, well-built, in no distress.  HENT:      Head: Normocephalic and atraumatic.   Eyes:      Extraocular Movements: Extraocular movements intact.      Pupils: Pupils are equal, round, and reactive to light.   Cardiovascular:      Rate and Rhythm: Normal rate and regular rhythm.      Pulses: Normal pulses.      Heart sounds: Normal heart sounds.   Pulmonary:      Effort: Pulmonary effort is normal.      Breath sounds: Normal breath sounds.   Abdominal:      General: Abdomen is flat. Bowel sounds are normal.      Palpations: Abdomen is soft.   Musculoskeletal:         General: Normal range of motion.      Cervical back: Normal range of motion and neck supple.   Skin:     General: Skin is warm and dry.  Incision well-approximated, no active bleeding, drain in place.  Neurological:      General: No focal deficit present.      Mental Status: Alert and oriented to person, place, and time. Mental status is at baseline.         Medications:    Current Facility-Administered Medications:     acetaminophen (Tylenol) tablet 650 mg, 650 mg, oral, q6h, Shani S Bain, APRN-CNP, 650 mg at 07/18/24 0947    aspirin EC tablet 81 mg, 81 mg, oral, Daily, Shani S Bain, APRN-CNP, 81 mg at 07/18/24 0803    atorvastatin (Lipitor) tablet 80 mg, 80 mg, oral, Daily, Shani S Bain, APRN-CNP, 80 mg at 07/18/24 0803    carvedilol (Coreg) tablet 12.5 mg, 12.5 mg, oral, BID, Shani S Bain, APRN-CNP, 12.5 mg at 07/18/24 0946    ceFAZolin (Ancef) 2 g in dextrose (iso)  mL, 2  g, intravenous, q24h, RACHELLE Mae    [START ON 7/19/2024] clopidogrel (Plavix) tablet 75 mg, 75 mg, oral, Daily, RACHELLE Mae    dextrose 1.5% - LOW calcium 2.5mEq/L 2,000 mL peritoneal dialysate, , intraperitoneal, 4x daily, RACHELLE Mae, Given at 07/18/24 0809    dextrose 50 % injection 25 g, 25 g, intravenous, q15 min PRN, 25 g at 07/17/24 2349 **OR** glucagon (Glucagen) injection 1 mg, 1 mg, intramuscular, q15 min PRN, RACHELLE Mae    epoetin shawn-epbx (Retacrit) injection 8,000 Units, 150 Units/kg, subcutaneous, Every Sunday, RACHELLE Mae, 8,000 Units at 07/14/24 1013    [START ON 7/19/2024] heparin (porcine) injection 5,000 Units, 5,000 Units, subcutaneous, q8h, RACHELLE Mae    insulin lispro (HumaLOG) injection 0-15 Units, 0-15 Units, subcutaneous, With meals & nightly, RACHELLE Mae    lidocaine 4 % patch 1 patch, 1 patch, transdermal, q24h, RACHELLE Mae, 1 patch at 07/17/24 2155    naloxone (Narcan) injection 0.2 mg, 0.2 mg, intravenous, q5 min PRN, RACHELLE Mae    ondansetron ODT (Zofran-ODT) disintegrating tablet 4 mg, 4 mg, oral, q6h PRN **OR** ondansetron (Zofran) injection 4 mg, 4 mg, intravenous, q6h PRN, ARCHELLE Mae    oxyCODONE (Roxicodone) immediate release tablet 10 mg, 10 mg, oral, q4h PRN, RACHELLE Mae    oxyCODONE (Roxicodone) immediate release tablet 5 mg, 5 mg, oral, q4h PRN, RACHELLE Mae    oxygen (O2) therapy, , inhalation, Continuous PRN - O2/gases, RACHELLE Mae, 1 L/min at 07/18/24 0722    phenyleph-min oil-petrolatum (Preparation H) 0.25-14-74.9 % rectal ointment, , rectal, 4x daily PRN, RACHELLE Mae, Given at 07/15/24 1618    polyethylene glycol (Glycolax, Miralax) packet 17 g, 17 g, oral, Daily, RACHELLE Mae    prochlorperazine (Compazine) tablet 10  mg, 10 mg, oral, q6h PRN **OR** prochlorperazine (Compazine) injection 10 mg, 10 mg, intravenous, q6h PRN **OR** prochlorperazine (Compazine) suppository 25 mg, 25 mg, rectal, q12h PRN, ALEXIS Mae-JUSTIN    sennosides-docusate sodium (Michelle-Colace) 8.6-50 mg per tablet 2 tablet, 2 tablet, oral, BID, ALEXIS Mae-CNP, 2 tablet at 07/18/24 0803    Laboratory Findings:  Lab Results   Component Value Date    WBC 9.5 07/18/2024    HGB 9.0 (L) 07/18/2024    HCT 27.9 (L) 07/18/2024    MCV 83 07/18/2024     07/18/2024     Lab Results   Component Value Date    INR 1.2 (H) 07/17/2024    PROTIME 13.8 (H) 07/17/2024     Lab Results   Component Value Date    GLUCOSE 105 (H) 07/18/2024    CALCIUM 8.4 (L) 07/18/2024     07/18/2024    K 4.3 07/18/2024    CO2 23 07/18/2024     07/18/2024    BUN 18 07/18/2024    CREATININE 7.49 (H) 07/18/2024       Assessment and Plan:     Non-ST elevation MI -underwent CABG, cardiothoracic surgery following.  ESRD -continue with peritoneal dialysis  Hypertension -better blood pressure control on current regimen.  Weakness -therapy went stable.    Patient seen with family at bedside.  ICU team to follow.       Jacob Staples MD  07/18/24  11:33 AM

## 2024-07-18 NOTE — PROGRESS NOTES
07/18/24 0811   Discharge Planning   Patient expects to be discharged to: cabg 7/17. will watch for pt ot recs     Foresttena Geronimo is a 79 y.o. female on day 11 of admission presenting with NSTEMI (non-ST elevated myocardial infarction) (Multi).    Lyly Lara RN

## 2024-07-18 NOTE — RESEARCH NOTES
Artificial Intelligence Monitoring in Nursing (AIMS Nursing) Study    Principle Investigator - Dr. Jaime Eden  Research Coordinator - George Newman RN     Patient Name - Isis Geronimo  Date - 7/18/2024 9:01 AM  Location - 4th Floor ICU, [facility]    Isis Geronimo was approached by George Newman RN to talk about participating in the AIMS Nursing Study. The patient wanted to read over the consent form and decide later. Study protocol was followed and patient was given study contact information.     George Newman RN

## 2024-07-18 NOTE — PROCEDURES
Seen on dialysis.  Electrolytes look good.  PD exchanges are going well.  Hemoglobin is noted, was given intravenous iron preoperatively, is on erythropoietin.  I asked her to try to dwell for the full 4 hours.  I would like to avoid her dry peritoneum during the day.  Surgery went well yesterday.

## 2024-07-18 NOTE — PROGRESS NOTES
Physical Therapy    Physical Therapy Evaluation    Patient Name: Isis Geronimo  MRN: [id number]  Today's Date: 7/18/2024   Time Calculation  Start Time: 1335  Stop Time: 1350  Time Calculation (min): 15 min    Assessment/Plan   PT Assessment  PT Assessment Results: Decreased endurance, Impaired balance, Decreased mobility, Decreased skin integrity, Pain  Rehab Prognosis: Excellent  Barriers to Discharge: none  Evaluation/Treatment Tolerance: Patient tolerated treatment well  Medical Staff Made Aware: Yes  Strengths: Ability to acquire knowledge, Housing layout, Premorbid level of function, Support of extended family/friends  Barriers to Participation: Comorbidities  End of Session Communication: Bedside nurse  Assessment Comment: Pt is now presenting with decreased functional mobility associated with recent surgical procedure.  Pt will benefit from continued PT to address functional limitations and reduce risk of immobility and falls.  End of Session Patient Position: Up in chair, Alarm on  IP OR SWING BED PT PLAN  Inpatient or Swing Bed: Inpatient  PT Plan  Treatment/Interventions: Bed mobility, Transfer training, Gait training, Neuromuscular re-education, Balance training, Endurance training, Therapeutic activity  PT Plan: Ongoing PT  PT Frequency: 3 times per week  PT Discharge Recommendations: Low intensity level of continued care  Equipment Recommended upon Discharge:  (TBD)  PT Recommended Transfer Status: Assist x1  PT - OK to Discharge: Yes (PT POC established)      Subjective   General Visit Information:  General  Reason for Referral: Pt admitted from home with c/o N/V, now s/p CABG POD #1.  Past Medical History Relevant to Rehab: ESRD on PD, anemia, RA, emphysema, vertigo, HTN, HLD  Family/Caregiver Present: No  Co-Treatment: OT  Co-Treatment Reason: Co-eval to maximize pt safety and participation  Prior to Session Communication: Bedside nurse  Patient Position Received: Bed, 3 rail up, Alarm on  Preferred  Learning Style: auditory, kinesthetic  General Comment: Pt is willing to participate in PT, agreeable to sit up in chair, but declines amb into hallway at this time.  Home Living:  Home Living  Type of Home: Apartment  Lives With: Alone  Home Adaptive Equipment: None  Home Layout: One level  Home Access: No concerns  Home Living Comments: has 3 dtrs that can assist  Prior Level of Function:  Prior Function Per Pt/Caregiver Report  Level of [place]: Independent with ADLs and functional transfers, Independent with homemaking with ambulation  ADL Assistance: Independent  Homemaking Assistance: Independent  Ambulatory Assistance: Independent  Vocational: Retired (nurse)  Precautions:  Precautions  Medical Precautions: Fall precautions, Chest tube  Post-Surgical Precautions: Move in the Tube  Precautions Comment: MITT prec and logroll reviewed with pt, handout provided.    Objective   Pain:  Pain Assessment  Pain Assessment: 0-10  0-10 (Numeric) Pain Score: 7  Pain Type: Surgical pain  Pain Location: Chest  Pain Orientation: Left  Cognition:  Cognition  Overall Cognitive Status: Within Functional Limits  Orientation Level: Oriented X4  Insight: Within function limits  Impulsive: Within functional limits    General Assessments:   Activity Tolerance  Endurance: Tolerates 10 - 20 min exercise with multiple rests    Perception  Inattention/Neglect: Appears intact  Initiation: Appears intact  Motor Planning: Appears intact    Static Sitting Balance  Static Sitting-Balance Support: Feet supported, No upper extremity supported  Static Sitting-Level of Assistance: Distant supervision    Static Standing Balance  Static Standing-Balance Support: No upper extremity supported  Static Standing-Level of Assistance: Contact guard  Static Standing-Comment/Number of Minutes: 1  Dynamic Standing Balance  Dynamic Standing-Balance Support: No upper extremity supported  Dynamic Standing-Balance: Turning  Dynamic Standing-Comments:  CGA  Functional Assessments:  Bed Mobility  Bed Mobility: Yes  Bed Mobility 1  Bed Mobility 1: Supine to sitting  Level of Assistance 1: Contact guard, Minimal verbal cues  Bed Mobility Comments 1: Instructed pt on logroll for transfer, but pt reports too much pain. Pt proceeds to transfer to long sit and then swings legs over the EOB. Will continue to encourage logroll in subsequent sessions.    Transfers  Transfer: Yes  Transfer 1  Technique 1: Sit to stand, Stand to sit  Transfer Device 1:  (no AD)  Transfer Level of Assistance 1: Contact guard    Ambulation/Gait Training  Ambulation/Gait Training Performed: Yes  Ambulation/Gait Training 1  Surface 1: Level tile  Device 1: No device  Assistance 1: Contact guard  Quality of Gait 1: Decreased step length  Comments/Distance (ft) 1: 4' (Pt declines amb further at this time. Will continue to encourage increased activity.)  Extremity/Trunk Assessments:  RLE   RLE : Within Functional Limits  LLE   LLE : Within Functional Limits  Outcome Measures:  [facility] Basic Mobility  Turning from your back to your side while in a flat bed without using bedrails: A little  Moving from lying on your back to sitting on the side of a flat bed without using bedrails: A little  Moving to and from bed to chair (including a wheelchair): A little  Standing up from a chair using your arms (e.g. wheelchair or bedside chair): A little  To walk in hospital room: A little  Climbing 3-5 steps with railing: A lot  Basic Mobility - Total Score: 17    FSS-ICU  Ambulation: Walks <50 feet with any assistance x1 or walks any distance with assistance x2 people  Rolling: Minimal assistance (performs 75% or more of task)  Sitting: Supervision or set-up only  Transfer Sit-to-Stand: Minimal assistance (performs 75% or more of task)  Transfer Supine-to-Sit: Minimal assistance (performs 75% or more of task)  Total Score: 18    Encounter Problems       Encounter Problems (Active)       Balance       STG -  Maintains dynamic standing balance without upper extremity support x 8 minutes with indep       Start:  07/18/24    Expected End:  08/01/24       INTERVENTIONS:  1. Practice standing with minimal support.  2. Educate patient about standing tolerance.  3. Educate patient about independence with gait, transfers, and ADL's.  4. Educate patient about use of assistive device.  5. Educate patient about self-directed care.            Mobility       STG - Patient will ambulate >200' indep       Start:  07/18/24    Expected End:  08/01/24               PT Transfers       STG - Transfer from bed to chair indep       Start:  07/18/24    Expected End:  08/01/24            STG - Patient to transfer to and from sit to supine via logroll mod indep with HOB flat       Start:  07/18/24    Expected End:  08/01/24            STG - Patient will transfer sit to and from stand indep       Start:  07/18/24    Expected End:  08/01/24            STG - Patient will follow MITT precautions during transfers 100% of the time without cues       Start:  07/18/24    Expected End:  08/01/24                   Education Documentation  Handouts, taught by Idalmis Yañez, PT at 7/18/2024  3:01 PM.  Learner: Patient  Readiness: Acceptance  Method: Explanation, Handout  Response: Verbalizes Understanding, Demonstrated Understanding, Needs Reinforcement    Precautions, taught by Idalmis Yañez, PT at 7/18/2024  3:01 PM.  Learner: Patient  Readiness: Acceptance  Method: Explanation, Handout  Response: Verbalizes Understanding, Demonstrated Understanding, Needs Reinforcement    Body Mechanics, taught by Idalmis Yañez, PT at 7/18/2024  3:01 PM.  Learner: Patient  Readiness: Acceptance  Method: Explanation, Handout  Response: Verbalizes Understanding, Demonstrated Understanding, Needs Reinforcement    Mobility Training, taught by Idalmis Yañez, PT at 7/18/2024  3:01 PM.  Learner: Patient  Readiness: Acceptance  Method: Explanation, Handout  Response:  Verbalizes Understanding, Demonstrated Understanding, Needs Reinforcement    Education Comments  No comments found.

## 2024-07-19 ENCOUNTER — HOME HEALTH ADMISSION (OUTPATIENT)
Dept: HOME HEALTH SERVICES | Facility: HOME HEALTH | Age: 79
End: 2024-07-19
Payer: COMMERCIAL

## 2024-07-19 PROBLEM — I21.4 NSTEMI (NON-ST ELEVATED MYOCARDIAL INFARCTION) (MULTI): Status: RESOLVED | Noted: 2024-07-07 | Resolved: 2024-07-19

## 2024-07-19 LAB
ALBUMIN SERPL BCP-MCNC: 2.8 G/DL (ref 3.4–5)
ANION GAP SERPL CALC-SCNC: 13 MMOL/L (ref 10–20)
ATRIAL RATE: 72 BPM
BLOOD EXPIRATION DATE: NORMAL
BUN SERPL-MCNC: 19 MG/DL (ref 6–23)
CALCIUM SERPL-MCNC: 8.8 MG/DL (ref 8.6–10.3)
CHLORIDE SERPL-SCNC: 99 MMOL/L (ref 98–107)
CO2 SERPL-SCNC: 26 MMOL/L (ref 21–32)
CREAT SERPL-MCNC: 7.18 MG/DL (ref 0.5–1.05)
DISPENSE STATUS: NORMAL
EGFRCR SERPLBLD CKD-EPI 2021: 5 ML/MIN/1.73M*2
ERYTHROCYTE [DISTWIDTH] IN BLOOD BY AUTOMATED COUNT: 16 % (ref 11.5–14.5)
GLUCOSE BLD MANUAL STRIP-MCNC: 95 MG/DL (ref 74–99)
GLUCOSE SERPL-MCNC: 74 MG/DL (ref 74–99)
HCT VFR BLD AUTO: 29.3 % (ref 36–46)
HGB BLD-MCNC: 9.7 G/DL (ref 12–16)
MAGNESIUM SERPL-MCNC: 1.7 MG/DL (ref 1.6–2.4)
MCH RBC QN AUTO: 27.2 PG (ref 26–34)
MCHC RBC AUTO-ENTMCNC: 33.1 G/DL (ref 32–36)
MCV RBC AUTO: 82 FL (ref 80–100)
NRBC BLD-RTO: 0 /100 WBCS (ref 0–0)
P AXIS: 42 DEGREES
P OFFSET: 196 MS
P ONSET: 146 MS
PHOSPHATE SERPL-MCNC: 4.2 MG/DL (ref 2.5–4.9)
PLATELET # BLD AUTO: 205 X10*3/UL (ref 150–450)
POTASSIUM SERPL-SCNC: 3.9 MMOL/L (ref 3.5–5.3)
PR INTERVAL: 148 MS
PRODUCT BLOOD TYPE: 5100
PRODUCT CODE: NORMAL
Q ONSET: 220 MS
QRS COUNT: 12 BEATS
QRS DURATION: 96 MS
QT INTERVAL: 460 MS
QTC CALCULATION(BAZETT): 503 MS
QTC FREDERICIA: 489 MS
R AXIS: 38 DEGREES
RBC # BLD AUTO: 3.56 X10*6/UL (ref 4–5.2)
SODIUM SERPL-SCNC: 134 MMOL/L (ref 136–145)
T AXIS: 106 DEGREES
T OFFSET: 450 MS
UNIT ABO: NORMAL
UNIT NUMBER: NORMAL
UNIT RH: NORMAL
UNIT VOLUME: 350
VENTRICULAR RATE: 72 BPM
WBC # BLD AUTO: 9.9 X10*3/UL (ref 4.4–11.3)
XM INTEP: NORMAL

## 2024-07-19 PROCEDURE — 94668 MNPJ CHEST WALL SBSQ: CPT

## 2024-07-19 PROCEDURE — 2500000004 HC RX 250 GENERAL PHARMACY W/ HCPCS (ALT 636 FOR OP/ED)

## 2024-07-19 PROCEDURE — 80069 RENAL FUNCTION PANEL: CPT

## 2024-07-19 PROCEDURE — 2500000001 HC RX 250 WO HCPCS SELF ADMINISTERED DRUGS (ALT 637 FOR MEDICARE OP)

## 2024-07-19 PROCEDURE — 83735 ASSAY OF MAGNESIUM: CPT

## 2024-07-19 PROCEDURE — 2500000004 HC RX 250 GENERAL PHARMACY W/ HCPCS (ALT 636 FOR OP/ED): Performed by: NURSE PRACTITIONER

## 2024-07-19 PROCEDURE — 2020000001 HC ICU ROOM DAILY

## 2024-07-19 PROCEDURE — 2500000004 HC RX 250 GENERAL PHARMACY W/ HCPCS (ALT 636 FOR OP/ED): Mod: JZ

## 2024-07-19 PROCEDURE — 97116 GAIT TRAINING THERAPY: CPT | Mod: GP,CQ

## 2024-07-19 PROCEDURE — 94660 CPAP INITIATION&MGMT: CPT

## 2024-07-19 PROCEDURE — 85027 COMPLETE CBC AUTOMATED: CPT

## 2024-07-19 PROCEDURE — 2500000005 HC RX 250 GENERAL PHARMACY W/O HCPCS

## 2024-07-19 PROCEDURE — 97530 THERAPEUTIC ACTIVITIES: CPT | Mod: GO

## 2024-07-19 PROCEDURE — 82947 ASSAY GLUCOSE BLOOD QUANT: CPT

## 2024-07-19 PROCEDURE — 9420000001 HC RT PATIENT EDUCATION 5 MIN

## 2024-07-19 PROCEDURE — 2500000001 HC RX 250 WO HCPCS SELF ADMINISTERED DRUGS (ALT 637 FOR MEDICARE OP): Performed by: NURSE PRACTITIONER

## 2024-07-19 PROCEDURE — 36415 COLL VENOUS BLD VENIPUNCTURE: CPT

## 2024-07-19 PROCEDURE — 97530 THERAPEUTIC ACTIVITIES: CPT | Mod: GP,CQ

## 2024-07-19 PROCEDURE — 2500000002 HC RX 250 W HCPCS SELF ADMINISTERED DRUGS (ALT 637 FOR MEDICARE OP, ALT 636 FOR OP/ED): Performed by: NURSE PRACTITIONER

## 2024-07-19 RX ORDER — POTASSIUM CHLORIDE 20 MEQ/1
20 TABLET, EXTENDED RELEASE ORAL ONCE
Status: COMPLETED | OUTPATIENT
Start: 2024-07-19 | End: 2024-07-19

## 2024-07-19 RX ORDER — B-COMPLEX WITH VITAMIN C
1 TABLET ORAL DAILY
Status: DISCONTINUED | OUTPATIENT
Start: 2024-07-19 | End: 2024-07-20 | Stop reason: HOSPADM

## 2024-07-19 RX ORDER — MAGNESIUM SULFATE HEPTAHYDRATE 40 MG/ML
2 INJECTION, SOLUTION INTRAVENOUS ONCE
Status: COMPLETED | OUTPATIENT
Start: 2024-07-19 | End: 2024-07-19

## 2024-07-19 RX ORDER — HYDROMORPHONE HYDROCHLORIDE 0.2 MG/ML
0.2 INJECTION INTRAMUSCULAR; INTRAVENOUS; SUBCUTANEOUS
Status: DISCONTINUED | OUTPATIENT
Start: 2024-07-19 | End: 2024-07-19

## 2024-07-19 RX ORDER — AMLODIPINE BESYLATE 10 MG/1
10 TABLET ORAL DAILY
Status: DISCONTINUED | OUTPATIENT
Start: 2024-07-19 | End: 2024-07-20 | Stop reason: HOSPADM

## 2024-07-19 SDOH — ECONOMIC STABILITY: HOUSING INSECURITY: IN THE PAST 12 MONTHS, HOW MANY TIMES HAVE YOU MOVED WHERE YOU WERE LIVING?: 0

## 2024-07-19 SDOH — ECONOMIC STABILITY: HOUSING INSECURITY: AT ANY TIME IN THE PAST 12 MONTHS, WERE YOU HOMELESS OR LIVING IN A SHELTER (INCLUDING NOW)?: NO

## 2024-07-19 SDOH — ECONOMIC STABILITY: TRANSPORTATION INSECURITY
IN THE PAST 12 MONTHS, HAS THE LACK OF TRANSPORTATION KEPT YOU FROM MEDICAL APPOINTMENTS OR FROM GETTING MEDICATIONS?: NO

## 2024-07-19 SDOH — ECONOMIC STABILITY: INCOME INSECURITY: HOW HARD IS IT FOR YOU TO PAY FOR THE VERY BASICS LIKE FOOD, HOUSING, MEDICAL CARE, AND HEATING?: NOT HARD AT ALL

## 2024-07-19 SDOH — ECONOMIC STABILITY: INCOME INSECURITY: IN THE LAST 12 MONTHS, WAS THERE A TIME WHEN YOU WERE NOT ABLE TO PAY THE MORTGAGE OR RENT ON TIME?: NO

## 2024-07-19 SDOH — ECONOMIC STABILITY: TRANSPORTATION INSECURITY
IN THE PAST 12 MONTHS, HAS LACK OF TRANSPORTATION KEPT YOU FROM MEETINGS, WORK, OR FROM GETTING THINGS NEEDED FOR DAILY LIVING?: NO

## 2024-07-19 ASSESSMENT — COGNITIVE AND FUNCTIONAL STATUS - GENERAL
DRESSING REGULAR UPPER BODY CLOTHING: A LITTLE
DRESSING REGULAR LOWER BODY CLOTHING: A LITTLE
PERSONAL GROOMING: A LITTLE
DRESSING REGULAR LOWER BODY CLOTHING: A LITTLE
WALKING IN HOSPITAL ROOM: A LITTLE
HELP NEEDED FOR BATHING: A LITTLE
WALKING IN HOSPITAL ROOM: A LITTLE
DAILY ACTIVITIY SCORE: 19
TURNING FROM BACK TO SIDE WHILE IN FLAT BAD: A LITTLE
MOVING FROM LYING ON BACK TO SITTING ON SIDE OF FLAT BED WITH BEDRAILS: A LITTLE
PERSONAL GROOMING: A LITTLE
HELP NEEDED FOR BATHING: A LITTLE
MOVING TO AND FROM BED TO CHAIR: A LITTLE
MOBILITY SCORE: 17
TOILETING: A LITTLE
STANDING UP FROM CHAIR USING ARMS: A LITTLE
DRESSING REGULAR UPPER BODY CLOTHING: A LITTLE
MOVING FROM LYING ON BACK TO SITTING ON SIDE OF FLAT BED WITH BEDRAILS: A LITTLE
TURNING FROM BACK TO SIDE WHILE IN FLAT BAD: A LITTLE
CLIMB 3 TO 5 STEPS WITH RAILING: A LOT
MOBILITY SCORE: 17
CLIMB 3 TO 5 STEPS WITH RAILING: A LOT
STANDING UP FROM CHAIR USING ARMS: A LITTLE
MOVING TO AND FROM BED TO CHAIR: A LITTLE
DAILY ACTIVITIY SCORE: 19
TOILETING: A LITTLE

## 2024-07-19 ASSESSMENT — PAIN SCALES - GENERAL
PAINLEVEL_OUTOF10: 8
PAINLEVEL_OUTOF10: 10 - WORST POSSIBLE PAIN
PAINLEVEL_OUTOF10: 6
PAINLEVEL_OUTOF10: 6
PAINLEVEL_OUTOF10: 3
PAINLEVEL_OUTOF10: 0 - NO PAIN

## 2024-07-19 ASSESSMENT — PAIN - FUNCTIONAL ASSESSMENT
PAIN_FUNCTIONAL_ASSESSMENT: 0-10

## 2024-07-19 ASSESSMENT — PAIN DESCRIPTION - ORIENTATION: ORIENTATION: MID

## 2024-07-19 ASSESSMENT — PAIN DESCRIPTION - LOCATION: LOCATION: CHEST

## 2024-07-19 NOTE — PROGRESS NOTES
07/19/24 1724   Discharge Planning   Home or Post Acute Services In home services   Expected Discharge Disposition  Services     [facility] unable to accept, referral sent to [facility] via fax. SW will follow.

## 2024-07-19 NOTE — PROCEDURES
Seen on dialysis. Pleased with progress, BPs slightly high. Hb improving, received IV iron, on an SHAGGY. She will use the incentive spirometer and walk with assistance today.

## 2024-07-19 NOTE — PROGRESS NOTES
07/19/24 1218   Discharge Planning   Assistance Needed met with patient and daughter. rec for patient is [facility]. they are agreeable. patient lives alone; they will decide amongst children if patient will stay with one of them, or if one of the children wlll stay with the patient at her home     Isis Geronimo is a 79 y.o. female on day 12 of admission presenting with NSTEMI (non-ST elevated myocardial infarction) (Multi).    Lyly Lara RN

## 2024-07-19 NOTE — PROGRESS NOTES
Physical Therapy                 Therapy Communication Note    Patient Name: Isis Geronimo  MRN: [id number]  Today's Date: 7/19/2024     Discipline: Physical Therapy    Missed Visit Reason: Missed Visit Reason:  (per RN pt with OT at this time)    Missed Time: Attempt    Comment:

## 2024-07-19 NOTE — PROGRESS NOTES
Occupational Therapy    OT Treatment    Patient Name: Isis Geronimo  MRN: [id number]  Today's Date: 7/19/2024  Time Calculation  Start Time: 0944  Stop Time: 1002  Time Calculation (min): 18 min        Assessment:  OT Assessment:  (Pt agreeable to therapy with max encouragement, declined any ADLs, and only agreeable to OOB transfer to chair. Educated pt on MITT and incentive spirometry use. Continue to recommend low intensity therapy at discharge.)  Prognosis: Good  Barriers to Discharge: None  Evaluation/Treatment Tolerance: Patient limited by pain  Medical Staff Made Aware: Yes  End of Session Communication: Bedside nurse  End of Session Patient Position: Up in chair, Alarm off, not on at start of session  OT Assessment Results: Decreased ADL status, Decreased cognition, Decreased endurance, Decreased IADLs  Prognosis: Good  Barriers to Discharge: None  Evaluation/Treatment Tolerance: Patient limited by pain  Medical Staff Made Aware: Yes  Strengths: Support of Caregivers, Ability to acquire knowledge, Premorbid level of function, Support of extended family/friends  Barriers to Participation: Comorbidities  Plan:  Treatment Interventions: ADL retraining, Functional transfer training, Endurance training, Equipment evaluation/education  OT Frequency: 3 times per week  OT Discharge Recommendations: Low intensity level of continued care  OT Recommended Transfer Status: Stand by assist  OT - OK to Discharge: Yes (per OT POC)  Treatment Interventions: ADL retraining, Functional transfer training, Endurance training, Equipment evaluation/education    Subjective   Previous Visit Info:  OT Last Visit  OT Received On: 07/19/24  General:  General  Reason for Referral: Pt admitted from home with c/o N/V, now s/p CABG.  Referred By: HAZEL Bain CNP  Past Medical History Relevant to Rehab: ESRD on PD, anemia, RA, emphysema, vertigo, HTN, HLD  Family/Caregiver Present: Yes  Caregiver Feedback: daughter in room but left mid  session  Prior to Session Communication: Bedside nurse  Patient Position Received: Bed, 3 rail up, Alarm off, not on at start of session  Preferred Learning Style: auditory, kinesthetic  General Comment:  (pt supine in bed upon entry, agreeable to get up to chair with ma encouragement, declined any ADLs, reviewed MITT protocol prior to OOB activity.)  Precautions:  Medical Precautions: Fall precautions  Post-Surgical Precautions: Move in the Tube  Precautions Comment:  (MITT precautions and logroll reviewed; pt reports no one reviewed this with her prior however pt with handout in room. pt reports she is unable to complete logroll due to pain, instructed pt to utilize pillow to splint chest; no evidence of learning)  Vital Signs:     Pain:  Pain Assessment  Pain Assessment: 0-10  0-10 (Numeric) Pain Score: 8 (RN notified)  Pain Type: Surgical pain  Pain Location: Chest  Pain Interventions: Repositioned, Ambulation/increased activity    Objective    Cognition:  Cognition  Orientation Level: Oriented X4  Following Commands: Follows one step commands with increased time  Attention: Within Functional Limits  Memory:  (pt unable to recall MITT precautions, per OT note had been reviewed with pt yesterday- pt denies anyone reviewing with her)  Safety/Judgement: Exceptions to WFL (decreased safety awareness, pt with difficulty following MITT precautions)  Insight: Mild  Impulsive: Within functional limits          Bed Mobility/Transfers: Bed Mobility  Bed Mobility: Yes  Bed Mobility 1  Bed Mobility 1: Supine to sitting  Level of Assistance 1: Contact guard  Bed Mobility Comments 1:  (pt instructed to log roll, declined stating she is unable due to pain. Attempted to re-educate but pt proceeded to swing LE over EOB and pull self up using bedrail.)    Transfers  Transfer: Yes  Transfer 1  Technique 1: Sit to stand, Stand to sit  Transfer Device 1:  (no AD)  Transfer Level of Assistance 1: Close supervision  Trials/Comments 1:  increased time needed; pt only agreeable to bed to chair transfer. able to take 5-6 steps from EOB to chair with SBA       Sitting Balance:  Static Sitting Balance  Static Sitting-Balance Support: Feet supported  Static Sitting-Level of Assistance: Close supervision  Dynamic Sitting Balance  Dynamic Sitting-Balance Support: Feet supported  Dynamic Sitting-Balance: Reaching across midline  Dynamic Sitting-Comments: close supervision  Standing Balance:  Static Standing Balance  Static Standing-Balance Support:  (unilateral UE support)  Static Standing-Level of Assistance: Close supervision             Outcome Measures:[facility] Daily Activity  Putting on and taking off regular lower body clothing: A little  Bathing (including washing, rinsing, drying): A little  Putting on and taking off regular upper body clothing: A little  Toileting, which includes using toilet, bedpan or urinal: A little  Taking care of personal grooming such as brushing teeth: A little  Eating Meals: None  Daily Activity - Total Score: 19        Education Documentation  Handouts, taught by Meme Stewart OT at 7/19/2024 11:09 AM.  Learner: Patient  Readiness: Acceptance  Method: Explanation, Demonstration, Handout  Response: Needs Reinforcement, Verbalizes Understanding    Precautions, taught by Meme Stewart OT at 7/19/2024 11:09 AM.  Learner: Patient  Readiness: Acceptance  Method: Explanation, Demonstration, Handout  Response: Needs Reinforcement, Verbalizes Understanding    Education Comments  No comments found.             Goals:  Encounter Problems       Encounter Problems (Active)       ADLs       Patient will perform UB and LB bathing 90%  with contact guard assist level of assistance and adaptive equipment prn . (Progressing)       Start:  07/18/24    Expected End:  08/01/24            Patient with complete upper body dressing with independent level of assistance donning and doffing all UE clothes with no adaptive equipment while  supported sitting (Progressing)       Start:  07/18/24    Expected End:  08/01/24            Patient with complete lower body dressing with minimal assist  level of assistance donning and doffing all LE clothes  with PRN adaptive equipment while supported sitting (Progressing)       Start:  07/18/24    Expected End:  08/01/24               COGNITION/SAFETY       Patient will recall and adhere to MITT precautions during all functional mobility/ADL tasks in order to demonstrate improved understanding and promote healing post op (Progressing)       Start:  07/18/24    Expected End:  08/01/24               TRANSFERS       Patient will perform bed mobility supervision level of assistance and bed rails in order to improve safety and independence with mobility (Progressing)       Start:  07/18/24    Expected End:  08/01/24            Patient will complete functional transfer to chair  with least restrictive device with modified independent level of assistance. (Progressing)       Start:  07/18/24    Expected End:  08/01/24

## 2024-07-19 NOTE — PROGRESS NOTES
07/19/24 1218   Discharge Planning   Assistance Needed met with patient and daughter. rec for patient is [facility]. they are agreeable. patient lives alone; they will decide amongst children if patient will stay with one of them, or if one of the children wlll stay with the patient at her home     Isis Geronimo is a 79 y.o. female on day 12 of admission presenting with NSTEMI (non-ST elevated myocardial infarction) (Multi).    Lyly Lara, RN    230  I was informed at this time by ct np that patient is min cabg, and should be ready tomorrow  Family concerned; feel she is not walking enough. Rec is low for pt ot, I offered to provide lists of [place] home health agencies, as family feels she needs someone with her at all times  I sent message to liaison from [facility], referral is not in yet

## 2024-07-19 NOTE — DISCHARGE INSTRUCTIONS
Continue taking short, frequent walks every day.    Do not lift/push/pull more than 10lbs for 3 months (sternal precautions).    You may shower. Recommend using shower chair and showering with lukewarm water at first. Do not scrub incision; let soapy water run down. Pat to dry. Use fragrance-free soaps, detergents, etc. Do not apply scented oils, cologne, perfumes, detergents, soaps, etc to incision sites.    Do not drive in the front seat ( or Passenger side) until you are cleared by your surgeon in 4-6 weeks or while taking opioids.    Please call 911 or go to the Emergency Department if you experience any of the following: extreme shortness of breath, severe or crushing chest pain, coughing up bloody or frothy sputum, or any other concerning symptoms.  PLEASE TAKE ONLY MEDICINES THAT ARE LISTED ON YOUR DISCHARGE PAPERWORK.  Do not restart any other medicines that are not listed.  If you have any questions or concerns please call the ICU ([phone]) to speak with the Nurse Practitioner or Physician Assistant.    “Keep Your Move in the Tube!!”  and think of a [place] dinosaur!  Please refer to the “Move in the Tube” handout.  Load bearing activities can be completed if you are “staying in the tube.” If you are attempting load bearing activities, let pain be your guide with when trying an activity “out of the tube”. If an activity hurts or is uncomfortable go back to doing it while you stay “in the tube”.  There is no time limit to “stay in the tube”.    Non-load bearing activities which are your activities of daily living can be completed with your arms “out of the tube” as long as you remain pain free. Some activities of daily living examples are dressing, personal care, showering, washing hair, and toilet hygiene.    We are unable to provide any refills for narcotic pain medicine once you are discharged. We recommend you use acetaminophen as directed once you have finished your narcotic pain  medicine.    Continue 15-20 minute walks 5 times a day, you have no limitations on stairs.    Please keep a log of your temperature, blood pressure, and weights.  If you have diabetes please keep a log of your blood glucoses also.  Please bring your log to your follow up appointments.    Call the Cardiac Surgery Nurse Practitioner/ Physician Assistant ([phone]) if you gain 3 or more pounds in 1 day or 5 or more pounds in 1 week; shortness of breath not related to exercise, while lying flat, or that wakes you up from sleep; increase in swelling feet, ankles or abdomen.    If you begin to notice an increase your mouth feeling dry or dizziness when changing your position please call to speak with the nurse practitioner, you may need your furosemide (Lasix) adjusted.    Your temporary pacing wires were cut. It is safe to have a MRI if ever needed. Please call and schedule an appointment with your cardiac surgeon if you notice the wires poking through your skin or any drainage from the site where pacing wires were located.    Please continue to wear surgical bra, you may remove it to shower.

## 2024-07-19 NOTE — PROGRESS NOTES
Physical Therapy    Physical Therapy Treatment    Patient Name: Isis Geronimo  MRN: [id number]  Today's Date: 7/19/2024  Time Calculation  Start Time: 1101  Stop Time: 1127  Time Calculation (min): 26 min    Assessment/Plan   PT Assessment  End of Session Communication: Bedside nurse  Assessment Comment: pt able to increase gait distance  End of Session Patient Position: Bed, 3 rail up, Alarm off, not on at start of session  PT Plan  Inpatient/Swing Bed or Outpatient: Inpatient  PT Plan  Treatment/Interventions: Bed mobility, Transfer training, Gait training  PT Plan: Ongoing PT  PT Frequency: 3 times per week  PT Discharge Recommendations: Low intensity level of continued care  Equipment Recommended upon Discharge:  (TBD)  PT Recommended Transfer Status: Assist x1  PT - OK to Discharge: Yes (per POC)      General Visit Information:   PT  Visit  PT Received On: 07/19/24  General  Reason for Referral: Pt admitted from home with c/o N/V, now s/p CABG.  Referred By: HAZEL Bain CNP  Past Medical History Relevant to Rehab: ESRD on PD, anemia, RA, emphysema, vertigo, HTN, HLD  Missed Visit: Yes  Missed Visit Reason:  (per RN pt with OT at this time)  Prior to Session Communication: Bedside nurse  Patient Position Received: Up in chair, Alarm off, not on at start of session  Preferred Learning Style: auditory, kinesthetic  General Comment: pt req max encouragement to particpate    Subjective   Precautions:  Precautions  Medical Precautions: Fall precautions  Post-Surgical Precautions: Move in the Tube  Vital Signs:  Vital Signs  Heart Rate: 80    Objective   Pain:  Pain Assessment  0-10 (Numeric) Pain Score: 10 - Worst possible pain (pain rating did not match body langauge and performance)  Cognition:  Cognition  Overall Cognitive Status: Within Functional Limits  Coordination:     Postural Control:  Static Standing Balance  Static Standing-Comment/Number of Minutes: pt performed static standing balance with UE support at   and  SBA,  x4 min  Extremity/Trunk Assessments:    Activity Tolerance:     Treatments:            Bed Mobility  Bed Mobility: Yes  Bed Mobility 1  Bed Mobility 1: Sitting to supine  Level of Assistance 1: Minimum assistance  Bed Mobility Comments 1: min assist req to bring BLE over EOB, performed slowly    Ambulation/Gait Training  Ambulation/Gait Training Performed: Yes  Ambulation/Gait Training 1  Surface 1: Level tile  Device 1: Rolling walker  Gait Support Devices: Gait belt  Assistance 1: Close supervision  Comments/Distance (ft) 1: 225x1 (pt performed with slow step through gait pattern, decreased step length. pt req x2 short standing rest breaks 2/2 BLE fatigue.  no overt LOB. increased time req)  Transfer 1  Technique 1: Sit to stand, Stand to sit  Transfer Device 1: Walker  Transfer Level of Assistance 1: Close supervision  Trials/Comments 1: vc/tc for hand placment on solid sitting surface and not RW.    Outcome Measures:  [facility] Basic Mobility  Turning from your back to your side while in a flat bed without using bedrails: A little  Moving from lying on your back to sitting on the side of a flat bed without using bedrails: A little  Moving to and from bed to chair (including a wheelchair): A little  Standing up from a chair using your arms (e.g. wheelchair or bedside chair): A little  To walk in hospital room: A little  Climbing 3-5 steps with railing: A lot  Basic Mobility - Total Score: 17    Education Documentation  Body Mechanics, taught by Kaden Barker PTA at 7/19/2024  3:28 PM.  Learner: Patient  Readiness: Acceptance  Method: Explanation  Response: Verbalizes Understanding    Mobility Training, taught by Kaden Barker PTA at 7/19/2024  3:28 PM.  Learner: Patient  Readiness: Acceptance  Method: Explanation  Response: Verbalizes Understanding    Education Comments  No comments found.        OP EDUCATION:       Encounter Problems       Encounter Problems (Active)       Balance       STG -  Maintains dynamic standing balance without upper extremity support x 8 minutes with indep       Start:  07/18/24    Expected End:  08/01/24       INTERVENTIONS:  1. Practice standing with minimal support.  2. Educate patient about standing tolerance.  3. Educate patient about independence with gait, transfers, and ADL's.  4. Educate patient about use of assistive device.  5. Educate patient about self-directed care.            Mobility       STG - Patient will ambulate >200' indep       Start:  07/18/24    Expected End:  08/01/24               PT Transfers       STG - Transfer from bed to chair indep (Progressing)       Start:  07/18/24    Expected End:  08/01/24            STG - Patient to transfer to and from sit to supine via logroll mod indep with HOB flat (Progressing)       Start:  07/18/24    Expected End:  08/01/24            STG - Patient will transfer sit to and from stand indep (Progressing)       Start:  07/18/24    Expected End:  08/01/24            STG - Patient will follow MITT precautions during transfers 100% of the time without cues       Start:  07/18/24    Expected End:  08/01/24

## 2024-07-19 NOTE — PROGRESS NOTES
"Isis Geronimo is a 79 y.o. female on day 12 of admission presenting with NSTEMI (non-ST elevated myocardial infarction) (Multi).    Subjective   MARK reported       Objective     Physical Exam  Constitutional:       General: She is not in acute distress.     Appearance: Normal appearance.      Comments: Sitting up in chair on RA on cell phone and in no acute distress   HENT:      Mouth/Throat:      Mouth: Mucous membranes are moist.   Cardiovascular:      Rate and Rhythm: Normal rate and regular rhythm.      Pulses: Normal pulses.   Pulmonary:      Effort: Pulmonary effort is normal.      Breath sounds: Normal breath sounds.   Abdominal:      General: Bowel sounds are normal. There is no distension.      Palpations: Abdomen is soft.      Tenderness: There is no abdominal tenderness.   Musculoskeletal:         General: Normal range of motion.   Skin:     General: Skin is warm and dry.      Capillary Refill: Capillary refill takes less than 2 seconds.      Comments: Left midcab surgical incision dressing dry and intact, no strikethrough noted.    Neurological:      General: No focal deficit present.      Mental Status: She is alert and oriented to person, place, and time. Mental status is at baseline.   Psychiatric:         Mood and Affect: Mood normal.         Last Recorded Vitals  Blood pressure 159/63, pulse 78, temperature 36.6 °C (97.8 °F), temperature source Temporal, resp. rate 23, height 1.6 m (5' 3\"), weight 45.8 kg (100 lb 15.5 oz), SpO2 98%.  Intake/Output last 3 Shifts:  I/O last 3 completed shifts:  In: 6106.5 (133.3 mL/kg) [I.V.:106.5 (2.3 mL/kg); Other:6000]  Out: 7875 (171.9 mL/kg) [Urine:775 (0.5 mL/kg/hr); Other:6900; Chest Tube:200]  Weight: 45.8 kg     Relevant Results  Scheduled medications  acetaminophen, 650 mg, oral, q6h  aspirin, 81 mg, oral, Daily  atorvastatin, 80 mg, oral, Daily  carvedilol, 12.5 mg, oral, BID  ceFAZolin, 2 g, intravenous, q24h  clopidogrel, 75 mg, oral, Daily  dextrose " 1.5% - LOW calcium 2.5mEq/L 2,000 mL peritoneal dialysate, , intraperitoneal, 4x daily  epoetin shawn or biosimilar, 150 Units/kg, subcutaneous, Every Sunday  heparin (porcine), 5,000 Units, subcutaneous, q8h  insulin lispro, 0-15 Units, subcutaneous, With meals & nightly  lidocaine, 1 patch, transdermal, q24h  magnesium sulfate, 2 g, intravenous, Once  polyethylene glycol, 17 g, oral, Daily  potassium chloride CR, 20 mEq, oral, Once  sennosides-docusate sodium, 2 tablet, oral, BID      Continuous medications     PRN medications  PRN medications: dextrose **OR** glucagon, naloxone, ondansetron ODT **OR** ondansetron, oxyCODONE, oxyCODONE, oxygen, oxygen, phenyleph-min oil-petrolatum, prochlorperazine **OR** prochlorperazine **OR** prochlorperazine    Results for orders placed or performed during the hospital encounter of 07/06/24 (from the past 24 hour(s))   POCT GLUCOSE   Result Value Ref Range    POCT Glucose 114 (H) 74 - 99 mg/dL   POCT GLUCOSE   Result Value Ref Range    POCT Glucose 134 (H) 74 - 99 mg/dL   POCT GLUCOSE   Result Value Ref Range    POCT Glucose 160 (H) 74 - 99 mg/dL   POCT GLUCOSE   Result Value Ref Range    POCT Glucose 64 (L) 74 - 99 mg/dL   POCT GLUCOSE   Result Value Ref Range    POCT Glucose 134 (H) 74 - 99 mg/dL   POCT GLUCOSE   Result Value Ref Range    POCT Glucose 124 (H) 74 - 99 mg/dL   Magnesium   Result Value Ref Range    Magnesium 1.70 1.60 - 2.40 mg/dL   CBC   Result Value Ref Range    WBC 9.9 4.4 - 11.3 x10*3/uL    nRBC 0.0 0.0 - 0.0 /100 WBCs    RBC 3.56 (L) 4.00 - 5.20 x10*6/uL    Hemoglobin 9.7 (L) 12.0 - 16.0 g/dL    Hematocrit 29.3 (L) 36.0 - 46.0 %    MCV 82 80 - 100 fL    MCH 27.2 26.0 - 34.0 pg    MCHC 33.1 32.0 - 36.0 g/dL    RDW 16.0 (H) 11.5 - 14.5 %    Platelets 205 150 - 450 x10*3/uL   Renal Function Panel   Result Value Ref Range    Glucose 74 74 - 99 mg/dL    Sodium 134 (L) 136 - 145 mmol/L    Potassium 3.9 3.5 - 5.3 mmol/L    Chloride 99 98 - 107 mmol/L     Bicarbonate 26 21 - 32 mmol/L    Anion Gap 13 10 - 20 mmol/L    Urea Nitrogen 19 6 - 23 mg/dL    Creatinine 7.18 (H) 0.50 - 1.05 mg/dL    eGFR 5 (L) >60 mL/min/1.73m*2    Calcium 8.8 8.6 - 10.3 mg/dL    Phosphorus 4.2 2.5 - 4.9 mg/dL    Albumin 2.8 (L) 3.4 - 5.0 g/dL     XR chest 1 view    Result Date: 7/18/2024  Interpreted By:  Gennaro Koehler, STUDY: XR CHEST 1 VIEW; 7/18/2024 5:25 am   INDICATION: Signs/Symptoms:Post op cardiac surgery   COMPARISON: Radiographs 07/17/2024   ACCESSION NUMBER(S): [id number]   ORDERING CLINICIAN: VENKAT LYLE   TECHNIQUE: Single frontal view of the chest performed.   FINDINGS: LINES AND DEVICES: ET tube removed. Right IJ CVC at cavoatrial junction. Stable left apical chest tube.   LUNGS: Mild left basilar atelectasis. No new focal consolidation, pulmonary edema, pleural effusion or pneumothorax.   CARDIOMEDIASTINAL SILHOUETTE: The cardiomediastinal silhouette is stable. Status post recent CABG through anterior thoracotomy.       Mild left basilar atelectasis.   MACRO None   Signed by: Gennaro Koehler 7/18/2024 7:56 AM Dictation workstation:   [id number]    XR chest 1 view    Result Date: 7/17/2024  Interpreted By:  Gennaro Koehler, STUDY: XR CHEST 1 VIEW; 7/17/2024 4:38 pm   INDICATION: Signs/Symptoms:Post op cardiac surgery   COMPARISON: Preoperative radiographs 07/11/2024   ACCESSION NUMBER(S): [id number]   ORDERING CLINICIAN: VENKAT LYLE   TECHNIQUE: Single frontal view of the chest performed.   FINDINGS: LINES AND DEVICES: Tip of endotracheal tube approximately 3.8 cm above the lovely. Tip of left chest tube in the left lung apex. Tip of right IJ CVC in the inferior SVC.   LUNGS: No focal consolidation, pulmonary edema, pleural effusion or significant pneumothorax.   CARDIOMEDIASTINAL SILHOUETTE: The cardiomediastinal silhouette is within normal limits. Status post recent CABG through anterior thoracotomy.       No significant pneumothorax.   Tip of endotracheal  tube 3.8 cm above the lovely.   MACRO None   Signed by: Gennaro Koehler 7/17/2024 4:53 PM Dictation workstation:   [id number]     Assessment/Plan   Principal Problem:    NSTEMI (non-ST elevated myocardial infarction) (Multi)  Active Problems:    Other disorders of arteries, arterioles and capillaries in diseases classified elsewhere (CMS-HCC)  Isis Geronimo is a 79 y.o. female with PMH of ESRD on PD, Anemia, RA, Emphysema Current smoker,  Vertigo, HTN, HLD. She came to ED w/complaints of n/v, and abdominal pain, constipation, in ED work up significant for elevated  troponin which [place] at [address]. TTE showed an LVEF of 60% with LV diastolic dysfunction, moderate concentric LVH and aortic valve sclerosis, C today with Dr Juárez, which showed Severe MVD   Case reviewed at [facility]   7/17 s/p Left Anterior Thoracatomy  Off Pump MIDCAB (ALCARAZ to LAD) Dr Cristina Clifton     NEURO:    Expected acute postop pain  - Serial neuro and pain assessments   - Scheduled Tylenol PRN oxy  - Lidoderm patches  - PT/OT Consult, OOB to chair, MITT precautions      CV:  HLD,  CAD with MVD S/p Off Pump MIDCAB (LIMA to LAD)  HTN base line -160's/60-70's  LV function EF normal pre and post, no EP wires  - Maintain MAP goal MAP    - asa/ statin , Coreg d/t metoprolol intolerance  - Plavix  - Resume home antihypertensives as HD permits     PULM:    Current tobacco use  Emphysema   - Wean FiO2 maintaining SpO2 >90%.   - ABGs PRN  - Acapella, IS   - Smoking cessation     :   ESRD on PD  - Nephrology consulted  - PD exchanges per Renal  - Daily RFP and replete electrolytes as needed     GI:    GERD  - Regular diet  - PPI  - Bowel regimen- senokot and miralax  - Zofran PRN      HEME:    Acute on chronic post op blood loss anemia     - Daily CBC  - SCDs and heparin for DVT prophylaxis      ENDO:  No history of DM  - ISS  - Hypoglycemia protocol     ID:  Afebrile, no current indications of infection. MRSA negative.  - Renal dosed Ancef 2g q 24  hours x 3 doses  - Trend temps  - Trend WBCs     Seen and discussed with Dr. Miller at bedside.          I spent 60 minutes in the professional and overall care of this patient.      ALEXIS Ureña-CNP

## 2024-07-20 ENCOUNTER — APPOINTMENT (OUTPATIENT)
Dept: RADIOLOGY | Facility: HOSPITAL | Age: 79
End: 2024-07-20
Payer: COMMERCIAL

## 2024-07-20 VITALS
SYSTOLIC BLOOD PRESSURE: 110 MMHG | RESPIRATION RATE: 17 BRPM | TEMPERATURE: 99 F | DIASTOLIC BLOOD PRESSURE: 51 MMHG | WEIGHT: 111.6 LBS | OXYGEN SATURATION: 92 % | BODY MASS INDEX: 19.77 KG/M2 | HEIGHT: 63 IN | HEART RATE: 82 BPM

## 2024-07-20 LAB
ALBUMIN SERPL BCP-MCNC: 2.6 G/DL (ref 3.4–5)
ANION GAP SERPL CALC-SCNC: 15 MMOL/L (ref 10–20)
BUN SERPL-MCNC: 23 MG/DL (ref 6–23)
CALCIUM SERPL-MCNC: 8.2 MG/DL (ref 8.6–10.3)
CHLORIDE SERPL-SCNC: 97 MMOL/L (ref 98–107)
CO2 SERPL-SCNC: 26 MMOL/L (ref 21–32)
CREAT SERPL-MCNC: 6.97 MG/DL (ref 0.5–1.05)
EGFRCR SERPLBLD CKD-EPI 2021: 6 ML/MIN/1.73M*2
ERYTHROCYTE [DISTWIDTH] IN BLOOD BY AUTOMATED COUNT: 16.2 % (ref 11.5–14.5)
GLUCOSE SERPL-MCNC: 70 MG/DL (ref 74–99)
HCT VFR BLD AUTO: 30.6 % (ref 36–46)
HGB BLD-MCNC: 10 G/DL (ref 12–16)
MAGNESIUM SERPL-MCNC: 2 MG/DL (ref 1.6–2.4)
MCH RBC QN AUTO: 27 PG (ref 26–34)
MCHC RBC AUTO-ENTMCNC: 32.7 G/DL (ref 32–36)
MCV RBC AUTO: 83 FL (ref 80–100)
NRBC BLD-RTO: 0 /100 WBCS (ref 0–0)
PHOSPHATE SERPL-MCNC: 3.9 MG/DL (ref 2.5–4.9)
PLATELET # BLD AUTO: 235 X10*3/UL (ref 150–450)
POTASSIUM SERPL-SCNC: 3.7 MMOL/L (ref 3.5–5.3)
RBC # BLD AUTO: 3.71 X10*6/UL (ref 4–5.2)
SODIUM SERPL-SCNC: 134 MMOL/L (ref 136–145)
WBC # BLD AUTO: 9.8 X10*3/UL (ref 4.4–11.3)

## 2024-07-20 PROCEDURE — 71046 X-RAY EXAM CHEST 2 VIEWS: CPT | Performed by: RADIOLOGY

## 2024-07-20 PROCEDURE — 85027 COMPLETE CBC AUTOMATED: CPT

## 2024-07-20 PROCEDURE — 9420000001 HC RT PATIENT EDUCATION 5 MIN

## 2024-07-20 PROCEDURE — 2500000004 HC RX 250 GENERAL PHARMACY W/ HCPCS (ALT 636 FOR OP/ED)

## 2024-07-20 PROCEDURE — 2500000001 HC RX 250 WO HCPCS SELF ADMINISTERED DRUGS (ALT 637 FOR MEDICARE OP)

## 2024-07-20 PROCEDURE — 71046 X-RAY EXAM CHEST 2 VIEWS: CPT

## 2024-07-20 PROCEDURE — 2500000001 HC RX 250 WO HCPCS SELF ADMINISTERED DRUGS (ALT 637 FOR MEDICARE OP): Performed by: NURSE PRACTITIONER

## 2024-07-20 PROCEDURE — 94668 MNPJ CHEST WALL SBSQ: CPT

## 2024-07-20 PROCEDURE — 80069 RENAL FUNCTION PANEL: CPT

## 2024-07-20 PROCEDURE — 36415 COLL VENOUS BLD VENIPUNCTURE: CPT

## 2024-07-20 PROCEDURE — 94660 CPAP INITIATION&MGMT: CPT

## 2024-07-20 PROCEDURE — 83735 ASSAY OF MAGNESIUM: CPT

## 2024-07-20 RX ORDER — OXYCODONE HYDROCHLORIDE 5 MG/1
5 TABLET ORAL EVERY 6 HOURS PRN
Qty: 15 TABLET | Refills: 0 | Status: ON HOLD | OUTPATIENT
Start: 2024-07-20 | End: 2024-07-25

## 2024-07-20 RX ORDER — ATORVASTATIN CALCIUM 80 MG/1
80 TABLET, FILM COATED ORAL DAILY
Qty: 30 TABLET | Refills: 0 | Status: SHIPPED | OUTPATIENT
Start: 2024-07-20 | End: 2024-07-20

## 2024-07-20 RX ORDER — ASPIRIN 81 MG/1
81 TABLET ORAL DAILY
Qty: 30 TABLET | Refills: 1 | Status: ON HOLD | OUTPATIENT
Start: 2024-07-20 | End: 2024-09-18

## 2024-07-20 RX ORDER — CLOPIDOGREL BISULFATE 75 MG/1
75 TABLET ORAL DAILY
Qty: 30 TABLET | Refills: 0 | Status: SHIPPED | OUTPATIENT
Start: 2024-07-20 | End: 2024-07-20

## 2024-07-20 RX ORDER — POLYETHYLENE GLYCOL 3350 17 G/17G
17 POWDER, FOR SOLUTION ORAL DAILY
Qty: 30 PACKET | Refills: 0 | Status: ON HOLD | OUTPATIENT
Start: 2024-07-20 | End: 2024-08-19

## 2024-07-20 RX ORDER — CARVEDILOL 12.5 MG/1
12.5 TABLET ORAL 2 TIMES DAILY
Qty: 60 TABLET | Refills: 0 | Status: SHIPPED | OUTPATIENT
Start: 2024-07-20 | End: 2024-07-20

## 2024-07-20 RX ORDER — COLCHICINE 0.6 MG/1
0.6 TABLET ORAL DAILY
Status: DISCONTINUED | OUTPATIENT
Start: 2024-07-20 | End: 2024-07-20 | Stop reason: HOSPADM

## 2024-07-20 RX ORDER — LOSARTAN POTASSIUM 50 MG/1
50 TABLET ORAL DAILY
Status: DISCONTINUED | OUTPATIENT
Start: 2024-07-20 | End: 2024-07-20 | Stop reason: HOSPADM

## 2024-07-20 RX ORDER — ACETAMINOPHEN 325 MG/1
650 TABLET ORAL EVERY 6 HOURS PRN
Qty: 240 TABLET | Refills: 0 | Status: ON HOLD | OUTPATIENT
Start: 2024-07-20 | End: 2024-08-19

## 2024-07-20 RX ORDER — COLCHICINE 0.6 MG/1
0.6 TABLET ORAL DAILY
Qty: 30 TABLET | Refills: 0 | Status: ON HOLD | OUTPATIENT
Start: 2024-07-21 | End: 2024-08-20

## 2024-07-20 ASSESSMENT — PAIN - FUNCTIONAL ASSESSMENT
PAIN_FUNCTIONAL_ASSESSMENT: 0-10

## 2024-07-20 ASSESSMENT — PAIN SCALES - GENERAL
PAINLEVEL_OUTOF10: 0 - NO PAIN

## 2024-07-20 NOTE — PROGRESS NOTES
Isis Geronimo is a 79 y.o. female on day 13 of admission presenting with NSTEMI (non-ST elevated myocardial infarction) (Multi).      Subjective   Overall doing better no new complaints       Objective     Peritoneal Dialysis  Exchange Number: [id number]  Dianeal Solution: Dextrose 1.5% in 2000 mL  Dianeal Additive: Potassium  Bag Weight (g): 1500 g  Peritoneal Input Status: Completed  Peritoneal Output Status: Completed  Effluent Appearance: Yellow  Peritoneal Dialysis Volume In (ml): 1500 ml  Dwell Time: 0015  Effluent Volume Out (mL): 1500 ml  Balance This Exchange (mL): 0 ml  Vitals 24HR  Heart Rate:  [69-84]   Temp:  [37.1 °C (98.8 °F)-37.6 °C (99.6 °F)]   Resp:  [10-23]   BP: (127-156)/(55-66)   Weight:  [50.6 kg (111 lb 9.6 oz)]   SpO2:  [91 %-100 %]     Intake/Output last 3 Shifts:    Intake/Output Summary (Last 24 hours) at 7/20/2024 1314  Last data filed at 7/20/2024 0600  Gross per 24 hour   Intake 4700 ml   Output 4600 ml   Net 100 ml       Physical Exam  GEN appearance; awake alert no acute distress  Head and ENT; normocephalic/atraumatic/PERRL neck/JVD  Lungs; CTA  Heart; RRR  Abdomen; soft no tenderness  Peritoneal dialysis catheter noted  Extremities; no edema      Relevant Results     Results from last 7 days   Lab Units 07/20/24  0520 07/19/24  0527 07/18/24  0550   WBC AUTO x10*3/uL 9.8 9.9 9.5   HEMOGLOBIN g/dL 10.0* 9.7* 9.0*   HEMATOCRIT % 30.6* 29.3* 27.9*   PLATELETS AUTO x10*3/uL 235 205 205      Results from last 7 days   Lab Units 07/20/24  0520 07/19/24  0527 07/18/24  0550   SODIUM mmol/L 134* 134* 138   POTASSIUM mmol/L 3.7 3.9 4.3   CHLORIDE mmol/L 97* 99 102   CO2 mmol/L 26 26 23   BUN mg/dL 23 19 18   CREATININE mg/dL 6.97* 7.18* 7.49*   GLUCOSE mg/dL 70* 74 105*   CALCIUM mg/dL 8.2* 8.8 8.4*        Current Facility-Administered Medications:     acetaminophen (Tylenol) tablet 650 mg, 650 mg, oral, q6h, Jeanne Cortes, APRN-CNP, 650 mg at 07/20/24 0956    amLODIPine (Norvasc) tablet 10 mg, 10  mg, oral, Daily, RACHELLE Ureña, 10 mg at 07/20/24 0906    aspirin EC tablet 81 mg, 81 mg, oral, Daily, ALEXIS Evans-CNP, 81 mg at 07/20/24 0906    atorvastatin (Lipitor) tablet 80 mg, 80 mg, oral, Daily, RACHELLE Evans, 80 mg at 07/20/24 0906    B complex-vitamin C tablet 1 tablet, 1 tablet, oral, Daily, ALEXIS Ureña-CNP, 1 tablet at 07/20/24 0906    carvedilol (Coreg) tablet 12.5 mg, 12.5 mg, oral, BID, RACHELLE Evans, 12.5 mg at 07/20/24 0905    clopidogrel (Plavix) tablet 75 mg, 75 mg, oral, Daily, RACHELLE Evans, 75 mg at 07/20/24 0906    colchicine tablet 0.6 mg, 0.6 mg, oral, Daily, RACHELLE Ureña, 0.6 mg at 07/20/24 1238    dextrose 1.5% - LOW calcium 2.5mEq/L 2,000 mL peritoneal dialysate, , intraperitoneal, 4x daily, RACHELLE Evans, Given at 07/20/24 1236    dextrose 50 % injection 25 g, 25 g, intravenous, q15 min PRN, 25 g at 07/18/24 1954 **OR** glucagon (Glucagen) injection 1 mg, 1 mg, intramuscular, q15 min PRN, RACHELLE Evans    epoetin shawn-epbx (Retacrit) injection 8,000 Units, 150 Units/kg, subcutaneous, Every Sunday, ALEXIS Evans-CNP, 8,000 Units at 07/14/24 1013    heparin (porcine) injection 5,000 Units, 5,000 Units, subcutaneous, q8h, ALEXIS Evans-CNP, 5,000 Units at 07/20/24 0613    lidocaine 4 % patch 1 patch, 1 patch, transdermal, q24h, ALEXIS Evans-CNP, 1 patch at 07/19/24 1632    losartan (Cozaar) tablet 50 mg, 50 mg, oral, Daily, RACHELLE Ureña, 50 mg at 07/20/24 0904    naloxone (Narcan) injection 0.2 mg, 0.2 mg, intravenous, q5 min PRN, RACHELLE Evans    ondansetron ODT (Zofran-ODT) disintegrating tablet 4 mg, 4 mg, oral, q6h PRN **OR** ondansetron (Zofran) injection 4 mg, 4 mg, intravenous, q6h PRN, RACHELLE Evans    oxyCODONE (Roxicodone) immediate release tablet 10 mg, 10 mg, oral, q4h PRN, RACHELLE Evans, 10 mg at 07/18/24 1531    oxyCODONE  (Roxicodone) immediate release tablet 5 mg, 5 mg, oral, q4h PRN, ALEXIS Evans-CNP, 5 mg at 07/19/24 2029    oxygen (O2) therapy, , inhalation, Continuous PRN - O2/gases, ALEXIS Evans-CNP, 1 L/min at 07/18/24 0722    oxygen (O2) therapy, , inhalation, Continuous PRN - O2/gases, ALEXIS Evans-CNP, 1 L/min at 07/19/24 0351    phenyleph-min oil-petrolatum (Preparation H) 0.25-14-74.9 % rectal ointment, , rectal, 4x daily PRN, RACHELLE Evans, Given at 07/15/24 1618    polyethylene glycol (Glycolax, Miralax) packet 17 g, 17 g, oral, Daily, ALEXIS Evans-CNP    prochlorperazine (Compazine) tablet 10 mg, 10 mg, oral, q6h PRN **OR** prochlorperazine (Compazine) injection 10 mg, 10 mg, intravenous, q6h PRN **OR** prochlorperazine (Compazine) suppository 25 mg, 25 mg, rectal, q12h PRN, ALEXIS Evans-CNP    sennosides-docusate sodium (Michelle-Colace) 8.6-50 mg per tablet 2 tablet, 2 tablet, oral, BID, RACHELLE Evans, 2 tablet at 07/19/24 0849           Assessment/Plan   This patient currently has cardiac telemetry ordered; if you would like to modify or discontinue the telemetry order, click here to go to the orders activity to modify/discontinue the order.  1.  ESKD on peritoneal dialysis under care of Dr. Zarate with [facility] unit transportation [place].  2.  NSTEMI status post surgical correction  3.  Hypertension continue current management    Will continue current peritoneal dialysis daily, till get ready for discharge patient performs at home CCPD which is mainly in nighttime peritoneal dialysis.        Active Problems:    Other disorders of arteries, arterioles and capillaries in diseases classified elsewhere (CMS-HCC)              I spent 35 minutes in the professional and overall care of this patient.      Jenifer Mtz MD

## 2024-07-20 NOTE — PROGRESS NOTES
Physical Therapy                 Therapy Communication Note    Patient Name: Isis Geronimo  MRN: [id number]  Today's Date: 7/20/2024     Discipline: Physical Therapy    Missed Visit Reason: Missed Visit Reason: Other (Comment) (Patient is preparing for discharge from facility)    Missed Time: Attempt    Comment: Patient is preparing for discharged

## 2024-07-20 NOTE — DISCHARGE SUMMARY
Discharge Diagnosis  NSTEMI (non-ST elevated myocardial infarction) (Multi)    Issues Requiring Follow-Up  Follow up post discharge with Dr. Germania Bullock 1 week following DC from hospital  Follow up with cardiologist 2 weeks post discharge  Follow up 4-6 weeks with Dr. Cristina Clifton post discharge    Test Results Pending At Discharge  Results for orders placed or performed during the hospital encounter of 07/06/24 (from the past 24 hour(s))   Magnesium   Result Value Ref Range    Magnesium 2.00 1.60 - 2.40 mg/dL   CBC   Result Value Ref Range    WBC 9.8 4.4 - 11.3 x10*3/uL    nRBC 0.0 0.0 - 0.0 /100 WBCs    RBC 3.71 (L) 4.00 - 5.20 x10*6/uL    Hemoglobin 10.0 (L) 12.0 - 16.0 g/dL    Hematocrit 30.6 (L) 36.0 - 46.0 %    MCV 83 80 - 100 fL    MCH 27.0 26.0 - 34.0 pg    MCHC 32.7 32.0 - 36.0 g/dL    RDW 16.2 (H) 11.5 - 14.5 %    Platelets 235 150 - 450 x10*3/uL   Renal Function Panel   Result Value Ref Range    Glucose 70 (L) 74 - 99 mg/dL    Sodium 134 (L) 136 - 145 mmol/L    Potassium 3.7 3.5 - 5.3 mmol/L    Chloride 97 (L) 98 - 107 mmol/L    Bicarbonate 26 21 - 32 mmol/L    Anion Gap 15 10 - 20 mmol/L    Urea Nitrogen 23 6 - 23 mg/dL    Creatinine 6.97 (H) 0.50 - 1.05 mg/dL    eGFR 6 (L) >60 mL/min/1.73m*2    Calcium 8.2 (L) 8.6 - 10.3 mg/dL    Phosphorus 3.9 2.5 - 4.9 mg/dL    Albumin 2.6 (L) 3.4 - 5.0 g/dL       Hospital Course   Isis Geronimo is a 79 y.o. female with PMH of ESRD on PD, Anemia, RA, Emphysema Current smoker,  Vertigo, HTN, HLD. She came to ED w/complaints of n/v, and abdominal pain, constipation, in ED work up significant for elevated  troponin which [place] at [address]. TTE showed an LVEF of 60% with LV diastolic dysfunction, moderate concentric LVH and aortic valve sclerosis, [facility] today with Dr Juárez, which showed Severe MVD   Case reviewed at [facility]   7/17 s/p Left Anterior Thoracatomy  Off Pump MIDCAB (ALCARAZ to LAD) Dr Cristina Clifton     NEURO:    Expected acute postop pain,controlled  - Serial neuro and pain  assessments   - Scheduled Tylenol PRN oxy  - PT/OT Consult, OOB to chair, MITT precautions      CV:  HLD,  CAD with MVD S/p Off Pump MIDCAB (LIMA to LAD)  HTN base line -160's/60-70's  LV function EF normal pre and post, no EP wires.  Faint friction rub auscultated   - Maintain MAP goal MAP    - asa/ statin , Coreg d/t metoprolol intolerance  - Plavix  - Resume home antihypertensives  - Colchicine x 30days re: pericarditis - discussed with nephrology     PULM:    Current tobacco use  Emphysema   Oxygenating well on RA  - Wean FiO2 maintaining SpO2 >90%.   - ABGs PRN  - Acapella, IS   - Smoking cessation     :   ESRD on PD  - Nephrology consulted  - PD exchanges per Renal  - Daily RFP and replete electrolytes as needed     GI:    GERD  - Regular diet  - PPI  - Bowel regimen- senokot and miralax  - Zofran PRN      HEME:    Acute on chronic post op blood loss anemia     - Daily CBC  - SCDs and heparin for DVT prophylaxis      ENDO:  No history of DM  - ISS  - Hypoglycemia protocol     ID:  Afebrile, no current indications of infection. MRSA negative.  - Periop ancef  - Trend temps  - Trend WBCs     Dispo: Possible DC home today      Discussed in detail with Dr. Cristina Clifton.     Home Medications     Medication List      ASK your doctor about these medications     amLODIPine 10 mg tablet; Commonly known as: Norvasc   atorvastatin 20 mg tablet; Commonly known as: Lipitor   B complex-vitamin C-folic acid 0.8 mg tablet; Commonly known as:   Nephro-Batsheva   losartan 50 mg tablet; Commonly known as: Cozaar       Outpatient Follow-Up  Future Appointments   Date Time Provider Department Center   8/8/2024  8:00 AM CARDSURG [facility] [id number] NURSE [id number] Academic   8/19/2024  3:15 PM Lg Clifton MD [facility]       Cristi Mendoza, APRN-CNP

## 2024-07-20 NOTE — PROGRESS NOTES
"Isis Geronimo is a 79 y.o. female on day 13 of admission presenting with NSTEMI (non-ST elevated myocardial infarction) (Multi).    Subjective   MARK reported  Denies dizziness  Post op pain controlled  Verbalizing she feels ready to go home       Objective     Physical Exam  Constitutional:       Appearance: Normal appearance.   Cardiovascular:      Rate and Rhythm: Normal rate and regular rhythm.      Pulses: Normal pulses.      Heart sounds:      Friction rub present.   Pulmonary:      Effort: Pulmonary effort is normal. No respiratory distress.      Breath sounds: No wheezing.   Abdominal:      General: There is no distension.      Palpations: Abdomen is soft.      Tenderness: There is no abdominal tenderness.   Musculoskeletal:         General: Normal range of motion.   Skin:     General: Skin is warm and dry.      Capillary Refill: Capillary refill takes less than 2 seconds.      Comments: Left flank surgical incision dressing dry and intact.  No strike through noted.    Neurological:      Mental Status: She is alert and oriented to person, place, and time. Mental status is at baseline.   Psychiatric:         Mood and Affect: Mood normal.         Last Recorded Vitals  Blood pressure 156/59, pulse 77, temperature 37.6 °C (99.6 °F), temperature source Temporal, resp. rate 17, height 1.6 m (5' 3\"), weight 50.6 kg (111 lb 9.6 oz), SpO2 99%.  Intake/Output last 3 Shifts:  I/O last 3 completed shifts:  In: 9250 (182.7 mL/kg) [I.V.:50 (1 mL/kg); Other:9000; IV Piggyback:200]  Out: 9250 (182.7 mL/kg) [Urine:450 (0.2 mL/kg/hr); Other:8800]  Weight: 50.6 kg     Relevant Results  Scheduled medications  acetaminophen, 650 mg, oral, q6h  amLODIPine, 10 mg, oral, Daily  aspirin, 81 mg, oral, Daily  atorvastatin, 80 mg, oral, Daily  B complex-vitamin C, 1 tablet, oral, Daily  carvedilol, 12.5 mg, oral, BID  clopidogrel, 75 mg, oral, Daily  colchicine, 0.6 mg, oral, Daily  dextrose 1.5% - LOW calcium 2.5mEq/L 2,000 mL " peritoneal dialysate, , intraperitoneal, 4x daily  epoetin shawn or biosimilar, 150 Units/kg, subcutaneous, Every Sunday  heparin (porcine), 5,000 Units, subcutaneous, q8h  lidocaine, 1 patch, transdermal, q24h  losartan, 50 mg, oral, Daily  polyethylene glycol, 17 g, oral, Daily  sennosides-docusate sodium, 2 tablet, oral, BID      Continuous medications     PRN medications  PRN medications: dextrose **OR** glucagon, naloxone, ondansetron ODT **OR** ondansetron, oxyCODONE, oxyCODONE, oxygen, oxygen, phenyleph-min oil-petrolatum, prochlorperazine **OR** prochlorperazine **OR** prochlorperazine    Results for orders placed or performed during the hospital encounter of 07/06/24 (from the past 24 hour(s))   Magnesium   Result Value Ref Range    Magnesium 2.00 1.60 - 2.40 mg/dL   CBC   Result Value Ref Range    WBC 9.8 4.4 - 11.3 x10*3/uL    nRBC 0.0 0.0 - 0.0 /100 WBCs    RBC 3.71 (L) 4.00 - 5.20 x10*6/uL    Hemoglobin 10.0 (L) 12.0 - 16.0 g/dL    Hematocrit 30.6 (L) 36.0 - 46.0 %    MCV 83 80 - 100 fL    MCH 27.0 26.0 - 34.0 pg    MCHC 32.7 32.0 - 36.0 g/dL    RDW 16.2 (H) 11.5 - 14.5 %    Platelets 235 150 - 450 x10*3/uL   Renal Function Panel   Result Value Ref Range    Glucose 70 (L) 74 - 99 mg/dL    Sodium 134 (L) 136 - 145 mmol/L    Potassium 3.7 3.5 - 5.3 mmol/L    Chloride 97 (L) 98 - 107 mmol/L    Bicarbonate 26 21 - 32 mmol/L    Anion Gap 15 10 - 20 mmol/L    Urea Nitrogen 23 6 - 23 mg/dL    Creatinine 6.97 (H) 0.50 - 1.05 mg/dL    eGFR 6 (L) >60 mL/min/1.73m*2    Calcium 8.2 (L) 8.6 - 10.3 mg/dL    Phosphorus 3.9 2.5 - 4.9 mg/dL    Albumin 2.6 (L) 3.4 - 5.0 g/dL     XR chest 2 views    Result Date: 7/20/2024  Interpreted By:  Minna Santana, STUDY: XR CHEST 2 VIEWS 7/20/2024 8:42 am   INDICATION: Signs/Symptoms:s/p cabg   COMPARISON: 07/18/2024   ACCESSION NUMBER(S): [id number]   ORDERING CLINICIAN: DOUG GONZALEZ   TECHNIQUE: PA and lateral views   FINDINGS: Bilateral lower lobe atelectasis and small bilateral  pleural effusions are noted. Gas beneath the right diaphragm is similar to the prior exam and may be related to history of peritoneal dialysis. Interval removal of a left chest tube and central catheter. Surgical clips overlie the left chest and mediastinum       1. Bibasilar atelectasis with small pleural effusions 2. Free air beneath the right diaphragm possibly related to peritoneal dialysis.     Signed by: Minna Santana 7/20/2024 9:09 AM Dictation workstation:   [id number]     Assessment/Plan   Active Problems:    Other disorders of arteries, arterioles and capillaries in diseases classified elsewhere (CMS-HCC)    Isis Geronimo is a 79 y.o. female with PMH of ESRD on PD, Anemia, RA, Emphysema Current smoker,  Vertigo, HTN, HLD. She came to ED w/complaints of n/v, and abdominal pain, constipation, in ED work up significant for elevated  troponin which peaked at [address]. TTE showed an LVEF of 60% with LV diastolic dysfunction, moderate concentric LVH and aortic valve sclerosis, LHC today with Dr Juárez, which showed Severe MVD   Case [facility]   7/17 s/p Left Anterior Thoracatomy  Off Pump MIDCAB (ALCARAZ to LAD) Dr Cristina Clifton     NEURO:    Expected acute postop pain,controlled  - Serial neuro and pain assessments   - Scheduled Tylenol PRN oxy  - PT/OT Consult, OOB to chair, MITT precautions      CV:  HLD,  CAD with MVD S/p Off Pump MIDCAB (LIMA to LAD)  HTN base line -160's/60-70's  LV function EF normal pre and post, no EP wires.  Faint friction rub auscultated   - Maintain MAP goal MAP    - asa/ statin , Coreg d/t metoprolol intolerance  - Plavix  - Resume home antihypertensives  - Colchicine x 30days re: pericarditis - discussed with nephrology     PULM:    Current tobacco use  Emphysema   Oxygenating well on RA  - Wean FiO2 maintaining SpO2 >90%.   - ABGs PRN  - Acapella, IS   - Smoking cessation     :   ESRD on PD  - Nephrology consulted  - PD exchanges per Renal  - Daily RFP and replete  electrolytes as needed     GI:    GERD  - Regular diet  - PPI  - Bowel regimen- senokot and miralax  - Zofran PRN      HEME:    Acute on chronic post op blood loss anemia     - Daily CBC  - SCDs and heparin for DVT prophylaxis      ENDO:  No history of DM  - ISS  - Hypoglycemia protocol     ID:  Afebrile, no current indications of infection. MRSA negative.  - Periop ancef  - Trend temps  - Trend WBCs     Dispo: Possible DC home today      Discussed in detail with Dr. Cristina Clifton.         I spent 60 minutes in the professional and overall care of this patient.          I spent 60 minutes in the professional and overall care of this patient.      Cristi Mendoza, APRN-CNP

## 2024-07-21 ENCOUNTER — DOCUMENTATION (OUTPATIENT)
Dept: HOME HEALTH SERVICES | Facility: HOME HEALTH | Age: 79
End: 2024-07-21
Payer: COMMERCIAL

## 2024-07-23 ENCOUNTER — APPOINTMENT (OUTPATIENT)
Dept: CARDIOLOGY | Facility: HOSPITAL | Age: 79
DRG: 640 | End: 2024-07-23
Payer: COMMERCIAL

## 2024-07-23 ENCOUNTER — HOSPITAL ENCOUNTER (INPATIENT)
Facility: HOSPITAL | Age: 79
DRG: 640 | End: 2024-07-23
Attending: EMERGENCY MEDICINE | Admitting: INTERNAL MEDICINE
Payer: COMMERCIAL

## 2024-07-23 DIAGNOSIS — R63.0 ANOREXIA: ICD-10-CM

## 2024-07-23 DIAGNOSIS — E86.0 DEHYDRATION: Primary | ICD-10-CM

## 2024-07-23 LAB
ALBUMIN SERPL BCP-MCNC: 2.3 G/DL (ref 3.4–5)
ALP SERPL-CCNC: 83 U/L (ref 33–136)
ALT SERPL W P-5'-P-CCNC: <3 U/L (ref 7–45)
ANION GAP SERPL CALC-SCNC: 16 MMOL/L (ref 10–20)
AST SERPL W P-5'-P-CCNC: 24 U/L (ref 9–39)
ATRIAL RATE: 72 BPM
ATRIAL RATE: 73 BPM
BASOPHILS # BLD AUTO: 0.02 X10*3/UL (ref 0–0.1)
BASOPHILS NFR BLD AUTO: 0.2 %
BILIRUB SERPL-MCNC: 0.4 MG/DL (ref 0–1.2)
BUN SERPL-MCNC: 43 MG/DL (ref 6–23)
CALCIUM SERPL-MCNC: 8.4 MG/DL (ref 8.6–10.3)
CARDIAC TROPONIN I PNL SERPL HS: 85 NG/L (ref 0–13)
CARDIAC TROPONIN I PNL SERPL HS: 93 NG/L (ref 0–13)
CHLORIDE SERPL-SCNC: 95 MMOL/L (ref 98–107)
CO2 SERPL-SCNC: 27 MMOL/L (ref 21–32)
CREAT SERPL-MCNC: 9.17 MG/DL (ref 0.5–1.05)
EGFRCR SERPLBLD CKD-EPI 2021: 4 ML/MIN/1.73M*2
EOSINOPHIL # BLD AUTO: 0.04 X10*3/UL (ref 0–0.4)
EOSINOPHIL NFR BLD AUTO: 0.3 %
ERYTHROCYTE [DISTWIDTH] IN BLOOD BY AUTOMATED COUNT: 15.9 % (ref 11.5–14.5)
GLUCOSE SERPL-MCNC: 109 MG/DL (ref 74–99)
HCT VFR BLD AUTO: 29.7 % (ref 36–46)
HGB BLD-MCNC: 9.9 G/DL (ref 12–16)
IMM GRANULOCYTES # BLD AUTO: 0.11 X10*3/UL (ref 0–0.5)
IMM GRANULOCYTES NFR BLD AUTO: 0.9 % (ref 0–0.9)
LACTATE SERPL-SCNC: 1.2 MMOL/L (ref 0.4–2)
LYMPHOCYTES # BLD AUTO: 0.56 X10*3/UL (ref 0.8–3)
LYMPHOCYTES NFR BLD AUTO: 4.7 %
MCH RBC QN AUTO: 26.8 PG (ref 26–34)
MCHC RBC AUTO-ENTMCNC: 33.3 G/DL (ref 32–36)
MCV RBC AUTO: 80 FL (ref 80–100)
MONOCYTES # BLD AUTO: 0.81 X10*3/UL (ref 0.05–0.8)
MONOCYTES NFR BLD AUTO: 6.9 %
NEUTROPHILS # BLD AUTO: 10.26 X10*3/UL (ref 1.6–5.5)
NEUTROPHILS NFR BLD AUTO: 87 %
NRBC BLD-RTO: 0 /100 WBCS (ref 0–0)
P AXIS: 11 DEGREES
P AXIS: 42 DEGREES
P OFFSET: 196 MS
P OFFSET: 202 MS
P ONSET: 146 MS
P ONSET: 154 MS
PLATELET # BLD AUTO: 287 X10*3/UL (ref 150–450)
POTASSIUM SERPL-SCNC: 4 MMOL/L (ref 3.5–5.3)
PR INTERVAL: 130 MS
PR INTERVAL: 148 MS
PROT SERPL-MCNC: 5.6 G/DL (ref 6.4–8.2)
Q ONSET: 219 MS
Q ONSET: 220 MS
QRS COUNT: 12 BEATS
QRS COUNT: 13 BEATS
QRS DURATION: 94 MS
QRS DURATION: 96 MS
QT INTERVAL: 410 MS
QT INTERVAL: 440 MS
QTC CALCULATION(BAZETT): 451 MS
QTC CALCULATION(BAZETT): 482 MS
QTC FREDERICIA: 438 MS
QTC FREDERICIA: 467 MS
R AXIS: -5 DEGREES
R AXIS: 38 DEGREES
RBC # BLD AUTO: 3.7 X10*6/UL (ref 4–5.2)
SODIUM SERPL-SCNC: 134 MMOL/L (ref 136–145)
T AXIS: 106 DEGREES
T AXIS: 119 DEGREES
T OFFSET: 424 MS
T OFFSET: 450 MS
TSH SERPL-ACNC: 0.68 MIU/L (ref 0.44–3.98)
VENTRICULAR RATE: 72 BPM
VENTRICULAR RATE: 73 BPM
WBC # BLD AUTO: 11.8 X10*3/UL (ref 4.4–11.3)

## 2024-07-23 PROCEDURE — 84484 ASSAY OF TROPONIN QUANT: CPT | Performed by: EMERGENCY MEDICINE

## 2024-07-23 PROCEDURE — 92950 HEART/LUNG RESUSCITATION CPR: CPT | Performed by: EMERGENCY MEDICINE

## 2024-07-23 PROCEDURE — 83605 ASSAY OF LACTIC ACID: CPT | Performed by: EMERGENCY MEDICINE

## 2024-07-23 PROCEDURE — 99285 EMERGENCY DEPT VISIT HI MDM: CPT | Mod: 25

## 2024-07-23 PROCEDURE — 2500000004 HC RX 250 GENERAL PHARMACY W/ HCPCS (ALT 636 FOR OP/ED): Performed by: INTERNAL MEDICINE

## 2024-07-23 PROCEDURE — 99221 1ST HOSP IP/OBS SF/LOW 40: CPT | Performed by: NURSE PRACTITIONER

## 2024-07-23 PROCEDURE — 2500000004 HC RX 250 GENERAL PHARMACY W/ HCPCS (ALT 636 FOR OP/ED): Performed by: EMERGENCY MEDICINE

## 2024-07-23 PROCEDURE — 2500000004 HC RX 250 GENERAL PHARMACY W/ HCPCS (ALT 636 FOR OP/ED): Performed by: NURSE PRACTITIONER

## 2024-07-23 PROCEDURE — 1200000002 HC GENERAL ROOM WITH TELEMETRY DAILY

## 2024-07-23 PROCEDURE — 80053 COMPREHEN METABOLIC PANEL: CPT | Performed by: EMERGENCY MEDICINE

## 2024-07-23 PROCEDURE — 93005 ELECTROCARDIOGRAM TRACING: CPT

## 2024-07-23 PROCEDURE — 85025 COMPLETE CBC W/AUTO DIFF WBC: CPT | Performed by: EMERGENCY MEDICINE

## 2024-07-23 PROCEDURE — 36415 COLL VENOUS BLD VENIPUNCTURE: CPT | Performed by: EMERGENCY MEDICINE

## 2024-07-23 PROCEDURE — 84443 ASSAY THYROID STIM HORMONE: CPT | Performed by: EMERGENCY MEDICINE

## 2024-07-23 PROCEDURE — 05H333Z INSERTION OF INFUSION DEVICE INTO RIGHT INNOMINATE VEIN, PERCUTANEOUS APPROACH: ICD-10-PCS | Performed by: EMERGENCY MEDICINE

## 2024-07-23 PROCEDURE — 3E1M39Z IRRIGATION OF PERITONEAL CAVITY USING DIALYSATE, PERCUTANEOUS APPROACH: ICD-10-PCS | Performed by: INTERNAL MEDICINE

## 2024-07-23 RX ORDER — CYPROHEPTADINE HYDROCHLORIDE 4 MG/1
4 TABLET ORAL 3 TIMES DAILY PRN
Status: DISCONTINUED | OUTPATIENT
Start: 2024-07-23 | End: 2024-07-24

## 2024-07-23 RX ORDER — SODIUM CHLORIDE, SODIUM LACTATE, POTASSIUM CHLORIDE, CALCIUM CHLORIDE 600; 310; 30; 20 MG/100ML; MG/100ML; MG/100ML; MG/100ML
50 INJECTION, SOLUTION INTRAVENOUS CONTINUOUS
Status: DISCONTINUED | OUTPATIENT
Start: 2024-07-23 | End: 2024-07-23

## 2024-07-23 RX ORDER — COLCHICINE 0.6 MG/1
0.6 TABLET ORAL DAILY
Status: DISCONTINUED | OUTPATIENT
Start: 2024-07-24 | End: 2024-07-31

## 2024-07-23 RX ORDER — POLYETHYLENE GLYCOL 3350 17 G/17G
17 POWDER, FOR SOLUTION ORAL DAILY
Status: DISCONTINUED | OUTPATIENT
Start: 2024-07-24 | End: 2024-07-25

## 2024-07-23 RX ORDER — LOSARTAN POTASSIUM 100 MG/1
100 TABLET ORAL DAILY
COMMUNITY

## 2024-07-23 RX ORDER — AMLODIPINE BESYLATE 10 MG/1
10 TABLET ORAL DAILY
Status: DISCONTINUED | OUTPATIENT
Start: 2024-07-24 | End: 2024-08-02 | Stop reason: HOSPADM

## 2024-07-23 RX ORDER — ACETAMINOPHEN 325 MG/1
650 TABLET ORAL EVERY 4 HOURS PRN
Status: DISCONTINUED | OUTPATIENT
Start: 2024-07-23 | End: 2024-08-02 | Stop reason: HOSPADM

## 2024-07-23 RX ORDER — POLYETHYLENE GLYCOL 3350 17 G/17G
17 POWDER, FOR SOLUTION ORAL DAILY
Status: DISCONTINUED | OUTPATIENT
Start: 2024-07-24 | End: 2024-07-23

## 2024-07-23 RX ORDER — ONDANSETRON HYDROCHLORIDE 2 MG/ML
4 INJECTION, SOLUTION INTRAVENOUS EVERY 4 HOURS PRN
Status: DISCONTINUED | OUTPATIENT
Start: 2024-07-23 | End: 2024-08-02 | Stop reason: HOSPADM

## 2024-07-23 RX ORDER — OXYCODONE HYDROCHLORIDE 5 MG/1
5 TABLET ORAL EVERY 6 HOURS PRN
Status: DISCONTINUED | OUTPATIENT
Start: 2024-07-23 | End: 2024-08-02 | Stop reason: HOSPADM

## 2024-07-23 RX ORDER — ACETAMINOPHEN 650 MG/1
650 SUPPOSITORY RECTAL EVERY 4 HOURS PRN
Status: DISCONTINUED | OUTPATIENT
Start: 2024-07-23 | End: 2024-08-02 | Stop reason: HOSPADM

## 2024-07-23 RX ORDER — LOSARTAN POTASSIUM 50 MG/1
100 TABLET ORAL DAILY
Status: DISCONTINUED | OUTPATIENT
Start: 2024-07-24 | End: 2024-07-31

## 2024-07-23 RX ORDER — CYPROHEPTADINE HYDROCHLORIDE 4 MG/1
4 TABLET ORAL 3 TIMES DAILY
Status: DISCONTINUED | OUTPATIENT
Start: 2024-07-23 | End: 2024-07-23

## 2024-07-23 RX ORDER — ASPIRIN 81 MG/1
81 TABLET ORAL DAILY
Status: DISCONTINUED | OUTPATIENT
Start: 2024-07-24 | End: 2024-08-02 | Stop reason: HOSPADM

## 2024-07-23 RX ORDER — HEPARIN SODIUM 5000 [USP'U]/ML
5000 INJECTION, SOLUTION INTRAVENOUS; SUBCUTANEOUS EVERY 8 HOURS SCHEDULED
Status: DISCONTINUED | OUTPATIENT
Start: 2024-07-23 | End: 2024-08-02 | Stop reason: HOSPADM

## 2024-07-23 RX ORDER — CLOPIDOGREL BISULFATE 75 MG/1
75 TABLET ORAL DAILY
Status: DISCONTINUED | OUTPATIENT
Start: 2024-07-24 | End: 2024-07-31

## 2024-07-23 RX ORDER — CARVEDILOL 12.5 MG/1
12.5 TABLET ORAL 2 TIMES DAILY
Status: DISCONTINUED | OUTPATIENT
Start: 2024-07-23 | End: 2024-08-02 | Stop reason: HOSPADM

## 2024-07-23 RX ORDER — ACETAMINOPHEN 160 MG/5ML
650 SOLUTION ORAL EVERY 4 HOURS PRN
Status: DISCONTINUED | OUTPATIENT
Start: 2024-07-23 | End: 2024-08-02 | Stop reason: HOSPADM

## 2024-07-23 RX ORDER — CYPROHEPTADINE HYDROCHLORIDE 4 MG/1
4 TABLET ORAL 3 TIMES DAILY PRN
COMMUNITY

## 2024-07-23 RX ORDER — ATORVASTATIN CALCIUM 80 MG/1
80 TABLET, FILM COATED ORAL DAILY
Status: DISCONTINUED | OUTPATIENT
Start: 2024-07-24 | End: 2024-07-31

## 2024-07-23 SDOH — SOCIAL STABILITY: SOCIAL INSECURITY: ARE YOU OR HAVE YOU BEEN THREATENED OR ABUSED PHYSICALLY, EMOTIONALLY, OR SEXUALLY BY ANYONE?: NO

## 2024-07-23 SDOH — SOCIAL STABILITY: SOCIAL INSECURITY: ARE THERE ANY APPARENT SIGNS OF INJURIES/BEHAVIORS THAT COULD BE RELATED TO ABUSE/NEGLECT?: NO

## 2024-07-23 SDOH — SOCIAL STABILITY: SOCIAL INSECURITY: HAVE YOU HAD THOUGHTS OF HARMING ANYONE ELSE?: NO

## 2024-07-23 SDOH — SOCIAL STABILITY: SOCIAL INSECURITY: DOES ANYONE TRY TO KEEP YOU FROM HAVING/CONTACTING OTHER FRIENDS OR DOING THINGS OUTSIDE YOUR HOME?: NO

## 2024-07-23 SDOH — SOCIAL STABILITY: SOCIAL INSECURITY: HAS ANYONE EVER THREATENED TO HURT YOUR FAMILY OR YOUR PETS?: NO

## 2024-07-23 SDOH — SOCIAL STABILITY: SOCIAL INSECURITY: ABUSE: ADULT

## 2024-07-23 SDOH — SOCIAL STABILITY: SOCIAL INSECURITY: WERE YOU ABLE TO COMPLETE ALL THE BEHAVIORAL HEALTH SCREENINGS?: YES

## 2024-07-23 SDOH — SOCIAL STABILITY: SOCIAL INSECURITY: DO YOU FEEL ANYONE HAS EXPLOITED OR TAKEN ADVANTAGE OF YOU FINANCIALLY OR OF YOUR PERSONAL PROPERTY?: NO

## 2024-07-23 SDOH — SOCIAL STABILITY: SOCIAL INSECURITY: HAVE YOU HAD ANY THOUGHTS OF HARMING ANYONE ELSE?: NO

## 2024-07-23 SDOH — SOCIAL STABILITY: SOCIAL INSECURITY: DO YOU FEEL UNSAFE GOING BACK TO THE PLACE WHERE YOU ARE LIVING?: NO

## 2024-07-23 ASSESSMENT — COGNITIVE AND FUNCTIONAL STATUS - GENERAL
PERSONAL GROOMING: A LOT
MOBILITY SCORE: 11
WALKING IN HOSPITAL ROOM: TOTAL
DRESSING REGULAR UPPER BODY CLOTHING: TOTAL
TURNING FROM BACK TO SIDE WHILE IN FLAT BAD: A LITTLE
STANDING UP FROM CHAIR USING ARMS: A LOT
DAILY ACTIVITIY SCORE: 8
MOVING TO AND FROM BED TO CHAIR: TOTAL
MOVING FROM LYING ON BACK TO SITTING ON SIDE OF FLAT BED WITH BEDRAILS: A LITTLE
DRESSING REGULAR LOWER BODY CLOTHING: TOTAL
EATING MEALS: A LOT
CLIMB 3 TO 5 STEPS WITH RAILING: TOTAL
HELP NEEDED FOR BATHING: TOTAL
PATIENT BASELINE BEDBOUND: NO
TOILETING: TOTAL

## 2024-07-23 ASSESSMENT — LIFESTYLE VARIABLES
HOW MANY STANDARD DRINKS CONTAINING ALCOHOL DO YOU HAVE ON A TYPICAL DAY: PATIENT DOES NOT DRINK
HOW OFTEN DO YOU HAVE 6 OR MORE DRINKS ON ONE OCCASION: NEVER
SKIP TO QUESTIONS 9-10: 1
HOW OFTEN DO YOU HAVE A DRINK CONTAINING ALCOHOL: MONTHLY OR LESS
AUDIT-C TOTAL SCORE: 1
AUDIT-C TOTAL SCORE: 1

## 2024-07-23 ASSESSMENT — COLUMBIA-SUICIDE SEVERITY RATING SCALE - C-SSRS
1. IN THE PAST MONTH, HAVE YOU WISHED YOU WERE DEAD OR WISHED YOU COULD GO TO SLEEP AND NOT WAKE UP?: NO
6. HAVE YOU EVER DONE ANYTHING, STARTED TO DO ANYTHING, OR PREPARED TO DO ANYTHING TO END YOUR LIFE?: NO
2. HAVE YOU ACTUALLY HAD ANY THOUGHTS OF KILLING YOURSELF?: NO

## 2024-07-23 ASSESSMENT — ACTIVITIES OF DAILY LIVING (ADL)
FEEDING YOURSELF: DEPENDENT
HEARING - LEFT EAR: HEARING AID
ADEQUATE_TO_COMPLETE_ADL: YES
PATIENT'S MEMORY ADEQUATE TO SAFELY COMPLETE DAILY ACTIVITIES?: YES
LACK_OF_TRANSPORTATION: NO
HEARING - RIGHT EAR: HEARING AID
GROOMING: DEPENDENT
JUDGMENT_ADEQUATE_SAFELY_COMPLETE_DAILY_ACTIVITIES: YES
WALKS IN HOME: NEEDS ASSISTANCE
DRESSING YOURSELF: DEPENDENT
TOILETING: DEPENDENT
BATHING: DEPENDENT

## 2024-07-23 ASSESSMENT — PAIN SCALES - GENERAL
PAINLEVEL_OUTOF10: 0 - NO PAIN

## 2024-07-23 ASSESSMENT — PATIENT HEALTH QUESTIONNAIRE - PHQ9
SUM OF ALL RESPONSES TO PHQ9 QUESTIONS 1 & 2: 0
1. LITTLE INTEREST OR PLEASURE IN DOING THINGS: NOT AT ALL
2. FEELING DOWN, DEPRESSED OR HOPELESS: NOT AT ALL

## 2024-07-23 ASSESSMENT — PAIN - FUNCTIONAL ASSESSMENT
PAIN_FUNCTIONAL_ASSESSMENT: 0-10
PAIN_FUNCTIONAL_ASSESSMENT: 0-10

## 2024-07-23 NOTE — CONSULTS
Reason For Consult  POD6 MID CAB; failure to thrive; dehydration.    History Of Present Illness  Isis Geronimo is a 79 y.o. female with PMH of ESRD on PD, Anemia, RA, Emphysema Current smoker,  Vertigo, HTN, HLD. She came to ED w/complaints of n/v, and abdominal pain, constipation, in ED work up significant for elevated  troponin which peaked at [address]. TTE showed an LVEF of 60% with LV diastolic dysfunction, moderate concentric LVH and aortic valve sclerosis, LHC today with Dr Juárez, which showed Severe MVD who is  s/p Left Anterior Thoracatomy  Off Pump MIDCAB (ALCARAZ to LAD) Dr Cristina Clifton and discharged home in stable condition on 7/20/2024.  Per ER report and family at bedside as well as patient herself, she has not had an appetite since she was discharged home and thus has not been eating.  Per family she appears to have lost some weight and they are feeling she has become more dehydrated.       Past Medical History  She has a past medical history of Arthritis, CAD (coronary artery disease), COPD (chronic obstructive pulmonary disease) (Multi), ESRD (end stage renal disease) (Multi), Hypertension, Non-sustained ventricular tachycardia (Multi), and Rheumatoid arthritis (Multi).    Surgical History  She has a past surgical history that includes Cardiac catheterization (N/A, 07/08/2024); Esophagogastroduodenoscopy; Peritoneal catheter insertion (05/23/2022); Colonoscopy; and Dilation and curettage of uterus.     Social History  She reports that she has been smoking cigarettes. She has been exposed to tobacco smoke. She does not have any smokeless tobacco history on file. She reports that she does not currently use alcohol. She reports that she does not use drugs.    Family History  No family history on file.     Allergies  Chlorothiazide, Metoprolol, Ibuprofen, and Penicillins    Review of Systems  As above       Physical Exam  Const: Laying in ER bed with family at bedside and inno acute distress.  CV: RRR, ppp, no  "peripheral edema  Pulm: Patent airway, good symmetrical chest expansion s accessory muscle usage  GI: soft, nondender, no distention  Skin: warm and dry  Ext: full ROM, no edema noted.   Head: Trachea midline, oral mucosa very dry     Last Recorded Vitals  Blood pressure 110/51, pulse 79, temperature 37.2 °C (98.9 °F), temperature source Oral, resp. rate 16, height 1.6 m (5' 3\"), weight 49 kg (108 lb), SpO2 94%.    Relevant Results  Scheduled medications    Continuous medications    PRN medications    Results for orders placed or performed during the hospital encounter of 07/23/24 (from the past 24 hour(s))   ECG 12 lead   Result Value Ref Range    Ventricular Rate 73 BPM    Atrial Rate 73 BPM    VT Interval 130 ms    QRS Duration 94 ms    QT Interval 410 ms    QTC Calculation(Bazett) 451 ms    P Axis 11 degrees    R Axis -5 degrees    T Axis 119 degrees    QRS Count 13 beats    Q Onset 219 ms    P Onset 154 ms    P Offset 202 ms    T Offset 424 ms    QTC Fredericia 438 ms   CBC and Auto Differential   Result Value Ref Range    WBC 11.8 (H) 4.4 - 11.3 x10*3/uL    nRBC 0.0 0.0 - 0.0 /100 WBCs    RBC 3.70 (L) 4.00 - 5.20 x10*6/uL    Hemoglobin 9.9 (L) 12.0 - 16.0 g/dL    Hematocrit 29.7 (L) 36.0 - 46.0 %    MCV 80 80 - 100 fL    MCH 26.8 26.0 - 34.0 pg    MCHC 33.3 32.0 - 36.0 g/dL    RDW 15.9 (H) 11.5 - 14.5 %    Platelets 287 150 - 450 x10*3/uL    Neutrophils % 87.0 40.0 - 80.0 %    Immature Granulocytes %, Automated 0.9 0.0 - 0.9 %    Lymphocytes % 4.7 13.0 - 44.0 %    Monocytes % 6.9 2.0 - 10.0 %    Eosinophils % 0.3 0.0 - 6.0 %    Basophils % 0.2 0.0 - 2.0 %    Neutrophils Absolute 10.26 (H) 1.60 - 5.50 x10*3/uL    Immature Granulocytes Absolute, Automated 0.11 0.00 - 0.50 x10*3/uL    Lymphocytes Absolute 0.56 (L) 0.80 - 3.00 x10*3/uL    Monocytes Absolute 0.81 (H) 0.05 - 0.80 x10*3/uL    Eosinophils Absolute 0.04 0.00 - 0.40 x10*3/uL    Basophils Absolute 0.02 0.00 - 0.10 x10*3/uL   Comprehensive metabolic panel "   Result Value Ref Range    Glucose 109 (H) 74 - 99 mg/dL    Sodium 134 (L) 136 - 145 mmol/L    Potassium 4.0 3.5 - 5.3 mmol/L    Chloride 95 (L) 98 - 107 mmol/L    Bicarbonate 27 21 - 32 mmol/L    Anion Gap 16 10 - 20 mmol/L    Urea Nitrogen 43 (H) 6 - 23 mg/dL    Creatinine 9.17 (H) 0.50 - 1.05 mg/dL    eGFR 4 (L) >60 mL/min/1.73m*2    Calcium 8.4 (L) 8.6 - 10.3 mg/dL    Albumin 2.3 (L) 3.4 - 5.0 g/dL    Alkaline Phosphatase 83 33 - 136 U/L    Total Protein 5.6 (L) 6.4 - 8.2 g/dL    AST 24 9 - 39 U/L    Bilirubin, Total 0.4 0.0 - 1.2 mg/dL    ALT <3 (L) 7 - 45 U/L   Lactate   Result Value Ref Range    Lactate 1.2 0.4 - 2.0 mmol/L   TSH with reflex to Free T4 if abnormal   Result Value Ref Range    Thyroid Stimulating Hormone 0.68 0.44 - 3.98 mIU/L   Troponin I, High Sensitivity, Initial   Result Value Ref Range    Troponin I, High Sensitivity 93 (HH) 0 - 13 ng/L   Troponin, High Sensitivity, 1 Hour   Result Value Ref Range    Troponin I, High Sensitivity 85 (HH) 0 - 13 ng/L     ECG 12 lead    Result Date: 7/23/2024  Normal sinus rhythm Cannot rule out Anterior infarct (cited on or before 06-JUL-2024) T wave abnormality, consider lateral ischemia Abnormal ECG When compared with ECG of 17-JUL-2024 16:41, Serial changes of Anterior infarct Present See ED provider note for full interpretation and clinical correlation Confirmed by Lien Schneider ([id number]) on 7/23/2024 6:21:34 PM        Assessment/Plan   Isis Geronimo is a 79 y.o. female with PMH of ESRD on PD, Anemia, RA, Emphysema Current smoker,  Vertigo, HTN, HLD. She came to ED w/complaints of n/v, and abdominal pain, constipation, in ED work up significant for elevated  troponin which [place] at [address]. TTE showed an LVEF of 60% with LV diastolic dysfunction, moderate concentric LVH and aortic valve sclerosis, LHC today with Dr Juárez, which showed Severe MVD who is  s/p Left Anterior Thoracatomy  Off Pump MIDCAB (ALCARAZ to LAD) Dr Cristina Clifton and discharged home in  stable condition on 7/20/2024.  Per ER report and family at bedside as well as patient herself, she has not had an appetite since she was discharged home and thus has not been eating.  Per family she appears to have lost some weight and they are feeling she has become more dehydrated.      Plan:     Continue post discharge regimen    Continue ASA/statin/coreg/plavix/and Colchicine for pericarditis (was cleared by nephrology)     Consult Nephrology for PD schedule and regimen  Continue with gentle hydration    GI:    Primary complaint is lack of appetite, failure to thrive and dehydration.  Per family she take cyproheptadine 4mg as appetite stimulant (ordered)  Regular diet    Discussed with Dr. Cristina Clifton via telephone and family at bedside.       I spent 60 minutes in the professional and overall care of this patient.      Cristi Mendoza, APRN-CNP

## 2024-07-23 NOTE — PROGRESS NOTES
Pharmacy Medication History Review   Spoke to the patient, and pts son. Pt had surgery last week. Added ASA    Isis Geronimo is a 79 y.o. female admitted for No Principal Problem: There is no principal problem currently on the Problem List. Please update the Problem List and refresh.. Pharmacy reviewed the patient's [id number] medications and allergies for accuracy.    The list below reflectives the updated PTA list. Please review each medication in order reconciliation for additional clarification and justification.     Prior to Admission Medications   Prescriptions Last Dose Informant   acetaminophen (Tylenol) 325 mg tablet     Sig: Take 2 tablets (650 mg) by mouth every 6 hours if needed for mild pain (1 - 3).   amLODIPine (Norvasc) 10 mg tablet 7/23/2024 at am Self   Sig: Take by mouth once daily.   aspirin 81 mg EC tablet 7/23/2024 at am    Sig: Take 1 tablet (81 mg) by mouth once daily.   atorvastatin (Lipitor) 80 mg tablet 7/23/2024 at am    Sig: TAKE 1 TABLET(80 MG) BY MOUTH DAILY   carvedilol (Coreg) 12.5 mg tablet 7/23/2024 at am    Sig: TAKE 1 TABLET(12.5 MG) BY MOUTH TWICE DAILY   clopidogrel (Plavix) 75 mg tablet 7/23/2024 at am    Sig: TAKE 1 TABLET(75 MG) BY MOUTH DAILY   colchicine 0.6 mg tablet 7/23/2024 at am    Sig: Take 1 tablet (0.6 mg) by mouth once daily.   cyproheptadine (Periactin) 4 mg tablet     Sig: Take 1 tablet (4 mg) by mouth 3 times a day as needed for allergies.   losartan (Cozaar) 100 mg tablet 7/23/2024 at am    Sig: Take 1 tablet (100 mg) by mouth once daily.   oxyCODONE (Roxicodone) 5 mg immediate release tablet     Sig: Take 1 tablet (5 mg) by mouth every 6 hours if needed for severe pain (7 - 10) or moderate pain (4 - 6) for up to 5 days.   polyethylene glycol (Glycolax, Miralax) 17 gram packet     Sig: Take 17 g by mouth once daily.      Facility-Administered Medications: None       The list below reflectives the updated allergy list. Please review each documented  "allergy for additional clarification and justification.  Allergies  Reviewed by Magnus Stoddard RN on 7/17/2024        Severity Reactions Comments    Chlorothiazide Not Specified Other     Metoprolol Not Specified Headache     Ibuprofen Low Other \"makes me feel faint\"    Penicillins Low Rash             Below are additional concerns with the patient's PTA list.      Leila Drummond    "

## 2024-07-23 NOTE — ED PROVIDER NOTES
HPI   Chief Complaint   Patient presents with    Failure To Thrive       HPI  Patient is a 79-year-old female with ESRD on peritoneal dialysis, anemia, emphysema, vertigo, hyperlipidemia, hypertension and recent hospitalization for an NSTEMI with a subsequent TTE that showed an left ventricular ejection fraction of 60% with left-ventricular diastolic dysfunction, LVH, aortic valve sclerosis and underwent a left heart cardiac cath via left anterior thoracotomy. MIDCAB (ALCARAZ to LAD) who presents emergency room with a chief complaint of failure to thrive.  Patient was discharged from the hospital on Saturday.  According to daughter patient really was not eating or drinking very much at the time but did eat the day she was discharged so they felt comfortable sending her home.  Since going home patient has not had anything to eat or drink because she just does not have an appetite.  She denies any chest pain, shortness of breath, fever, chills, nausea, vomiting, diarrhea, dysuria or hematuria.  Daughter feels like she has lost some weight as a result and feels like she is very dehydrated.  Denies any other symptoms at this time.  Of note, she was supposed to follow-up with outpatient rehab which was the initial discharge target but somehow patient ended up going home instead and daughter would like to see if she can be rerouted to rehab.      PMHx: As above  PSHx: As above  FamilyHx: Denies.  SocialHx: Smoker  Allergies: Per EMR  Medications: See Medication Reconciliation     ROS  As above otherwise denies      Physical Exam    GENERAL: Awake and Alert, No Acute Distress  HEENT: AT/NC, PERRL, EOMI, Normal Oropharynx, very dry oral mucosa  NECK: Normal Inspection, No JVD  CARDIOVASCULAR: RRR, No M/R/G  RESPIRATORY: CTA Bilaterally, No Wheezes, Rales or Rhonchi, Chest Wall Non-tender  ABDOMEN: Soft, non-tender abdomen, Normal Bowel Sounds, No Distention  BACK: No CVA Tenderness  SKIN: Normal Color, Warm, Dry, No Rashes    EXTREMITIES: Non-Tender, Full ROM, No Pedal Edema  NEURO: A&O x 3, Normal Motor and Sensation, Normal Mood and Affect    Nursing Assessment and Vitals Reviewed    EKG showed a normal sinus rhythm at 73 bpm.  There are T wave inversions in V4 through V6 as well as in lead I and aVL.  There is left axis deviation.  There are signs concerning for possible LVH.  No ischemic ST changes    Medical Decision  Patient is a 79-year-old female with ESRD on peritoneal dialysis, anemia, emphysema, vertigo, hyperlipidemia, hypertension and recent hospitalization for an NSTEMI with a subsequent TTE that showed an left ventricular ejection fraction of 60% with left-ventricular diastolic dysfunction, LVH, aortic valve sclerosis and underwent a left heart cardiac cath via left anterior thoracotomy. [facility] ([facility] to [facility]) who presents emergency room with a chief complaint of failure to thrive.  Patient was discharged from the hospital on Saturday.  According to daughter patient really was not eating or drinking very much at the time but did eat the day she was discharged so they felt comfortable sending her home.  Since going home patient has not had anything to eat or drink because she just does not have an appetite.  She denies any chest pain, shortness of breath, fever, chills, nausea, vomiting, diarrhea, dysuria or hematuria.  Daughter feels like she has lost some weight as a result and feels like she is very dehydrated.  Denies any other symptoms at this time.  Of note, she was supposed to follow-up with outpatient rehab which was the initial discharge target but somehow patient ended up going home instead and daughter would like to see if she can be rerouted to rehab.    On evaluation patient is in no acute distress but does show signs concerning for dehydration.  She is started on gentle IV fluids.  Per initial vital signs obtained patient slightly hypoxic at 92 to 93% and placed on 2 L via nasal cannula which she states is new  for her.  She did not feel short of breath at that time and she does not sound fluid overloaded.    Workup for patient included labs that revealed an elevated BUN and creatinine concerning for acute on chronic renal failure.  Lactate is within normal limits.  Troponin is 93 with a repeat at 85, however, on review of records her most recent troponin 2 weeks ago was 18,955.  She has mild leukocytosis with a left shift.  She is pending urinalysis.  Given family concern, picture of dehydration patient will be admitted for further workup and management with likely placement in rehab facility.                Patient History   Past Medical History:   Diagnosis Date    Arthritis     CAD (coronary artery disease)     COPD (chronic obstructive pulmonary disease) (Multi)     ESRD (end stage renal disease) (Multi)     on peritoneal dialysis    Hypertension     Non-sustained ventricular tachycardia (Multi)     Rheumatoid arthritis (Multi)      Past Surgical History:   Procedure Laterality Date    CARDIAC CATHETERIZATION N/A 07/08/2024    Procedure: Left Heart Cath with Coronary Angiography and LV;  Surgeon: Nicola Juárez MD;  Location: [facility] Cardiac Cath Lab;  Service: Cardiovascular;  Laterality: N/A;    COLONOSCOPY      DILATION AND CURETTAGE OF UTERUS      ESOPHAGOGASTRODUODENOSCOPY      PERITONEAL CATHETER INSERTION  05/23/2022     No family history on file.  Social History     Tobacco Use    Smoking status: Some Days     Types: Cigarettes     Passive exposure: Current    Smokeless tobacco: Not on file   Vaping Use    Vaping status: Never Used   Substance Use Topics    Alcohol use: Not Currently    Drug use: Never       Physical Exam   ED Triage Vitals [07/23/24 1116]   Temperature Heart Rate Respirations BP   37.2 °C (98.9 °F) 73 16 103/51      Pulse Ox Temp Source Heart Rate Source Patient Position   (!) 92 % Oral Monitor --      BP Location FiO2 (%)     -- --       Physical Exam      ED Course & MDM   Diagnoses as of  07/23/24 1551   Dehydration   Anorexia                       Rahat Coma Scale Score: 15                        Medical Decision Making      Procedure  Procedures     Araseli العلي MD  07/23/24 1427       Araseli العلي MD  07/23/24 1559

## 2024-07-23 NOTE — ED TRIAGE NOTES
Pt from home for poor oral intake and nausea, fatigue, weakness, dry mouth, left arm/ shoulder pain. Recent hospitalization for valve replacement and had poor oral intake then also. Pt went home on Saturday. Pt aox4.

## 2024-07-24 LAB
ANION GAP SERPL CALC-SCNC: 21 MMOL/L
BUN SERPL-MCNC: 50 MG/DL (ref 6–23)
CALCIUM SERPL-MCNC: 7.8 MG/DL (ref 8.6–10.3)
CHLORIDE SERPL-SCNC: 95 MMOL/L (ref 98–107)
CO2 SERPL-SCNC: 23 MMOL/L (ref 21–32)
CREAT SERPL-MCNC: 10.81 MG/DL (ref 0.5–1.05)
EGFRCR SERPLBLD CKD-EPI 2021: 3 ML/MIN/1.73M*2
ERYTHROCYTE [DISTWIDTH] IN BLOOD BY AUTOMATED COUNT: 16.2 % (ref 11.5–14.5)
GLUCOSE SERPL-MCNC: 68 MG/DL (ref 74–99)
HCT VFR BLD AUTO: 32.3 % (ref 36–46)
HGB BLD-MCNC: 10.4 G/DL (ref 12–16)
MCH RBC QN AUTO: 26.6 PG (ref 26–34)
MCHC RBC AUTO-ENTMCNC: 32.2 G/DL (ref 32–36)
MCV RBC AUTO: 83 FL (ref 80–100)
NRBC BLD-RTO: 0 /100 WBCS (ref 0–0)
PLATELET # BLD AUTO: 309 X10*3/UL (ref 150–450)
POTASSIUM SERPL-SCNC: 4.3 MMOL/L (ref 3.5–5.3)
RBC # BLD AUTO: 3.91 X10*6/UL (ref 4–5.2)
SODIUM SERPL-SCNC: 135 MMOL/L (ref 136–145)
WBC # BLD AUTO: 12.1 X10*3/UL (ref 4.4–11.3)

## 2024-07-24 PROCEDURE — 85027 COMPLETE CBC AUTOMATED: CPT | Performed by: INTERNAL MEDICINE

## 2024-07-24 PROCEDURE — 2500000001 HC RX 250 WO HCPCS SELF ADMINISTERED DRUGS (ALT 637 FOR MEDICARE OP): Performed by: INTERNAL MEDICINE

## 2024-07-24 PROCEDURE — 2500000004 HC RX 250 GENERAL PHARMACY W/ HCPCS (ALT 636 FOR OP/ED): Performed by: INTERNAL MEDICINE

## 2024-07-24 PROCEDURE — 97161 PT EVAL LOW COMPLEX 20 MIN: CPT | Mod: GP

## 2024-07-24 PROCEDURE — 36415 COLL VENOUS BLD VENIPUNCTURE: CPT | Performed by: INTERNAL MEDICINE

## 2024-07-24 PROCEDURE — 99232 SBSQ HOSP IP/OBS MODERATE 35: CPT | Performed by: NURSE PRACTITIONER

## 2024-07-24 PROCEDURE — 1200000002 HC GENERAL ROOM WITH TELEMETRY DAILY

## 2024-07-24 PROCEDURE — 80048 BASIC METABOLIC PNL TOTAL CA: CPT | Performed by: INTERNAL MEDICINE

## 2024-07-24 PROCEDURE — 87070 CULTURE OTHR SPECIMN AEROBIC: CPT | Mod: AHULAB | Performed by: INTERNAL MEDICINE

## 2024-07-24 PROCEDURE — 87075 CULTR BACTERIA EXCEPT BLOOD: CPT | Mod: AHULAB | Performed by: INTERNAL MEDICINE

## 2024-07-24 PROCEDURE — 97165 OT EVAL LOW COMPLEX 30 MIN: CPT | Mod: GO | Performed by: OCCUPATIONAL THERAPIST

## 2024-07-24 RX ORDER — NYSTATIN 100000 [USP'U]/ML
5 SUSPENSION ORAL 3 TIMES DAILY
Status: DISCONTINUED | OUTPATIENT
Start: 2024-07-24 | End: 2024-08-02 | Stop reason: HOSPADM

## 2024-07-24 ASSESSMENT — PAIN - FUNCTIONAL ASSESSMENT
PAIN_FUNCTIONAL_ASSESSMENT: 0-10

## 2024-07-24 ASSESSMENT — COGNITIVE AND FUNCTIONAL STATUS - GENERAL
STANDING UP FROM CHAIR USING ARMS: A LOT
DRESSING REGULAR LOWER BODY CLOTHING: A LOT
MOVING TO AND FROM BED TO CHAIR: TOTAL
EATING MEALS: A LITTLE
MOBILITY SCORE: 11
HELP NEEDED FOR BATHING: TOTAL
TURNING FROM BACK TO SIDE WHILE IN FLAT BAD: TOTAL
MOBILITY SCORE: 11
STANDING UP FROM CHAIR USING ARMS: A LOT
WALKING IN HOSPITAL ROOM: TOTAL
TURNING FROM BACK TO SIDE WHILE IN FLAT BAD: A LITTLE
TOILETING: A LOT
MOVING FROM LYING ON BACK TO SITTING ON SIDE OF FLAT BED WITH BEDRAILS: A LOT
PERSONAL GROOMING: A LOT
DAILY ACTIVITIY SCORE: 8
CLIMB 3 TO 5 STEPS WITH RAILING: TOTAL
HELP NEEDED FOR BATHING: A LOT
DAILY ACTIVITIY SCORE: 14
MOVING TO AND FROM BED TO CHAIR: TOTAL
TOILETING: TOTAL
TURNING FROM BACK TO SIDE WHILE IN FLAT BAD: A LOT
HELP NEEDED FOR BATHING: A LOT
MOVING FROM LYING ON BACK TO SITTING ON SIDE OF FLAT BED WITH BEDRAILS: A LITTLE
CLIMB 3 TO 5 STEPS WITH RAILING: TOTAL
STANDING UP FROM CHAIR USING ARMS: A LOT
MOVING TO AND FROM BED TO CHAIR: A LOT
MOVING FROM LYING ON BACK TO SITTING ON SIDE OF FLAT BED WITH BEDRAILS: A LITTLE
DRESSING REGULAR UPPER BODY CLOTHING: TOTAL
CLIMB 3 TO 5 STEPS WITH RAILING: TOTAL
EATING MEALS: A LITTLE
MOBILITY SCORE: 8
DRESSING REGULAR LOWER BODY CLOTHING: TOTAL
WALKING IN HOSPITAL ROOM: TOTAL
DRESSING REGULAR UPPER BODY CLOTHING: A LOT
EATING MEALS: A LOT
DAILY ACTIVITIY SCORE: 10
PERSONAL GROOMING: TOTAL
PERSONAL GROOMING: A LITTLE
WALKING IN HOSPITAL ROOM: TOTAL
DRESSING REGULAR LOWER BODY CLOTHING: A LOT
TOILETING: TOTAL
DRESSING REGULAR UPPER BODY CLOTHING: TOTAL

## 2024-07-24 ASSESSMENT — ACTIVITIES OF DAILY LIVING (ADL)
ADL_ASSISTANCE: INDEPENDENT
ADL_ASSISTANCE: INDEPENDENT

## 2024-07-24 ASSESSMENT — PAIN SCALES - GENERAL
PAINLEVEL_OUTOF10: 0 - NO PAIN
PAINLEVEL_OUTOF10: 7
PAINLEVEL_OUTOF10: 0 - NO PAIN
PAINLEVEL_OUTOF10: 7
PAINLEVEL_OUTOF10: 0 - NO PAIN

## 2024-07-24 ASSESSMENT — ENCOUNTER SYMPTOMS
CARDIOVASCULAR NEGATIVE: 1
EYES NEGATIVE: 1
MUSCULOSKELETAL NEGATIVE: 1
RESPIRATORY NEGATIVE: 1
WEAKNESS: 1
GASTROINTESTINAL NEGATIVE: 1
APPETITE CHANGE: 1

## 2024-07-24 ASSESSMENT — PAIN SCALES - WONG BAKER: WONGBAKER_NUMERICALRESPONSE: HURTS LITTLE MORE

## 2024-07-24 NOTE — CARE PLAN
Problem: Skin  Goal: Decreased wound size/increased tissue granulation at next dressing change  Outcome: Progressing  Goal: Participates in plan/prevention/treatment measures  Outcome: Progressing  Goal: Prevent/manage excess moisture  Outcome: Progressing  Goal: Prevent/minimize sheer/friction injuries  Outcome: Progressing  Goal: Promote/optimize nutrition  Outcome: Progressing  Goal: Promote skin healing  Outcome: Progressing   The patient's goals for the shift include sleep    The clinical goals for the shift include safety    Over the shift, the patient did not make progress toward the following goals. Barriers to progression include . Recommendations to address these barriers include .     Maxine Dawson called asking if he can get a call with his PET scan results.  Please call

## 2024-07-24 NOTE — PROGRESS NOTES
Isis Geronimo is a 79 y.o. female on day 1 of admission presenting with Dehydration.      Subjective   Isis Geronimo is a 79-year-old female with a past medical history of end-stage renal disease on peritoneal dialysis under the care of Dr. Zarate ([facility] transportation [place]). She has a history of anemia, rheumatoid arthritis, nonsustained V. tach, hypertension, hyperlipidemia, vertigo, emphysema, carpal tunnel syndrome who was recently admitted on 7/6 after presenting with nausea, emesis, constipation and right-sided chest discomfort.  She was admitted to [facility] with NSTEMI with a high-sensitivity troponin peak of 21,094. 2 D ECHO showed an LVEF of 61% with LV diastolic dysfunction, moderate concentric LVH and aortic valve sclerosis.  She was seen by cardiology. Heparin infusion, beta-blocker, statin and aspirin given. Left heart cath showed severe multivessel coronary artery disease. On 7/17 she underwent left anterior thoracatomy off Pump MIDCAB.  She was discharged home on 7/20.  She comes back in with failure to thrive.  She has had poor by mouth intake, a 3 pound weight loss, confusion and weakness thus she was readmitted.  Workup was notable for new low-grade leukocytosis with neutrophil predominance.  She is afebrile.  Chest x-ray shows bilateral atelectasis with small effusions.  She denies fever and abdominal discomfort.  She cannot tell me if there has been changes in her PD fluid.  Nephrology is consulted for renal care.       Objective          Vitals 24HR  Heart Rate:  [77-87]   Temp:  [36.6 °C (97.8 °F)-37.1 °C (98.8 °F)]   Resp:  [16-19]   BP: ()/(39-67)   SpO2:  [92 %-97 %]     Intake/Output last 3 Shifts:    Intake/Output Summary (Last 24 hours) at 7/24/2024 1552  Last data filed at 7/23/2024 2331  Gross per 24 hour   Intake 12.5 ml   Output --   Net 12.5 ml       Physical Exam  General: Awake, conversational albeit confused.   HEENT:  Pupils equal and reactive. Moist  mucosa.    Neck: Normal Jugular Venous Pressure.  Cardiovascular: Regular rate and rhythm. Normal S1 and S2.  Pulmonary:  Clear to auscultation bilaterally.  No wheezes, rales or rhonchi  Abdomen:  Soft. Non-tender. Mildly distended. Positive bowel sounds.  Lower Extremities:  2+ pedal pulses. No LE edema.  Neurologic:  Cranial nerves intact.  No focal deficit.   Skin: Skin warm and dry, normal skin turgor.   Psychiatric: Appropriate mood and behavior    Scheduled Medications  amLODIPine, 10 mg, oral, Daily  aspirin, 81 mg, oral, Daily  atorvastatin, 80 mg, oral, Daily  carvedilol, 12.5 mg, oral, BID  clopidogrel, 75 mg, oral, Daily  colchicine, 0.6 mg, oral, Daily  heparin (porcine), 5,000 Units, subcutaneous, q8h MARIYA  losartan, 100 mg, oral, Daily  nystatin, 5 mL, Swish & Swallow, TID  polyethylene glycol, 17 g, oral, Daily      Continuous medications       PRN medications: acetaminophen **OR** acetaminophen **OR** acetaminophen, ondansetron, oxyCODONE     Relevant Results  Results from last 7 days   Lab Units 07/24/24  0548 07/23/24  1128 07/20/24  0520   WBC AUTO x10*3/uL 12.1* 11.8* 9.8   HEMOGLOBIN g/dL 10.4* 9.9* 10.0*   HEMATOCRIT % 32.3* 29.7* 30.6*   PLATELETS AUTO x10*3/uL 309 287 235   NEUTROS PCT AUTO %  --  87.0  --    LYMPHS PCT AUTO %  --  4.7  --    MONOS PCT AUTO %  --  6.9  --    EOS PCT AUTO %  --  0.3  --      Results from last 7 days   Lab Units 07/24/24  0548 07/23/24  1128 07/20/24  0520   SODIUM mmol/L 135* 134* 134*   POTASSIUM mmol/L 4.3 4.0 3.7   CHLORIDE mmol/L 95* 95* 97*   CO2 mmol/L 23 27 26   BUN mg/dL 50* 43* 23   CREATININE mg/dL 10.81* 9.17* 6.97*   GLUCOSE mg/dL 68* 109* 70*   CALCIUM mg/dL 7.8* 8.4* 8.2*       No orders to display            Assessment/Plan      Isis Geronimo is a 79-year-old female with a past medical history of end-stage renal disease on peritoneal dialysis under the care of Dr. Zarate ([facility]). She has a history of  anemia, rheumatoid arthritis, nonsustained V. tach, hypertension, hyperlipidemia, vertigo, emphysema, carpal tunnel syndrome who was recently admitted on 7/6 after presenting with nausea, emesis, constipation and right-sided chest discomfort.  She was admitted to [facility] with NSTEMI with a high-sensitivity troponin peak of 21,094. 2 D ECHO showed an LVEF of 61% with LV diastolic dysfunction, moderate concentric LVH and aortic valve sclerosis.  She was seen by cardiology. Heparin infusion, beta-blocker, statin and aspirin given. Left heart cath showed severe multivessel coronary artery disease. On 7/17 she underwent left anterior thoracatomy off Pump MIDCAB.  She was discharged home on 7/20.  She comes back in with failure to thrive.  She has had poor by mouth intake, a 3 pound weight loss, confusion and weakness thus she was readmitted.  Workup was notable for new low-grade leukocytosis with neutrophil predominance.  She is afebrile.  Chest x-ray shows bilateral atelectasis with small effusions.  She denies fever and abdominal discomfort.  She cannot tell me if there has been changes in her PD fluid.  Nephrology is consulted for renal care.    Given her leukocytosis and failure to thrive symptoms, I will send her PD fluid for cell count and culture and also obtain blood cultures.  I will hold off on resuming peritoneal dialysis this evening and we will plan to do a couple of exchanges tomorrow utilizing 1.5% dextrose.  We may have to back down on antihypertensives if her blood pressure remains borderline soft.  Trend her RFP.  Nephrology will follow with her care.    Principal Problem:    Dehydration         I spent 45 minutes in the professional and overall care of this patient.      Gab Lynn, DO

## 2024-07-24 NOTE — CARE PLAN
Problem: Skin  Goal: Decreased wound size/increased tissue granulation at next dressing change  7/23/2024 2330 by Apryl Miller RN  Flowsheets (Taken 7/23/2024 2330)  Decreased wound size/increased tissue granulation at next dressing change: Promote sleep for wound healing  7/23/2024 2329 by Apryl Miller RN  Outcome: Progressing  Goal: Participates in plan/prevention/treatment measures  Outcome: Progressing  Goal: Prevent/manage excess moisture  Outcome: Progressing  Goal: Prevent/minimize sheer/friction injuries  Outcome: Progressing  Goal: Promote/optimize nutrition  Outcome: Progressing  Goal: Promote skin healing  Outcome: Progressing   The patient's goals for the shift include sleep    The clinical goals for the shift include safety    Over the shift, the patient did not make progress toward the following goals. Barriers to progression include . Recommendations to address these barriers include .

## 2024-07-24 NOTE — PROGRESS NOTES
Occupational Therapy    Evaluation    Patient Name: Isis Geronimo  MRN: [id number]  Today's Date: 7/24/2024  Time Calculation  Start Time: 1028  Stop Time: 1046  Time Calculation (min): 18 min        Assessment:  OT Assessment: Pt demos decreased balance, strength, endurance, ROM and cognition/safety awareness resulting in decreased safety and independence with ADLs/IADLs. Pt requires skilled OT services to address above deficits to safely return to PLOF.  Prognosis: Good  Evaluation/Treatment Tolerance: Patient limited by pain, Patient limited by fatigue  Medical Staff Made Aware: Yes  End of Session Communication: Bedside nurse  End of Session Patient Position: Bed, 3 rail up, Alarm on  OT Assessment Results: Decreased ADL status, Decreased upper extremity range of motion, Decreased upper extremity strength, Decreased safe judgment during ADL, Decreased cognition, Decreased endurance, Decreased functional mobility, Decreased IADLs, Decreased trunk control for functional activities  Prognosis: Good  Evaluation/Treatment Tolerance: Patient limited by pain, Patient limited by fatigue  Medical Staff Made Aware: Yes  Strengths: Premorbid level of function  Barriers to Participation: Comorbidities, Attitude of self  Plan:  Treatment Interventions: ADL retraining, Functional transfer training, UE strengthening/ROM, Endurance training, Patient/family training, Equipment evaluation/education, Compensatory technique education  OT Frequency: 3 times per week  OT Discharge Recommendations: Moderate intensity level of continued care  Equipment Recommended upon Discharge: Wheeled walker  OT Recommended Transfer Status: Assist of 2  OT - OK to Discharge: Yes (OT POC established this date)  Treatment Interventions: ADL retraining, Functional transfer training, UE strengthening/ROM, Endurance training, Patient/family training, Equipment evaluation/education, Compensatory technique education    Subjective      General:  General  Reason for Referral: Pt is a 80 yo female presenting due to poor food intake/dehydration/FTT. Pt recently dc'd home from hospital due to CABGx1 and anterior thoracotomy  Referred By: Jacob Staples MD  Past Medical History Relevant to Rehab:   Past Medical History:   Diagnosis Date    Arthritis     CAD (coronary artery disease)     COPD (chronic obstructive pulmonary disease) (Multi)     ESRD (end stage renal disease) (Multi)     on peritoneal dialysis    Hypertension     Non-sustained ventricular tachycardia (Multi)     Rheumatoid arthritis (Multi)       Family/Caregiver Present: No  Co-Treatment: PT  Co-Treatment Reason: to maximize safety and participation with skilled intervention  Prior to Session Communication: Bedside nurse  Patient Position Received: Bed, 3 rail up, Alarm on  General Comment: Pt supine in bed upon arrival and agreeable to OT eval/tx. Pt requires encouragement to participate in session.  Precautions:  Medical Precautions: Fall precautions  Post-Surgical Precautions: Move in the Tube  Precautions Comment: pt s/p Left Anterior Thoracatomy & Off Pump MIDCAB (LIMA to LAD) on 7/17       Pain:  Pain Assessment  Pain Assessment: 0-10  0-10 (Numeric) Pain Score: 7  Pain Type: Acute pain  Pain Location:  (Flank)  Pain Orientation: Right  Pain Interventions: Repositioned, Ambulation/increased activity    Objective   Cognition:  Overall Cognitive Status: Within Functional Limits  Orientation Level: Oriented X4  Insight: Mild  Impulsive: Within functional limits  Processing Speed: Delayed           Home Living:  Type of Home: Apartment  Lives With: Alone (neighbor able to assist as needed)  Home Adaptive Equipment: None  Home Layout: One level  Home Access: Level entry  Bathroom Shower/Tub: Walk-in shower  Bathroom Toilet: Standard  Bathroom Equipment: Grab bars in shower, Shower chair without back, Grab bars around toilet  Prior Function:  Level of [place]: Independent with  ADLs and functional transfers, Independent with homemaking with ambulation  ADL Assistance: Independent  Homemaking Assistance: Independent  Ambulatory Assistance: Independent (no AD)  Prior Function Comments: - driving. Pt denies recent falls       ADL:  UE Dressing Assistance: Moderate  UE Dressing Deficit:  (to don/doff gown, cues for max effort)  LE Dressing Assistance: Total  LE Dressing Deficit: Don/doff R sock, Don/doff L sock (d/t abdominal/flank pain)  Activity Tolerance:  Endurance: Tolerates less than 10 min exercise, no significant change in vital signs, Decreased tolerance for upright activites  Bed Mobility/Transfers: Bed Mobility  Bed Mobility: Yes  Bed Mobility 1  Bed Mobility 1: Supine to sitting, Log roll  Level of Assistance 1: Maximum assistance, +2  Bed Mobility Comments 1: cues for sequencing steps of log roll, assist at trunk and B LEs  Bed Mobility 2  Bed Mobility  2: Sitting to supine, Log roll  Level of Assistance 2: Moderate assistance, +2, Moderate verbal cues  Bed Mobility Comments 2: assist at B LEs and trunk, cues for sequencing steps of log roll    Transfers  Transfer: Yes  Transfer 1  Technique 1: Sit to stand, Stand to sit  Transfer Device 1: Walker  Transfer Level of Assistance 1: Moderate assistance, +2, Moderate verbal cues, Minimal tactile cues  Trials/Comments 1: cues for sequencing, safe hand placement and walker safety      Functional Mobility:  Functional Mobility  Functional Mobility Performed: Yes  Functional Mobility 1  Comments 1: Pt side stepped to R side towards HOB using FWW with Min A x1. Cues for sequencing, walker safety and upright posture. No LOB noted. Distance limited by fatigue/pain.  Sitting Balance:  Static Sitting Balance  Static Sitting-Balance Support: Bilateral upper extremity supported, Feet supported  Static Sitting-Level of Assistance: Close supervision  Static Sitting-Comment/Number of Minutes: at EOB  Dynamic Sitting Balance  Dynamic  Sitting-Comments: SBA at EOB  Standing Balance:  Static Standing Balance  Static Standing-Balance Support: Bilateral upper extremity supported  Static Standing-Level of Assistance: Contact guard  Static Standing-Comment/Number of Minutes: FWW  Dynamic Standing Balance  Dynamic Standing-Comments: Min A using FWW     Sensation:  Light Touch: No apparent deficits  Strength:  Strength Comments: B shoulders grossly 3-/5, distally 3/5 based on function, not formally assessed d/t MITT precautions  Perception:  Inattention/Neglect: Appears intact  Coordination:  Movements are Fluid and Coordinated: Yes        Extremities: RUE   RUE : Exceptions to WFL (R shoulder flexion ~90 degrees, limited by abdominal/flank pain, distally WFL) and LUE   LUE: Exceptions to WFL (L shoulder flexion ~90 degrees, limited by abdominal pain, distally WFL)        Outcome Measures:[facility] Daily Activity  Putting on and taking off regular lower body clothing: A lot  Bathing (including washing, rinsing, drying): A lot  Putting on and taking off regular upper body clothing: A lot  Toileting, which includes using toilet, bedpan or urinal: A lot  Taking care of personal grooming such as brushing teeth: A little  Eating Meals: A little  Daily Activity - Total Score: 14        Education Documentation  Body Mechanics, taught by Rhea Romano OT at 7/24/2024  1:46 PM.  Learner: Patient  Readiness: Acceptance  Method: Explanation  Response: Verbalizes Understanding, Needs Reinforcement    Precautions, taught by Rhea Romano OT at 7/24/2024  1:46 PM.  Learner: Patient  Readiness: Acceptance  Method: Explanation  Response: Verbalizes Understanding, Needs Reinforcement    ADL Training, taught by Rhea Romano OT at 7/24/2024  1:46 PM.  Learner: Patient  Readiness: Acceptance  Method: Explanation  Response: Verbalizes Understanding, Needs Reinforcement    Education Comments  No comments found.             Goals:  Encounter Problems       Encounter  Problems (Active)       ADLs       Patient will perform UB and LB sponge bathing with minimal assist  level of assistance and long-handled sponge.       Start:  07/24/24    Expected End:  08/07/24            Patient with complete upper body dressing with stand by assist level of assistance donning and doffing all UE clothes with PRN adaptive equipment.       Start:  07/24/24    Expected End:  08/07/24            Patient with complete lower body dressing with minimal assist  level of assistance donning and doffing all LE clothes  with PRN adaptive equipment.       Start:  07/24/24    Expected End:  08/07/24            Patient will complete daily grooming tasks with modified independent level of assistance and PRN adaptive equipment.       Start:  07/24/24    Expected End:  08/07/24            Patient will complete toileting including hygiene clothing management/hygiene with minimal assist  level of assistance and raised toilet seat and grab bars.       Start:  07/24/24    Expected End:  08/07/24               MOBILITY       Patient will perform Functional mobility x Household distances/Community Distances with stand by assist level of assistance and least restrictive device in order to improve safety and functional mobility.       Start:  07/24/24    Expected End:  08/07/24               TRANSFERS       Patient will perform bed mobility contact guard assist level of assistance and bed rails in order to improve safety and independence with mobility       Start:  07/24/24    Expected End:  08/07/24            Patient will complete functional transfers with least restrictive device with contact guard assist level of assistance.       Start:  07/24/24    Expected End:  08/07/24

## 2024-07-24 NOTE — H&P
INTERNAL MEDICINE     HISTORY AND PHYSICAL      Chief Complaint:  Weakness    History Of Present Illness  Isis Geronimo is a 79 y.o. female presenting with generalized weakness from home.  Patient has a recent history of admission for acute MI.  She underwent cardiac catheterization and subsequently left anterior thoracotomy.  She was discharged on 7/20 and per family since discharge has had minimal p.o. intake and increased somnolence.  There has also been reports of confusion beyond her baseline.  Patient reports very poor appetite.  She states that food does not taste good.  She lives alone but family reports that she has had someone with her 24 hours a day since discharge.  She has not been active.  She was brought back to the hospital due to the above and currently she continues to state that she has very poor appetite.  She did not have her peritoneal dialysis yesterday.  She denies shortness of breath.  She has had no fever or chills.  She was admitted for further treatment.     Past Medical History  She has a past medical history of Arthritis, CAD (coronary artery disease), COPD (chronic obstructive pulmonary disease) (Multi), ESRD (end stage renal disease) (Multi), Hypertension, Non-sustained ventricular tachycardia (Multi), and Rheumatoid arthritis (Multi).    Surgical History  She has a past surgical history that includes Cardiac catheterization (N/A, 07/08/2024); Esophagogastroduodenoscopy; Peritoneal catheter insertion (05/23/2022); Colonoscopy; and Dilation and curettage of uterus.     Social History  She reports that she has been smoking cigarettes. She has been exposed to tobacco smoke. She does not have any smokeless tobacco history on file. She reports that she does not currently use alcohol. She reports that she does not use drugs.    Family History  No family history on file.     Allergies  Chlorothiazide, Metoprolol, Ibuprofen, and Penicillins    Home Medications:  Prior to Admission  medications    Medication Sig Start Date End Date Taking? Authorizing Provider   amLODIPine (Norvasc) 10 mg tablet Take by mouth once daily.   Yes Historical Provider, MD   aspirin 81 mg EC tablet Take 1 tablet (81 mg) by mouth once daily. 7/20/24 9/18/24 Yes RACHELLE Ureña   atorvastatin (Lipitor) 80 mg tablet TAKE 1 TABLET(80 MG) BY MOUTH DAILY 7/20/24  Yes RACHELLE Ureña   carvedilol (Coreg) 12.5 mg tablet TAKE 1 TABLET(12.5 MG) BY MOUTH TWICE DAILY 7/20/24 10/18/24 Yes RACHELLE Ureña   clopidogrel (Plavix) 75 mg tablet TAKE 1 TABLET(75 MG) BY MOUTH DAILY 7/20/24  Yes RACHELLE Ureña   colchicine 0.6 mg tablet Take 1 tablet (0.6 mg) by mouth once daily. 7/21/24 8/20/24 Yes RACHELLE Ureña   losartan (Cozaar) 100 mg tablet Take 1 tablet (100 mg) by mouth once daily.   Yes Historical Provider, MD   acetaminophen (Tylenol) 325 mg tablet Take 2 tablets (650 mg) by mouth every 6 hours if needed for mild pain (1 - 3). 7/20/24 8/19/24  RACHELLE Ureña   cyproheptadine (Periactin) 4 mg tablet Take 1 tablet (4 mg) by mouth 3 times a day as needed for allergies.    Historical Provider, MD   oxyCODONE (Roxicodone) 5 mg immediate release tablet Take 1 tablet (5 mg) by mouth every 6 hours if needed for severe pain (7 - 10) or moderate pain (4 - 6) for up to 5 days. 7/20/24 7/25/24  RACHELLE Ureña   polyethylene glycol (Glycolax, Miralax) 17 gram packet Take 17 g by mouth once daily. 7/20/24 8/19/24  RACHELLE Ureña   atorvastatin (Lipitor) 20 mg tablet Take 1 tablet (20 mg) by mouth once daily.  7/20/24  Historical Provider, MD   atorvastatin (Lipitor) 80 mg tablet Take 1 tablet (80 mg) by mouth once daily. 7/20/24 7/20/24  ALEXIS Ureña-CNP   B complex-vitamin C-folic acid (Nephro-Batsheva) 0.8 mg tablet Take 1 tablet by mouth once daily.  7/23/24  Historical Provider, MD   carvedilol (Coreg) 12.5 mg tablet Take 1 tablet (12.5 mg) by  "mouth 2 times a day. 7/20/24 7/20/24  ALEXIS Ureña-CNP   clopidogrel (Plavix) 75 mg tablet Take 1 tablet (75 mg) by mouth once daily. 7/20/24 7/20/24  ALEXIS Ureña-CNP   losartan (Cozaar) 50 mg tablet Take 2 tablets (100 mg) by mouth once daily.  7/23/24  Historical Provider, MD        Review of Systems   Constitutional:  Positive for appetite change.   HENT: Negative.     Eyes: Negative.    Respiratory: Negative.     Cardiovascular: Negative.    Gastrointestinal: Negative.    Musculoskeletal: Negative.    Skin: Negative.    Neurological:  Positive for weakness.        Last Recorded Vitals  Blood pressure 129/54, pulse 82, temperature 36.8 °C (98.3 °F), temperature source Temporal, resp. rate 18, height 1.6 m (5' 3\"), weight 49 kg (108 lb), SpO2 92%.    Physical Exam      Constitutional:       Appearance: Elderly, asthenic build, in no distress  HENT:      Head: Normocephalic and atraumatic.  Moderate thrush present.  Eyes:      Extraocular Movements: Extraocular movements intact.      Pupils: Pupils are equal, round, and reactive to light.   Cardiovascular:      Rate and Rhythm: Normal rate and regular rhythm.      Pulses: Normal pulses.      Heart sounds: Normal heart sounds.   Pulmonary:      Effort: Pulmonary effort is normal.      Breath sounds: Normal breath sounds.   Abdominal:      General: Abdomen is flat. Bowel sounds are normal.      Palpations: Abdomen is soft.   Musculoskeletal:         General: Normal range of motion.      Cervical back: Normal range of motion and neck supple.   Skin:     General: Skin is warm and dry.  Thoracic incision healing well.  Neurological:      General: No focal deficit present.      Mental Status: He is alert and oriented to person, place, and time. Mental status is at baseline.       Relevant Results    Results for orders placed or performed during the hospital encounter of 07/23/24 (from the past 24 hour(s))   Troponin, High Sensitivity, 1 Hour   Result " Value Ref Range    Troponin I, High Sensitivity 85 (HH) 0 - 13 ng/L   CBC   Result Value Ref Range    WBC 12.1 (H) 4.4 - 11.3 x10*3/uL    nRBC 0.0 0.0 - 0.0 /100 WBCs    RBC 3.91 (L) 4.00 - 5.20 x10*6/uL    Hemoglobin 10.4 (L) 12.0 - 16.0 g/dL    Hematocrit 32.3 (L) 36.0 - 46.0 %    MCV 83 80 - 100 fL    MCH 26.6 26.0 - 34.0 pg    MCHC 32.2 32.0 - 36.0 g/dL    RDW 16.2 (H) 11.5 - 14.5 %    Platelets 309 150 - 450 x10*3/uL   Basic metabolic panel   Result Value Ref Range    Glucose 68 (L) 74 - 99 mg/dL    Sodium 135 (L) 136 - 145 mmol/L    Potassium 4.3 3.5 - 5.3 mmol/L    Chloride 95 (L) 98 - 107 mmol/L    Bicarbonate 23 21 - 32 mmol/L    Anion Gap 21 mmol/L    Urea Nitrogen 50 (H) 6 - 23 mg/dL    Creatinine 10.81 (H) 0.50 - 1.05 mg/dL    eGFR 3 (L) >60 mL/min/1.73m*2    Calcium 7.8 (L) 8.6 - 10.3 mg/dL             Principal Problem:    Dehydration    Problem list:  Principal Problem:    Dehydration      ASSESSMENT AND PLAN:    Dehydration -admit to poor p.o. intake.  Will consult nutrition, treat thrush, offer supplements twice daily on a scheduled basis.  ESRD -continue with peritoneal dialysis under the guidance of nephrology.  CAD -underwent CABG recently.  Healing well.  Cardiothoracic surgery consulted.  Lethargy -will discontinue Periactin and monitor.  Weakness -PT OT consulted.    Patient seen with family at bedside.      Jacob Staples MD  07/24/2412:29 PM

## 2024-07-24 NOTE — PROGRESS NOTES
Physical Therapy    Physical Therapy Evaluation    Patient Name: Isis Geronimo  MRN: [id number]  Today's Date: 7/24/2024   Time Calculation  Start Time: 1027  Stop Time: 1045  Time Calculation (min): 18 min    Assessment/Plan   PT Assessment  PT Assessment Results: Decreased strength, Decreased endurance, Impaired balance, Decreased mobility  Rehab Prognosis: Good  Evaluation/Treatment Tolerance: Patient tolerated treatment well  Medical Staff Made Aware: Yes  Strengths: Ability to acquire knowledge, Physical health, Premorbid level of function  Barriers to Participation: Comorbidities, Insight into problems  End of Session Communication: Bedside nurse  Assessment Comment: Pt demonstrating deficits in generalized strength, endurance and balance as well as increased pain resulting in impaired functional mobility, gait and self care. Due to the impairments listed above, pt would benefit from skilled physical therapy services in acute care setting as well as additional therapy in [facility] intensity therapy and 24/7 supervision and assistance  End of Session Patient Position: Alarm on, Bed, 3 rail up  IP OR SWING BED PT PLAN  Inpatient or Swing Bed: Inpatient  PT Plan  Treatment/Interventions: Bed mobility, Transfer training, Gait training, Stair training, Balance training, Neuromuscular re-education, Strengthening, Endurance training, Therapeutic exercise, Therapeutic activity, Home exercise program  PT Plan: Ongoing PT  PT Frequency: 3 times per week  PT Discharge Recommendations: Moderate intensity level of continued care  Equipment Recommended upon Discharge: Wheeled walker  PT Recommended Transfer Status: Assist x2  PT - OK to Discharge: Yes (PT POC initiated this date)      Subjective   General Visit Information:  General  Reason for Referral: Pt is a 80 yo female presenting due to poor food intake/dehydration/FTT. Pt recently dc'd home from hospital due to CABGx1  and anterior thoracotomy  Referred By: Jacob Staples  MD  Past Medical History Relevant to Rehab:   Past Medical History:   Diagnosis Date    Arthritis     CAD (coronary artery disease)     COPD (chronic obstructive pulmonary disease) (Multi)     ESRD (end stage renal disease) (Multi)     on peritoneal dialysis    Hypertension     Non-sustained ventricular tachycardia (Multi)     Rheumatoid arthritis (Multi)        Co-Treatment: OT  Co-Treatment Reason: for maximal pt safety and participation  Prior to Session Communication: Bedside nurse  Patient Position Received: Bed, 3 rail up, Alarm on  General Comment: Pt pleasant and agreeable to PT evaluation  Home Living:  Home Living  Type of Home: Apartment  Lives With: Alone (neighbor able to assist as needed)  Home Adaptive Equipment: None  Home Layout: One level  Home Access: Level entry  Bathroom Shower/Tub: Walk-in shower  Bathroom Toilet: Standard  Bathroom Equipment: Grab bars in shower, Shower chair without back, Grab bars around toilet  Prior Level of Function:  Prior Function Per Pt/Caregiver Report  Level of [place]: Independent with ADLs and functional transfers, Independent with homemaking with ambulation  ADL Assistance: Independent  Homemaking Assistance: Independent  Ambulatory Assistance: Independent (no device)  Precautions:  Precautions  Medical Precautions: Fall precautions  Post-Surgical Precautions: Move in the Tube  Vital Signs:       Objective   Pain:  Pain Assessment  Pain Assessment: 0-10  0-10 (Numeric) Pain Score: 7  Pain Type: Acute pain  Pain Location:  (flank)  Pain Orientation: Right  Pain Interventions: Repositioned, Ambulation/increased activity  Cognition:  Cognition  Overall Cognitive Status: Within Functional Limits  Orientation Level: Oriented X4  Insight: Mild  Impulsive: Within functional limits    General Assessments:  Activity Tolerance  Endurance: Tolerates 30 min exercise with multiple rests    Sensation  Light Touch: No apparent deficits    Coordination  Movements are Fluid  and Coordinated: Yes    Static Sitting Balance  Static Sitting-Balance Support: No upper extremity supported, Feet supported  Static Sitting-Level of Assistance: Distant supervision  Static Sitting-Comment/Number of Minutes: 2 min    Static Standing Balance  Static Standing-Balance Support: Bilateral upper extremity supported (FWW)  Static Standing-Level of Assistance: Contact guard  Static Standing-Comment/Number of Minutes: 2 min  Dynamic Standing Balance  Dynamic Standing-Balance Support: Right upper extremity supported  Dynamic Standing-Balance:  (reaching behind back for clothing adjustment)  Dynamic Standing-Comments: CGA  Functional Assessments:  Bed Mobility  Bed Mobility: Yes  Bed Mobility 1  Bed Mobility 1: Supine to sitting  Level of Assistance 1: Maximum assistance (x2)  Bed Mobility Comments 1: Managing BLEs and trunk. VCs for log roll sequencing. Assist to initiate  Bed Mobility 2  Bed Mobility  2: Sitting to supine  Level of Assistance 2: Moderate assistance (x2)  Bed Mobility Comments 2: Managing BLEs and trunk. VCs for log roll sequencing. Assist to initiate    Transfers  Transfer: Yes  Transfer 1  Transfer From 1: Sit to  Transfer to 1: Stand  Technique 1: Sit to stand, Stand to sit  Transfer Device 1: Walker  Transfer Level of Assistance 1: Moderate assistance (x2)  Trials/Comments 1: Manual assist to shift weight in anterior to superior direction. VCs for hand placement    Ambulation/Gait Training  Ambulation/Gait Training Performed: Yes  Ambulation/Gait Training 1  Surface 1: Level tile  Device 1: Rolling walker  Gait Support Devices: Gait belt  Assistance 1: Minimum assistance  Quality of Gait 1:  (Difficulty noted with B step clearance and reduced step length. Min A to manage FWW)  Comments/Distance (ft) 1: 2ft (R lateral sidestepping)  Extremity/Trunk Assessments:  RLE   RLE : Exceptions to WFL (grossly 3-/5)  LLE   LLE : Exceptions to WFL (grossly 3-/5)  Outcome Measures:  [facility] Basic  Mobility  Turning from your back to your side while in a flat bed without using bedrails: A lot  Moving from lying on your back to sitting on the side of a flat bed without using bedrails: Total  Moving to and from bed to chair (including a wheelchair): Total  Standing up from a chair using your arms (e.g. wheelchair or bedside chair): A lot  To walk in hospital room: Total  Climbing 3-5 steps with railing: Total  Basic Mobility - Total Score: 8    Encounter Problems       Encounter Problems (Active)       Balance       LTG - Patient will tolerate standing       Start:  07/24/24    Expected End:  08/07/24       >2 min SUP using FWW            Mobility       STG - Patient will ambulate       Start:  07/24/24    Expected End:  08/07/24       SBA using FWW >15ft            PT Transfers       STG - Patient will perform bed mobility       Start:  07/24/24    Expected End:  08/07/24       SUP         STG - Patient will transfer sit to and from stand       Start:  07/24/24    Expected End:  08/07/24       CGA                Education Documentation  Handouts, taught by Onofre Mills, PT at 7/24/2024  2:14 PM.  Learner: Patient  Readiness: Acceptance  Method: Explanation  Response: Verbalizes Understanding, Needs Reinforcement    Precautions, taught by Onofre Mills, PT at 7/24/2024  2:14 PM.  Learner: Patient  Readiness: Acceptance  Method: Explanation  Response: Verbalizes Understanding, Needs Reinforcement    Body Mechanics, taught by Onofre Mills, PT at 7/24/2024  2:14 PM.  Learner: Patient  Readiness: Acceptance  Method: Explanation  Response: Verbalizes Understanding, Needs Reinforcement    Mobility Training, taught by Onofre Mills, PT at 7/24/2024  2:14 PM.  Learner: Patient  Readiness: Acceptance  Method: Explanation  Response: Verbalizes Understanding, Needs Reinforcement    Education Comments  No comments found.

## 2024-07-24 NOTE — CONSULTS
"Nutrition Assessment Note  Nutrition Assessment      Reason for Assessment  Reason for Assessment: Admission nursing screening  Admitted for anorexia & dehydration.  MST score of 2 for weight loss & poor appetite.   Lived at home on her own PTA and states she would heat meals family provided, if she was hungry.  Family in room and feel she is confused at this time.  Report she has not been eating since discharge home on the 20th, s/p CABG.      History:  Food and Nutrient History  Energy Intake: Poor < 50 %  Food and Nutrient History: Family reports poor intake at home.  Patient says she eats at home, but family is saying she is confused and not reliable source of information.  Vitamin/Herbal Supplement Use: Boost if available     Past Medical History:  Diagnosis Date   Arthritis    CAD (coronary artery disease)    COPD (chronic obstructive pulmonary disease) (Multi)    ESRD (end stage renal disease) (Multi)    on peritoneal dialysis   Hypertension    Non-sustained ventricular tachycardia (Multi)    Rheumatoid arthritis (Multi)   CABG x1     Anthropometrics:  Height: 160 cm (5' 2.99\")  Weight: 49 kg (108 lb)  BMI (Calculated): 19.14    Weight Change: 0    Weight Change  Weight History / % Weight Change: Family notes 49kg as UBW. 7/20/24: 50.6 kg at hospital d/c.  Significant Weight Loss:  (unable to verify)  IBW/kg (Dietitian Calculated): 52.3 kg   Amputation Calculations:  BMI Amputation Adjustment: No      Energy Needs:  Calculated Energy Needs Using Equations  Height: 160 cm (5' 2.99\")  Temp: 37 °C (98.6 °F)    Estimated Energy Needs  Method for Estimating Needs: 0919-9021 @ 30-32 kcal/kg    Estimated Protein Needs  Method for Estimating Needs: 63-73 @ 1.2-1.4 gr/kg IBW    Estimated Fluid Needs  Method for Estimating Needs: per MD     Dietary Orders  Adult diet Regular, Renal; Potassium Restricted 2 gm (50mEq); 2 - 3 grams Sodium   Oral nutritional supplements   Select supplement: Nepro With Carbstkermit " BID     Nutrition Focused Physical Findings:  Subcutaneous Fat Loss  Orbital Fat Pads: Mild-Moderate (slight dark circles and slight hollowing)  Buccal Fat Pads: Mild-Moderate (flat cheeks, minimal bounce)  Triceps: Mild-Moderate (less than ample fat tissue)    Muscle Wasting  Temporalis: Severe (hollowed scooping depression)  Pectoralis (Clavicular Region): Severe (protruding prominent clavicle)  Deltoid/Trapezius: Severe (squared shoulders, acromion process prominent)  Interosseous: Severe (depressed area between thumb and forefinger)    Edema  Edema: none        Latest Reference Range & Units 07/24/24 05:48   GLUCOSE 74 - 99 mg/dL 68 (L)   SODIUM 136 - 145 mmol/L 135 (L)   POTASSIUM 3.5 - 5.3 mmol/L 4.3   CHLORIDE 98 - 107 mmol/L 95 (L)   Bicarbonate 21 - 32 mmol/L 23   Anion Gap mmol/L 21   Blood Urea Nitrogen 6 - 23 mg/dL 50 (H)   Creatinine 0.50 - 1.05 mg/dL 10.81 (H)   EGFR >60 mL/min/1.73m*2 3 (L)   Calcium 8.6 - 10.3 mg/dL 7.8 (L)   (L): Data is abnormally low  (H): Data is abnormally high    Nutrition Diagnosis   Malnutrition Diagnosis  Patient has Malnutrition Diagnosis: Yes  Diagnosis Status: New  Malnutrition Diagnosis: Severe malnutrition related to chronic disease or condition  As Evidenced by: reported intake <75% estimated needs for > 1 month, sever muscle & moderate subcutaneous fat depletion, low BMI.    Patient has Nutrition Diagnosis: Yes  Nutrition Diagnosis 1: Inadequate protein energy intake  Diagnosis Status (1): New  Related to (1): anorexia  As Evidenced by (1): refusing meals, increased nutrient needs for healing post-op     Scheduled medications  amLODIPine, 10 mg, oral, Daily  aspirin, 81 mg, oral, Daily  atorvastatin, 80 mg, oral, Daily  carvedilol, 12.5 mg, oral, BID  clopidogrel, 75 mg, oral, Daily  colchicine, 0.6 mg, oral, Daily  heparin (porcine), 5,000 Units, subcutaneous, q8h MARIYA  losartan, 100 mg, oral, Daily  nystatin, 5 mL, Swish & Swallow, TID  polyethylene glycol, 17 g,  oral, Daily    Continuous medications     PRN medications  PRN medications: acetaminophen **OR** acetaminophen **OR** acetaminophen, ondansetron, oxyCODONE     Nutrition Interventions/Recommendations   Nutrition Prescription  Individualized Nutrition Prescription Provided for : Continue diet as ordered.  Modify Nepro shake to Ensure Plus BID if ok with medical team, drink between meals.  Renal MVI daily.  Consider appetite stimulant.    Food and/or Nutrient Delivery Interventions  Meals and Snacks: General healthful diet  Goal: intake meets >75% estimated nutrient needs.   Medical Food Supplement: Commercial beverage  Additional Interventions: Absorbs ~ 185 kcal from peritoneal dialysis solution daily, if restarted at 1.5% dextrose, 1.5l exchange 3 times/day.    Nutrition Monitoring and Evaluation   Food and Nutrient Related History   Amount of Food: Estimated amout of food  Criteria: intake >75% of meals    Anthropometrics: Body Composition/Growth/Weight History  Weight: Measured weight  Criteria: daily  Weight Change: Weight change percentage  Criteria: weekly    Biochemical Data, Medical Tests and Procedures  Electrolyte and Renal Panel: BUN, Calcium, serum, Chloride, Creatinine, Magnesium, Phosphorus, Potassium, Sodium  Criteria: as indicated  Glucose/Endocrine Profile: Glucose, casual  Criteria: as indicated  Nutritional Anemia Profile: Folate, serum, Hematocrit, Hemoglobin, Iron, serum  Criteria: as indicated    Nutrition Focused Physical Findings   Digestive System: Anorexia, Constipation, Diarrhea, Early satiety, Nausea, Vomiting  Criteria: daily    Follow Up  Last Date of Nutrition Visit: 07/24/24  Nutrition Follow-Up Needed?: Dietitian to reassess per policy  Follow up Comment: [id number]

## 2024-07-24 NOTE — PROGRESS NOTES
"Isis Geronimo is a 79 y.o. female on day 1 of admission presenting with Dehydration.    Cardiac Surgery is being consulted d/t pt was  discharged here in the hospital  last 7/20/24 after Left Anterior Thoracatomy Off Pump MIDCAB (ALCARAZ to LAD)  with Dr Cristina Clifton 7/17/2024     Subjective   Alert oriented x3, with conversation difficult to ff train of thought, daughter states this is not normal for her  States she does not have appetite  Admits feeling weak.   Right side vague abdominal pain, had multiple bms today     Denies CP, SOB, palpitations, Denies Fever and chill   Surgical site well approx no s/s of infection.       Objective     Physical Exam  Constitutional:       Appearance: She is cachectic.   HENT:      Mouth/Throat:      Mouth: Mucous membranes are dry.   Eyes:      Extraocular Movements: Extraocular movements intact.      Pupils: Pupils are equal, round, and reactive to light.   Cardiovascular:      Rate and Rhythm: Normal rate.      Pulses: Normal pulses.      Heart sounds: Normal heart sounds.   Pulmonary:      Effort: Pulmonary effort is normal.      Breath sounds: Normal breath sounds.   Abdominal:      General: Abdomen is flat. Bowel sounds are normal.   Musculoskeletal:         General: Normal range of motion.   Skin:     General: Skin is warm and dry.   Neurological:      Mental Status: She is alert and oriented to person, place, and time.   Psychiatric:         Mood and Affect: Affect is flat.         Behavior: Behavior is withdrawn.         Cognition and Memory: She exhibits impaired recent memory.         Last Recorded Vitals  Blood pressure (!) 112/39, pulse 82, temperature 37 °C (98.6 °F), temperature source Oral, resp. rate 18, height 1.6 m (5' 2.99\"), weight 49 kg (108 lb), SpO2 92%.  Intake/Output last 3 Shifts:  I/O last 3 completed shifts:  In: 12.5 (0.3 mL/kg) [I.V.:12.5 (0.3 mL/kg)]  Out: - (0 mL/kg)   Weight: 49 kg     Relevant Results  Scheduled medications  amLODIPine, 10 mg, " oral, Daily  aspirin, 81 mg, oral, Daily  atorvastatin, 80 mg, oral, Daily  carvedilol, 12.5 mg, oral, BID  clopidogrel, 75 mg, oral, Daily  colchicine, 0.6 mg, oral, Daily  heparin (porcine), 5,000 Units, subcutaneous, q8h MARIYA  losartan, 100 mg, oral, Daily  nystatin, 5 mL, Swish & Swallow, TID  polyethylene glycol, 17 g, oral, Daily      Continuous medications     PRN medications  PRN medications: acetaminophen **OR** acetaminophen **OR** acetaminophen, ondansetron, oxyCODONE     Results for orders placed or performed during the hospital encounter of 07/23/24 (from the past 24 hour(s))   CBC   Result Value Ref Range    WBC 12.1 (H) 4.4 - 11.3 x10*3/uL    nRBC 0.0 0.0 - 0.0 /100 WBCs    RBC 3.91 (L) 4.00 - 5.20 x10*6/uL    Hemoglobin 10.4 (L) 12.0 - 16.0 g/dL    Hematocrit 32.3 (L) 36.0 - 46.0 %    MCV 83 80 - 100 fL    MCH 26.6 26.0 - 34.0 pg    MCHC 32.2 32.0 - 36.0 g/dL    RDW 16.2 (H) 11.5 - 14.5 %    Platelets 309 150 - 450 x10*3/uL   Basic metabolic panel   Result Value Ref Range    Glucose 68 (L) 74 - 99 mg/dL    Sodium 135 (L) 136 - 145 mmol/L    Potassium 4.3 3.5 - 5.3 mmol/L    Chloride 95 (L) 98 - 107 mmol/L    Bicarbonate 23 21 - 32 mmol/L    Anion Gap 21 mmol/L    Urea Nitrogen 50 (H) 6 - 23 mg/dL    Creatinine 10.81 (H) 0.50 - 1.05 mg/dL    eGFR 3 (L) >60 mL/min/1.73m*2    Calcium 7.8 (L) 8.6 - 10.3 mg/dL        ECG 12 lead    Result Date: 7/23/2024  Normal sinus rhythm Cannot rule out Anterior infarct (cited on or before 06-JUL-2024) T wave abnormality, consider lateral ischemia Abnormal ECG When compared with ECG of 17-JUL-2024 16:41, Serial changes of Anterior infarct Present See ED provider note for full interpretation and clinical correlation Confirmed by Lien Schneider ([id number]) on 7/23/2024 6:21:34 PM    ECG 12 Lead    Result Date: 7/23/2024  Normal sinus rhythm Septal infarct (cited on or before 06-JUL-2024) T wave abnormality, consider lateral ischemia Prolonged QT Abnormal ECG Confirmed  by Sriram Rizzo ([id number]) on 7/23/2024 12:48:14 PM    XR chest 2 views    Result Date: 7/20/2024  Interpreted By:  Minna Santana, STUDY: XR CHEST 2 VIEWS 7/20/2024 8:42 am   INDICATION: Signs/Symptoms:s/p cabg   COMPARISON: 07/18/2024   ACCESSION NUMBER(S): [id number]   ORDERING CLINICIAN: DOUG GONZALEZ   TECHNIQUE: PA and lateral views   FINDINGS: Bilateral lower lobe atelectasis and small bilateral pleural effusions are noted. Gas beneath the right diaphragm is similar to the prior exam and may be related to history of peritoneal dialysis. Interval removal of a left chest tube and central catheter. Surgical clips overlie the left chest and mediastinum       1. Bibasilar atelectasis with small pleural effusions 2. Free air beneath the right diaphragm possibly related to peritoneal dialysis.     Signed by: Minna Santana 7/20/2024 9:09 AM Dictation workstation:   [id number]    XR chest 1 view    Result Date: 7/18/2024  Interpreted By:  Gennaro Koehler, STUDY: XR CHEST 1 VIEW; 7/18/2024 5:25 am   INDICATION: Signs/Symptoms:Post op cardiac surgery   COMPARISON: Radiographs 07/17/2024   ACCESSION NUMBER(S): [id number]   ORDERING CLINICIAN: VENKAT LYLE   TECHNIQUE: Single frontal view of the chest performed.   FINDINGS: LINES AND DEVICES: ET tube removed. Right IJ CVC at cavoatrial junction. Stable left apical chest tube.   LUNGS: Mild left basilar atelectasis. No new focal consolidation, pulmonary edema, pleural effusion or pneumothorax.   CARDIOMEDIASTINAL SILHOUETTE: The cardiomediastinal silhouette is stable. Status post recent CABG through anterior thoracotomy.       Mild left basilar atelectasis.   MACRO None   Signed by: Gennaro Koehler 7/18/2024 7:56 AM Dictation workstation:   [id number]    XR chest 1 view    Result Date: 7/17/2024  Interpreted By:  Gennaro Koehler, STUDY: XR CHEST 1 VIEW; 7/17/2024 4:38 pm   INDICATION: Signs/Symptoms:Post op cardiac surgery   COMPARISON: Preoperative radiographs  07/11/2024   ACCESSION NUMBER(S): [id number]   ORDERING CLINICIAN: VENKAT LYLE   TECHNIQUE: Single frontal view of the chest performed.   FINDINGS: LINES AND DEVICES: Tip of endotracheal tube approximately 3.8 cm above the lovely. Tip of left chest tube in the left lung apex. Tip of right IJ CVC in the inferior SVC.   LUNGS: No focal consolidation, pulmonary edema, pleural effusion or significant pneumothorax.   CARDIOMEDIASTINAL SILHOUETTE: The cardiomediastinal silhouette is within normal limits. Status post recent CABG through anterior thoracotomy.       No significant pneumothorax.   Tip of endotracheal tube 3.8 cm above the lovely.   MACRO None   Signed by: Gennaro Koehler 7/17/2024 4:53 PM Dictation workstation:   [id number]    Anesthesia Intraoperative Transesophageal Echocardiogram    Result Date: 7/17/2024  [facility] - Dept of Anesthesiology   [address] [place] [address]     Tel [phone] and Fax [phone] TRANSESOPHAGEAL ECHOCARDIOGRAM REPORT  Patient Name:      REJI Kong Physician: [id number] Shirley MD Study Date:        7/17/2024            Ordering Provider: [id number] SAROJ SHIRLEY MRN/PID:           [id number]             Fellow: Accession#:        [id number]         Nurse: Date of Birth/Age: 1945 / 79 years Sonographer: Gender:            F                    Additional Staff: BSA / BMI:         m2 / kg/m2           Encounter#:        [id number] Study Type:    ANESTHESIA INTRAOPERATIVE YURIY Diagnosis/ICD: 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 The left ventricular cavity size is normal. Left ventricular diastolic filling was not  assessed. Left Atrium: The left atrium is normal in size. There is no evidence of a patent foramen ovale. There is no thrombus visualized in the left atrial appendage. Right Ventricle: The right ventricle is normal in size. There is normal right ventricular global systolic function. Right Atrium: The right atrium is normal in size. Aortic Valve: The aortic valve appears structurally normal. The aortic valve appears tricuspid and non-restricted. There is moderate aortic valve cusp calcification. There is no evidence of aortic valve regurgitation. Mitral Valve: The mitral valve is normal in structure. There is trace mitral valve regurgitation. Tricuspid Valve: The tricuspid valve is structurally normal. There is trace tricuspid regurgitation. Pulmonic Valve: The pulmonic valve is not well visualized. There is trace pulmonic valve regurgitation. Pericardium: There is a trivial pericardial effusion. Aorta: The aortic root is normal. Severe atheromas seen throughout ascending, transverse and descending aorta. Multiple small mobile atheromas seen in ascending aorta. In comparison to the previous echocardiogram(s): Not performed on intraoperative study.  CONCLUSIONS:  1. The left ventricular systolic function is normal, with a visually estimated ejection fraction of 60-65%.  2. There is normal right ventricular global systolic function.  3. There is moderate aortic valve cusp calcification.  4. There is no evidence of a patent foramen ovale. POST CARDIOPULMONARY BYPASS REPORT: Patient has a normal sinus rhythm. Patient is on an epinephrine drip. Global LV function is normal. Global RV function is normal. All transesophageal echocardiogram findings discussed with surgeon. Off pump CABG performed.  QUANTITATIVE DATA SUMMARY: LV SYSTOLIC FUNCTION BY 2D PLANIMETRY (MOD):                      Normal Ranges: EF-Visual:      63 % LV EF Reported: 63 %  [id number] Jesus Shirley MD Electronically signed on 7/17/2024 at 4:23:34 PM  ** Final **      ECG 12 lead    Result Date: 7/15/2024  Normal sinus rhythm Left ventricular hypertrophy with repolarization abnormality ( [place] product ) Cannot rule out Septal infarct (cited on or before 06-JUL-2024) Abnormal ECG When compared with ECG of 06-JUL-2024 20:46, (unconfirmed) Serial changes of Septal infarct Present See ED provider note for full interpretation and clinical correlation Confirmed by Lien Angela ([id number]) on 7/15/2024 11:05:43 AM    Electrocardiogram, 12-lead PRN ACS symptoms    Result Date: 7/13/2024  Normal sinus rhythm Septal infarct , age undetermined ST & T wave abnormality, consider lateral ischemia Abnormal ECG When compared with ECG of 21-JUN-2016 15:28, Vent. rate has increased BY  30 BPM ST now depressed in Inferior leads ST now depressed in Lateral leads T wave inversion no longer evident in Inferior leads See ED provider note for full interpretation and clinical correlation Confirmed by Lien Angela ([id number]) on 7/13/2024 7:38:06 PM    Cardiac Catheterization Procedure    Result Date: 7/12/2024   [facility], Cath Lab, [address] [place] [address] Cardiovascular Catheterization Report Patient Name:     REJI PETERS      Performing Physician:  [id number] Juárez MD Study Date:       7/8/2024            Verifying Physician:   [id number] Juárez MD MRN/PID:          [id number]            Cardiologist/Co-Scrub: Accession#:       [id number]        Ordering Provider:     [id number] LACI GREGORY Date of           1945 / 79      Cardiologist: Birth/Age:        years Gender:           F                   Fellow: Encounter#:       [id number]          Surgeon:  Study:            Left Heart Cath Additional Study: Coronary Arteriogram  Indications: REJI PETERS is a 79 year old  female who presents with end stage renal disease on dialysis, dyslipidemia, hypertension and a chest pain assessment of atypical angina. NSTE - ACS.  Procedure Description: After infiltration with 1% Lidocaine, the right radial artery was cannulated with a modified Seldinger technique. Subsequently a 6 [place] sheath was placed in the right radial artery. Selective coronary catheterization was performed using a 4 Fr catheter(s) exchanged over a guide wire to cannulate the coronary arteries. A JL 3.5 tip catheter was used for left coronary injections. A JR 4 tip catheter was used for right coronary injections. Multiple injections of contrast were made into the left and right coronary arteries with angiograms recorded in multiple projections. After completion of the procedure, the arterial sheath was pulled and a TR Band Radial Compression Device was utilized to obtain patent hemostasis.  Procedure Description Comments: A JR4 was used to cross the aortic valve for LV pressure measurement and pullback.  Coronary Angiography: The coronary circulation is right dominant.  Left Main Coronary Artery: The left main coronary artery is a normal caliber vessel. The left main arises normally from the left coronary sinus of Valsalva and bifurcates into the LAD and circumflex coronary arteries. The left main coronary artery showed severe calcification. The distal left main coronary artery showed 80% stenosis.  Left Anterior Descending Coronary Artery Distribution: The left anterior descending coronary artery is a normal caliber vessel. The LAD arises normally from the left main coronary artery. The LAD demonstrated severe calcification. The proximal left anterior descending coronary artery showed 80% stenosis. The 1st diagonal branch is a large caliber vessel. The proximal 1st diagonal branch revealed 30% stenosis. The 2nd diagonal branch is a very small caliber vessel. The 2nd diagonal branch demonstrated no significant disease or  stenosis greater than 30%. The 3rd diagonal branch is a small caliber vessel. The 3rd diagonal branch revealed no significant disease or stenosis greater than 30%.  Circumflex Coronary Artery Distribution: The circumflex coronary artery is a normal caliber vessel. The circumflex arises normally from the left main coronary artery and terminates in the AV groove. The circumflex revealed severe calcification. The proximal circumflex coronary artery showed 30% stenosis. An additional lesion, located at the mid circumflex coronary artery, demonstrated 40% stenosis. The 1st obtuse marginal branch is a large caliber vessel. The 1st obtuse marginal branch showed severe calcification. The proximal to mid 1st obtuse marginal branch showed 50% stenosis.  Ramus Intermedius: The ramus intermedius is a small caliber vessel. The ramus intermedius arises normally from the left main coronary artery. The ramus intermedius showed no significant disease or stenosis greater than 30%.  Right Coronary Artery Distribution: The right coronary artery is a normal caliber vessel. The RCA arises normally from the right sinus of Valsalva. The RCA showed severe calcification. The ostial right coronary artery showed 80% stenosis. An additional RCA lesion, located at the proximal to mid right coronary artery, revealed 70% stenosis.  Valve Findings: No aortic valve stenosis is visualized.  Coronary Lesion Summary: Vessel         Stenosis   Vessel Segment Left Main    80% stenosis     distal LAD          80% stenosis    proximal 1st Diagonal 30% stenosis    proximal Circumflex   30% stenosis    proximal Circumflex   40% stenosis       mid OM 1         50% stenosis proximal to mid RCA          80% stenosis     ostial RCA          70% stenosis proximal to mid  Hemo Personnel: +----------------+---------+ Name            Duty      +----------------+---------+ Nicola Juárez MD 1 +----------------+---------+  Hemodynamic Pressures:   +----+------------------+---------+-------------+-------------+------+---------+ Site    Date Time       Phase  Systolic mmHg  Diastolic    ED  Mean mmHg                         Name                    mmHg      mmHg           +----+------------------+---------+-------------+-------------+------+---------+   AO  7/8/2024 1:45:27 AIR REST          143           50             83                     PM                                                   +----+------------------+---------+-------------+-------------+------+---------+   LV  7/8/2024 1:52:33 AIR REST          145            7    19                              PM                                                   +----+------------------+---------+-------------+-------------+------+---------+   LV  7/8/2024 1:52:42 AIR REST          145            7    18                              PM                                                   +----+------------------+---------+-------------+-------------+------+---------+  LVp  7/8/2024 1:52:47 AIR REST          148            6    20                              PM                                                   +----+------------------+---------+-------------+-------------+------+---------+  AOp  7/8/2024 1:52:54 AIR REST          142           50             82                     PM                                                   +----+------------------+---------+-------------+-------------+------+---------+   AO  7/8/2024 1:53:09 AIR REST          143           57             90                     PM                                                   +----+------------------+---------+-------------+-------------+------+---------+  Complications: No in-lab complications observed.  Cardiac Cath Post Procedure Notes: Post Procedure Diagnosis: CAD. Blood Loss:               Estimated blood loss  during the procedure was 5 mls. Specimens Removed:        Number of specimen(s) removed: none.  Recommendations: Maximize medical therapy. Coronary artery bypass graft surgery. ____________________________________________________________________________________ CONCLUSIONS  1. Severe multivessel CAD in a right dominant system.  2. Elevatedl LVEDP.  3. No evidence of aortic stenosis. ICD 10 Codes: Atherosclerotic heart disease of native coronary artery with unspecified angina pectoris-I25.119; Non ST elevation (NSTEMI) myocardial infarction-I21.4  CPT Codes: Left Heart Cath (visualization of coronaries) and LV-68081  [id number] Nicola Juárez MD Performing Physician Electronically signed by [id number] Nicola Juárez MD on 7/12/2024 at 5:36:55 PM  ** Final **     XR chest 1 view    Result Date: 7/11/2024  Interpreted By:  Jacob Arora, STUDY: XR CHEST 1 VIEW;  7/11/2024 7:29 am   INDICATION: Signs/Symptoms:Preop cardiac surgery.   COMPARISON: Correlation with CT scan chest from 07/06/2024 and comparison with chest x-ray from 06/20/2016.   ACCESSION NUMBER(S): [id number]   ORDERING CLINICIAN: ADI JALLOH   TECHNIQUE: Single AP portable view of the chest was obtained.   FINDINGS: MEDIASTINUM/ LUNGS/ ADORE: Pleural based nodular density at the posterolateral right base is not readily apparent on plain film. There are the several curvilinear horizontal opacities at the right base with associated lucency near the diaphragm. The left lung was clear. There was cardiomegaly without vascular congestion or pleural effusion. No pneumothorax. No tracheal deviation. No abnormal hilar fullness or gross mass on either side.   BONES: No lytic or blastic destructive bone lesion.   UPPER ABDOMEN: Grossly intact.       Mild cardiomegaly.  Currently without radiographic evidence of CHF or pneumonia.   Horizontal curvilinear opacities with associated lucency at the right base adjacent to the diaphragm. Suspect atelectasis rather than  pneumoperitoneum. Suggest dedicated supine and upright views of the abdomen in follow-up.   MACRO: None   Signed by: Jacob Arora 7/11/2024 7:40 AM Dictation workstation:   [id number]    Vascular US carotid artery duplex bilateral    Result Date: 7/9/2024             [place] [address]   Tel [phone] and Fax [phone]  Vascular Lab Report VASC US CAROTID ARTERY DUPLEX BILATERAL  Patient Name:     REJI PETERS      Reading Physician: [id number] Melvin Lopez MD Study Date:       7/9/2024            Ordering           [id number] VENKAT SCHREIBER                                       Physician:         DARIELA MRN/PID:          [id number]            Technologist:      Ciera Cooper RVT Accession#:       [id number]        Technologist 2: Date of           1945 / 79      Encounter#:        [id number] Birth/Age:        years Gender:           F Admission Status: Outpatient          Location           [facility]                                       Performed:  Diagnosis/ICD: 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 Turbulent flow seen by color Doppler. Notification called to Venkat Bain APRN-CNP with results. on 7/9/2024 at 10:08:53 AM.  CONCLUSIONS: Right Carotid: Findings are consistent with 50 to 69% stenosis of the right proximal internal carotid artery. Turbulent flow seen by color Doppler. Right external carotid artery appears patent with no evidence of stenosis. No evidence of hemodynamically significant stenosis of the right common carotid artery. The right vertebral artery is patent with antegrade flow. There is a >50% stenosis noted in the right subclavian artery. Left Carotid: Findings are consistent with greater than 70% stenosis of the  left proximal internal carotid artery. Turbulent flow seen by color Doppler. There are elevated velocities in the left ECA that are suggestive of disease. There is a >50% stenosis noted in the left external carotid artery. No evidence of hemodynamically significant stenosis of the left common carotid artery. The left vertebral artery is patent with antegrade flow. There are elevated velocities in the left subclavian artery that are suggestive of disease.  Imaging & Doppler Findings: Right Plaque Morph: The proximal right internal carotid artery demonstrates heterogenous and calcified plaque. The proximal right external carotid artery demonstrates heterogenous and calcified plaque. The mid right common carotid artery demonstrates heterogenous and calcified plaque. The distal right common carotid artery demonstrates heterogenous and calcified plaque. The proximal right subclavian artery demonstrates heterogenous plaque. Left Plaque Morph: The proximal left internal carotid artery demonstrates heterogenous and calcified plaque. The proximal left external carotid artery demonstrates heterogenous and calcified plaque. The proximal left common carotid artery demonstrates heterogenous and calcified plaque. The mid left common carotid artery demonstrates heterogenous and calcified plaque. The distal left common carotid artery demonstrates heterogenous and calcified plaque. The proximal left subclavian artery demonstrates heterogenous plaque.   Right                        Left   PSV      EDV                PSV      EDV 78 cm/s  10 cm/s   CCA P    82 cm/s  17 cm/s 57 cm/s  13 cm/s   CCA D    93 cm/s  22 cm/s 180 cm/s 59 cm/s   ICA P    244 cm/s 63 cm/s 116 cm/s 12 cm/s   ICA M    84 cm/s  22 cm/s 113 cm/s 29 cm/s   ICA D    87 cm/s  20 cm/s 122 cm/s            ECA     254 cm/s 21 cm/s 107 cm/s 29 cm/s Vertebral  56 cm/s  8 cm/s 315 cm/s 21 cm/s Subclavian 257 cm/s               Right Left ICA/CCA Ratio  3.2  2.6   [id number] Melvin  Jessica GIORDANO Electronically signed by [id number] Melvin Lopez MD on 7/9/2024 at 11:53:21 AM  ** Final **     Transthoracic Echo (TTE) Complete    Result Date: 7/8/2024   [facility], [address] [place] [address]              Tel [phone] and Fax [phone] TRANSTHORACIC ECHOCARDIOGRAM REPORT  Patient Name:      REJI PETERS       Reading Physician:    [id number] Dontae Gonzalez MD Study Date:        7/8/2024             Ordering Provider:    [id number] LACI GREGORY MRN/PID:           [id number]             Fellow: Accession#:        [id number]         Nurse: Date of Birth/Age: 1945 / 79 years Sonographer:          David Quintero RDCS Gender:            F                    Additional Staff: Height:            160.02 cm            Admit Date:           7/6/2024 Weight:            49.90 kg             Admission Status:     Inpatient -                                                               Priority discharge BSA / BMI:         1.50 m2 / 19.49      Encounter#:           [id number]                    kg/m2 Blood Pressure:    181/81 mmHg          Department Location:  [facility]                                                               Lab Study Type:    TRANSTHORACIC ECHO (TTE) COMPLETE Diagnosis/ICD: Non ST elevation (NSTEMI) myocardial infarction-I21.4 Indication:    NSTEMI CPT Code:      Echo Complete w Full [id number] Patient History: Pertinent History: HTN. ESRD. Study Detail: The following Echo studies were performed: 2D, M-Mode, Doppler and               color flow. Technically challenging study due to prominent lung               artifact and body habitus.  PHYSICIAN INTERPRETATION: Left Ventricle: Left ventricular ejection fraction is normal, calculated by Rasmussen's biplane at 61%. Estimated left ventricular mass is increased. There are no regional wall motion  abnormalities. The left ventricular cavity size is normal. The left ventricular septal wall thickness is moderately increased. There is mildly increased left ventricular posterior wall thickness. There is moderate concentric left ventricular hypertrophy. Spectral Doppler shows an impaired relaxation pattern of left ventricular diastolic filling. Left Atrium: The left atrium is normal in size. Right Ventricle: The right ventricle is normal in size. There is normal right ventricular global systolic function. Right Atrium: The right atrium is normal in size. Aortic Valve: The aortic valve appears structurally normal. The aortic valve appears tricuspid. There is mild to moderate aortic valve cusp calcification. There is evidence of mildly elevated transaortic gradients consistent with sclerosis of the aortic valve. The aortic valve dimensionless index is 0.60. There is no evidence of aortic valve regurgitation. The peak instantaneous gradient of the aortic valve is 4.8 mmHg. The mean gradient of the aortic valve is 3.0 mmHg. Mitral Valve: The mitral valve is normal in structure. There is no evidence of mitral valve regurgitation. Tricuspid Valve: The tricuspid valve is structurally normal. No evidence of tricuspid regurgitation. Pulmonic Valve: The pulmonic valve is structurally normal. There is no indication of pulmonic valve regurgitation. Pericardium: There is a trivial to small pericardial effusion. Aorta: The aortic root is normal. Systemic Veins: The inferior vena cava appears to be of normal size. There is IVC inspiratory collapse greater than 50%. In comparison to the previous echocardiogram(s): Compared with study dated 6/21/2016, there is increased LVH and aortic sclerosis.  CONCLUSIONS:  1. Left ventricular ejection fraction is normal, calculated by Rasmussen's biplane at 61%.  2. Spectral Doppler shows an impaired relaxation pattern of left ventricular diastolic filling.  3. There is moderate concentric left  ventricular hypertrophy.  4. Increased LV mass.  5. Moderately increased left ventricular septal thickness.  6. There is normal right ventricular global systolic function.  7. Aortic valve sclerosis. QUANTITATIVE DATA SUMMARY: 2D MEASUREMENTS:                           Normal Ranges: IVSd:          1.60 cm    (0.6-1.1cm) LVPWd:         1.20 cm    (0.6-1.1cm) LVIDd:         4.10 cm    (3.9-5.9cm) LVIDs:         3.50 cm LV Mass Index: 144.4 g/m2 LV % FS        14.6 % LA VOLUME:                              Normal Ranges: LA Vol A4C:        52.9 ml   (22+/-6mL/m2) LA Vol Index A4C:  35.3ml/m2 LA Area A4C:       18.2 cm2 LA Major Axis A4C: 5.3 cm AORTA MEASUREMENTS:                      Normal Ranges: Ao Sinus, d: 3.10 cm (2.1-3.5cm) Asc Ao, d:   3.02 cm (2.1-3.4cm) LV SYSTOLIC FUNCTION BY 2D PLANIMETRY (MOD):                      Normal Ranges: EF-A4C View:    56 % (>=55%) EF-A2C View:    65 % EF-Biplane:     61 % LV EF Reported: 61 % LV DIASTOLIC FUNCTION:                               Normal Ranges: MV Peak E:        0.68 m/s    (0.7-1.2 m/s) MV Peak A:        1.01 m/s    (0.42-0.7 m/s) E/A Ratio:        0.67        (1.0-2.2) MV e'             0.048 m/s   (>8.0) MV lateral e'     0.05 m/s MV medial e'      0.04 m/s E/e' Ratio:       14.11       (<8.0) PulmV Sys Irwin:    53.80 cm/s PulmV Goss Irwin:   30.60 cm/s PulmV S/D Irwin:    1.80 PulmV A Revs Irwin: 37.00 cm/s PulmV A Revs Dur: 137.00 msec MITRAL VALVE:                 Normal Ranges: MV DT: 282 msec (150-240msec) AORTIC VALVE:                                   Normal Ranges: AoV Vmax:                1.09 m/s (<=1.7m/s) AoV Peak P.8 mmHg (<20mmHg) AoV Mean PG:             3.0 mmHg (1.7-11.5mmHg) LVOT Max Irwin:            0.74 m/s (<=1.1m/s) AoV VTI:                 27.50 cm (18-25cm) LVOT VTI:                16.60 cm LVOT Diameter:           2.07 cm  (1.8-2.4cm) AoV Area, VTI:           2.03 cm2 (2.5-5.5cm2) AoV Area,Vmax:           2.27 cm2 (2.5-4.5cm2)  AoV Dimensionless Index: 0.60 TRICUSPID VALVE/RVSP:                           Normal Ranges: Est. RA Pressure: 3 mmHg IVC Diam:         1.33 cm PULMONIC VALVE:                         Normal Ranges: PV Accel Time: 102 msec (>120ms) PV Max Irwin:    0.8 m/s  (0.6-0.9m/s) PV Max P.7 mmHg Pulmonary Veins: PulmV A Revs Dur: 137.00 msec PulmV A Revs Irwin: 37.00 cm/s PulmV Goss Irwin:   30.60 cm/s PulmV S/D Irwin:    1.80 PulmV Sys Irwin:    53.80 cm/s  [id number] Dontae Gonzalez MD Electronically signed on 2024 at 12:27:11 PM  ** Final **     CT chest abdomen pelvis wo IV contrast    Result Date: 2024  STUDY: CT Chest, Abdomen, and Pelvis without IV Contrast; 2024 10:21 PM. INDICATION: Vomiting.  Chest pain.  Constipation for 5 days.  Peritoneal dialysis. COMPARISON: None. ACCESSION NUMBER(S): [id number] ORDERING CLINICIAN: ALEC BARCLAY TECHNIQUE: CT of the chest, abdomen, and pelvis was performed.  Contiguous axial images were obtained at 3 mm slice thickness through the chest, abdomen, and pelvis.  Coronal and sagittal reconstructions at 3 mm slice thickness were performed.  No intravenous contrast was administered.  FINDINGS: Please note that the evaluation of vessels, lymph nodes and organs is limited without intravenous contrast. CHEST: MEDIASTINUM: Enlarged and slightly heterogeneous appearance of the thyroid gland with multiple nodules, left greater than right, measuring up to 2.1 cm on the left.  The heart is normal in size without pericardial effusion.  Moderate atherosclerotic calcification of the thoracic aorta without aneurysm.  Coronary arterial calcifications are noted. LUNGS/PLEURA: There is no pleural effusion, pleural thickening, or pneumothorax. The airways are patent. Minimal streaky bibasilar atelectasis is demonstrated.  Pleural-based right lower lobe nodule measuring 1.2 cm is demonstrated (image 243, series 607).  Lungs are otherwise clear bilaterally with minimal biapical scarring.  No  distinct consolidation or evidence for pulmonary edema. LYMPH NODES: Thoracic lymph nodes are not enlarged. ABDOMEN:  LIVER: No hepatomegaly.  Smooth surface contour.  Normal attenuation.  BILE DUCTS: No intrahepatic or extrahepatic biliary ductal dilatation.  GALLBLADDER: Gallbladder is unremarkable. STOMACH: No abnormalities identified.  PANCREAS: No masses or ductal dilatation.  SPLEEN: No splenomegaly or focal splenic lesion.  ADRENAL GLANDS: Adenomatous hypertrophy of the adrenal glands, left greater than right.  KIDNEYS AND URETERS: Mildly atrophic appearance of the kidneys bilaterally.  Nonobstructive stones of the left kidney are noted measuring up to 3 mm in the midpole.  Vascular calcifications of the kidneys bilaterally.  No ureteral calculus.  No hydronephrosis.  PELVIS:  BLADDER: No abnormalities identified.  REPRODUCTIVE ORGANS: No abnormalities identified.  Uterus is present.  BOWEL: Colonic diverticulosis without CT evidence for acute diverticulitis. Appendix is within normal limits.  Large amount of fecal material within the rectosigmoid colon and rectum.  No evidence of bowel obstruction.  VESSELS: Extensive atherosclerotic calcification of the abdominal aorta and branch vessels.  No aneurysm.  PERITONEUM/RETROPERITONEUM/LYMPH NODES: No free fluid.  No pneumoperitoneum.  Peritoneal dialysis catheter is noted terminating in the lower pelvis.  No lymphadenopathy.  ABDOMINAL WALL: No abnormalities identified. SOFT TISSUES: No abnormalities identified.  BONES: No acute fracture or aggressive osseous lesion.    1.  No distinct acute intrathoracic process identified. 2.  Minimal streaky bibasilar atelectasis with pleural based right lower lobe pulmonary nodule measuring 1.2 cm.  Suggest continued follow-up as per clinical guidelines. 3.  Enlarged and heterogeneous appearance of the thyroid gland with multiple bilateral nodules, left greater than right.  Suggest nonemergent follow-up with ultrasound as  clinically warranted. 4.  Nonobstructing nephrolithiasis of the left kidney.  No ureteral calculus or evidence for obstructive uropathy bilaterally. 5.  Colonic diverticulosis without CT evidence for acute diverticulitis. 6.  Large amount of fecal material within the rectosigmoid colon and rectal vault.  No evidence of bowel obstruction. Fleischner Society 2017 Guidelines for Management of Incidentally Detected Pulmonary Nodules in Adults: A.  SOLID NODULES* Nodule Type and Size: Single: *Low Risk** < 6 mm - No routine follow-up 6-8 mm - CT at 6-12 months, then consider CT at 18-24 months > 8 mm - Consider CT at 3 months, PET/CT, or tissue sampling *High Risk** < 6 mm - Optional CT at 12 months 6-8 mm - CT at 6-12 months, then CT at 18-24 months > 8 mm - Consider CT at 3 months, PET/CT, or tissue sampling Multiple: *Low Risk** < 6 mm - No routine follow-up 6-8 mm - CT at 3-6 months, then consider CT at 18-24 months > 8 mm - CT at 3-6 months, then consider CT at 18-24 months *High Risk** < 6 mm - Optional CT at 12 months 6-8 mm - CT at 3-6 months, then at 18-24 months > 8 mm - CT at 3-6 months, then at 18-24 months B. SUBSOLID NODULES* Nodule Type and Size: Single:  *Ground glass < 6 mm - No routine follow-up > 6 mm - CT at 6-12 months to confirm persistence, then CT every 2 years until 5 years *Part Solid < 6 mm - No routine follow-up > 6 mm - CT at 3-6 months to confirm persistence.  If unchanged and solid component remains < 6 mm, annual CT should be performed for 5 years. Multiple: < 6 mm - CT at 3-6 months.  If stable, consider CT at 2 and 4 years. > 6 mm - CT at 3-6 months.  Subsequent management based on the most suspicious nodule(s). Note - These recommendations do not apply to lung cancer screening, patients with immunosuppression, or patients with known primary cancer. * Dimensions are average of long and short axes, rounded to the nearest millimeter. ** Consider all relevant risk factors. Signed by Girish  MD Dayo          Malnutrition Diagnosis Status: New  Malnutrition Diagnosis: Severe malnutrition related to chronic disease or condition  As Evidenced by: reported intake <75% estimated needs for > 1 month, sever muscle & moderate subcutaneous fat depletion, low BMI.  I agree with the dietitian's malnutrition diagnosis.      Assessment/Plan   Principal Problem:    Dehydration    Plan:      Continue post discharge regimen     Continue ASA/statin/coreg/plavix/and Colchicine for pericarditis (was cleared by nephrology)     Nephrology for PD schedule and regimen  Continue with gentle hydration     GI:    Primary complaint is lack of appetite, failure to thrive and dehydration.  Per family she take cyproheptadine 4mg as appetite stimulant (ordered)  Regular diet  Nutrition consult   PT/OT: continue       We will follow on the the periphery     I spent 35 minutes in the professional and overall care of this patient.      Angélica Soriano, ALEXIS-CNP

## 2024-07-25 ENCOUNTER — APPOINTMENT (OUTPATIENT)
Dept: CARDIAC SURGERY | Facility: HOSPITAL | Age: 79
End: 2024-07-25
Payer: COMMERCIAL

## 2024-07-25 ENCOUNTER — APPOINTMENT (OUTPATIENT)
Dept: RADIOLOGY | Facility: HOSPITAL | Age: 79
DRG: 640 | End: 2024-07-25
Payer: COMMERCIAL

## 2024-07-25 LAB
ANION GAP SERPL CALC-SCNC: 26 MMOL/L (ref 10–20)
BASOPHILS # BLD AUTO: 0.03 X10*3/UL (ref 0–0.1)
BASOPHILS NFR BLD AUTO: 0.4 %
BUN SERPL-MCNC: 59 MG/DL (ref 6–23)
BURR CELLS BLD QL SMEAR: NORMAL
CALCIUM SERPL-MCNC: 8.4 MG/DL (ref 8.6–10.3)
CHLORIDE SERPL-SCNC: 95 MMOL/L (ref 98–107)
CO2 SERPL-SCNC: 21 MMOL/L (ref 21–32)
CREAT SERPL-MCNC: 11.84 MG/DL (ref 0.5–1.05)
EGFRCR SERPLBLD CKD-EPI 2021: 3 ML/MIN/1.73M*2
EOSINOPHIL # BLD AUTO: 0.14 X10*3/UL (ref 0–0.4)
EOSINOPHIL NFR BLD AUTO: 1.8 %
ERYTHROCYTE [DISTWIDTH] IN BLOOD BY AUTOMATED COUNT: 16.6 % (ref 11.5–14.5)
GLUCOSE BLD MANUAL STRIP-MCNC: 119 MG/DL (ref 74–99)
GLUCOSE BLD MANUAL STRIP-MCNC: 129 MG/DL (ref 74–99)
GLUCOSE BLD MANUAL STRIP-MCNC: 59 MG/DL (ref 74–99)
GLUCOSE BLD MANUAL STRIP-MCNC: 60 MG/DL (ref 74–99)
GLUCOSE BLD MANUAL STRIP-MCNC: 72 MG/DL (ref 74–99)
GLUCOSE SERPL-MCNC: 49 MG/DL (ref 74–99)
HCT VFR BLD AUTO: 28.9 % (ref 36–46)
HGB BLD-MCNC: 9.3 G/DL (ref 12–16)
HOLD SPECIMEN: NORMAL
IMM GRANULOCYTES # BLD AUTO: 0.48 X10*3/UL (ref 0–0.5)
IMM GRANULOCYTES NFR BLD AUTO: 6.3 % (ref 0–0.9)
LACTATE SERPL-SCNC: 0.5 MMOL/L (ref 0.4–2)
LYMPHOCYTES # BLD AUTO: 0.64 X10*3/UL (ref 0.8–3)
LYMPHOCYTES NFR BLD AUTO: 8.3 %
MCH RBC QN AUTO: 26.6 PG (ref 26–34)
MCHC RBC AUTO-ENTMCNC: 32.2 G/DL (ref 32–36)
MCV RBC AUTO: 83 FL (ref 80–100)
MONOCYTES # BLD AUTO: 0.44 X10*3/UL (ref 0.05–0.8)
MONOCYTES NFR BLD AUTO: 5.7 %
NEUTROPHILS # BLD AUTO: 5.94 X10*3/UL (ref 1.6–5.5)
NEUTROPHILS NFR BLD AUTO: 77.5 %
NRBC BLD-RTO: 0 /100 WBCS (ref 0–0)
PLATELET # BLD AUTO: 320 X10*3/UL (ref 150–450)
POLYCHROMASIA BLD QL SMEAR: NORMAL
POTASSIUM SERPL-SCNC: 4.6 MMOL/L (ref 3.5–5.3)
RBC # BLD AUTO: 3.49 X10*6/UL (ref 4–5.2)
RBC MORPH BLD: NORMAL
SCHISTOCYTES BLD QL SMEAR: NORMAL
SODIUM SERPL-SCNC: 137 MMOL/L (ref 136–145)
WBC # BLD AUTO: 7.7 X10*3/UL (ref 4.4–11.3)

## 2024-07-25 PROCEDURE — 2500000004 HC RX 250 GENERAL PHARMACY W/ HCPCS (ALT 636 FOR OP/ED): Performed by: INTERNAL MEDICINE

## 2024-07-25 PROCEDURE — 85025 COMPLETE CBC W/AUTO DIFF WBC: CPT | Performed by: INTERNAL MEDICINE

## 2024-07-25 PROCEDURE — 87040 BLOOD CULTURE FOR BACTERIA: CPT | Mod: AHULAB | Performed by: INTERNAL MEDICINE

## 2024-07-25 PROCEDURE — 1200000002 HC GENERAL ROOM WITH TELEMETRY DAILY

## 2024-07-25 PROCEDURE — 2500000005 HC RX 250 GENERAL PHARMACY W/O HCPCS: Performed by: NURSE PRACTITIONER

## 2024-07-25 PROCEDURE — 36415 COLL VENOUS BLD VENIPUNCTURE: CPT | Performed by: INTERNAL MEDICINE

## 2024-07-25 PROCEDURE — 76705 ECHO EXAM OF ABDOMEN: CPT | Performed by: RADIOLOGY

## 2024-07-25 PROCEDURE — 80048 BASIC METABOLIC PNL TOTAL CA: CPT | Performed by: INTERNAL MEDICINE

## 2024-07-25 PROCEDURE — 2500000002 HC RX 250 W HCPCS SELF ADMINISTERED DRUGS (ALT 637 FOR MEDICARE OP, ALT 636 FOR OP/ED): Performed by: INTERNAL MEDICINE

## 2024-07-25 PROCEDURE — 76705 ECHO EXAM OF ABDOMEN: CPT

## 2024-07-25 PROCEDURE — 2500000001 HC RX 250 WO HCPCS SELF ADMINISTERED DRUGS (ALT 637 FOR MEDICARE OP): Performed by: INTERNAL MEDICINE

## 2024-07-25 PROCEDURE — 83605 ASSAY OF LACTIC ACID: CPT | Performed by: INTERNAL MEDICINE

## 2024-07-25 PROCEDURE — 82947 ASSAY GLUCOSE BLOOD QUANT: CPT

## 2024-07-25 RX ORDER — DEXTROSE 50 % IN WATER (D50W) INTRAVENOUS SYRINGE
25
Status: DISCONTINUED | OUTPATIENT
Start: 2024-07-25 | End: 2024-08-02 | Stop reason: HOSPADM

## 2024-07-25 RX ORDER — CYPROHEPTADINE HYDROCHLORIDE 4 MG/1
2 TABLET ORAL 3 TIMES DAILY
Status: DISCONTINUED | OUTPATIENT
Start: 2024-07-25 | End: 2024-07-31

## 2024-07-25 RX ORDER — SERTRALINE HYDROCHLORIDE 50 MG/1
25 TABLET, FILM COATED ORAL DAILY
Status: DISCONTINUED | OUTPATIENT
Start: 2024-07-25 | End: 2024-08-02 | Stop reason: HOSPADM

## 2024-07-25 RX ORDER — DEXTROSE 50 % IN WATER (D50W) INTRAVENOUS SYRINGE
12.5
Status: DISCONTINUED | OUTPATIENT
Start: 2024-07-25 | End: 2024-08-02 | Stop reason: HOSPADM

## 2024-07-25 ASSESSMENT — ACTIVITIES OF DAILY LIVING (ADL): LACK_OF_TRANSPORTATION: NO

## 2024-07-25 ASSESSMENT — PAIN - FUNCTIONAL ASSESSMENT
PAIN_FUNCTIONAL_ASSESSMENT: 0-10
PAIN_FUNCTIONAL_ASSESSMENT: 0-10

## 2024-07-25 ASSESSMENT — COGNITIVE AND FUNCTIONAL STATUS - GENERAL
TURNING FROM BACK TO SIDE WHILE IN FLAT BAD: A LOT
CLIMB 3 TO 5 STEPS WITH RAILING: TOTAL
STANDING UP FROM CHAIR USING ARMS: A LOT
EATING MEALS: A LITTLE
DRESSING REGULAR LOWER BODY CLOTHING: A LOT
HELP NEEDED FOR BATHING: A LOT
DRESSING REGULAR UPPER BODY CLOTHING: TOTAL
MOBILITY SCORE: 11
PERSONAL GROOMING: TOTAL
WALKING IN HOSPITAL ROOM: TOTAL
DAILY ACTIVITIY SCORE: 10
MOVING FROM LYING ON BACK TO SITTING ON SIDE OF FLAT BED WITH BEDRAILS: A LITTLE
TOILETING: TOTAL
MOVING TO AND FROM BED TO CHAIR: A LOT

## 2024-07-25 ASSESSMENT — PAIN SCALES - GENERAL
PAINLEVEL_OUTOF10: 0 - NO PAIN
PAINLEVEL_OUTOF10: 0 - NO PAIN

## 2024-07-25 NOTE — PROGRESS NOTES
7/25/2024 4:36 PM Daughter gave SNF choices. 1) [facility] 2) [facility] and 3) [facility]. I have requested referrals to be sent. Lynn HOGAN

## 2024-07-25 NOTE — PROGRESS NOTES
"Isis Geronimo is a 79 y.o. female on day 2 of admission presenting with Dehydration.      Subjective   Isis Geronimo is a 79-year-old female with a past medical history of end-stage renal disease on peritoneal dialysis under the care of Dr. Zarate ([facility] transportation [place]). She has a history of anemia, rheumatoid arthritis, nonsustained V. tach, hypertension, hyperlipidemia, vertigo, emphysema, carpal tunnel syndrome who was recently admitted on 7/6 after presenting with nausea, emesis, constipation and right-sided chest discomfort.  She was admitted to [facility] with NSTEMI with a high-sensitivity troponin peak of 21,094. 2 D ECHO showed an LVEF of 61% with LV diastolic dysfunction, moderate concentric LVH and aortic valve sclerosis.  She was seen by cardiology. Heparin infusion, beta-blocker, statin and aspirin given. Left heart cath showed severe multivessel coronary artery disease. On 7/17 she underwent left anterior thoracatomy off Pump MIDCAB.  She was discharged home on 7/20.  She comes back in with failure to thrive.  She has had poor by mouth intake, a 3 pound weight loss, confusion and weakness thus she was readmitted.  Workup was notable for new low-grade leukocytosis with neutrophil predominance.  She is afebrile.  Chest x-ray shows bilateral atelectasis with small effusions.  She denies fever and abdominal discomfort.  She cannot tell me if there has been changes in her PD fluid.  Nephrology is consulted for renal care.    Seen and examined. Remains confused.  No acute events.  Her daughter is at bedside and is concerned about urinary tract infection given her confusion.  The patient does not offer specific complaints       Objective          Vitals 24HR  Heart Rate:  [77]   Temp:  [36.2 °C (97.2 °F)-37.4 °C (99.3 °F)]   Resp:  [16]   BP: (102-118)/(39-50)   Height:  [160 cm (5' 2.99\")]   Weight:  [49 kg (108 lb)]   SpO2:  [90 %-99 %]     Intake/Output last 3 Shifts:  No intake or " output data in the 24 hours ending 07/25/24 1202      Physical Exam  General: Awake, conversational albeit confused.   HEENT:  Pupils equal and reactive. Moist mucosa.    Neck: Normal Jugular Venous Pressure.  Cardiovascular: Regular rate and rhythm. Normal S1 and S2.  Pulmonary:  Clear to auscultation bilaterally.  No wheezes, rales or rhonchi  Abdomen:  Soft. Non-tender. Mildly distended. Positive bowel sounds.  Lower Extremities:  2+ pedal pulses. No LE edema.  Neurologic:  Cranial nerves intact.  No focal deficit.   Skin: Skin warm and dry, normal skin turgor.   Psychiatric: Flat, not answering questions appropriately    Scheduled Medications  amLODIPine, 10 mg, oral, Daily  aspirin, 81 mg, oral, Daily  atorvastatin, 80 mg, oral, Daily  carvedilol, 12.5 mg, oral, BID  clopidogrel, 75 mg, oral, Daily  colchicine, 0.6 mg, oral, Daily  dextrose 1.5% - LOW calcium 2.5mEq/L 2,000 mL peritoneal dialysate, , intraperitoneal, q24h  heparin (porcine), 5,000 Units, subcutaneous, q8h MARIYA  losartan, 100 mg, oral, Daily  nystatin, 5 mL, Swish & Swallow, TID  polyethylene glycol, 17 g, oral, Daily      Continuous medications       PRN medications: acetaminophen **OR** acetaminophen **OR** acetaminophen, ondansetron, oxyCODONE     Relevant Results  Results from last 7 days   Lab Units 07/25/24  0603 07/24/24  0548 07/23/24  1128   WBC AUTO x10*3/uL 7.7 12.1* 11.8*   HEMOGLOBIN g/dL 9.3* 10.4* 9.9*   HEMATOCRIT % 28.9* 32.3* 29.7*   PLATELETS AUTO x10*3/uL 320 309 287   NEUTROS PCT AUTO % 77.5  --  87.0   LYMPHS PCT AUTO % 8.3  --  4.7   MONOS PCT AUTO % 5.7  --  6.9   EOS PCT AUTO % 1.8  --  0.3     Results from last 7 days   Lab Units 07/25/24  0603 07/24/24  0548 07/23/24  1128   SODIUM mmol/L 137 135* 134*   POTASSIUM mmol/L 4.6 4.3 4.0   CHLORIDE mmol/L 95* 95* 95*   CO2 mmol/L 21 23 27   BUN mg/dL 59* 50* 43*   CREATININE mg/dL 11.84* 10.81* 9.17*   GLUCOSE mg/dL 49* 68* 109*   CALCIUM mg/dL 8.4* 7.8* 8.4*        right  upper quadrant    (Results Pending)            Assessment/Plan      Isis Geronimo is a 79-year-old female with a past medical history of end-stage renal disease on peritoneal dialysis under the care of Dr. Zarate ([facility] transportation [place]). She has a history of anemia, rheumatoid arthritis, nonsustained V. tach, hypertension, hyperlipidemia, vertigo, emphysema, carpal tunnel syndrome who was recently admitted on 7/6 after presenting with nausea, emesis, constipation and right-sided chest discomfort.  She was admitted to [facility] with NSTEMI with a high-sensitivity troponin peak of 21,094. 2 D ECHO showed an LVEF of 61% with LV diastolic dysfunction, moderate concentric LVH and aortic valve sclerosis.  She was seen by cardiology. Heparin infusion, beta-blocker, statin and aspirin given. Left heart cath showed severe multivessel coronary artery disease. On 7/17 she underwent left anterior thoracatomy off Pump MIDCAB.  She was discharged home on 7/20.  She comes back in with failure to thrive.  She has had poor by mouth intake, a 3 pound weight loss, confusion and weakness thus she was readmitted.  Workup was notable for new low-grade leukocytosis with neutrophil predominance.  She is afebrile.  Chest x-ray shows bilateral atelectasis with small effusions.  She denies fever and abdominal discomfort.  She cannot tell me if there has been changes in her PD fluid.  Nephrology is consulted for renal care.    Given her leukocytosis and failure to thrive symptoms, I sent her PD fluid for cell count and culture as well as blood cultures, currently pending. She continues to have poor caloric intake and confusion. I will send a urine for culture and discussed obtaining a CT head with the primary service. I will order a 1 x exchange of 1.5% dextrose with a fill volume of 1.5 L x 4 hours.  I held her Losartan given borderline soft BP's.  Trend her RFP.  Nephrology will follow with her care.    Principal  Problem:    Dehydration       I spent 45 minutes in the professional and overall care of this patient.      Gab Lynn, DO

## 2024-07-25 NOTE — PROGRESS NOTES
07/25/24 1559   Discharge Planning   Living Arrangements Alone   Support Systems Children;Home care staff   Assistance Needed PTA; need asst w/ADL's, use walker, Independent w/ PD   Type of Residence Private residence  (demo correct)   Number of Stairs to Enter Residence 0  (elevator)   Number of Stairs Within Residence 0   Home or Post Acute Services Post acute facilities (Rehab/SNF/etc)   Type of Post Acute Facility Services Skilled nursing   Expected Discharge Disposition SNF  (PT/OT recommendended further rehab)   Does the patient need discharge transport arranged? Yes   RoundTrip coordination needed? Yes   Has discharge transport been arranged? No   Financial Resource Strain   How hard is it for you to pay for the very basics like food, housing, medical care, and heating? Not very   Housing Stability   In the last 12 months, was there a time when you were not able to pay the mortgage or rent on time? N   In the past 12 months, how many times have you moved where you were living? 1   At any time in the past 12 months, were you homeless or living in a shelter (including now)? N   Transportation Needs   In the past 12 months, has lack of transportation kept you from medical appointments or from getting medications? no  (PCP Dr Bullock; daughter drives to appointments)   In the past 12 months, has lack of transportation kept you from meetings, work, or from getting things needed for daily living? No

## 2024-07-25 NOTE — CARE PLAN
The patient's goals for the shift include sleep    The clinical goals for the shift include patient will remain safe throughout shift    Over the shift, the patient did not make progress toward the following goals. Barriers to progression include pt;s appetite is poor. Pt is confused. Recommendations to address these barriers include po supplement  .    Problem: Skin  Goal: Participates in plan/prevention/treatment measures  Outcome: Not Progressing  Goal: Promote/optimize nutrition  Outcome: Not Progressing

## 2024-07-25 NOTE — PROGRESS NOTES
07/25/24 1150   Discharge Planning   Expected Discharge Disposition SNF   Patient Choice   Provider Choice list and CMS website (https://medicare.gov/care-compare#search) for post-acute Quality and Resource Measure Data were provided and reviewed with: Family

## 2024-07-25 NOTE — PROGRESS NOTES
INTERNAL MEDICINE PROGRESS NOTE      Interval history    Poor p.o. intake persists.  Reports some abdominal discomfort.  Has had low-grade fever.  Lethargy and mild intermittent confusion persists.    Vital signs in last 24 hours:  Temp:  [36.2 °C (97.2 °F)-37.4 °C (99.3 °F)] 36.6 °C (97.8 °F)  Heart Rate:  [77] 77  Resp:  [16] 16  BP: (102-118)/(39-50) 117/50    Physical Examination:  Physical Exam    Constitutional:       Appearance: Elderly, asthenic build, in no distress  HENT:      Head: Normocephalic and atraumatic.  Moderate thrush present.  Eyes:      Extraocular Movements: Extraocular movements intact.      Pupils: Pupils are equal, round, and reactive to light.   Cardiovascular:      Rate and Rhythm: Normal rate and regular rhythm.      Pulses: Normal pulses.      Heart sounds: Normal heart sounds.   Pulmonary:      Effort: Pulmonary effort is normal.      Breath sounds: Normal breath sounds.   Abdominal:      General: Abdomen is flat. Bowel sounds are normal.      Palpations: Abdomen is soft.  Generalized tenderness present.  No masses.  Musculoskeletal:         General: Normal range of motion.      Cervical back: Normal range of motion and neck supple.   Skin:     General: Skin is warm and dry.  Thoracic incision healing well.  Neurological:      General: No focal deficit present.      Mental Status: Alert and oriented x 2, somnolent but responds to questions.    Medications:    Current Facility-Administered Medications:     acetaminophen (Tylenol) tablet 650 mg, 650 mg, oral, q4h PRN **OR** acetaminophen (Tylenol) oral liquid 650 mg, 650 mg, oral, q4h PRN **OR** acetaminophen (Tylenol) suppository 650 mg, 650 mg, rectal, q4h PRN, Jacob Staples MD    amLODIPine (Norvasc) tablet 10 mg, 10 mg, oral, Daily, Jacob Staples MD, 10 mg at 07/25/24 0902    aspirin EC tablet 81 mg, 81 mg, oral, Daily, Jacob Staples MD, 81 mg at 07/25/24 0901    atorvastatin (Lipitor) tablet 80 mg, 80 mg,  oral, Daily, Jacob Staples MD, 80 mg at 07/25/24 0901    carvedilol (Coreg) tablet 12.5 mg, 12.5 mg, oral, BID, Jacob Staples MD, 12.5 mg at 07/25/24 0901    clopidogrel (Plavix) tablet 75 mg, 75 mg, oral, Daily, Jacob Staples MD, 75 mg at 07/25/24 0902    colchicine tablet 0.6 mg, 0.6 mg, oral, Daily, Jacob Staples MD, 0.6 mg at 07/25/24 0902    heparin (porcine) injection 5,000 Units, 5,000 Units, subcutaneous, q8h MARIYA, Jacob Staples MD, 5,000 Units at 07/24/24 2156    losartan (Cozaar) tablet 100 mg, 100 mg, oral, Daily, Jacob Staples MD, 100 mg at 07/25/24 0901    nystatin (Mycostatin) 100,000 unit/mL suspension 500,000 Units, 5 mL, Swish & Swallow, TID, Jacob Staples MD, 500,000 Units at 07/25/24 0902    ondansetron (Zofran) injection 4 mg, 4 mg, intravenous, q4h PRN, Jacob Staples MD    oxyCODONE (Roxicodone) immediate release tablet 5 mg, 5 mg, oral, q6h PRN, Jacob Staples MD, 5 mg at 07/24/24 0947    polyethylene glycol (Glycolax, Miralax) packet 17 g, 17 g, oral, Daily, Jacob Staples MD    Laboratory Findings:  Lab Results   Component Value Date    WBC 7.7 07/25/2024    HGB 9.3 (L) 07/25/2024    HCT 28.9 (L) 07/25/2024    MCV 83 07/25/2024     07/25/2024     Lab Results   Component Value Date    INR 1.2 (H) 07/17/2024    PROTIME 13.8 (H) 07/17/2024     Lab Results   Component Value Date    GLUCOSE 49 (LL) 07/25/2024    CALCIUM 8.4 (L) 07/25/2024     07/25/2024    K 4.6 07/25/2024    CO2 21 07/25/2024    CL 95 (L) 07/25/2024    BUN 59 (H) 07/25/2024    CREATININE 11.84 (H) 07/25/2024       Assessment and Plan:     Dehydration -poor p.o. intake persists.  Continue to encourage p.o. intake.  Malnutrition ongoing, might require [place]ak.  ESRD -continue with peritoneal dialysis per renal guidance.  CAD -underwent CABG recently.  Healing well.  Cardiothoracic surgery following.  Lethargy -will check cultures, lactic acid.  Weakness -PT OT consulted.  Thrush  -continue with nystatin.  Abdominal pain -ultrasound ordered, GI consult.    Discussed with daughter at bedside.       Jacob Staples MD  07/25/24  11:40 AM

## 2024-07-25 NOTE — PROGRESS NOTES
7/25/2024 11:47 AM I spoke with patient's daughter. Patient lives alone. She has private duty during the day and then her children stay with patient. Patient was active with  home care. I discussed PT recommendations. Daughter agrees to SNF  I have provided SNF list and asked daughter to call back with SNF choices. [facility]

## 2024-07-26 ENCOUNTER — APPOINTMENT (OUTPATIENT)
Dept: RADIOLOGY | Facility: HOSPITAL | Age: 79
DRG: 640 | End: 2024-07-26
Payer: COMMERCIAL

## 2024-07-26 LAB
ALBUMIN SERPL BCP-MCNC: 2.2 G/DL (ref 3.4–5)
ANION GAP SERPL CALC-SCNC: 20 MMOL/L (ref 10–20)
APPEARANCE UR: CLEAR
BASOPHILS # BLD AUTO: 0.02 X10*3/UL (ref 0–0.1)
BASOPHILS NFR BLD AUTO: 0.3 %
BILIRUB UR STRIP.AUTO-MCNC: NEGATIVE MG/DL
BUN SERPL-MCNC: 61 MG/DL (ref 6–23)
CALCIUM SERPL-MCNC: 7.7 MG/DL (ref 8.6–10.3)
CHLORIDE SERPL-SCNC: 96 MMOL/L (ref 98–107)
CO2 SERPL-SCNC: 23 MMOL/L (ref 21–32)
COLOR UR: YELLOW
CREAT SERPL-MCNC: 12.91 MG/DL (ref 0.5–1.05)
EGFRCR SERPLBLD CKD-EPI 2021: 3 ML/MIN/1.73M*2
EOSINOPHIL # BLD AUTO: 0.13 X10*3/UL (ref 0–0.4)
EOSINOPHIL NFR BLD AUTO: 1.9 %
ERYTHROCYTE [DISTWIDTH] IN BLOOD BY AUTOMATED COUNT: 16.1 % (ref 11.5–14.5)
GLUCOSE BLD MANUAL STRIP-MCNC: 104 MG/DL (ref 74–99)
GLUCOSE BLD MANUAL STRIP-MCNC: 105 MG/DL (ref 74–99)
GLUCOSE BLD MANUAL STRIP-MCNC: 119 MG/DL (ref 74–99)
GLUCOSE BLD MANUAL STRIP-MCNC: 77 MG/DL (ref 74–99)
GLUCOSE BLD MANUAL STRIP-MCNC: 92 MG/DL (ref 74–99)
GLUCOSE BLD MANUAL STRIP-MCNC: 93 MG/DL (ref 74–99)
GLUCOSE BLD MANUAL STRIP-MCNC: 95 MG/DL (ref 74–99)
GLUCOSE SERPL-MCNC: 87 MG/DL (ref 74–99)
GLUCOSE UR STRIP.AUTO-MCNC: NORMAL MG/DL
HCT VFR BLD AUTO: 27.7 % (ref 36–46)
HGB BLD-MCNC: 9.2 G/DL (ref 12–16)
IMM GRANULOCYTES # BLD AUTO: 0.34 X10*3/UL (ref 0–0.5)
IMM GRANULOCYTES NFR BLD AUTO: 4.8 % (ref 0–0.9)
KETONES UR STRIP.AUTO-MCNC: NEGATIVE MG/DL
LEUKOCYTE ESTERASE UR QL STRIP.AUTO: ABNORMAL
LYMPHOCYTES # BLD AUTO: 0.52 X10*3/UL (ref 0.8–3)
LYMPHOCYTES NFR BLD AUTO: 7.4 %
MCH RBC QN AUTO: 27.1 PG (ref 26–34)
MCHC RBC AUTO-ENTMCNC: 33.2 G/DL (ref 32–36)
MCV RBC AUTO: 82 FL (ref 80–100)
MONOCYTES # BLD AUTO: 0.29 X10*3/UL (ref 0.05–0.8)
MONOCYTES NFR BLD AUTO: 4.1 %
NEUTROPHILS # BLD AUTO: 5.72 X10*3/UL (ref 1.6–5.5)
NEUTROPHILS NFR BLD AUTO: 81.5 %
NITRITE UR QL STRIP.AUTO: NEGATIVE
NRBC BLD-RTO: 0 /100 WBCS (ref 0–0)
PH UR STRIP.AUTO: 6 [PH]
PHOSPHATE SERPL-MCNC: 6.3 MG/DL (ref 2.5–4.9)
PLATELET # BLD AUTO: 308 X10*3/UL (ref 150–450)
POTASSIUM SERPL-SCNC: 4.1 MMOL/L (ref 3.5–5.3)
PROT UR STRIP.AUTO-MCNC: ABNORMAL MG/DL
RBC # BLD AUTO: 3.4 X10*6/UL (ref 4–5.2)
RBC # UR STRIP.AUTO: ABNORMAL /UL
RBC #/AREA URNS AUTO: NORMAL /HPF
SODIUM SERPL-SCNC: 135 MMOL/L (ref 136–145)
SP GR UR STRIP.AUTO: 1.02
SQUAMOUS #/AREA URNS AUTO: NORMAL /HPF
UROBILINOGEN UR STRIP.AUTO-MCNC: NORMAL MG/DL
WBC # BLD AUTO: 7 X10*3/UL (ref 4.4–11.3)
WBC #/AREA URNS AUTO: NORMAL /HPF

## 2024-07-26 PROCEDURE — 97535 SELF CARE MNGMENT TRAINING: CPT | Mod: GO

## 2024-07-26 PROCEDURE — 81001 URINALYSIS AUTO W/SCOPE: CPT | Performed by: EMERGENCY MEDICINE

## 2024-07-26 PROCEDURE — 85025 COMPLETE CBC W/AUTO DIFF WBC: CPT | Performed by: INTERNAL MEDICINE

## 2024-07-26 PROCEDURE — 70450 CT HEAD/BRAIN W/O DYE: CPT

## 2024-07-26 PROCEDURE — 1200000002 HC GENERAL ROOM WITH TELEMETRY DAILY

## 2024-07-26 PROCEDURE — 97530 THERAPEUTIC ACTIVITIES: CPT | Mod: GP

## 2024-07-26 PROCEDURE — 36415 COLL VENOUS BLD VENIPUNCTURE: CPT | Performed by: INTERNAL MEDICINE

## 2024-07-26 PROCEDURE — 99221 1ST HOSP IP/OBS SF/LOW 40: CPT | Performed by: NURSE PRACTITIONER

## 2024-07-26 PROCEDURE — 80069 RENAL FUNCTION PANEL: CPT | Performed by: INTERNAL MEDICINE

## 2024-07-26 PROCEDURE — 70450 CT HEAD/BRAIN W/O DYE: CPT | Performed by: RADIOLOGY

## 2024-07-26 PROCEDURE — 2500000004 HC RX 250 GENERAL PHARMACY W/ HCPCS (ALT 636 FOR OP/ED): Performed by: INTERNAL MEDICINE

## 2024-07-26 PROCEDURE — 2500000002 HC RX 250 W HCPCS SELF ADMINISTERED DRUGS (ALT 637 FOR MEDICARE OP, ALT 636 FOR OP/ED): Performed by: INTERNAL MEDICINE

## 2024-07-26 PROCEDURE — 90947 DIALYSIS REPEATED EVAL: CPT

## 2024-07-26 PROCEDURE — 2500000001 HC RX 250 WO HCPCS SELF ADMINISTERED DRUGS (ALT 637 FOR MEDICARE OP): Performed by: INTERNAL MEDICINE

## 2024-07-26 PROCEDURE — 82947 ASSAY GLUCOSE BLOOD QUANT: CPT

## 2024-07-26 PROCEDURE — 87086 URINE CULTURE/COLONY COUNT: CPT | Mod: AHULAB | Performed by: INTERNAL MEDICINE

## 2024-07-26 PROCEDURE — 97530 THERAPEUTIC ACTIVITIES: CPT | Mod: GO

## 2024-07-26 ASSESSMENT — COGNITIVE AND FUNCTIONAL STATUS - GENERAL
TOILETING: TOTAL
HELP NEEDED FOR BATHING: A LOT
WALKING IN HOSPITAL ROOM: A LOT
EATING MEALS: A LITTLE
PERSONAL GROOMING: A LITTLE
DAILY ACTIVITIY SCORE: 18
PERSONAL GROOMING: A LITTLE
EATING MEALS: A LITTLE
STANDING UP FROM CHAIR USING ARMS: A LOT
HELP NEEDED FOR BATHING: A LITTLE
TOILETING: A LITTLE
MOBILITY SCORE: 11
MOVING FROM LYING ON BACK TO SITTING ON SIDE OF FLAT BED WITH BEDRAILS: A LITTLE
MOVING TO AND FROM BED TO CHAIR: A LOT
PERSONAL GROOMING: TOTAL
CLIMB 3 TO 5 STEPS WITH RAILING: TOTAL
MOBILITY SCORE: 15
TURNING FROM BACK TO SIDE WHILE IN FLAT BAD: A LITTLE
DAILY ACTIVITIY SCORE: 16
HELP NEEDED FOR BATHING: A LOT
DRESSING REGULAR UPPER BODY CLOTHING: TOTAL
DRESSING REGULAR UPPER BODY CLOTHING: A LITTLE
DRESSING REGULAR LOWER BODY CLOTHING: A LOT
CLIMB 3 TO 5 STEPS WITH RAILING: TOTAL
MOVING TO AND FROM BED TO CHAIR: A LITTLE
TURNING FROM BACK TO SIDE WHILE IN FLAT BAD: A LOT
WALKING IN HOSPITAL ROOM: TOTAL
TOILETING: A LITTLE
DRESSING REGULAR UPPER BODY CLOTHING: A LITTLE
DRESSING REGULAR LOWER BODY CLOTHING: A LOT
DAILY ACTIVITIY SCORE: 10
DRESSING REGULAR LOWER BODY CLOTHING: A LITTLE
EATING MEALS: A LITTLE
STANDING UP FROM CHAIR USING ARMS: A LITTLE
MOVING FROM LYING ON BACK TO SITTING ON SIDE OF FLAT BED WITH BEDRAILS: A LITTLE

## 2024-07-26 ASSESSMENT — PAIN SCALES - GENERAL
PAINLEVEL_OUTOF10: 5 - MODERATE PAIN
PAINLEVEL_OUTOF10: 0 - NO PAIN
PAINLEVEL_OUTOF10: 5 - MODERATE PAIN

## 2024-07-26 ASSESSMENT — PAIN - FUNCTIONAL ASSESSMENT
PAIN_FUNCTIONAL_ASSESSMENT: 0-10

## 2024-07-26 ASSESSMENT — ACTIVITIES OF DAILY LIVING (ADL): HOME_MANAGEMENT_TIME_ENTRY: 10

## 2024-07-26 NOTE — PROGRESS NOTES
Occupational Therapy    OT Treatment    Patient Name: Isis Geronimo  MRN: [id number]  Today's Date: 7/26/2024  Time Calculation  Start Time: 0921  Stop Time: 0944  Time Calculation (min): 23 min        Assessment:  OT Assessment: continues to demonstrate impairments in strength, balance and activity tolerance- limiting ADL and fxnl mob education. pt requires mod intensity OT at d/c to return home safely.  Prognosis: Good  Barriers to Discharge: Decreased caregiver support  Evaluation/Treatment Tolerance: Patient limited by fatigue  End of Session Communication: Bedside nurse  End of Session Patient Position: Alarm on, Up in chair  Prognosis: Good  Barriers to Discharge: Decreased caregiver support  Evaluation/Treatment Tolerance: Patient limited by fatigue  Plan:  Treatment Interventions: ADL retraining, Functional transfer training, UE strengthening/ROM, Endurance training, Patient/family training, Equipment evaluation/education, Compensatory technique education  OT Frequency: 3 times per week  OT Discharge Recommendations: Moderate intensity level of continued care  Equipment Recommended upon Discharge: Wheeled walker  OT Recommended Transfer Status: Assist of 2  OT - OK to Discharge: Yes (OT POC established this date)  Treatment Interventions: ADL retraining, Functional transfer training, UE strengthening/ROM, Endurance training, Patient/family training, Equipment evaluation/education, Compensatory technique education    Subjective   Previous Visit Info:  OT Last Visit  OT Received On: 07/26/24  General:  General  Reason for Referral: Pt is a 78 yo female presenting due to poor food intake/dehydration/FTT. Pt recently dc'd home from hospital due to CABGx1  and anterior thoracotomy  Referred By: Jacob Staples MD  Past Medical History Relevant to Rehab:   Past Medical History:   Diagnosis Date    Arthritis     CAD (coronary artery disease)     COPD (chronic obstructive pulmonary disease) (Multi)     ESRD (end stage  renal disease) (Multi)     on peritoneal dialysis    Hypertension     Non-sustained ventricular tachycardia (Multi)     Rheumatoid arthritis (Multi)        Missed Visit: Yes  Missed Visit Reason: Other (Comment) (RN cleared pt for therapy treatment sessions. Session initiated however during bed mobility treatment, transport arrived to take pt to CT scan. Unable to complete full treatment session)  Family/Caregiver Present: Yes  Caregiver Feedback: daughter and son present. daughter expressing extreme agitation and lack of satisfaction with medical care, requesting information about ombudsman, agitated pt has not had a urinalysis.  Co-Treatment: PT  Co-Treatment Reason: to maximize safety and participation with skilled intervention  Prior to Session Communication: Bedside nurse  Patient Position Received: Bed, 3 rail up, Alarm on  General Comment: family present, patient agreeable to OT.  Precautions:  Medical Precautions: Fall precautions  Post-Surgical Precautions: Move in the Tube  Pain:  Pain Assessment  Pain Assessment: 0-10  0-10 (Numeric) Pain Score: 5 - Moderate pain  Pain Type: Acute pain  Pain Location: Abdomen  Pain Orientation: Right    Objective    Cognition:  Cognition  Orientation Level: Disoriented to time  Activities of Daily Living: LE Dressing  LE Dressing: Yes  Pants Level of Assistance: Moderate assistance  Sock Level of Assistance: Maximum assistance  LE Dressing Where Assessed: Edge of bed  LE Dressing Comments: limited initiation, limited by fatigue and abd pain. able to pull up pants with CGA in standing  Bed Mobility/Transfers: Bed Mobility 1  Bed Mobility 1: Supine to sitting  Level of Assistance 1: Minimum assistance  Bed Mobility Comments 1: assist with trunk    Transfer 1  Transfer From 1: Sit to  Transfer to 1: Stand  Technique 1: Sit to stand, Stand to sit  Transfer Device 1: Walker  Transfer Level of Assistance 1: Minimum assistance    Outcome Measures:[facility] Daily Activity  Putting on  and taking off regular lower body clothing: A lot  Bathing (including washing, rinsing, drying): A lot  Putting on and taking off regular upper body clothing: A little  Toileting, which includes using toilet, bedpan or urinal: A little  Taking care of personal grooming such as brushing teeth: A little  Eating Meals: A little  Daily Activity - Total Score: 16    Education Documentation  Precautions, taught by Cherie Powell OT at 7/26/2024  1:11 PM.  Learner: Patient  Readiness: Acceptance  Method: Explanation  Response: Verbalizes Understanding    ADL Training, taught by Cherie Powell OT at 7/26/2024  1:11 PM.  Learner: Patient  Readiness: Acceptance  Method: Explanation  Response: Verbalizes Understanding    Education Comments  No comments found.      Goals:  Encounter Problems       Encounter Problems (Active)       ADLs       Patient will perform UB and LB sponge bathing with minimal assist  level of assistance and long-handled sponge. (Progressing)       Start:  07/24/24    Expected End:  08/07/24            Patient with complete upper body dressing with stand by assist level of assistance donning and doffing all UE clothes with PRN adaptive equipment. (Progressing)       Start:  07/24/24    Expected End:  08/07/24            Patient with complete lower body dressing with minimal assist  level of assistance donning and doffing all LE clothes  with PRN adaptive equipment. (Progressing)       Start:  07/24/24    Expected End:  08/07/24            Patient will complete daily grooming tasks with modified independent level of assistance and PRN adaptive equipment. (Progressing)       Start:  07/24/24    Expected End:  08/07/24            Patient will complete toileting including hygiene clothing management/hygiene with minimal assist  level of assistance and raised toilet seat and grab bars. (Progressing)       Start:  07/24/24    Expected End:  08/07/24               MOBILITY       Patient will perform Functional  mobility x Household distances/Community Distances with stand by assist level of assistance and least restrictive device in order to improve safety and functional mobility. (Progressing)       Start:  07/24/24    Expected End:  08/07/24               TRANSFERS       Patient will perform bed mobility contact guard assist level of assistance and bed rails in order to improve safety and independence with mobility (Progressing)       Start:  07/24/24    Expected End:  08/07/24            Patient will complete functional transfers with least restrictive device with contact guard assist level of assistance. (Progressing)       Start:  07/24/24    Expected End:  08/07/24

## 2024-07-26 NOTE — CARE PLAN
The patient's goals for the shift include rest    The clinical goals for the shift include pt will remain free from injury    Problem: Skin  Goal: Decreased wound size/increased tissue granulation at next dressing change  7/26/2024 0049 by Ara Shrestha RN  Outcome: Progressing  7/26/2024 0049 by Ara Shrestha RN  Outcome: Progressing  Goal: Participates in plan/prevention/treatment measures  7/26/2024 0049 by Ara Shrestha RN  Outcome: Progressing  7/26/2024 0049 by Ara Shrestha RN  Outcome: Progressing  Goal: Prevent/manage excess moisture  7/26/2024 0049 by Ara Shrestha RN  Outcome: Progressing  7/26/2024 0049 by Ara Shrestha RN  Outcome: Progressing  Goal: Prevent/minimize sheer/friction injuries  7/26/2024 0049 by Ara Shrestha RN  Outcome: Progressing  7/26/2024 0049 by Ara Shrestha RN  Outcome: Progressing  Goal: Promote/optimize nutrition  7/26/2024 0049 by Ara Shrestha RN  Outcome: Progressing  7/26/2024 0049 by Ara Shrestha RN  Outcome: Progressing  Goal: Promote skin healing  7/26/2024 0049 by Ara Shrestha RN  Outcome: Progressing  7/26/2024 0049 by Ara Shrestha RN  Outcome: Progressing     Problem: Pain - Adult  Goal: Verbalizes/displays adequate comfort level or baseline comfort level  7/26/2024 0049 by Ara Shrestha RN  Outcome: Progressing  7/26/2024 0049 by Ara Shrestha RN  Outcome: Progressing     Problem: Safety - Adult  Goal: Free from fall injury  7/26/2024 0049 by Ara Shrestha RN  Outcome: Progressing  7/26/2024 0049 by Ara Shrestha RN  Outcome: Progressing     Problem: Discharge Planning  Goal: Discharge to home or other facility with appropriate resources  7/26/2024 0049 by Ara Shrestha RN  Outcome: Progressing  7/26/2024 0049 by Ara Shrestha RN  Outcome: Progressing     Problem: Chronic Conditions and Co-morbidities  Goal: Patient's chronic conditions and co-morbidity symptoms are monitored and maintained or improved  7/26/2024 0049 by Ara Shrestha RN  Outcome:  Progressing  7/26/2024 0049 by Ara Shrestha, RN  Outcome: Progressing

## 2024-07-26 NOTE — CONSULTS
"Reason For Consult  Abdominal pain    History Of Present Illness  Isis Geronimo is a 79 y.o. female with h/o CAD, s/p CABG, ESRD, anemia, RA, nonsustained V. tach, hypertension, hyperlipidemia, vertigo, emphysema, carpal tunnel syndrome,  s/p Left Anterior Thoracatomy  Off Pump MIDCAB (LIMA to LAD), 7/17/24, presented with failure to thrive, confusion, weakness.  GI requested for abdominal pain.  Patient appears lethargic, but oriented to self and place.  Stated hip pain located on the right side, constant, aching, not associated with ambulation or movement.  She denies nausea or vomiting.  Stated the pain started after she \"had the surgery.\" RUQ U/S showed mild perihepatic ascites secondary to patient's peritoneal dialysis, moderate sludge in the gallbladder, nonspecific gallbladder wall thickening, no stones, small kidney. Nephrology follows, PD fluid sent for cell count and culture as well as blood cultures. She has yet to send a urine culture. Cultures are without growth currently. CT head ordered. She has h/o anemia, had multiple GI endoscopies as below.  Denies dysphagia, odynophagia, reports chronic acid reflux.    Small bowel enteroscopy 2022 [facility]  - A single actively bleeding angioectasia in the                         jejunum. Treated with argon plasma coagulation (APC).                         - No specimens collected.   Colonoscopy 4/1/22   - Preparation of the colon was poor.                        - The ileum was intubated and showed melanotic/maroon                        colored contents.                        - Visualization in the colon was challenging. Dark                        stool was encountered in the rectum and left side of                        the colon that became progressively marroon colored                        toward the cecum. No over lesions identified. Given                        TI findings will proceed with capsule deployment as a                        small bowel source " is suspected.                        - Diverticulosis in the sigmoid colon and in the                        descending colon.                        - No specimens collected.   Small bowel enteroscopy 7/7/16:  Esophagus, stomach, and duodenal bulb/ D2 normal     EGD 7/7/16:  One angiectasia with no bleeding was found in the proximal jejunum.  Unable to visualize AVM on repeat maneuvers.  No active bleeding in duodenem or jejunum     Colonoscopy 7/7/16:  Hematin found in entire colon.  No source of bleed, normal mucosa in entire colon.  Unable to intubate ileum.  Nonbleeding internal hemorrhoids     EGD 7/4/16:   Medium hiatal hernia noted.  Esophagus normal, patchy moderately erythematous mucosa in gastric antrum and duodenal bulb   Past Medical History  She has a past medical history of Arthritis, CAD (coronary artery disease), COPD (chronic obstructive pulmonary disease) (Multi), ESRD (end stage renal disease) (Multi), Hypertension, Non-sustained ventricular tachycardia (Multi), and Rheumatoid arthritis (Multi).    Surgical History  She has a past surgical history that includes Cardiac catheterization (N/A, 07/08/2024); Esophagogastroduodenoscopy; Peritoneal catheter insertion (05/23/2022); Colonoscopy; and Dilation and curettage of uterus.     Social History  She reports that she has been smoking cigarettes. She has been exposed to tobacco smoke. She does not have any smokeless tobacco history on file. She reports that she does not currently use alcohol. She reports that she does not use drugs.    Family History  No family history on file.     Allergies  Chlorothiazide, Metoprolol, Ibuprofen, and Penicillins    Review of Systems  10 systems reviewed and negative other than HPI     Physical Exam  Physical Exam  Vitals reviewed.   Constitutional:       Appearance: Normal appearance.   HENT:      Head: Normocephalic and atraumatic.      Nose: Nose normal.      Mouth/Throat:      Mouth: Mucous membranes are dry.  "  Eyes:      Conjunctiva/sclera: Conjunctivae normal.   Cardiovascular:      Rate and Rhythm: Normal rate and regular rhythm.      Heart sounds: Normal heart sounds.   Pulmonary:      Effort: Pulmonary effort is normal.      Breath sounds: Normal breath sounds.   Abdominal:      General: Bowel sounds are normal. There is no distension.      Palpations: Abdomen is soft. There is no mass.      Tenderness: There is abdominal tenderness in the right upper quadrant. There is no guarding or rebound.      Hernia: No hernia is present.          Comments: Pt is reproducible by palpation of the right rib cage   Musculoskeletal:      Right lower leg: Edema present.      Left lower leg: Edema present.   Skin:     General: Skin is dry.   Neurological:      Mental Status: She is oriented to person, place, and time. She is lethargic.   Psychiatric:         Mood and Affect: Mood normal.         Behavior: Behavior normal.            Last Recorded Vitals  Blood pressure 114/53, pulse 69, temperature 36.7 °C (98 °F), temperature source Temporal, resp. rate 16, height 1.6 m (5' 2.99\"), weight 49 kg (108 lb), SpO2 92%.    Relevant Results      Scheduled medications  amLODIPine, 10 mg, oral, Daily  aspirin, 81 mg, oral, Daily  atorvastatin, 80 mg, oral, Daily  carvedilol, 12.5 mg, oral, BID  clopidogrel, 75 mg, oral, Daily  colchicine, 0.6 mg, oral, Daily  cyproheptadine, 2 mg, oral, TID  dextrose 1.5% - LOW calcium 2.5mEq/L 2,000 mL peritoneal dialysate, , intraperitoneal, BID  heparin (porcine), 5,000 Units, subcutaneous, q8h MARIYA  [Held by provider] losartan, 100 mg, oral, Daily  nystatin, 5 mL, Swish & Swallow, TID  sertraline, 25 mg, oral, Daily      Continuous medications     PRN medications  PRN medications: acetaminophen **OR** acetaminophen **OR** acetaminophen, dextrose, dextrose, glucagon, glucagon, ondansetron, oxyCODONE  US right upper quadrant    Result Date: 7/25/2024  Interpreted By:  Jacob Arora, STUDY: US RIGHT UPPER " QUADRANT  7/25/2024 3:19 pm   INDICATION: 80 y/o   F with  Signs/Symptoms:abd pain.   COMPARISON: Unenhanced CT scan from 07/06/2024.   ACCESSION NUMBER(S): [id number]   ORDERING CLINICIAN: RODRIGUEZ OLMEDO   TECHNIQUE: Routine ultrasound of the right upper quadrant was performed using grayscale imaging, color Doppler, and spectral Doppler.   FINDINGS: LIVER: Craniocaudal length: 13.9.  This is  within normal limits. Echogenicity:  Normal. Mass:  None   GALLBLADDER: No shadowing stone. Moderate sludge in the gallbladder. The gallbladder wall thickness is 0.6cm. Sonographic Borrero's sign is negative.   BILE DUCTS: No intrahepatic biliary ductal dilatation. Common bile duct measured 0.3   in diameter. This is within the limits of normal.   PANCREAS: Pancreatic head and body were well seen and were sonographically normal for size and echogenicity. The pancreatic tail was obscured by bowel gas.   RIGHT KIDNEY: The right kidney was small in size and was prominently echogenic. It measured 7.5 cm in length. No gross shadowing stone or hydronephrosis.   PERITONEAL FLUID: Mild perihepatic free fluid. Most likely related to  patient's peritoneal dialysis.         Chronic changes in the right kidney including small size and increased echogenicity.   Mild perihepatic ascites. Most likely related to patient's peritoneal dialysis.   Moderate sludge in the gallbladder. Nonspecific gallbladder wall thickening. No shadowing stone.   Remainder of the exam was negative.   MACRO: None   Signed by: Rodriguez Arora 7/25/2024 4:13 PM Dictation workstation:   [id number]    ECG 12 lead    Result Date: 7/23/2024  Normal sinus rhythm Cannot rule out Anterior infarct (cited on or before 06-JUL-2024) T wave abnormality, consider lateral ischemia Abnormal ECG When compared with ECG of 17-JUL-2024 16:41, Serial changes of Anterior infarct Present See ED provider note for full interpretation and clinical correlation Confirmed by Jennie  Lien ([id number]) on 7/23/2024 6:21:34 PM    ECG 12 Lead    Result Date: 7/23/2024  Normal sinus rhythm Septal infarct (cited on or before 06-JUL-2024) T wave abnormality, consider lateral ischemia Prolonged QT Abnormal ECG Confirmed by Sriram Rizzo ([id number]) on 7/23/2024 12:48:14 PM    XR chest 2 views    Result Date: 7/20/2024  Interpreted By:  Minna Santana, STUDY: XR CHEST 2 VIEWS 7/20/2024 8:42 am   INDICATION: Signs/Symptoms:s/p cabg   COMPARISON: 07/18/2024   ACCESSION NUMBER(S): [id number]   ORDERING CLINICIAN: DOUG GONZALEZ   TECHNIQUE: PA and lateral views   FINDINGS: Bilateral lower lobe atelectasis and small bilateral pleural effusions are noted. Gas beneath the right diaphragm is similar to the prior exam and may be related to history of peritoneal dialysis. Interval removal of a left chest tube and central catheter. Surgical clips overlie the left chest and mediastinum       1. Bibasilar atelectasis with small pleural effusions 2. Free air beneath the right diaphragm possibly related to peritoneal dialysis.     Signed by: Minna Santana 7/20/2024 9:09 AM Dictation workstation:   [id number]    XR chest 1 view    Result Date: 7/18/2024  Interpreted By:  Gennaro Koehler, STUDY: XR CHEST 1 VIEW; 7/18/2024 5:25 am   INDICATION: Signs/Symptoms:Post op cardiac surgery   COMPARISON: Radiographs 07/17/2024   ACCESSION NUMBER(S): [id number]   ORDERING CLINICIAN: VENKAT LYLE   TECHNIQUE: Single frontal view of the chest performed.   FINDINGS: LINES AND DEVICES: ET tube removed. Right IJ CVC at cavoatrial junction. Stable left apical chest tube.   LUNGS: Mild left basilar atelectasis. No new focal consolidation, pulmonary edema, pleural effusion or pneumothorax.   CARDIOMEDIASTINAL SILHOUETTE: The cardiomediastinal silhouette is stable. Status post recent CABG through anterior thoracotomy.       Mild left basilar atelectasis.   MACRO None   Signed by: Gennaro Koehler 7/18/2024 7:56 AM Dictation workstation:    [id number]    XR chest 1 view    Result Date: 7/17/2024  Interpreted By:  Gennaro Koehler, STUDY: XR CHEST 1 VIEW; 7/17/2024 4:38 pm   INDICATION: Signs/Symptoms:Post op cardiac surgery   COMPARISON: Preoperative radiographs 07/11/2024   ACCESSION NUMBER(S): [id number]   ORDERING CLINICIAN: VENKAT LYLE   TECHNIQUE: Single frontal view of the chest performed.   FINDINGS: LINES AND DEVICES: Tip of endotracheal tube approximately 3.8 cm above the lovely. Tip of left chest tube in the left lung apex. Tip of right IJ CVC in the inferior SVC.   LUNGS: No focal consolidation, pulmonary edema, pleural effusion or significant pneumothorax.   CARDIOMEDIASTINAL SILHOUETTE: The cardiomediastinal silhouette is within normal limits. Status post recent CABG through anterior thoracotomy.       No significant pneumothorax.   Tip of endotracheal tube 3.8 cm above the lovely.   MACRO None   Signed by: Gennaro Koehler 7/17/2024 4:53 PM Dictation workstation:   [id number]    Anesthesia Intraoperative Transesophageal Echocardiogram    Result Date: 7/17/2024  [facility] - Dept of Anesthesiology   [address] [place] [address]     Tel [phone] and Fax [phone] TRANSESOPHAGEAL ECHOCARDIOGRAM REPORT  Patient Name:      REJI Kong Physician: [id number] Saroj Shirley MD Study Date:        7/17/2024            Ordering Provider: [id number] SAROJ SHIRLEY MRN/PID:           [id number]             Fellow: Accession#:        [id number]         Nurse: Date of Birth/Age: 1945 / 79 years Sonographer: Gender:            F                    Additional Staff: BSA / BMI:         m2 / kg/m2           Encounter#:        [id number] Study Type:    ANESTHESIA INTRAOPERATIVE YURIY Diagnosis/ICD: Atherosclerotic heart disease of native coronary artery with                unstable angina pectoris-I25.110 Indication:    Intraop CABG CPT Code:      YURIY Complete-13670; Doppler Limited-32959; Color Doppler-60915 PHYSICIAN  INTERPRETATION: Left Ventricle: The left ventricular systolic function is normal, with a visually estimated ejection fraction of 60-65%. The left ventricular cavity size is normal. Left ventricular diastolic filling was not assessed. Left Atrium: The left atrium is normal in size. There is no evidence of a patent foramen ovale. There is no thrombus visualized in the left atrial appendage. Right Ventricle: The right ventricle is normal in size. There is normal right ventricular global systolic function. Right Atrium: The right atrium is normal in size. Aortic Valve: The aortic valve appears structurally normal. The aortic valve appears tricuspid and non-restricted. There is moderate aortic valve cusp calcification. There is no evidence of aortic valve regurgitation. Mitral Valve: The mitral valve is normal in structure. There is trace mitral valve regurgitation. Tricuspid Valve: The tricuspid valve is structurally normal. There is trace tricuspid regurgitation. Pulmonic Valve: The pulmonic valve is not well visualized. There is trace pulmonic valve regurgitation. Pericardium: There is a trivial pericardial effusion. Aorta: The aortic root is normal. Severe atheromas seen throughout ascending, transverse and descending aorta. Multiple small mobile atheromas seen in ascending aorta. In comparison to the previous echocardiogram(s): Not performed on intraoperative study.  CONCLUSIONS:  1. The left ventricular systolic function is normal, with a visually estimated ejection fraction of 60-65%.  2. There is normal right ventricular global systolic function.  3. There is moderate aortic valve cusp calcification.  4. There is no evidence of a patent foramen ovale. POST CARDIOPULMONARY BYPASS REPORT: Patient has a normal sinus rhythm. Patient is on an epinephrine drip. Global LV function is normal. Global RV function is normal. All transesophageal echocardiogram findings discussed with surgeon. Off pump CABG performed.   QUANTITATIVE DATA SUMMARY: LV SYSTOLIC FUNCTION BY 2D PLANIMETRY (MOD):                      Normal Ranges: EF-Visual:      63 % LV EF Reported: 63 %  [id number] Jesus Shirley MD Electronically signed on 7/17/2024 at 4:23:34 PM  ** Final **     ECG 12 lead    Result Date: 7/15/2024  Normal sinus rhythm Left ventricular hypertrophy with repolarization abnormality ( Jonnie product ) Cannot rule out Septal infarct (cited on or before 06-JUL-2024) Abnormal ECG When compared with ECG of 06-JUL-2024 20:46, (unconfirmed) Serial changes of Septal infarct Present See ED provider note for full interpretation and clinical correlation Confirmed by Lien Angela ([id number]) on 7/15/2024 11:05:43 AM    Electrocardiogram, 12-lead PRN ACS symptoms    Result Date: 7/13/2024  Normal sinus rhythm Septal infarct , age undetermined ST & T wave abnormality, consider lateral ischemia Abnormal ECG When compared with ECG of 21-JUN-2016 15:28, Vent. rate has increased BY  30 BPM ST now depressed in Inferior leads ST now depressed in Lateral leads T wave inversion no longer evident in Inferior leads See ED provider note for full interpretation and clinical correlation Confirmed by Lien Angela ([id number]) on 7/13/2024 7:38:06 PM    Cardiac Catheterization Procedure    Result Date: 7/12/2024   [facility], Cath Lab, [address] [place] [address] Cardiovascular Catheterization Report Patient Name:     REJI PETERS      Performing Physician:  [id number] Juárez MD Study Date:       7/8/2024            Verifying Physician:   [id number] Juárez MD MRN/PID:          [id number]            Cardiologist/Co-Scrub: Accession#:       [id number]        Ordering Provider:     [id number] LACI GREGORY Date of           1945 / 79      Cardiologist: Birth/Age:         years Gender:           F                   Fellow: Encounter#:       [id number]          Surgeon:  Study:            Left Heart Cath Additional Study: Coronary Arteriogram  Indications: REJI PETERS is a 79 year old female who presents with end stage renal disease on dialysis, dyslipidemia, hypertension and a chest pain assessment of atypical angina. NSTE - ACS.  Procedure Description: After infiltration with 1% Lidocaine, the right radial artery was cannulated with a modified Seldinger technique. Subsequently a 6 Argentine sheath was placed in the right radial artery. Selective coronary catheterization was performed using a 4 Fr catheter(s) exchanged over a guide wire to cannulate the coronary arteries. A JL 3.5 tip catheter was used for left coronary injections. A JR 4 tip catheter was used for right coronary injections. Multiple injections of contrast were made into the left and right coronary arteries with angiograms recorded in multiple projections. After completion of the procedure, the arterial sheath was pulled and a TR Band Radial Compression Device was utilized to obtain patent hemostasis.  Procedure Description Comments: A JR4 was used to cross the aortic valve for LV pressure measurement and pullback.  Coronary Angiography: The coronary circulation is right dominant.  Left Main Coronary Artery: The left main coronary artery is a normal caliber vessel. The left main arises normally from the left coronary sinus of Valsalva and bifurcates into the LAD and circumflex coronary arteries. The left main coronary artery showed severe calcification. The distal left main coronary artery showed 80% stenosis.  Left Anterior Descending Coronary Artery Distribution: The left anterior descending coronary artery is a normal caliber vessel. The LAD arises normally from the left main coronary artery. The LAD demonstrated severe calcification. The proximal left anterior descending coronary artery showed 80% stenosis.  The 1st diagonal branch is a large caliber vessel. The proximal 1st diagonal branch revealed 30% stenosis. The 2nd diagonal branch is a very small caliber vessel. The 2nd diagonal branch demonstrated no significant disease or stenosis greater than 30%. The 3rd diagonal branch is a small caliber vessel. The 3rd diagonal branch revealed no significant disease or stenosis greater than 30%.  Circumflex Coronary Artery Distribution: The circumflex coronary artery is a normal caliber vessel. The circumflex arises normally from the left main coronary artery and terminates in the AV groove. The circumflex revealed severe calcification. The proximal circumflex coronary artery showed 30% stenosis. An additional lesion, located at the mid circumflex coronary artery, demonstrated 40% stenosis. The 1st obtuse marginal branch is a large caliber vessel. The 1st obtuse marginal branch showed severe calcification. The proximal to mid 1st obtuse marginal branch showed 50% stenosis.  Ramus Intermedius: The ramus intermedius is a small caliber vessel. The ramus intermedius arises normally from the left main coronary artery. The ramus intermedius showed no significant disease or stenosis greater than 30%.  Right Coronary Artery Distribution: The right coronary artery is a normal caliber vessel. The RCA arises normally from the right sinus of Valsalva. The RCA showed severe calcification. The ostial right coronary artery showed 80% stenosis. An additional RCA lesion, located at the proximal to mid right coronary artery, revealed 70% stenosis.  Valve Findings: No aortic valve stenosis is visualized.  Coronary Lesion Summary: Vessel         Stenosis   Vessel Segment Left Main    80% stenosis     distal LAD          80% stenosis    proximal 1st Diagonal 30% stenosis    proximal Circumflex   30% stenosis    proximal Circumflex   40% stenosis       mid OM 1         50% stenosis proximal to mid RCA          80% stenosis     ostial RCA           70% stenosis proximal to mid  Hemo Personnel: +----------------+---------+ Name            Duty      +----------------+---------+ Nicola Juárez MD 1 +----------------+---------+  Hemodynamic Pressures:  +----+------------------+---------+-------------+-------------+------+---------+ Site    Date Time       Phase  Systolic mmHg  Diastolic    ED  Mean mmHg                         Name                    mmHg      mmHg           +----+------------------+---------+-------------+-------------+------+---------+   AO  7/8/2024 1:45:27 AIR REST          143           50             83                     PM                                                   +----+------------------+---------+-------------+-------------+------+---------+   LV  7/8/2024 1:52:33 AIR REST          145            7    19                              PM                                                   +----+------------------+---------+-------------+-------------+------+---------+   LV  7/8/2024 1:52:42 AIR REST          145            7    18                              PM                                                   +----+------------------+---------+-------------+-------------+------+---------+  LVp  7/8/2024 1:52:47 AIR REST          148            6    20                              PM                                                   +----+------------------+---------+-------------+-------------+------+---------+  AOp  7/8/2024 1:52:54 AIR REST          142           50             82                     PM                                                   +----+------------------+---------+-------------+-------------+------+---------+   AO  7/8/2024 1:53:09 AIR REST          143           57             90                     PM                                                    +----+------------------+---------+-------------+-------------+------+---------+  Complications: No in-lab complications observed.  Cardiac Cath Post Procedure Notes: Post Procedure Diagnosis: CAD. Blood Loss:               Estimated blood loss during the procedure was 5 mls. Specimens Removed:        Number of specimen(s) removed: none.  Recommendations: Maximize medical therapy. Coronary artery bypass graft surgery. ____________________________________________________________________________________ CONCLUSIONS  1. Severe multivessel CAD in a right dominant system.  2. Elevatedl LVEDP.  3. No evidence of aortic stenosis. ICD 10 Codes: Atherosclerotic heart disease of native coronary artery with unspecified angina pectoris-I25.119; Non ST elevation (NSTEMI) myocardial infarction-I21.4  CPT Codes: Left Heart Cath (visualization of coronaries) and LV-19764  [id number] Nicola Juárez MD Performing Physician Electronically signed by [id number] Nicola Juárez MD on 7/12/2024 at 5:36:55 PM  ** Final **     XR chest 1 view    Result Date: 7/11/2024  Interpreted By:  Jacob Arora, STUDY: XR CHEST 1 VIEW;  7/11/2024 7:29 am   INDICATION: Signs/Symptoms:Preop cardiac surgery.   COMPARISON: Correlation with CT scan chest from 07/06/2024 and comparison with chest x-ray from 06/20/2016.   ACCESSION NUMBER(S): [id number]   ORDERING CLINICIAN: ADI JALLOH   TECHNIQUE: Single AP portable view of the chest was obtained.   FINDINGS: MEDIASTINUM/ LUNGS/ ADORE: Pleural based nodular density at the posterolateral right base is not readily apparent on plain film. There are the several curvilinear horizontal opacities at the right base with associated lucency near the diaphragm. The left lung was clear. There was cardiomegaly without vascular congestion or pleural effusion. No pneumothorax. No tracheal deviation. No abnormal hilar fullness or gross mass on either side.   BONES: No lytic or blastic destructive bone lesion.   UPPER ABDOMEN:  Grossly intact.       Mild cardiomegaly.  Currently without radiographic evidence of CHF or pneumonia.   Horizontal curvilinear opacities with associated lucency at the right base adjacent to the diaphragm. Suspect atelectasis rather than pneumoperitoneum. Suggest dedicated supine and upright views of the abdomen in follow-up.   MACRO: None   Signed by: Jacob Arora 7/11/2024 7:40 AM Dictation workstation:   [id number]    Vascular US carotid artery duplex bilateral    Result Date: 7/9/2024             [place] [address]   Tel [phone] and Fax [phone]  Vascular Lab Report VASC US CAROTID ARTERY DUPLEX BILATERAL  Patient Name:     REJI PETERS      Reading Physician: [id number] Melvin Lopez MD Study Date:       7/9/2024            Ordering           [id number] VENKAT SCHREIBER                                       Physician:         DARIELA MRN/PID:          [id number]            Technologist:      Ciera Cooper T Accession#:       [id number]        Technologist 2: Date of           1945 / 79      Encounter#:        [id number] Birth/Age:        years Gender:           F Admission Status: Outpatient          Location           [facility]                                       Performed:  Diagnosis/ICD: Occlusion and stenosis of bilateral carotid arteries-I65.23 CPT Codes:     61779 Cerebrovascular Carotid Duplex scan complete  **CRITICAL RESULT** Critical Result: Findings are consistent with greater than 70% stenosis of the left proximal internal carotid artery. Turbulent flow seen by color Doppler. Notification called to Venkat Bain APRN-CNP with results. on 7/9/2024 at 10:08:53 AM.  CONCLUSIONS: Right Carotid: Findings are consistent with 50 to 69% stenosis of the right proximal internal carotid artery. Turbulent flow seen by color Doppler. Right external carotid artery appears patent with no evidence of stenosis. No evidence of hemodynamically significant  stenosis of the right common carotid artery. The right vertebral artery is patent with antegrade flow. There is a >50% stenosis noted in the right subclavian artery. Left Carotid: Findings are consistent with greater than 70% stenosis of the left proximal internal carotid artery. Turbulent flow seen by color Doppler. There are elevated velocities in the left ECA that are suggestive of disease. There is a >50% stenosis noted in the left external carotid artery. No evidence of hemodynamically significant stenosis of the left common carotid artery. The left vertebral artery is patent with antegrade flow. There are elevated velocities in the left subclavian artery that are suggestive of disease.  Imaging & Doppler Findings: Right Plaque Morph: The proximal right internal carotid artery demonstrates heterogenous and calcified plaque. The proximal right external carotid artery demonstrates heterogenous and calcified plaque. The mid right common carotid artery demonstrates heterogenous and calcified plaque. The distal right common carotid artery demonstrates heterogenous and calcified plaque. The proximal right subclavian artery demonstrates heterogenous plaque. Left Plaque Morph: The proximal left internal carotid artery demonstrates heterogenous and calcified plaque. The proximal left external carotid artery demonstrates heterogenous and calcified plaque. The proximal left common carotid artery demonstrates heterogenous and calcified plaque. The mid left common carotid artery demonstrates heterogenous and calcified plaque. The distal left common carotid artery demonstrates heterogenous and calcified plaque. The proximal left subclavian artery demonstrates heterogenous plaque.   Right                        Left   PSV      EDV                PSV      EDV 78 cm/s  10 cm/s   CCA P    82 cm/s  17 cm/s 57 cm/s  13 cm/s   CCA D    93 cm/s  22 cm/s 180 cm/s 59 cm/s   ICA P    244 cm/s 63 cm/s 116 cm/s 12 cm/s   ICA M    84 cm/s   22 cm/s 113 cm/s 29 cm/s   ICA D    87 cm/s  20 cm/s 122 cm/s            ECA     254 cm/s 21 cm/s 107 cm/s 29 cm/s Vertebral  56 cm/s  8 cm/s 315 cm/s 21 cm/s Subclavian 257 cm/s               Right Left ICA/CCA Ratio  3.2  2.6   [id number] Melvin Lopez MD Electronically signed by [id number] Melvin Lopez MD on 7/9/2024 at 11:53:21 AM  ** Final **     Transthoracic Echo (TTE) Complete    Result Date: 7/8/2024   [facility], [address] [place] [address]              Tel [phone] and Fax [phone] TRANSTHORACIC ECHOCARDIOGRAM REPORT  Patient Name:      REJI Kong Physician:    [id number] Dontae Gonzalez MD Study Date:        7/8/2024             Ordering Provider:    [id number] LACI GREGORY MRN/PID:           [id number]             Fellow: Accession#:        [id number]         Nurse: Date of Birth/Age: 1945 / 79 years Sonographer:          David Quintero RDCS Gender:            F                    Additional Staff: Height:            160.02 cm            Admit Date:           7/6/2024 Weight:            49.90 kg             Admission Status:     Inpatient -                                                               Priority discharge BSA / BMI:         1.50 m2 / 19.49      Encounter#:           [id number]                    kg/m2 Blood Pressure:    181/81 mmHg          Department Location:  [facility] Cath                                                               Lab Study Type:    TRANSTHORACIC ECHO (TTE) COMPLETE Diagnosis/ICD: Non ST elevation (NSTEMI) myocardial infarction-I21.4 Indication:    NSTEMI CPT Code:      Echo Complete w Full Doppler-[id number] Patient History: Pertinent History: HTN. ESRD. Study Detail: The following Echo studies were performed: 2D, M-Mode, Doppler and               color flow. Technically challenging study due to prominent lung                artifact and body habitus.  PHYSICIAN INTERPRETATION: Left Ventricle: Left ventricular ejection fraction is normal, calculated by Rasmussen's biplane at 61%. Estimated left ventricular mass is increased. There are no regional wall motion abnormalities. The left ventricular cavity size is normal. The left ventricular septal wall thickness is moderately increased. There is mildly increased left ventricular posterior wall thickness. There is moderate concentric left ventricular hypertrophy. Spectral Doppler shows an impaired relaxation pattern of left ventricular diastolic filling. Left Atrium: The left atrium is normal in size. Right Ventricle: The right ventricle is normal in size. There is normal right ventricular global systolic function. Right Atrium: The right atrium is normal in size. Aortic Valve: The aortic valve appears structurally normal. The aortic valve appears tricuspid. There is mild to moderate aortic valve cusp calcification. There is evidence of mildly elevated transaortic gradients consistent with sclerosis of the aortic valve. The aortic valve dimensionless index is 0.60. There is no evidence of aortic valve regurgitation. The peak instantaneous gradient of the aortic valve is 4.8 mmHg. The mean gradient of the aortic valve is 3.0 mmHg. Mitral Valve: The mitral valve is normal in structure. There is no evidence of mitral valve regurgitation. Tricuspid Valve: The tricuspid valve is structurally normal. No evidence of tricuspid regurgitation. Pulmonic Valve: The pulmonic valve is structurally normal. There is no indication of pulmonic valve regurgitation. Pericardium: There is a trivial to small pericardial effusion. Aorta: The aortic root is normal. Systemic Veins: The inferior vena cava appears to be of normal size. There is IVC inspiratory collapse greater than 50%. In comparison to the previous echocardiogram(s): Compared with study dated 6/21/2016, there is increased LVH and aortic sclerosis.   CONCLUSIONS:  1. Left ventricular ejection fraction is normal, calculated by Rasmussen's biplane at 61%.  2. Spectral Doppler shows an impaired relaxation pattern of left ventricular diastolic filling.  3. There is moderate concentric left ventricular hypertrophy.  4. Increased LV mass.  5. Moderately increased left ventricular septal thickness.  6. There is normal right ventricular global systolic function.  7. Aortic valve sclerosis. QUANTITATIVE DATA SUMMARY: 2D MEASUREMENTS:                           Normal Ranges: IVSd:          1.60 cm    (0.6-1.1cm) LVPWd:         1.20 cm    (0.6-1.1cm) LVIDd:         4.10 cm    (3.9-5.9cm) LVIDs:         3.50 cm LV Mass Index: 144.4 g/m2 LV % FS        14.6 % LA VOLUME:                              Normal Ranges: LA Vol A4C:        52.9 ml   (22+/-6mL/m2) LA Vol Index A4C:  35.3ml/m2 LA Area A4C:       18.2 cm2 LA Major Axis A4C: 5.3 cm AORTA MEASUREMENTS:                      Normal Ranges: Ao Sinus, d: 3.10 cm (2.1-3.5cm) Asc Ao, d:   3.02 cm (2.1-3.4cm) LV SYSTOLIC FUNCTION BY 2D PLANIMETRY (MOD):                      Normal Ranges: EF-A4C View:    56 % (>=55%) EF-A2C View:    65 % EF-Biplane:     61 % LV EF Reported: 61 % LV DIASTOLIC FUNCTION:                               Normal Ranges: MV Peak E:        0.68 m/s    (0.7-1.2 m/s) MV Peak A:        1.01 m/s    (0.42-0.7 m/s) E/A Ratio:        0.67        (1.0-2.2) MV e'             0.048 m/s   (>8.0) MV lateral e'     0.05 m/s MV medial e'      0.04 m/s E/e' Ratio:       14.11       (<8.0) PulmV Sys Irwin:    53.80 cm/s PulmV Goss Irwin:   30.60 cm/s PulmV S/D Irwin:    1.80 PulmV A Revs Irwin: 37.00 cm/s PulmV A Revs Dur: 137.00 msec MITRAL VALVE:                 Normal Ranges: MV DT: 282 msec (150-240msec) AORTIC VALVE:                                   Normal Ranges: AoV Vmax:                1.09 m/s (<=1.7m/s) AoV Peak P.8 mmHg (<20mmHg) AoV Mean PG:             3.0 mmHg (1.7-11.5mmHg) LVOT Max Irwin:             0.74 m/s (<=1.1m/s) AoV VTI:                 27.50 cm (18-25cm) LVOT VTI:                16.60 cm LVOT Diameter:           2.07 cm  (1.8-2.4cm) AoV Area, VTI:           2.03 cm2 (2.5-5.5cm2) AoV Area,Vmax:           2.27 cm2 (2.5-4.5cm2) AoV Dimensionless Index: 0.60 TRICUSPID VALVE/RVSP:                           Normal Ranges: Est. RA Pressure: 3 mmHg IVC Diam:         1.33 cm PULMONIC VALVE:                         Normal Ranges: PV Accel Time: 102 msec (>120ms) PV Max Irwin:    0.8 m/s  (0.6-0.9m/s) PV Max P.7 mmHg Pulmonary Veins: PulmV A Revs Dur: 137.00 msec PulmV A Revs Irwin: 37.00 cm/s PulmV Goss Irwin:   30.60 cm/s PulmV S/D Irwin:    1.80 PulmV Sys Irwin:    53.80 cm/s  [id number] Dontae Gonzalez MD Electronically signed on 2024 at 12:27:11 PM  ** Final **     CT chest abdomen pelvis wo IV contrast    Result Date: 2024  STUDY: CT Chest, Abdomen, and Pelvis without IV Contrast; 2024 10:21 PM. INDICATION: Vomiting.  Chest pain.  Constipation for 5 days.  Peritoneal dialysis. COMPARISON: None. ACCESSION NUMBER(S): [id number] ORDERING CLINICIAN: ALEC BARCLAY TECHNIQUE: CT of the chest, abdomen, and pelvis was performed.  Contiguous axial images were obtained at 3 mm slice thickness through the chest, abdomen, and pelvis.  Coronal and sagittal reconstructions at 3 mm slice thickness were performed.  No intravenous contrast was administered.  FINDINGS: Please note that the evaluation of vessels, lymph nodes and organs is limited without intravenous contrast. CHEST: MEDIASTINUM: Enlarged and slightly heterogeneous appearance of the thyroid gland with multiple nodules, left greater than right, measuring up to 2.1 cm on the left.  The heart is normal in size without pericardial effusion.  Moderate atherosclerotic calcification of the thoracic aorta without aneurysm.  Coronary arterial calcifications are noted. LUNGS/PLEURA: There is no pleural effusion, pleural thickening, or pneumothorax. The airways are  patent. Minimal streaky bibasilar atelectasis is demonstrated.  Pleural-based right lower lobe nodule measuring 1.2 cm is demonstrated (image 243, series 607).  Lungs are otherwise clear bilaterally with minimal biapical scarring.  No distinct consolidation or evidence for pulmonary edema. LYMPH NODES: Thoracic lymph nodes are not enlarged. ABDOMEN:  LIVER: No hepatomegaly.  Smooth surface contour.  Normal attenuation.  BILE DUCTS: No intrahepatic or extrahepatic biliary ductal dilatation.  GALLBLADDER: Gallbladder is unremarkable. STOMACH: No abnormalities identified.  PANCREAS: No masses or ductal dilatation.  SPLEEN: No splenomegaly or focal splenic lesion.  ADRENAL GLANDS: Adenomatous hypertrophy of the adrenal glands, left greater than right.  KIDNEYS AND URETERS: Mildly atrophic appearance of the kidneys bilaterally.  Nonobstructive stones of the left kidney are noted measuring up to 3 mm in the midpole.  Vascular calcifications of the kidneys bilaterally.  No ureteral calculus.  No hydronephrosis.  PELVIS:  BLADDER: No abnormalities identified.  REPRODUCTIVE ORGANS: No abnormalities identified.  Uterus is present.  BOWEL: Colonic diverticulosis without CT evidence for acute diverticulitis. Appendix is within normal limits.  Large amount of fecal material within the rectosigmoid colon and rectum.  No evidence of bowel obstruction.  VESSELS: Extensive atherosclerotic calcification of the abdominal aorta and branch vessels.  No aneurysm.  PERITONEUM/RETROPERITONEUM/LYMPH NODES: No free fluid.  No pneumoperitoneum.  Peritoneal dialysis catheter is noted terminating in the lower pelvis.  No lymphadenopathy.  ABDOMINAL WALL: No abnormalities identified. SOFT TISSUES: No abnormalities identified.  BONES: No acute fracture or aggressive osseous lesion.    1.  No distinct acute intrathoracic process identified. 2.  Minimal streaky bibasilar atelectasis with pleural based right lower lobe pulmonary nodule measuring 1.2  cm.  Suggest continued follow-up as per clinical guidelines. 3.  Enlarged and heterogeneous appearance of the thyroid gland with multiple bilateral nodules, left greater than right.  Suggest nonemergent follow-up with ultrasound as clinically warranted. 4.  Nonobstructing nephrolithiasis of the left kidney.  No ureteral calculus or evidence for obstructive uropathy bilaterally. 5.  Colonic diverticulosis without CT evidence for acute diverticulitis. 6.  Large amount of fecal material within the rectosigmoid colon and rectal vault.  No evidence of bowel obstruction. Fleischner Society 2017 Guidelines for Management of Incidentally Detected Pulmonary Nodules in Adults: A.  SOLID NODULES* Nodule Type and Size: Single: *Low Risk** < 6 mm - No routine follow-up 6-8 mm - CT at 6-12 months, then consider CT at 18-24 months > 8 mm - Consider CT at 3 months, PET/CT, or tissue sampling *High Risk** < 6 mm - Optional CT at 12 months 6-8 mm - CT at 6-12 months, then CT at 18-24 months > 8 mm - Consider CT at 3 months, PET/CT, or tissue sampling Multiple: *Low Risk** < 6 mm - No routine follow-up 6-8 mm - CT at 3-6 months, then consider CT at 18-24 months > 8 mm - CT at 3-6 months, then consider CT at 18-24 months *High Risk** < 6 mm - Optional CT at 12 months 6-8 mm - CT at 3-6 months, then at 18-24 months > 8 mm - CT at 3-6 months, then at 18-24 months B. SUBSOLID NODULES* Nodule Type and Size: Single:  *Ground glass < 6 mm - No routine follow-up > 6 mm - CT at 6-12 months to confirm persistence, then CT every 2 years until 5 years *Part Solid < 6 mm - No routine follow-up > 6 mm - CT at 3-6 months to confirm persistence.  If unchanged and solid component remains < 6 mm, annual CT should be performed for 5 years. Multiple: < 6 mm - CT at 3-6 months.  If stable, consider CT at 2 and 4 years. > 6 mm - CT at 3-6 months.  Subsequent management based on the most suspicious nodule(s). Note - These recommendations do not apply to  lung cancer screening, patients with immunosuppression, or patients with known primary cancer. * Dimensions are average of long and short axes, rounded to the nearest millimeter. ** Consider all relevant risk factors. Signed by Girish Oshea MD   Results for orders placed or performed during the hospital encounter of 07/23/24 (from the past 24 hour(s))   POCT GLUCOSE   Result Value Ref Range    POCT Glucose 129 (H) 74 - 99 mg/dL   POCT GLUCOSE   Result Value Ref Range    POCT Glucose 60 (L) 74 - 99 mg/dL   POCT GLUCOSE   Result Value Ref Range    POCT Glucose 119 (H) 74 - 99 mg/dL   POCT GLUCOSE   Result Value Ref Range    POCT Glucose 93 74 - 99 mg/dL   POCT GLUCOSE   Result Value Ref Range    POCT Glucose 104 (H) 74 - 99 mg/dL   CBC and Auto Differential   Result Value Ref Range    WBC 7.0 4.4 - 11.3 x10*3/uL    nRBC 0.0 0.0 - 0.0 /100 WBCs    RBC 3.40 (L) 4.00 - 5.20 x10*6/uL    Hemoglobin 9.2 (L) 12.0 - 16.0 g/dL    Hematocrit 27.7 (L) 36.0 - 46.0 %    MCV 82 80 - 100 fL    MCH 27.1 26.0 - 34.0 pg    MCHC 33.2 32.0 - 36.0 g/dL    RDW 16.1 (H) 11.5 - 14.5 %    Platelets 308 150 - 450 x10*3/uL    Neutrophils % 81.5 40.0 - 80.0 %    Immature Granulocytes %, Automated 4.8 (H) 0.0 - 0.9 %    Lymphocytes % 7.4 13.0 - 44.0 %    Monocytes % 4.1 2.0 - 10.0 %    Eosinophils % 1.9 0.0 - 6.0 %    Basophils % 0.3 0.0 - 2.0 %    Neutrophils Absolute 5.72 (H) 1.60 - 5.50 x10*3/uL    Immature Granulocytes Absolute, Automated 0.34 0.00 - 0.50 x10*3/uL    Lymphocytes Absolute 0.52 (L) 0.80 - 3.00 x10*3/uL    Monocytes Absolute 0.29 0.05 - 0.80 x10*3/uL    Eosinophils Absolute 0.13 0.00 - 0.40 x10*3/uL    Basophils Absolute 0.02 0.00 - 0.10 x10*3/uL   Renal Function Panel   Result Value Ref Range    Glucose 87 74 - 99 mg/dL    Sodium 135 (L) 136 - 145 mmol/L    Potassium 4.1 3.5 - 5.3 mmol/L    Chloride 96 (L) 98 - 107 mmol/L    Bicarbonate 23 21 - 32 mmol/L    Anion Gap 20 10 - 20 mmol/L    Urea Nitrogen 61 (H) 6 - 23 mg/dL     Creatinine 12.91 (H) 0.50 - 1.05 mg/dL    eGFR 3 (L) >60 mL/min/1.73m*2    Calcium 7.7 (L) 8.6 - 10.3 mg/dL    Phosphorus 6.3 (H) 2.5 - 4.9 mg/dL    Albumin 2.2 (L) 3.4 - 5.0 g/dL   POCT GLUCOSE   Result Value Ref Range    POCT Glucose 92 74 - 99 mg/dL   POCT GLUCOSE   Result Value Ref Range    POCT Glucose 105 (H) 74 - 99 mg/dL          Assessment/Plan     79 y.o. female with h/o CAD, s/p CABG, ESRD, anemia, RA, nonsustained V. tach, hypertension, hyperlipidemia, vertigo, emphysema, carpal tunnel syndrome,  s/p Left Anterior Thoracatomy  Off Pump MIDCAB (LIMA to LAD), 7/17,  presented with failure to thrive, confusion, weakness.  GI requested for abdominal pain.  He had pain located on the right side of the upper quadrant, reproducible with palpation of the right rib cage and right upper quadrant, suspicious for musculoskeletal pain, less likely biliary colic or cholecystitis.    - await abdominal fluid culture, recommend to send urine culture  -Do not think she would benefit from endoscopies  -Recommend diet as tolerated, encourage p.o. intake  -Daily PPI  Please call GI if any questions or further assistance needed discussed with Dr. Dr Vasquez    I spent 35 minutes in the professional and overall care of this patient.      Elizabeth Jimenez, APRN-CNP

## 2024-07-26 NOTE — PROGRESS NOTES
Physical Therapy    Physical Therapy Treatment    Patient Name: Isis Geronimo  MRN: [id number]  Today's Date: 7/26/2024  Time Calculation  Start Time: 0920  Stop Time: 0942  Time Calculation (min): 22 min    Assessment/Plan   PT Assessment  End of Session Communication: Bedside nurse  Assessment Comment: PT session focused on bed mobility, transfers, and gait training. Pt limited by abdominal pain and fatigue. Additioanl education provided to family due to family frustration/confusion with hospital stay including concern over possible UTI (notified RN and MD of family concerns). Continue to recommend MOD intensity therapy at DC  End of Session Patient Position: Alarm on, Up in chair     PT Plan  Treatment/Interventions: Bed mobility, Transfer training, Gait training, Stair training, Balance training, Neuromuscular re-education, Strengthening, Endurance training, Therapeutic exercise, Therapeutic activity, Home exercise program  PT Plan: Ongoing PT  PT Frequency: 3 times per week  PT Discharge Recommendations: Moderate intensity level of continued care  Equipment Recommended upon Discharge: Wheeled walker  PT Recommended Transfer Status: Assist x1  PT - OK to Discharge: Yes (Per POC)      General Visit Information:      General  Reason for Referral: Pt is a 80 yo female presenting due to poor food intake/dehydration/FTT. Pt recently dc'd home from hospital due to CABGx1  and anterior thoracotomy  Referred By: Jacob Staples MD  Past Medical History Relevant to Rehab:   Past Medical History:   Diagnosis Date    Arthritis     CAD (coronary artery disease)     COPD (chronic obstructive pulmonary disease) (Multi)     ESRD (end stage renal disease) (Multi)     on peritoneal dialysis    Hypertension     Non-sustained ventricular tachycardia (Multi)     Rheumatoid arthritis (Multi)      Family/Caregiver Present: Yes  Caregiver Feedback: daughter and son present. daughter expressing extreme agitation and lack of satisfaction  with medical care, requesting information about hernan, agitated pt has not had a urinalysis.  Co-Treatment: OT  Co-Treatment Reason: to maximize safety and participation with skilled intervention  Prior to Session Communication: Bedside nurse  Patient Position Received: Bed, 3 rail up, Alarm on  General Comment: family present, patient agreeable to PT    Subjective   Precautions:  Precautions  Medical Precautions: Fall precautions  Post-Surgical Precautions: Move in the Tube    Objective   Pain:  Pain Assessment  Pain Assessment: 0-10  0-10 (Numeric) Pain Score: 5 - Moderate pain  Pain Type: Acute pain  Pain Location: Abdomen  Pain Orientation: Right  Cognition:  Cognition  Overall Cognitive Status: Within Functional Limits  Insight: Mild  Impulsive: Within functional limits  Processing Speed: Delayed  Coordination:  Movements are Fluid and Coordinated: Yes  Postural Control:  Static Sitting Balance  Static Sitting-Balance Support: No upper extremity supported, Feet supported  Static Sitting-Level of Assistance: Distant supervision  Static Sitting-Comment/Number of Minutes: 2 min  Static Standing Balance  Static Standing-Balance Support: Bilateral upper extremity supported (FWW)  Static Standing-Level of Assistance: Contact guard  Static Standing-Comment/Number of Minutes: 2 min    Activity Tolerance:  Activity Tolerance  Endurance: Tolerates 10 - 20 min exercise with multiple rests  Treatments:  Bed Mobility  Bed Mobility: Yes  Bed Mobility 1  Bed Mobility 1: Supine to sitting  Level of Assistance 1: Minimum assistance  Bed Mobility Comments 1: Min A to elevate trunk into sitting    Ambulation/Gait Training  Ambulation/Gait Training Performed: Yes  Ambulation/Gait Training 1  Surface 1: Level tile  Device 1: Rolling walker  Gait Support Devices: Gait belt  Assistance 1: Contact guard  Quality of Gait 1: Shuffling gait, Decreased step length, Inconsistent stride length, Forward flexed posture  Comments/Distance  (ft) 1: 3ft (limited due to fatigue)  Transfers  Transfer: Yes  Transfer 1  Transfer From 1: Sit to  Transfer to 1: Stand  Technique 1: Sit to stand, Stand to sit  Transfer Device 1: Walker  Transfer Level of Assistance 1: Minimum assistance  Trials/Comments 1: Min A to elevate into standing. VCs for hand placement    Outcome Measures:  [facility] Basic Mobility  Turning from your back to your side while in a flat bed without using bedrails: A little  Moving from lying on your back to sitting on the side of a flat bed without using bedrails: A little  Moving to and from bed to chair (including a wheelchair): A little  Standing up from a chair using your arms (e.g. wheelchair or bedside chair): A little  To walk in hospital room: A lot  Climbing 3-5 steps with railing: Total  Basic Mobility - Total Score: 15    Education Documentation  Precautions, taught by Onofre Mills, PT at 7/26/2024 12:14 PM.  Learner: Family, Patient  Readiness: Acceptance  Method: Explanation  Response: Verbalizes Understanding, Needs Reinforcement    Body Mechanics, taught by Onofre Mills PT at 7/26/2024 12:14 PM.  Learner: Family, Patient  Readiness: Acceptance  Method: Explanation  Response: Verbalizes Understanding, Needs Reinforcement    Mobility Training, taught by Onofre Mills PT at 7/26/2024 12:14 PM.  Learner: Family, Patient  Readiness: Acceptance  Method: Explanation  Response: Verbalizes Understanding, Needs Reinforcement    Education Comments  No comments found.        OP EDUCATION:       Encounter Problems       Encounter Problems (Active)       Balance       LTG - Patient will tolerate standing (Progressing)       Start:  07/24/24    Expected End:  08/07/24       >2 min SUP using FWW            Mobility       STG - Patient will ambulate (Progressing)       Start:  07/24/24    Expected End:  08/07/24       SBA using FWW >15ft            PT Transfers       STG - Patient will perform bed mobility (Progressing)        Start:  07/24/24    Expected End:  08/07/24       SUP         STG - Patient will transfer sit to and from stand (Progressing)       Start:  07/24/24    Expected End:  08/07/24       CGA            Pain - Adult

## 2024-07-26 NOTE — SIGNIFICANT EVENT
Mr Geronimo seen laying in bed, family at bedside.   She continues to be intermittently confused, not unchanged since last admission  Continues to report no appetite or will to eat.     She remains HD stable, no surgical concerns at this time. Plan for her to follow with Dr Cristina Clifton as scheduled     Cardiac surgery will sign off, please reach out for any surgical concern.   Thank you  Shani Bain, APRN-CNP

## 2024-07-26 NOTE — NURSING NOTE
1200- assumed care of patient    1334-denies pain, alert and oriented times four, pt drowsy. Spo2 87% on RA 2L applied and increased to 96% 2L, MD Staples notified.     1830-daughter requesting MD to DC miralax d/t 4 episodes of diarrhea today and start sertraline and appetite stimulant. See new orders.     1930-PRN IV dextrose given for blood sugar 60. Pt is awake and alert but does not want to drink orange juice. MD notified of blood sugar, will continue to monitor

## 2024-07-26 NOTE — PROGRESS NOTES
INTERNAL MEDICINE PROGRESS NOTE      Interval history    Seems a little more alert today.  Does not complain of abdominal pain.  Still has poor p.o. intake.    Vital signs in last 24 hours:  Temp:  [36.6 °C (97.9 °F)-37 °C (98.6 °F)] 36.8 °C (98.2 °F)  Heart Rate:  [65-72] 66  Resp:  [14-16] 16  BP: (102-133)/(40-53) 104/44    Physical Examination:  Physical Exam    Constitutional:       Appearance: Elderly, asthenic build, in no distress  HENT:      Head: Normocephalic and atraumatic.  Moderate thrush present.  Eyes:      Extraocular Movements: Extraocular movements intact.      Pupils: Pupils are equal, round, and reactive to light.   Cardiovascular:      Rate and Rhythm: Normal rate and regular rhythm.      Pulses: Normal pulses.      Heart sounds: Normal heart sounds.   Pulmonary:      Effort: Pulmonary effort is normal.      Breath sounds: Normal breath sounds.   Abdominal:      General: Abdomen is flat. Bowel sounds are normal.      Palpations: Abdomen is soft.  Generalized tenderness present.  No masses.  Musculoskeletal:         General: Normal range of motion.      Cervical back: Normal range of motion and neck supple.   Skin:     General: Skin is warm and dry.  Thoracic incision healing well.  Neurological:      General: No focal deficit present.      Mental Status: Alert and oriented x 2, minimally somnolent but responds to questions.    Medications:    Current Facility-Administered Medications:     acetaminophen (Tylenol) tablet 650 mg, 650 mg, oral, q4h PRN **OR** acetaminophen (Tylenol) oral liquid 650 mg, 650 mg, oral, q4h PRN **OR** acetaminophen (Tylenol) suppository 650 mg, 650 mg, rectal, q4h PRN, Jacob Staples MD    amLODIPine (Norvasc) tablet 10 mg, 10 mg, oral, Daily, Jacob Staples MD, 10 mg at 07/25/24 0902    aspirin EC tablet 81 mg, 81 mg, oral, Daily, Jacob Staples MD, 81 mg at 07/26/24 0858    atorvastatin (Lipitor) tablet 80 mg, 80 mg, oral, Daily, Jacob Staples,  MD, 80 mg at 07/26/24 0858    carvedilol (Coreg) tablet 12.5 mg, 12.5 mg, oral, BID, Jacob Staples MD, 12.5 mg at 07/25/24 0901    clopidogrel (Plavix) tablet 75 mg, 75 mg, oral, Daily, Jacob Staples MD, 75 mg at 07/26/24 0858    colchicine tablet 0.6 mg, 0.6 mg, oral, Daily, Jacob Staples MD, 0.6 mg at 07/26/24 0858    cyproheptadine (Periactin) tablet 2 mg, 2 mg, oral, TID, Jacob Staples MD, 2 mg at 07/26/24 1413    dextrose 1.5% - LOW calcium 2.5mEq/L 2,000 mL peritoneal dialysate, , intraperitoneal, BID, Gab Lynn DO, Given at 07/26/24 1427    dextrose 50 % injection 12.5 g, 12.5 g, intravenous, q15 min PRN, Shani Bain, APRN-CNP, 12.5 g at 07/25/24 2022    dextrose 50 % injection 25 g, 25 g, intravenous, q15 min PRN, Shani Bain, APRN-CNP    glucagon (Glucagen) injection 1 mg, 1 mg, intramuscular, q15 min PRN, Shani S Bain, APRN-CNP    glucagon (Glucagen) injection 1 mg, 1 mg, intramuscular, q15 min PRN, Shani S Bain, APRN-CNP    heparin (porcine) injection 5,000 Units, 5,000 Units, subcutaneous, q8h MARIYA, Jacob Staples MD, 5,000 Units at 07/26/24 1413    [Held by provider] losartan (Cozaar) tablet 100 mg, 100 mg, oral, Daily, Jacob Staples MD, 100 mg at 07/25/24 0901    nystatin (Mycostatin) 100,000 unit/mL suspension 500,000 Units, 5 mL, Swish & Swallow, TID, Jacob Staples MD, 500,000 Units at 07/26/24 1413    ondansetron (Zofran) injection 4 mg, 4 mg, intravenous, q4h PRN, Jacob Staples MD    oxyCODONE (Roxicodone) immediate release tablet 5 mg, 5 mg, oral, q6h PRN, Jacob Staples MD, 5 mg at 07/24/24 0947    sertraline (Zoloft) tablet 25 mg, 25 mg, oral, Daily, Jacob Staples MD, 25 mg at 07/26/24 0858    Laboratory Findings:  Lab Results   Component Value Date    WBC 7.0 07/26/2024    HGB 9.2 (L) 07/26/2024    HCT 27.7 (L) 07/26/2024    MCV 82 07/26/2024     07/26/2024     Lab Results   Component Value Date    INR 1.2 (H) 07/17/2024     PROTIME 13.8 (H) 07/17/2024     Lab Results   Component Value Date    GLUCOSE 87 07/26/2024    CALCIUM 7.7 (L) 07/26/2024     (L) 07/26/2024    K 4.1 07/26/2024    CO2 23 07/26/2024    CL 96 (L) 07/26/2024    BUN 61 (H) 07/26/2024    CREATININE 12.91 (H) 07/26/2024       Assessment and Plan:     Dehydration -continue to encourage p.o. intake, supplements as tolerated.  ESRD -continue with peritoneal dialysis per renal guidance.  CAD -underwent CABG recently.  Healing well.    Lethargy -blood and peritoneal cultures so far with no growth.  Continue supportive measures.  Weakness -PT OT consulted.  Thrush -continue with nystatin.  Abdominal pain -ultrasound done, results pending.  Urinalysis not sent yet.    Discussed with daughter.       Jacob Staples MD  07/26/24  5:12 PM

## 2024-07-26 NOTE — PROGRESS NOTES
Isis Geronimo is a 79 y.o. female on day 3 of admission presenting with Dehydration.      Subjective   Isis Geronimo is a 79-year-old female with a past medical history of end-stage renal disease on peritoneal dialysis under the care of Dr. Zarate ([facility] transportation [place]). She has a history of anemia, rheumatoid arthritis, nonsustained V. tach, hypertension, hyperlipidemia, vertigo, emphysema, carpal tunnel syndrome who was recently admitted on 7/6 after presenting with nausea, emesis, constipation and right-sided chest discomfort.  She was admitted to [facility] with NSTEMI with a high-sensitivity troponin peak of 21,094. 2 D ECHO showed an LVEF of 61% with LV diastolic dysfunction, moderate concentric LVH and aortic valve sclerosis.  She was seen by cardiology. Heparin infusion, beta-blocker, statin and aspirin given. Left heart cath showed severe multivessel coronary artery disease. On 7/17 she underwent left anterior thoracatomy off Pump MIDCAB.  She was discharged home on 7/20.  She comes back in with failure to thrive.  She has had poor by mouth intake, a 3 pound weight loss, confusion and weakness thus she was readmitted.  Workup was notable for new low-grade leukocytosis with neutrophil predominance.  She is afebrile.  Chest x-ray shows bilateral atelectasis with small effusions.  She denies fever and abdominal discomfort.  She cannot tell me if there has been changes in her PD fluid.  Nephrology is consulted for renal care.    Seen and examined. She lucid and conversational today.   No longer confused. She c/o ongoing right sided abdominal/rib discomfort.        Objective     Peritoneal Dialysis  Dianeal Solution: Dextrose 1.5% in 2000 mL  Dianeal Additive: Other (Comment) (calcium)  Peritoneal Input Status: Started  Peritoneal Dialysis Volume In (ml): 2000 ml  Effluent Volume Out (mL): 1500 ml  Balance This Exchange (mL): -2000 ml    Vitals 24HR  Heart Rate:  [65-80]   Temp:  [36.6 °C  (97.9 °F)-37.2 °C (98.9 °F)]   Resp:  [14-17]   BP: (102-133)/(40-58)   SpO2:  [87 %-100 %]     Intake/Output last 3 Shifts:    Intake/Output Summary (Last 24 hours) at 7/26/2024 1315  Last data filed at 7/25/2024 1400  Gross per 24 hour   Intake --   Output 1500 ml   Net -1500 ml         Physical Exam  General: Awake, conversational albeit confused.   HEENT:  Pupils equal and reactive. Moist mucosa.    Neck: Normal Jugular Venous Pressure.  Cardiovascular: Regular rate and rhythm. Normal S1 and S2.  Pulmonary:  Clear to auscultation bilaterally.  No wheezes, rales or rhonchi  Abdomen:  Soft. Non-tender. Mildly distended. Positive bowel sounds.  Lower Extremities:  2+ pedal pulses. No LE edema.  Neurologic:  Cranial nerves intact.  No focal deficit.   Skin: Skin warm and dry, normal skin turgor.   Psychiatric: Flat, not answering questions appropriately    Scheduled Medications  amLODIPine, 10 mg, oral, Daily  aspirin, 81 mg, oral, Daily  atorvastatin, 80 mg, oral, Daily  carvedilol, 12.5 mg, oral, BID  clopidogrel, 75 mg, oral, Daily  colchicine, 0.6 mg, oral, Daily  cyproheptadine, 2 mg, oral, TID  dextrose 1.5% - LOW calcium 2.5mEq/L 2,000 mL peritoneal dialysate, , intraperitoneal, BID  heparin (porcine), 5,000 Units, subcutaneous, q8h MARIYA  [Held by provider] losartan, 100 mg, oral, Daily  nystatin, 5 mL, Swish & Swallow, TID  sertraline, 25 mg, oral, Daily      Continuous medications       PRN medications: acetaminophen **OR** acetaminophen **OR** acetaminophen, dextrose, dextrose, glucagon, glucagon, ondansetron, oxyCODONE     Relevant Results  Results from last 7 days   Lab Units 07/26/24  0523 07/25/24  0603 07/24/24  0548 07/23/24  1128   WBC AUTO x10*3/uL 7.0 7.7 12.1* 11.8*   HEMOGLOBIN g/dL 9.2* 9.3* 10.4* 9.9*   HEMATOCRIT % 27.7* 28.9* 32.3* 29.7*   PLATELETS AUTO x10*3/uL 308 320 309 287   NEUTROS PCT AUTO % 81.5 77.5  --  87.0   LYMPHS PCT AUTO % 7.4 8.3  --  4.7   MONOS PCT AUTO % 4.1 5.7  --  6.9    EOS PCT AUTO % 1.9 1.8  --  0.3     Results from last 7 days   Lab Units 07/26/24  0523 07/25/24  0603 07/24/24  0548   SODIUM mmol/L 135* 137 135*   POTASSIUM mmol/L 4.1 4.6 4.3   CHLORIDE mmol/L 96* 95* 95*   CO2 mmol/L 23 21 23   BUN mg/dL 61* 59* 50*   CREATININE mg/dL 12.91* 11.84* 10.81*   GLUCOSE mg/dL 87 49* 68*   CALCIUM mg/dL 7.7* 8.4* 7.8*       US right upper quadrant   Final Result   Chronic changes in the right kidney including small size and   increased echogenicity.        Mild perihepatic ascites. Most likely related to patient's peritoneal   dialysis.        Moderate sludge in the gallbladder. Nonspecific gallbladder wall   thickening. No shadowing stone.        Remainder of the exam was negative.        MACRO:   None        Signed by: Jacob Arora 7/25/2024 4:13 PM   Dictation workstation:   [id number]      CT head wo IV contrast    (Results Pending)            Assessment/Plan      Isis Geronimo is a 79-year-old female with a past medical history of end-stage renal disease on peritoneal dialysis under the care of Dr. Zarate ([facility] transportation [place]). She has a history of anemia, rheumatoid arthritis, nonsustained V. tach, hypertension, hyperlipidemia, vertigo, emphysema, carpal tunnel syndrome who was recently admitted on 7/6 after presenting with nausea, emesis, constipation and right-sided chest discomfort.  She was admitted to [facility]down with NSTEMI with a high-sensitivity troponin peak of 21,094. 2 D ECHO showed an LVEF of 61% with LV diastolic dysfunction, moderate concentric LVH and aortic valve sclerosis.  She was seen by cardiology. Heparin infusion, beta-blocker, statin and aspirin given. Left heart cath showed severe multivessel coronary artery disease. On 7/17 she underwent left anterior thoracatomy off Pump MIDCAB.  She was discharged home on 7/20.  She comes back in with failure to thrive.  She has had poor by mouth intake, a 3 pound weight loss, confusion and  weakness thus she was readmitted.  Workup was notable for new low-grade leukocytosis with neutrophil predominance.  She is afebrile.  Chest x-ray shows bilateral atelectasis with small effusions.  She denies fever and abdominal discomfort.  She cannot tell me if there has been changes in her PD fluid.  Nephrology is consulted for renal care.    Given her leukocytosis and failure to thrive symptoms, I sent her PD fluid for cell count and culture as well as blood cultures. She has yet to send a urine culture.  Her PD catheter side is c/d/I. Cultures are without growth currently. She will go for a head CT albeit she appears to be back at her cognitive baseline. We are encouraging her to increase her caloric intake. I have ordered 2 exchanges of 1.5% dextrose with a fill volume of 1.5 L x 4 hours each today. Continue holding Losartan given borderline soft BP's.  Trend her RFP.  Nephrology will follow with her care.    Principal Problem:    Dehydration       I spent 40 minutes in the professional and overall care of this patient.      Gab Lynn, DO

## 2024-07-26 NOTE — PROGRESS NOTES
Physical Therapy                 Therapy Attempt Note    Patient Name: Isis Geronimo  MRN: [id number]  Today's Date: 7/26/2024     Discipline: Physical Therapy    Missed Visit Reason: Other (Comment) (RN cleared pt for therapy treatment sessions. Session initiated however during bed mobility treatment, transport arrived to take pt to CT scan. Unable to complete full treatment session)    Missed Time: Attempt

## 2024-07-26 NOTE — PROGRESS NOTES
Occupational Therapy                 Therapy Communication Note    Patient Name: Isis Geronimo  MRN: [id number]  Today's Date: 7/26/2024     Discipline: Occupational Therapy    Missed Visit Reason: Missed Visit Reason: Other (Comment) (RN cleared pt for therapy treatment sessions. Session initiated however during bed mobility treatment, transport arrived to take pt to CT scan. Unable to complete full treatment session)    Missed Time: Attempt    Comment:

## 2024-07-27 LAB
ANION GAP SERPL CALC-SCNC: 21 MMOL/L (ref 10–20)
BASOPHILS # BLD AUTO: 0.02 X10*3/UL (ref 0–0.1)
BASOPHILS NFR BLD AUTO: 0.3 %
BUN SERPL-MCNC: 66 MG/DL (ref 6–23)
CALCIUM SERPL-MCNC: 8.6 MG/DL (ref 8.6–10.3)
CHLORIDE SERPL-SCNC: 96 MMOL/L (ref 98–107)
CO2 SERPL-SCNC: 22 MMOL/L (ref 21–32)
CREAT SERPL-MCNC: 12.51 MG/DL (ref 0.5–1.05)
EGFRCR SERPLBLD CKD-EPI 2021: 3 ML/MIN/1.73M*2
EOSINOPHIL # BLD AUTO: 0.16 X10*3/UL (ref 0–0.4)
EOSINOPHIL NFR BLD AUTO: 2.4 %
ERYTHROCYTE [DISTWIDTH] IN BLOOD BY AUTOMATED COUNT: 16.3 % (ref 11.5–14.5)
GLUCOSE BLD MANUAL STRIP-MCNC: 106 MG/DL (ref 74–99)
GLUCOSE BLD MANUAL STRIP-MCNC: 120 MG/DL (ref 74–99)
GLUCOSE BLD MANUAL STRIP-MCNC: 127 MG/DL (ref 74–99)
GLUCOSE BLD MANUAL STRIP-MCNC: 74 MG/DL (ref 74–99)
GLUCOSE BLD MANUAL STRIP-MCNC: 75 MG/DL (ref 74–99)
GLUCOSE BLD MANUAL STRIP-MCNC: 83 MG/DL (ref 74–99)
GLUCOSE SERPL-MCNC: 67 MG/DL (ref 74–99)
HCT VFR BLD AUTO: 28.8 % (ref 36–46)
HGB BLD-MCNC: 9.6 G/DL (ref 12–16)
HOLD SPECIMEN: NORMAL
IMM GRANULOCYTES # BLD AUTO: 0.11 X10*3/UL (ref 0–0.5)
IMM GRANULOCYTES NFR BLD AUTO: 1.7 % (ref 0–0.9)
LYMPHOCYTES # BLD AUTO: 0.36 X10*3/UL (ref 0.8–3)
LYMPHOCYTES NFR BLD AUTO: 5.4 %
MCH RBC QN AUTO: 26.9 PG (ref 26–34)
MCHC RBC AUTO-ENTMCNC: 33.3 G/DL (ref 32–36)
MCV RBC AUTO: 81 FL (ref 80–100)
MONOCYTES # BLD AUTO: 0.19 X10*3/UL (ref 0.05–0.8)
MONOCYTES NFR BLD AUTO: 2.9 %
NEUTROPHILS # BLD AUTO: 5.8 X10*3/UL (ref 1.6–5.5)
NEUTROPHILS NFR BLD AUTO: 87.3 %
NRBC BLD-RTO: 0 /100 WBCS (ref 0–0)
PLATELET # BLD AUTO: 299 X10*3/UL (ref 150–450)
POTASSIUM SERPL-SCNC: 4.3 MMOL/L (ref 3.5–5.3)
RBC # BLD AUTO: 3.57 X10*6/UL (ref 4–5.2)
SODIUM SERPL-SCNC: 135 MMOL/L (ref 136–145)
WBC # BLD AUTO: 6.6 X10*3/UL (ref 4.4–11.3)

## 2024-07-27 PROCEDURE — 2500000004 HC RX 250 GENERAL PHARMACY W/ HCPCS (ALT 636 FOR OP/ED): Performed by: INTERNAL MEDICINE

## 2024-07-27 PROCEDURE — 36415 COLL VENOUS BLD VENIPUNCTURE: CPT | Performed by: INTERNAL MEDICINE

## 2024-07-27 PROCEDURE — 2500000002 HC RX 250 W HCPCS SELF ADMINISTERED DRUGS (ALT 637 FOR MEDICARE OP, ALT 636 FOR OP/ED): Performed by: INTERNAL MEDICINE

## 2024-07-27 PROCEDURE — 2500000001 HC RX 250 WO HCPCS SELF ADMINISTERED DRUGS (ALT 637 FOR MEDICARE OP): Performed by: INTERNAL MEDICINE

## 2024-07-27 PROCEDURE — 1200000002 HC GENERAL ROOM WITH TELEMETRY DAILY

## 2024-07-27 PROCEDURE — 85025 COMPLETE CBC W/AUTO DIFF WBC: CPT | Performed by: INTERNAL MEDICINE

## 2024-07-27 PROCEDURE — 80051 ELECTROLYTE PANEL: CPT | Performed by: INTERNAL MEDICINE

## 2024-07-27 PROCEDURE — 82947 ASSAY GLUCOSE BLOOD QUANT: CPT

## 2024-07-27 RX ORDER — LEVOFLOXACIN 500 MG/1
250 TABLET, FILM COATED ORAL
Status: DISCONTINUED | OUTPATIENT
Start: 2024-07-27 | End: 2024-07-31

## 2024-07-27 RX ORDER — FLUCONAZOLE 100 MG/1
100 TABLET ORAL DAILY
Status: DISCONTINUED | OUTPATIENT
Start: 2024-07-27 | End: 2024-07-31

## 2024-07-27 RX ORDER — LOPERAMIDE HYDROCHLORIDE 2 MG/1
2 CAPSULE ORAL 4 TIMES DAILY PRN
Status: DISCONTINUED | OUTPATIENT
Start: 2024-07-27 | End: 2024-08-02 | Stop reason: HOSPADM

## 2024-07-27 ASSESSMENT — PAIN - FUNCTIONAL ASSESSMENT
PAIN_FUNCTIONAL_ASSESSMENT: 0-10

## 2024-07-27 ASSESSMENT — COGNITIVE AND FUNCTIONAL STATUS - GENERAL
TOILETING: TOTAL
MOBILITY SCORE: 11
STANDING UP FROM CHAIR USING ARMS: A LOT
WALKING IN HOSPITAL ROOM: A LOT
MOVING TO AND FROM BED TO CHAIR: A LOT
MOVING TO AND FROM BED TO CHAIR: A LITTLE
PERSONAL GROOMING: TOTAL
TURNING FROM BACK TO SIDE WHILE IN FLAT BAD: A LITTLE
DRESSING REGULAR UPPER BODY CLOTHING: TOTAL
WALKING IN HOSPITAL ROOM: TOTAL
DRESSING REGULAR LOWER BODY CLOTHING: TOTAL
STANDING UP FROM CHAIR USING ARMS: A LOT
MOVING FROM LYING ON BACK TO SITTING ON SIDE OF FLAT BED WITH BEDRAILS: A LITTLE
EATING MEALS: A LITTLE
MOBILITY SCORE: 15
MOVING FROM LYING ON BACK TO SITTING ON SIDE OF FLAT BED WITH BEDRAILS: A LITTLE
CLIMB 3 TO 5 STEPS WITH RAILING: A LOT
DAILY ACTIVITIY SCORE: 8
TURNING FROM BACK TO SIDE WHILE IN FLAT BAD: A LOT
HELP NEEDED FOR BATHING: TOTAL
CLIMB 3 TO 5 STEPS WITH RAILING: TOTAL

## 2024-07-27 ASSESSMENT — PAIN SCALES - GENERAL
PAINLEVEL_OUTOF10: 0 - NO PAIN

## 2024-07-27 NOTE — PROGRESS NOTES
INTERNAL MEDICINE PROGRESS NOTE      Interval history    Appears more alert today.  Denies pain.  Has had some loose stools.    Vital signs in last 24 hours:  Temp:  [36.5 °C (97.7 °F)-36.9 °C (98.4 °F)] 36.9 °C (98.4 °F)  Heart Rate:  [66-72] 71  Resp:  [16-17] 17  BP: (104-128)/(44-64) 128/57    Physical Examination:  Physical Exam    Constitutional:       Appearance: Elderly, asthenic build, in no distress  HENT:      Head: Normocephalic and atraumatic.  Moderate thrush present.  Eyes:      Extraocular Movements: Extraocular movements intact.      Pupils: Pupils are equal, round, and reactive to light.   Cardiovascular:      Rate and Rhythm: Normal rate and regular rhythm.      Pulses: Normal pulses.      Heart sounds: Normal heart sounds.   Pulmonary:      Effort: Pulmonary effort is normal.      Breath sounds: Normal breath sounds.   Abdominal:      General: Abdomen is flat. Bowel sounds are normal.      Palpations: Abdomen is soft.  Generalized tenderness present.  No masses.  Musculoskeletal:         General: Normal range of motion.      Cervical back: Normal range of motion and neck supple.   Skin:     General: Skin is warm and dry.  Thoracic incision healing well.  Neurological:      General: No focal deficit present.      Mental Status: Alert and oriented x 2, minimally somnolent but responds to questions.    Medications:    Current Facility-Administered Medications:     acetaminophen (Tylenol) tablet 650 mg, 650 mg, oral, q4h PRN **OR** acetaminophen (Tylenol) oral liquid 650 mg, 650 mg, oral, q4h PRN **OR** acetaminophen (Tylenol) suppository 650 mg, 650 mg, rectal, q4h PRN, Jacob Staples MD    amLODIPine (Norvasc) tablet 10 mg, 10 mg, oral, Daily, Jacob Staples MD, 10 mg at 07/27/24 1229    aspirin EC tablet 81 mg, 81 mg, oral, Daily, Jacob Staples MD, 81 mg at 07/27/24 1229    atorvastatin (Lipitor) tablet 80 mg, 80 mg, oral, Daily, Jacob Staples MD, 80 mg at 07/27/24 1230     carvedilol (Coreg) tablet 12.5 mg, 12.5 mg, oral, BID, Jacob Staples MD, 12.5 mg at 07/27/24 1231    clopidogrel (Plavix) tablet 75 mg, 75 mg, oral, Daily, Jacob Staples MD, 75 mg at 07/27/24 1228    colchicine tablet 0.6 mg, 0.6 mg, oral, Daily, Jacob Staples MD, 0.6 mg at 07/27/24 1229    cyproheptadine (Periactin) tablet 2 mg, 2 mg, oral, TID, Jacob Staples MD, 2 mg at 07/26/24 2030    dextrose 1.5% - LOW calcium 2.5mEq/L 2,000 mL peritoneal dialysate, , intraperitoneal, BID, Gab Lynn DO, Given at 07/26/24 1800    dextrose 50 % injection 12.5 g, 12.5 g, intravenous, q15 min PRN, Shani Bain APRN-CNP, 12.5 g at 07/25/24 2022    dextrose 50 % injection 25 g, 25 g, intravenous, q15 min PRN, Shani Bain, APRN-CNP    fluconazole (Diflucan) tablet 100 mg, 100 mg, oral, Daily, Jacob Staples MD, 100 mg at 07/27/24 1231    glucagon (Glucagen) injection 1 mg, 1 mg, intramuscular, q15 min PRN, Shani Bain, APRN-CNP    glucagon (Glucagen) injection 1 mg, 1 mg, intramuscular, q15 min PRN, Shani Bain APRN-CNP    heparin (porcine) injection 5,000 Units, 5,000 Units, subcutaneous, q8h MARIYA, Jacob Staples MD, 5,000 Units at 07/27/24 0616    levoFLOXacin (Levaquin) tablet 250 mg, 250 mg, oral, q48h, Jacob Staples MD, 250 mg at 07/27/24 1228    [Held by provider] losartan (Cozaar) tablet 100 mg, 100 mg, oral, Daily, Jacob Staples MD, 100 mg at 07/25/24 0901    nystatin (Mycostatin) 100,000 unit/mL suspension 500,000 Units, 5 mL, Swish & Swallow, TID, Jacob Staples MD, 500,000 Units at 07/27/24 1227    ondansetron (Zofran) injection 4 mg, 4 mg, intravenous, q4h PRN, Jacob Staples MD    oxyCODONE (Roxicodone) immediate release tablet 5 mg, 5 mg, oral, q6h PRN, Jacob Staples MD, 5 mg at 07/24/24 0947    sertraline (Zoloft) tablet 25 mg, 25 mg, oral, Daily, Jacob Staples MD, 25 mg at 07/27/24 1230    Laboratory Findings:  Lab Results   Component Value  Date    WBC 6.6 07/27/2024    HGB 9.6 (L) 07/27/2024    HCT 28.8 (L) 07/27/2024    MCV 81 07/27/2024     07/27/2024     Lab Results   Component Value Date    INR 1.2 (H) 07/17/2024    PROTIME 13.8 (H) 07/17/2024     Lab Results   Component Value Date    GLUCOSE 67 (L) 07/27/2024    CALCIUM 8.6 07/27/2024     (L) 07/27/2024    K 4.3 07/27/2024    CO2 22 07/27/2024    CL 96 (L) 07/27/2024    BUN 66 (H) 07/27/2024    CREATININE 12.51 (H) 07/27/2024       Assessment and Plan:     Dehydration -continue to encourage p.o.  ESRD -continue with peritoneal dialysis  Abnormal urinalysis -possible mild UTI, will treat with Levaquin, await cultures.  Lethargy -cultures negative so far.  Mentation appears to be better.  Weakness -PT OT consulted.  Thrush -continue with nystatin.  Diarrhea-Imodium as needed.    Discussed with daughter at bedside.       Jacob Staples MD  07/27/24  1:19 PM

## 2024-07-27 NOTE — PROGRESS NOTES
Isis Geronimo is a 79 y.o. female on day 4 of admission presenting with Dehydration.      Subjective   Isis Geronimo is a 79-year-old female with a past medical history of end-stage renal disease on peritoneal dialysis under the care of Dr. Zarate ([facility] transportation [place]). She has a history of anemia, rheumatoid arthritis, nonsustained V. tach, hypertension, hyperlipidemia, vertigo, emphysema, carpal tunnel syndrome who was recently admitted on 7/6 after presenting with nausea, emesis, constipation and right-sided chest discomfort.  She was admitted to [facility] with NSTEMI with a high-sensitivity troponin peak of 21,094. 2 D ECHO showed an LVEF of 61% with LV diastolic dysfunction, moderate concentric LVH and aortic valve sclerosis.  She was seen by cardiology. Heparin infusion, beta-blocker, statin and aspirin given. Left heart cath showed severe multivessel coronary artery disease. On 7/17 she underwent left anterior thoracatomy off Pump MIDCAB.  She was discharged home on 7/20.  She comes back in with failure to thrive.  She has had poor by mouth intake, a 3 pound weight loss, confusion and weakness thus she was readmitted.  Workup was notable for new low-grade leukocytosis with neutrophil predominance.  She is afebrile.  Chest x-ray shows bilateral atelectasis with small effusions.  She denies fever and abdominal discomfort.  She cannot tell me if there has been changes in her PD fluid.  Nephrology is consulted for renal care.    Seen and examined. Remains lucid and conversational.   Tolerated the 2 PD exchanges yesterday without issue. C/o thrush.        Objective     Peritoneal Dialysis  Dianeal Solution: Dextrose 1.5% in 2000 mL  Dianeal Additive: Other (Comment) (calcium)  Peritoneal Input Status: Started  Peritoneal Dialysis Volume In (ml): 1500 ml  Effluent Volume Out (mL): 650 ml  Balance This Exchange (mL): -1500 ml    Vitals 24HR  Heart Rate:  [66-72]   Temp:  [36.5 °C (97.7  °F)-36.9 °C (98.4 °F)]   Resp:  [16-17]   BP: (104-128)/(44-64)   Weight:  [46.9 kg (103 lb 8 oz)]   SpO2:  [94 %-96 %]     Intake/Output last 3 Shifts:    Intake/Output Summary (Last 24 hours) at 7/27/2024 1234  Last data filed at 7/26/2024 2030  Gross per 24 hour   Intake 1500 ml   Output 650 ml   Net 850 ml         Physical Exam  General: Awake, conversational.   HEENT:  Pupils equal and reactive. Moist mucosa.    Neck: Normal Jugular Venous Pressure.  Cardiovascular: Regular rate and rhythm. Normal S1 and S2.  Pulmonary:  Clear to auscultation bilaterally.  No wheezes, rales or rhonchi  Abdomen:  Soft. Non-tender. Positive bowel sounds.  Lower Extremities:  2+ pedal pulses. No LE edema.  Neurologic:  Cranial nerves intact.  No focal deficit.   Skin: Skin warm and dry, normal skin turgor.   Psychiatric: Appropriate mood and behavior.    Scheduled Medications  amLODIPine, 10 mg, oral, Daily  aspirin, 81 mg, oral, Daily  atorvastatin, 80 mg, oral, Daily  carvedilol, 12.5 mg, oral, BID  clopidogrel, 75 mg, oral, Daily  colchicine, 0.6 mg, oral, Daily  cyproheptadine, 2 mg, oral, TID  dextrose 1.5% - LOW calcium 2.5mEq/L 2,000 mL peritoneal dialysate, , intraperitoneal, BID  fluconazole, 100 mg, oral, Daily  heparin (porcine), 5,000 Units, subcutaneous, q8h MARIYA  levoFLOXacin, 250 mg, oral, q48h  [Held by provider] losartan, 100 mg, oral, Daily  nystatin, 5 mL, Swish & Swallow, TID  sertraline, 25 mg, oral, Daily      Continuous medications       PRN medications: acetaminophen **OR** acetaminophen **OR** acetaminophen, dextrose, dextrose, glucagon, glucagon, ondansetron, oxyCODONE     Relevant Results  Results from last 7 days   Lab Units 07/27/24  0624 07/26/24  0523 07/25/24  0603   WBC AUTO x10*3/uL 6.6 7.0 7.7   HEMOGLOBIN g/dL 9.6* 9.2* 9.3*   HEMATOCRIT % 28.8* 27.7* 28.9*   PLATELETS AUTO x10*3/uL 299 308 320   NEUTROS PCT AUTO % 87.3 81.5 77.5   LYMPHS PCT AUTO % 5.4 7.4 8.3   MONOS PCT AUTO % 2.9 4.1 5.7    EOS PCT AUTO % 2.4 1.9 1.8     Results from last 7 days   Lab Units 07/27/24  0624 07/26/24  0523 07/25/24  0603   SODIUM mmol/L 135* 135* 137   POTASSIUM mmol/L 4.3 4.1 4.6   CHLORIDE mmol/L 96* 96* 95*   CO2 mmol/L 22 23 21   BUN mg/dL 66* 61* 59*   CREATININE mg/dL 12.51* 12.91* 11.84*   GLUCOSE mg/dL 67* 87 49*   CALCIUM mg/dL 8.6 7.7* 8.4*       CT head wo IV contrast   Final Result   1. No acute intracranial pathology.   2. Moderate parenchymal cerebral volume loss and burden of chronic   microvascular ischemic changes, progressed when compared to prior   exam from 2016.        I personally reviewed the images/study and I agree with the findings   as stated above by resident physician, Dr. Hussein Schmidt.        MACRO:   none        Signed by: Keyon Traylor 7/26/2024 3:25 PM   Dictation workstation:   [id number]      US right upper quadrant   Final Result   Chronic changes in the right kidney including small size and   increased echogenicity.        Mild perihepatic ascites. Most likely related to patient's peritoneal   dialysis.        Moderate sludge in the gallbladder. Nonspecific gallbladder wall   thickening. No shadowing stone.        Remainder of the exam was negative.        MACRO:   None        Signed by: Jacob Arora 7/25/2024 4:13 PM   Dictation workstation:   [id number]               Assessment/Plan      Isis Geronimo is a 79-year-old female with a past medical history of end-stage renal disease on peritoneal dialysis under the care of Dr. Zarate ([facility] transportation [place]). She has a history of anemia, rheumatoid arthritis, nonsustained V. tach, hypertension, hyperlipidemia, vertigo, emphysema, carpal tunnel syndrome who was recently admitted on 7/6 after presenting with nausea, emesis, constipation and right-sided chest discomfort.  She was admitted to [facility] with NSTEMI with a high-sensitivity troponin peak of 21,094. 2 D ECHO showed an LVEF of 61% with LV  diastolic dysfunction, moderate concentric LVH and aortic valve sclerosis.  She was seen by cardiology. Heparin infusion, beta-blocker, statin and aspirin given. Left heart cath showed severe multivessel coronary artery disease. On 7/17 she underwent left anterior thoracatomy off Pump MIDCAB.  She was discharged home on 7/20.  She comes back in with failure to thrive.  She has had poor by mouth intake, a 3 pound weight loss, confusion and weakness thus she was readmitted.  Workup was notable for new low-grade leukocytosis with neutrophil predominance.  She is afebrile.  Chest x-ray shows bilateral atelectasis with small effusions.  She denies fever and abdominal discomfort.  She cannot tell me if there has been changes in her PD fluid.  Nephrology is consulted for renal care.    Given her leukocytosis and failure to thrive symptoms, I sent her PD fluid for cell count and culture as well as blood cultures which are without growth. Her urine culture is without growth currently. Her PD catheter side is c/d/I. Head CT  does not show acute pathology. She appears to be back at her cognitive baseline. We are encouraging her to increase her caloric intake. I have ordered another 2 exchanges of 1.5% dextrose with a fill volume of 1.5 L x 4 hours each for today. I will add an additional exchange starting tomorrow. Continue holding Losartan given prior soft BP's.  Trend her RFP.  Nephrology will follow with her care.    Principal Problem:    Dehydration       I spent 40 minutes in the professional and overall care of this patient.      Gab Lynn, DO

## 2024-07-27 NOTE — CARE PLAN
The patient's goals for the shift include rest    The clinical goals for the shift include Pt will remain free from injury

## 2024-07-27 NOTE — CARE PLAN
The patient's goals for the shift include rest    The clinical goals for the shift include pt will remain free from injury    Over the shift, the patient did not make progress toward the following goals. Barriers to progression include pt's loss of appetite. Recommendations to address these barriers include appetite stimulant.    Problem: Skin  Goal: Promote/optimize nutrition  Outcome: Not Progressing

## 2024-07-28 VITALS
TEMPERATURE: 98.6 F | OXYGEN SATURATION: 99 % | WEIGHT: 103.5 LBS | HEART RATE: 62 BPM | HEIGHT: 63 IN | BODY MASS INDEX: 18.34 KG/M2 | SYSTOLIC BLOOD PRESSURE: 109 MMHG | DIASTOLIC BLOOD PRESSURE: 53 MMHG | RESPIRATION RATE: 16 BRPM

## 2024-07-28 LAB
ANION GAP SERPL CALC-SCNC: 20 MMOL/L (ref 10–20)
BACTERIA BLD CULT: NORMAL
BACTERIA BLD CULT: NORMAL
BACTERIA DIAFP CULT: NORMAL
BACTERIA UR CULT: NO GROWTH
BASOPHILS # BLD AUTO: 0.03 X10*3/UL (ref 0–0.1)
BASOPHILS NFR BLD AUTO: 0.4 %
BUN SERPL-MCNC: 65 MG/DL (ref 6–23)
CALCIUM SERPL-MCNC: 8.2 MG/DL (ref 8.6–10.3)
CHLORIDE SERPL-SCNC: 96 MMOL/L (ref 98–107)
CO2 SERPL-SCNC: 24 MMOL/L (ref 21–32)
CREAT SERPL-MCNC: 12.47 MG/DL (ref 0.5–1.05)
EGFRCR SERPLBLD CKD-EPI 2021: 3 ML/MIN/1.73M*2
EOSINOPHIL # BLD AUTO: 0.13 X10*3/UL (ref 0–0.4)
EOSINOPHIL NFR BLD AUTO: 1.6 %
ERYTHROCYTE [DISTWIDTH] IN BLOOD BY AUTOMATED COUNT: 16.3 % (ref 11.5–14.5)
GLUCOSE BLD MANUAL STRIP-MCNC: 126 MG/DL (ref 74–99)
GLUCOSE BLD MANUAL STRIP-MCNC: 129 MG/DL (ref 74–99)
GLUCOSE BLD MANUAL STRIP-MCNC: 91 MG/DL (ref 74–99)
GLUCOSE BLD MANUAL STRIP-MCNC: 94 MG/DL (ref 74–99)
GLUCOSE SERPL-MCNC: 84 MG/DL (ref 74–99)
GRAM STN SPEC: NORMAL
GRAM STN SPEC: NORMAL
HCT VFR BLD AUTO: 25.9 % (ref 36–46)
HGB BLD-MCNC: 9 G/DL (ref 12–16)
IMM GRANULOCYTES # BLD AUTO: 0.16 X10*3/UL (ref 0–0.5)
IMM GRANULOCYTES NFR BLD AUTO: 2 % (ref 0–0.9)
LYMPHOCYTES # BLD AUTO: 0.38 X10*3/UL (ref 0.8–3)
LYMPHOCYTES NFR BLD AUTO: 4.7 %
MCH RBC QN AUTO: 27.5 PG (ref 26–34)
MCHC RBC AUTO-ENTMCNC: 34.7 G/DL (ref 32–36)
MCV RBC AUTO: 79 FL (ref 80–100)
MONOCYTES # BLD AUTO: 0.23 X10*3/UL (ref 0.05–0.8)
MONOCYTES NFR BLD AUTO: 2.9 %
NEUTROPHILS # BLD AUTO: 7.08 X10*3/UL (ref 1.6–5.5)
NEUTROPHILS NFR BLD AUTO: 88.4 %
NRBC BLD-RTO: 0 /100 WBCS (ref 0–0)
PLATELET # BLD AUTO: 250 X10*3/UL (ref 150–450)
POTASSIUM SERPL-SCNC: 4.1 MMOL/L (ref 3.5–5.3)
RBC # BLD AUTO: 3.27 X10*6/UL (ref 4–5.2)
SODIUM SERPL-SCNC: 136 MMOL/L (ref 136–145)
WBC # BLD AUTO: 8 X10*3/UL (ref 4.4–11.3)

## 2024-07-28 PROCEDURE — 2500000002 HC RX 250 W HCPCS SELF ADMINISTERED DRUGS (ALT 637 FOR MEDICARE OP, ALT 636 FOR OP/ED): Performed by: INTERNAL MEDICINE

## 2024-07-28 PROCEDURE — 82947 ASSAY GLUCOSE BLOOD QUANT: CPT

## 2024-07-28 PROCEDURE — 36415 COLL VENOUS BLD VENIPUNCTURE: CPT | Performed by: INTERNAL MEDICINE

## 2024-07-28 PROCEDURE — 2500000004 HC RX 250 GENERAL PHARMACY W/ HCPCS (ALT 636 FOR OP/ED): Performed by: INTERNAL MEDICINE

## 2024-07-28 PROCEDURE — 80048 BASIC METABOLIC PNL TOTAL CA: CPT | Performed by: INTERNAL MEDICINE

## 2024-07-28 PROCEDURE — 1200000002 HC GENERAL ROOM WITH TELEMETRY DAILY

## 2024-07-28 PROCEDURE — 2500000001 HC RX 250 WO HCPCS SELF ADMINISTERED DRUGS (ALT 637 FOR MEDICARE OP): Performed by: INTERNAL MEDICINE

## 2024-07-28 PROCEDURE — 85025 COMPLETE CBC W/AUTO DIFF WBC: CPT | Performed by: INTERNAL MEDICINE

## 2024-07-28 ASSESSMENT — COGNITIVE AND FUNCTIONAL STATUS - GENERAL
TOILETING: TOTAL
WALKING IN HOSPITAL ROOM: A LOT
DRESSING REGULAR LOWER BODY CLOTHING: TOTAL
MOVING FROM LYING ON BACK TO SITTING ON SIDE OF FLAT BED WITH BEDRAILS: A LITTLE
CLIMB 3 TO 5 STEPS WITH RAILING: A LOT
PERSONAL GROOMING: TOTAL
DAILY ACTIVITIY SCORE: 8
HELP NEEDED FOR BATHING: TOTAL
TURNING FROM BACK TO SIDE WHILE IN FLAT BAD: A LITTLE
MOBILITY SCORE: 15
EATING MEALS: A LITTLE
STANDING UP FROM CHAIR USING ARMS: A LOT
MOVING TO AND FROM BED TO CHAIR: A LITTLE
DRESSING REGULAR UPPER BODY CLOTHING: TOTAL

## 2024-07-28 ASSESSMENT — PAIN SCALES - GENERAL
PAINLEVEL_OUTOF10: 0 - NO PAIN
PAINLEVEL_OUTOF10: 10 - WORST POSSIBLE PAIN
PAINLEVEL_OUTOF10: 0 - NO PAIN
PAINLEVEL_OUTOF10: 0 - NO PAIN

## 2024-07-28 ASSESSMENT — PAIN SCALES - WONG BAKER: WONGBAKER_NUMERICALRESPONSE: HURTS WORST

## 2024-07-28 ASSESSMENT — PAIN - FUNCTIONAL ASSESSMENT
PAIN_FUNCTIONAL_ASSESSMENT: 0-10
PAIN_FUNCTIONAL_ASSESSMENT: 0-10

## 2024-07-28 NOTE — PROGRESS NOTES
INTERNAL MEDICINE PROGRESS NOTE      Interval history    Slightly better.  Continues to have fair p.o. intake.    Vital signs in last 24 hours:  Temp:  [36.6 °C (97.9 °F)-37.2 °C (99 °F)] 37.1 °C (98.8 °F)  Heart Rate:  [64-71] 64  Resp:  [13-18] 13  BP: (114-138)/(52-64) 114/52    Physical Examination:  Physical Exam    Constitutional:       Appearance: Elderly, asthenic build, in no distress  HENT:      Head: Normocephalic and atraumatic.  Moderate thrush present, slightly improved.  Eyes:      Extraocular Movements: Extraocular movements intact.      Pupils: Pupils are equal, round, and reactive to light.   Cardiovascular:      Rate and Rhythm: Normal rate and regular rhythm.      Pulses: Normal pulses.      Heart sounds: Normal heart sounds.   Pulmonary:      Effort: Pulmonary effort is normal.      Breath sounds: Normal breath sounds.   Abdominal:      General: Abdomen is flat. Bowel sounds are normal.      Palpations: Abdomen is soft.  Generalized tenderness present.  No masses.  Musculoskeletal:         General: Normal range of motion.      Cervical back: Normal range of motion and neck supple.   Skin:     General: Skin is warm and dry.  Thoracic incision healing well.  Neurological:      General: No focal deficit present.      Mental Status: Alert and oriented x 2, minimally somnolent but responds to questions.    Medications:    Current Facility-Administered Medications:     acetaminophen (Tylenol) tablet 650 mg, 650 mg, oral, q4h PRN **OR** acetaminophen (Tylenol) oral liquid 650 mg, 650 mg, oral, q4h PRN **OR** acetaminophen (Tylenol) suppository 650 mg, 650 mg, rectal, q4h PRN, Jacob Staples MD    amLODIPine (Norvasc) tablet 10 mg, 10 mg, oral, Daily, Jacob Staples MD, 10 mg at 07/28/24 0821    aspirin EC tablet 81 mg, 81 mg, oral, Daily, Jacob Staples MD, 81 mg at 07/28/24 0821    atorvastatin (Lipitor) tablet 80 mg, 80 mg, oral, Daily, Jacob Staples MD, 80 mg at 07/28/24  0821    carvedilol (Coreg) tablet 12.5 mg, 12.5 mg, oral, BID, Jacob Staples MD, 12.5 mg at 07/28/24 0821    clopidogrel (Plavix) tablet 75 mg, 75 mg, oral, Daily, Jacob Staples MD, 75 mg at 07/28/24 0821    colchicine tablet 0.6 mg, 0.6 mg, oral, Daily, Jacob Staples MD, 0.6 mg at 07/28/24 0821    cyproheptadine (Periactin) tablet 2 mg, 2 mg, oral, TID, Jacob Staples MD, 2 mg at 07/28/24 0827    dextrose 1.5% - LOW calcium 2.5mEq/L 2,000 mL peritoneal dialysate, , intraperitoneal, TID, Gab Lynn,     dextrose 50 % injection 12.5 g, 12.5 g, intravenous, q15 min PRN, Shani Bain, APRN-CNP, 12.5 g at 07/25/24 2022    dextrose 50 % injection 25 g, 25 g, intravenous, q15 min PRN, Shani Bain, APRN-CNP    fluconazole (Diflucan) tablet 100 mg, 100 mg, oral, Daily, Jacob Staples MD, 100 mg at 07/28/24 0822    glucagon (Glucagen) injection 1 mg, 1 mg, intramuscular, q15 min PRN, Shani Bain, APRN-CNP    glucagon (Glucagen) injection 1 mg, 1 mg, intramuscular, q15 min PRN, Shani Bain, APRN-CNP    heparin (porcine) injection 5,000 Units, 5,000 Units, subcutaneous, q8h MARIYA, Jacob Staples MD, 5,000 Units at 07/28/24 0603    levoFLOXacin (Levaquin) tablet 250 mg, 250 mg, oral, q48h, Jacob Staples MD, 250 mg at 07/27/24 1228    loperamide (Imodium) capsule 2 mg, 2 mg, oral, 4x daily PRN, Jacob Staples MD    [Held by provider] losartan (Cozaar) tablet 100 mg, 100 mg, oral, Daily, Jacob Staples MD, 100 mg at 07/25/24 0901    nystatin (Mycostatin) 100,000 unit/mL suspension 500,000 Units, 5 mL, Swish & Swallow, TID, Jacob Staples MD, 500,000 Units at 07/28/24 0821    ondansetron (Zofran) injection 4 mg, 4 mg, intravenous, q4h PRN, Jacob Staples MD    oxyCODONE (Roxicodone) immediate release tablet 5 mg, 5 mg, oral, q6h PRN, Jacob Staples MD, 5 mg at 07/24/24 0947    sertraline (Zoloft) tablet 25 mg, 25 mg, oral, Daily, Jacob Staples MD, 25 mg at  07/28/24 0821    Laboratory Findings:  Lab Results   Component Value Date    WBC 8.0 07/28/2024    HGB 9.0 (L) 07/28/2024    HCT 25.9 (L) 07/28/2024    MCV 79 (L) 07/28/2024     07/28/2024     Lab Results   Component Value Date    INR 1.2 (H) 07/17/2024    PROTIME 13.8 (H) 07/17/2024     Lab Results   Component Value Date    GLUCOSE 84 07/28/2024    CALCIUM 8.2 (L) 07/28/2024     07/28/2024    K 4.1 07/28/2024    CO2 24 07/28/2024    CL 96 (L) 07/28/2024    BUN 65 (H) 07/28/2024    CREATININE 12.47 (H) 07/28/2024       Assessment and Plan:     Dehydration -continue to encourage p.o., liberal diet, family aware.  ESRD -continue with peritoneal dialysis  Abnormal urinalysis -urine culture still pending, continue with empiric Levaquin  Lethargy -encephalopathy secondary to multiple falls, supportive measures.  Weakness -PT OT consulted.  Thrush -continue with nystatin, p.o. Diflucan.  Diarrhea-Imodium as needed.    Discussed with daughter at bedside.  Discussed with Dr. Shane Staples MD  07/28/24  11:19 AM

## 2024-07-28 NOTE — SIGNIFICANT EVENT
"Pt seen laying in bed, frail, reports no appetite, minimal oral intake, mildly confused unchanged from baseline. She has been admitted for 5 days for failure to thrive, unfortunately she has not shown movement and is at the point that enteral nutrition should be started.  PEG tube is understandably not an option d/t PD however a corpak is a viable option. It can be placed post pyloric at bedside, so that she may discharge to rehab/snf.  Discussed with Dr Staples and patients sister whom are all in agreement.     LABS:  CMP:  Results from last 7 days   Lab Units 07/28/24  0637 07/27/24  0624 07/26/24  0523 07/25/24  0603 07/24/24  0548 07/23/24  1128   SODIUM mmol/L 136 135* 135* 137 135* 134*   POTASSIUM mmol/L 4.1 4.3 4.1 4.6 4.3 4.0   CHLORIDE mmol/L 96* 96* 96* 95* 95* 95*   CO2 mmol/L 24 22 23 21 23 27   ANION GAP mmol/L 20 21* 20 26* 21 16   BUN mg/dL 65* 66* 61* 59* 50* 43*   CREATININE mg/dL 12.47* 12.51* 12.91* 11.84* 10.81* 9.17*   EGFR mL/min/1.73m*2 3* 3* 3* 3* 3* 4*   ALBUMIN g/dL  --   --  2.2*  --   --  2.3*   ALT U/L  --   --   --   --   --  <3*   AST U/L  --   --   --   --   --  24   BILIRUBIN TOTAL mg/dL  --   --   --   --   --  0.4     CBC:  Results from last 7 days   Lab Units 07/28/24  0637 07/27/24  0624 07/26/24  0523 07/25/24  0603 07/24/24  0548 07/23/24  1128   WBC AUTO x10*3/uL 8.0 6.6 7.0 7.7 12.1* 11.8*   HEMOGLOBIN g/dL 9.0* 9.6* 9.2* 9.3* 10.4* 9.9*   HEMATOCRIT % 25.9* 28.8* 27.7* 28.9* 32.3* 29.7*   PLATELETS AUTO x10*3/uL 250 299 308 320 309 287   MCV fL 79* 81 82 83 83 80     COAG:     HEME/ENDO:  Results from last 7 days   Lab Units 07/23/24  1128   TSH mIU/L 0.68      CARDIAC:   Results from last 7 days   Lab Units 07/23/24  1258 07/23/24  1128   TROPHS ng/L 85* 93*     /60 (BP Location: Right arm, Patient Position: Lying)   Pulse 63   Temp 37.4 °C (99.3 °F) (Temporal)   Resp 13   Ht 1.6 m (5' 2.99\")   Wt 46.9 kg (103 lb 8 oz)   SpO2 99%   BMI 18.34 kg/m²       Time " spent on the assessment of patient, gathering and interpreting data, review of medical record/patient history, personally reviewing radiographic imaging and formulation of this note. With greater than 50% spent in personal discussion with patient/ family.  20 minutes  Dr Cristina Clifton updated  Shani Bain, APRN-CNP

## 2024-07-28 NOTE — PROGRESS NOTES
Isis Geronimo is a 79 y.o. female on day 5 of admission presenting with Dehydration.      Subjective   Isis Geronimo is a 79-year-old female with a past medical history of end-stage renal disease on peritoneal dialysis under the care of Dr. Zarate ([facility] transportation [place]). She has a history of anemia, rheumatoid arthritis, nonsustained V. tach, hypertension, hyperlipidemia, vertigo, emphysema, carpal tunnel syndrome who was recently admitted on 7/6 after presenting with nausea, emesis, constipation and right-sided chest discomfort.  She was admitted to [facility] with NSTEMI with a high-sensitivity troponin peak of 21,094. 2 D ECHO showed an LVEF of 61% with LV diastolic dysfunction, moderate concentric LVH and aortic valve sclerosis.  She was seen by cardiology. Heparin infusion, beta-blocker, statin and aspirin given. Left heart cath showed severe multivessel coronary artery disease. On 7/17 she underwent left anterior thoracatomy off Pump MIDCAB.  She was discharged home on 7/20.  She comes back in with failure to thrive.  She has had poor by mouth intake, a 3 pound weight loss, confusion and weakness thus she was readmitted.  Workup was notable for new low-grade leukocytosis with neutrophil predominance.  She is afebrile.  Chest x-ray shows bilateral atelectasis with small effusions.  She denies fever and abdominal discomfort.  She cannot tell me if there has been changes in her PD fluid.  Nephrology is consulted for renal care.    Seen and examined.   Remains lucid. Still not eating.   No issues with PD exchanges.         Objective     Peritoneal Dialysis  Dianeal Solution: Dextrose 1.5% in 2000 mL  Dianeal Additive: Other (Comment) (calcium)  Peritoneal Input Status: Completed  Effluent Appearance: Yellow  Peritoneal Dialysis Volume In (ml): 1900 ml  Dwell Time: 0400  Effluent Volume Out (mL): 1600 ml  Balance This Exchange (mL): -1500 ml    Vitals 24HR  Heart Rate:  [64-71]   Temp:   [36.6 °C (97.9 °F)-37.2 °C (99 °F)]   Resp:  [13-18]   BP: (114-138)/(52-64)   Weight:  [46.9 kg (103 lb 8 oz)]   SpO2:  [92 %-98 %]     Intake/Output last 3 Shifts:    Intake/Output Summary (Last 24 hours) at 7/28/2024 1136  Last data filed at 7/27/2024 1800  Gross per 24 hour   Intake 3850 ml   Output 1600 ml   Net 2250 ml         Physical Exam  General: Awake, conversational.   HEENT:  Pupils equal and reactive. Moist mucosa.    Neck: Normal Jugular Venous Pressure.  Cardiovascular: Regular rate and rhythm. Normal S1 and S2.  Pulmonary:  Clear to auscultation bilaterally.  No wheezes, rales or rhonchi  Abdomen:  Soft. Non-tender. Positive bowel sounds.  Lower Extremities:  2+ pedal pulses. No LE edema.  Neurologic:  Cranial nerves intact.  No focal deficit.   Skin: Skin warm and dry, normal skin turgor.   Psychiatric: Appropriate mood and behavior.    Scheduled Medications  amLODIPine, 10 mg, oral, Daily  aspirin, 81 mg, oral, Daily  atorvastatin, 80 mg, oral, Daily  carvedilol, 12.5 mg, oral, BID  clopidogrel, 75 mg, oral, Daily  colchicine, 0.6 mg, oral, Daily  cyproheptadine, 2 mg, oral, TID  dextrose 1.5% - LOW calcium 2.5mEq/L 2,000 mL peritoneal dialysate, , intraperitoneal, TID  fluconazole, 100 mg, oral, Daily  heparin (porcine), 5,000 Units, subcutaneous, q8h MARIYA  levoFLOXacin, 250 mg, oral, q48h  [Held by provider] losartan, 100 mg, oral, Daily  nystatin, 5 mL, Swish & Swallow, TID  sertraline, 25 mg, oral, Daily      Continuous medications       PRN medications: acetaminophen **OR** acetaminophen **OR** acetaminophen, dextrose, dextrose, glucagon, glucagon, loperamide, ondansetron, oxyCODONE     Relevant Results  Results from last 7 days   Lab Units 07/28/24  0637 07/27/24  0624 07/26/24  0523   WBC AUTO x10*3/uL 8.0 6.6 7.0   HEMOGLOBIN g/dL 9.0* 9.6* 9.2*   HEMATOCRIT % 25.9* 28.8* 27.7*   PLATELETS AUTO x10*3/uL 250 299 308   NEUTROS PCT AUTO % 88.4 87.3 81.5   LYMPHS PCT AUTO % 4.7 5.4 7.4   MONOS  PCT AUTO % 2.9 2.9 4.1   EOS PCT AUTO % 1.6 2.4 1.9     Results from last 7 days   Lab Units 07/28/24  0637 07/27/24  0624 07/26/24  0523   SODIUM mmol/L 136 135* 135*   POTASSIUM mmol/L 4.1 4.3 4.1   CHLORIDE mmol/L 96* 96* 96*   CO2 mmol/L 24 22 23   BUN mg/dL 65* 66* 61*   CREATININE mg/dL 12.47* 12.51* 12.91*   GLUCOSE mg/dL 84 67* 87   CALCIUM mg/dL 8.2* 8.6 7.7*       CT head wo IV contrast   Final Result   1. No acute intracranial pathology.   2. Moderate parenchymal cerebral volume loss and burden of chronic   microvascular ischemic changes, progressed when compared to prior   exam from 2016.        I personally reviewed the images/study and I agree with the findings   as stated above by resident physician, Dr. Hussein Schmidt.        MACRO:   none        Signed by: Keyon Traylor 7/26/2024 3:25 PM   Dictation workstation:   [id number]      US right upper quadrant   Final Result   Chronic changes in the right kidney including small size and   increased echogenicity.        Mild perihepatic ascites. Most likely related to patient's peritoneal   dialysis.        Moderate sludge in the gallbladder. Nonspecific gallbladder wall   thickening. No shadowing stone.        Remainder of the exam was negative.        MACRO:   None        Signed by: Jacob Arora 7/25/2024 4:13 PM   Dictation workstation:   [id number]               Assessment/Plan      Isis Geronimo is a 79-year-old female with a past medical history of end-stage renal disease on peritoneal dialysis under the care of Dr. Zarate ([facility] transportation [place]). She has a history of anemia, rheumatoid arthritis, nonsustained V. tach, hypertension, hyperlipidemia, vertigo, emphysema, carpal tunnel syndrome who was recently admitted on 7/6 after presenting with nausea, emesis, constipation and right-sided chest discomfort.  She was admitted to [facility] with NSTEMI with a high-sensitivity troponin peak of 21,094. 2 D ECHO showed an  LVEF of 61% with LV diastolic dysfunction, moderate concentric LVH and aortic valve sclerosis.  She was seen by cardiology. Heparin infusion, beta-blocker, statin and aspirin given. Left heart cath showed severe multivessel coronary artery disease. On 7/17 she underwent left anterior thoracatomy off Pump MIDCAB.  She was discharged home on 7/20.  She comes back in with failure to thrive.  She has had poor by mouth intake, a 3 pound weight loss, confusion and weakness thus she was readmitted.  Workup was notable for new low-grade leukocytosis with neutrophil predominance.  She is afebrile.  Chest x-ray shows bilateral atelectasis with small effusions.  She denies fever and abdominal discomfort.  She cannot tell me if there has been changes in her PD fluid.  Nephrology is consulted for renal care.    Given her leukocytosis and failure to thrive symptoms, I sent her PD fluid for cell count and culture as well as blood and urine cultures. All are without growth.  Her PD catheter site is c/d/I. Head CT  does not show acute pathology. She appears to be back at her cognitive baseline. Unfortunately she continues to refuse to eat. We are hoping to avoid entertaining a feeding tube. A PEG would not allow her to receive PD.  We are encouraging her to increase her caloric intake. She will have 3 exchanges of 1.5% dextrose with a fill volume of 1.5 L x 4 hours each for today. Continue holding Losartan.  Trend her RFP.  Nephrology will follow with her care.    Principal Problem:    Dehydration       I spent 40 minutes in the professional and overall care of this patient.      Gab Lynn, DO

## 2024-07-29 ENCOUNTER — APPOINTMENT (OUTPATIENT)
Dept: RADIOLOGY | Facility: HOSPITAL | Age: 79
DRG: 640 | End: 2024-07-29
Payer: COMMERCIAL

## 2024-07-29 ENCOUNTER — APPOINTMENT (OUTPATIENT)
Dept: CARDIOLOGY | Facility: HOSPITAL | Age: 79
DRG: 640 | End: 2024-07-29
Payer: COMMERCIAL

## 2024-07-29 LAB
ANION GAP SERPL CALC-SCNC: 21 MMOL/L (ref 10–20)
BACTERIA BLD CULT: NORMAL
BASOPHILS # BLD AUTO: 0.02 X10*3/UL (ref 0–0.1)
BASOPHILS NFR BLD AUTO: 0.4 %
BUN SERPL-MCNC: 58 MG/DL (ref 6–23)
BURR CELLS BLD QL SMEAR: NORMAL
CALCIUM SERPL-MCNC: 8.8 MG/DL (ref 8.6–10.3)
CARDIAC TROPONIN I PNL SERPL HS: 144 NG/L (ref 0–13)
CHLORIDE SERPL-SCNC: 95 MMOL/L (ref 98–107)
CO2 SERPL-SCNC: 23 MMOL/L (ref 21–32)
CREAT SERPL-MCNC: 11.15 MG/DL (ref 0.5–1.05)
EGFRCR SERPLBLD CKD-EPI 2021: 3 ML/MIN/1.73M*2
EOSINOPHIL # BLD AUTO: 0.09 X10*3/UL (ref 0–0.4)
EOSINOPHIL NFR BLD AUTO: 1.6 %
ERYTHROCYTE [DISTWIDTH] IN BLOOD BY AUTOMATED COUNT: 16.2 % (ref 11.5–14.5)
GLUCOSE BLD MANUAL STRIP-MCNC: 140 MG/DL (ref 74–99)
GLUCOSE BLD MANUAL STRIP-MCNC: 145 MG/DL (ref 74–99)
GLUCOSE BLD MANUAL STRIP-MCNC: 78 MG/DL (ref 74–99)
GLUCOSE SERPL-MCNC: 85 MG/DL (ref 74–99)
HCT VFR BLD AUTO: 27 % (ref 36–46)
HGB BLD-MCNC: 9.1 G/DL (ref 12–16)
IMM GRANULOCYTES # BLD AUTO: 0.35 X10*3/UL (ref 0–0.5)
IMM GRANULOCYTES NFR BLD AUTO: 6.1 % (ref 0–0.9)
LYMPHOCYTES # BLD AUTO: 0.31 X10*3/UL (ref 0.8–3)
LYMPHOCYTES NFR BLD AUTO: 5.4 %
MCH RBC QN AUTO: 26.9 PG (ref 26–34)
MCHC RBC AUTO-ENTMCNC: 33.7 G/DL (ref 32–36)
MCV RBC AUTO: 80 FL (ref 80–100)
MONOCYTES # BLD AUTO: 0.15 X10*3/UL (ref 0.05–0.8)
MONOCYTES NFR BLD AUTO: 2.6 %
NEUTROPHILS # BLD AUTO: 4.79 X10*3/UL (ref 1.6–5.5)
NEUTROPHILS NFR BLD AUTO: 83.9 %
NRBC BLD-RTO: 0 /100 WBCS (ref 0–0)
OVALOCYTES BLD QL SMEAR: NORMAL
PLATELET # BLD AUTO: 219 X10*3/UL (ref 150–450)
POTASSIUM SERPL-SCNC: 3.7 MMOL/L (ref 3.5–5.3)
RBC # BLD AUTO: 3.38 X10*6/UL (ref 4–5.2)
RBC MORPH BLD: NORMAL
SCHISTOCYTES BLD QL SMEAR: NORMAL
SODIUM SERPL-SCNC: 135 MMOL/L (ref 136–145)
TARGETS BLD QL SMEAR: NORMAL
WBC # BLD AUTO: 5.7 X10*3/UL (ref 4.4–11.3)

## 2024-07-29 PROCEDURE — 99233 SBSQ HOSP IP/OBS HIGH 50: CPT | Performed by: INTERNAL MEDICINE

## 2024-07-29 PROCEDURE — 36415 COLL VENOUS BLD VENIPUNCTURE: CPT | Performed by: INTERNAL MEDICINE

## 2024-07-29 PROCEDURE — 84484 ASSAY OF TROPONIN QUANT: CPT | Performed by: INTERNAL MEDICINE

## 2024-07-29 PROCEDURE — 6350000001 HC RX 635 EPOETIN >10,000 UNITS: Mod: JZ | Performed by: INTERNAL MEDICINE

## 2024-07-29 PROCEDURE — 74176 CT ABD & PELVIS W/O CONTRAST: CPT | Performed by: RADIOLOGY

## 2024-07-29 PROCEDURE — 93010 ELECTROCARDIOGRAM REPORT: CPT | Performed by: INTERNAL MEDICINE

## 2024-07-29 PROCEDURE — 2500000004 HC RX 250 GENERAL PHARMACY W/ HCPCS (ALT 636 FOR OP/ED): Performed by: INTERNAL MEDICINE

## 2024-07-29 PROCEDURE — 82374 ASSAY BLOOD CARBON DIOXIDE: CPT | Performed by: INTERNAL MEDICINE

## 2024-07-29 PROCEDURE — 2500000001 HC RX 250 WO HCPCS SELF ADMINISTERED DRUGS (ALT 637 FOR MEDICARE OP): Performed by: INTERNAL MEDICINE

## 2024-07-29 PROCEDURE — 74176 CT ABD & PELVIS W/O CONTRAST: CPT

## 2024-07-29 PROCEDURE — 93005 ELECTROCARDIOGRAM TRACING: CPT

## 2024-07-29 PROCEDURE — 1200000002 HC GENERAL ROOM WITH TELEMETRY DAILY

## 2024-07-29 PROCEDURE — 82947 ASSAY GLUCOSE BLOOD QUANT: CPT

## 2024-07-29 PROCEDURE — 2500000002 HC RX 250 W HCPCS SELF ADMINISTERED DRUGS (ALT 637 FOR MEDICARE OP, ALT 636 FOR OP/ED): Performed by: INTERNAL MEDICINE

## 2024-07-29 PROCEDURE — 85025 COMPLETE CBC W/AUTO DIFF WBC: CPT | Performed by: INTERNAL MEDICINE

## 2024-07-29 PROCEDURE — 97530 THERAPEUTIC ACTIVITIES: CPT | Mod: GP,CQ | Performed by: PHYSICAL THERAPY ASSISTANT

## 2024-07-29 ASSESSMENT — COGNITIVE AND FUNCTIONAL STATUS - GENERAL
TOILETING: TOTAL
MOVING TO AND FROM BED TO CHAIR: A LOT
DAILY ACTIVITIY SCORE: 7
MOVING TO AND FROM BED TO CHAIR: A LITTLE
MOVING TO AND FROM BED TO CHAIR: A LOT
MOVING FROM LYING ON BACK TO SITTING ON SIDE OF FLAT BED WITH BEDRAILS: A LOT
TURNING FROM BACK TO SIDE WHILE IN FLAT BAD: A LOT
EATING MEALS: TOTAL
MOVING FROM LYING ON BACK TO SITTING ON SIDE OF FLAT BED WITH BEDRAILS: A LITTLE
TURNING FROM BACK TO SIDE WHILE IN FLAT BAD: A LOT
MOBILITY SCORE: 11
WALKING IN HOSPITAL ROOM: A LOT
STANDING UP FROM CHAIR USING ARMS: A LOT
DRESSING REGULAR LOWER BODY CLOTHING: TOTAL
WALKING IN HOSPITAL ROOM: A LOT
CLIMB 3 TO 5 STEPS WITH RAILING: A LOT
MOBILITY SCORE: 15
TOILETING: TOTAL
STANDING UP FROM CHAIR USING ARMS: A LOT
DRESSING REGULAR LOWER BODY CLOTHING: TOTAL
PERSONAL GROOMING: TOTAL
DRESSING REGULAR UPPER BODY CLOTHING: A LOT
WALKING IN HOSPITAL ROOM: A LOT
TOILETING: TOTAL
DRESSING REGULAR LOWER BODY CLOTHING: TOTAL
MOBILITY SCORE: 11
STANDING UP FROM CHAIR USING ARMS: A LOT
EATING MEALS: TOTAL
MOVING TO AND FROM BED TO CHAIR: A LITTLE
PERSONAL GROOMING: TOTAL
DAILY ACTIVITIY SCORE: 6
DRESSING REGULAR UPPER BODY CLOTHING: A LOT
TURNING FROM BACK TO SIDE WHILE IN FLAT BAD: A LITTLE
CLIMB 3 TO 5 STEPS WITH RAILING: TOTAL
DRESSING REGULAR UPPER BODY CLOTHING: TOTAL
MOVING FROM LYING ON BACK TO SITTING ON SIDE OF FLAT BED WITH BEDRAILS: A LOT
MOVING FROM LYING ON BACK TO SITTING ON SIDE OF FLAT BED WITH BEDRAILS: A LITTLE
HELP NEEDED FOR BATHING: TOTAL
TURNING FROM BACK TO SIDE WHILE IN FLAT BAD: A LITTLE
MOBILITY SCORE: 15
STANDING UP FROM CHAIR USING ARMS: A LOT
CLIMB 3 TO 5 STEPS WITH RAILING: TOTAL
DRESSING REGULAR UPPER BODY CLOTHING: TOTAL
WALKING IN HOSPITAL ROOM: A LOT
PERSONAL GROOMING: TOTAL
CLIMB 3 TO 5 STEPS WITH RAILING: A LOT
DAILY ACTIVITIY SCORE: 6
EATING MEALS: TOTAL
HELP NEEDED FOR BATHING: TOTAL
HELP NEEDED FOR BATHING: TOTAL

## 2024-07-29 ASSESSMENT — PAIN SCALES - GENERAL
PAINLEVEL_OUTOF10: 3
PAINLEVEL_OUTOF10: 3
PAINLEVEL_OUTOF10: 0 - NO PAIN

## 2024-07-29 ASSESSMENT — PAIN DESCRIPTION - LOCATION
LOCATION: ABDOMEN
LOCATION: CHEST

## 2024-07-29 NOTE — PROGRESS NOTES
Physical Therapy    Physical Therapy Treatment    Patient Name: Isis Geronimo  MRN: [id number]  Today's Date: 7/29/2024  Time Calculation  Start Time: 1134  Stop Time: 1159  Time Calculation (min): 25 min    Assessment/Plan   PT Assessment  End of Session Communication: Bedside nurse  Assessment Comment:  (pt demo limited tolerance to activity,increased dizziness and lightheadedness noted,RN notified.)  End of Session Patient Position: Bed, 3 rail up, Alarm on  PT Plan  Inpatient/Swing Bed or Outpatient: Inpatient  PT Plan  Treatment/Interventions: Bed mobility, Transfer training, Gait training, Therapeutic activity  PT Plan: Ongoing PT  PT Frequency: 3 times per week  PT Discharge Recommendations: Moderate intensity level of continued care  Equipment Recommended upon Discharge: Wheeled walker  PT Recommended Transfer Status: Assist x1  PT - OK to Discharge: Yes (per PT POC)      General Visit Information:   PT  Visit  PT Received On: 07/29/24  General  Reason for Referral: Pt is a 78 yo female presenting due to poor food intake/dehydration/FTT. Pt recently dc'd home from hospital due to CABGx1  and anterior thoracotomy  Prior to Session Communication: Bedside nurse  Patient Position Received: Bed, 3 rail up, Alarm on  Preferred Learning Style: auditory, kinesthetic, verbal  General Comment:  (pt agreeable to tx.)    Subjective   Precautions:     Vital Signs:  Vital Signs  Heart Rate: 55  SpO2: 92 %  BP: (!) 94/42  MAP (mmHg): 52    Objective   Pain:     Cognition:     Coordination:     Postural Control:     Extremity/Trunk Assessments:    Activity Tolerance:     Treatments:            Bed Mobility  Bed Mobility: Yes  Bed Mobility 1  Bed Mobility 1: Supine to sitting, Sitting to supine, Rolling right, Rolling left, Log roll  Level of Assistance 1: Moderate assistance, Moderate verbal cues, Moderate tactile cues  Bed Mobility Comments 1:  (pt req increased cues for proper hand placement and sequecning,slwo  mobility)       Transfers  Transfer: Yes  Transfer 1  Transfer From 1: Sit to, Stand to  Technique 1: Sit to stand, Stand to sit  Transfer Device 1: Walker  Transfer Level of Assistance 1: Moderate assistance, Moderate verbal cues, Moderate tactile cues  Trials/Comments 1:  (pt req increased time and cues to complete task,partial standing,pt became dizzy and lightheaded retrun to supine vitals take,RN notified,further activity deffered)         Outcome Measures:  [facility] Basic Mobility  Turning from your back to your side while in a flat bed without using bedrails: A lot  Moving from lying on your back to sitting on the side of a flat bed without using bedrails: A lot  Moving to and from bed to chair (including a wheelchair): A lot  Standing up from a chair using your arms (e.g. wheelchair or bedside chair): A lot  To walk in hospital room: A lot  Climbing 3-5 steps with railing: Total  Basic Mobility - Total Score: 11    Education Documentation  Handouts, taught by Román Neville PTA at 7/29/2024  3:26 PM.  Learner: Patient  Readiness: Acceptance  Method: Explanation  Response: Needs Reinforcement    Precautions, taught by Román Neville PTA at 7/29/2024  3:26 PM.  Learner: Patient  Readiness: Acceptance  Method: Explanation  Response: Needs Reinforcement    Body Mechanics, taught by Román Neville PTA at 7/29/2024  3:26 PM.  Learner: Patient  Readiness: Acceptance  Method: Explanation  Response: Needs Reinforcement    Mobility Training, taught by Román Neville PTA at 7/29/2024  3:26 PM.  Learner: Patient  Readiness: Acceptance  Method: Explanation  Response: Needs Reinforcement    Education Comments  No comments found.        OP EDUCATION:       Encounter Problems       Encounter Problems (Active)       Balance       LTG - Patient will tolerate standing (Progressing)       Start:  07/24/24    Expected End:  08/07/24       >2 min SUP using FWW            Mobility       STG - Patient will  ambulate (Progressing)       Start:  07/24/24    Expected End:  08/07/24       SBA using FWW >15ft            PT Transfers       STG - Patient will perform bed mobility (Progressing)       Start:  07/24/24    Expected End:  08/07/24       SUP         STG - Patient will transfer sit to and from stand (Progressing)       Start:  07/24/24    Expected End:  08/07/24       CGA            Pain - Adult

## 2024-07-29 NOTE — PROGRESS NOTES
Nephrology Consult Progress Note    Admit Date: 7/23/2024    Interval history:  No complaints     CURRENT MEDICATIONS:    Current Facility-Administered Medications:     acetaminophen (Tylenol) tablet 650 mg, 650 mg, oral, q4h PRN, 650 mg at 07/29/24 0921 **OR** acetaminophen (Tylenol) oral liquid 650 mg, 650 mg, oral, q4h PRN, 650 mg at 07/28/24 2247 **OR** acetaminophen (Tylenol) suppository 650 mg, 650 mg, rectal, q4h PRN, Jacob Staples MD    amLODIPine (Norvasc) tablet 10 mg, 10 mg, oral, Daily, Jacob Staples MD, 10 mg at 07/29/24 0920    aspirin EC tablet 81 mg, 81 mg, oral, Daily, Jacob Staples MD, 81 mg at 07/29/24 0921    atorvastatin (Lipitor) tablet 80 mg, 80 mg, oral, Daily, Jacob Staples MD, 80 mg at 07/29/24 0921    carvedilol (Coreg) tablet 12.5 mg, 12.5 mg, oral, BID, Jacob Staples MD, 12.5 mg at 07/29/24 0921    clopidogrel (Plavix) tablet 75 mg, 75 mg, oral, Daily, Jacob Staples MD, 75 mg at 07/29/24 0921    colchicine tablet 0.6 mg, 0.6 mg, oral, Daily, Jacob Staples MD, 0.6 mg at 07/29/24 0921    cyproheptadine (Periactin) tablet 2 mg, 2 mg, oral, TID, Jacob Staples MD, 2 mg at 07/29/24 0920    dextrose 1.5% - LOW calcium 2.5mEq/L 2,000 mL peritoneal dialysate, , intraperitoneal, TID, Gab Lynn DO, Given at 07/29/24 1128    dextrose 50 % injection 12.5 g, 12.5 g, intravenous, q15 min PRN, ALEXIS Mae-CNP, 12.5 g at 07/25/24 2022    dextrose 50 % injection 25 g, 25 g, intravenous, q15 min PRN, ShaniRACHELLE Moe    fluconazole (Diflucan) tablet 100 mg, 100 mg, oral, Daily, Jacob Staples MD, 100 mg at 07/29/24 0920    glucagon (Glucagen) injection 1 mg, 1 mg, intramuscular, q15 min PRN, RACHELLE Mae    glucagon (Glucagen) injection 1 mg, 1 mg, intramuscular, q15 min PRN, RACHELLE Mae    heparin (porcine) injection 5,000 Units, 5,000 Units, subcutaneous, q8h MARIYA, Jacob Staples MD, 5,000 Units at  "07/28/24 1722    levoFLOXacin (Levaquin) tablet 250 mg, 250 mg, oral, q48h, Jacob Staples MD, 250 mg at 07/27/24 1228    loperamide (Imodium) capsule 2 mg, 2 mg, oral, 4x daily PRN, Jacob Staples MD, 2 mg at 07/28/24 2247    [Held by provider] losartan (Cozaar) tablet 100 mg, 100 mg, oral, Daily, Jacob Staples MD, 100 mg at 07/25/24 0901    nystatin (Mycostatin) 100,000 unit/mL suspension 500,000 Units, 5 mL, Swish & Swallow, TID, Jacob Staples MD, 500,000 Units at 07/29/24 0920    ondansetron (Zofran) injection 4 mg, 4 mg, intravenous, q4h PRN, Jacob Staples MD, 4 mg at 07/28/24 2244    oxyCODONE (Roxicodone) immediate release tablet 5 mg, 5 mg, oral, q6h PRN, Jacob Staples MD, 5 mg at 07/28/24 2236    sertraline (Zoloft) tablet 25 mg, 25 mg, oral, Daily, Jacob Staples MD, 25 mg at 07/29/24 0920       Intake/Output Summary (Last 24 hours) at 7/29/2024 1319  Last data filed at 7/29/2024 0023  Gross per 24 hour   Intake 1500 ml   Output 1500 ml   Net 0 ml       PHYSICAL EXAM:  /54 (BP Location: Left arm, Patient Position: Lying)   Pulse 60   Temp 36.4 °C (97.5 °F) (Temporal)   Resp 16   Ht 1.6 m (5' 2.99\")   Wt 46.9 kg (103 lb 8 oz)   SpO2 97%   BMI 18.34 kg/m²     Intake/Output Summary (Last 24 hours) at 7/29/2024 1319  Last data filed at 7/29/2024 0023  Gross per 24 hour   Intake 1500 ml   Output 1500 ml   Net 0 ml     Gen: AAO, NAD  Neck: No JVD  Cardiac: RRR  Resp: clear BS  Abd: Soft, non tender, +BS, non distended   PD catheter site OK  Ext: No edema   Neuro: moves 4 ext  Peripheral Pulses: Capillary refill <2secs, strong peripheral pulses.  Skin: Skin color, texture, turgor normal, no suspicious rashes or lesions.    Labs:  Results for orders placed or performed during the hospital encounter of 07/23/24 (from the past 24 hour(s))   POCT GLUCOSE   Result Value Ref Range    POCT Glucose 126 (H) 74 - 99 mg/dL   POCT GLUCOSE   Result Value Ref Range    POCT Glucose 129 (H) " 74 - 99 mg/dL   CBC and Auto Differential   Result Value Ref Range    WBC 5.7 4.4 - 11.3 x10*3/uL    nRBC 0.0 0.0 - 0.0 /100 WBCs    RBC 3.38 (L) 4.00 - 5.20 x10*6/uL    Hemoglobin 9.1 (L) 12.0 - 16.0 g/dL    Hematocrit 27.0 (L) 36.0 - 46.0 %    MCV 80 80 - 100 fL    MCH 26.9 26.0 - 34.0 pg    MCHC 33.7 32.0 - 36.0 g/dL    RDW 16.2 (H) 11.5 - 14.5 %    Platelets 219 150 - 450 x10*3/uL    Neutrophils % 83.9 40.0 - 80.0 %    Immature Granulocytes %, Automated 6.1 (H) 0.0 - 0.9 %    Lymphocytes % 5.4 13.0 - 44.0 %    Monocytes % 2.6 2.0 - 10.0 %    Eosinophils % 1.6 0.0 - 6.0 %    Basophils % 0.4 0.0 - 2.0 %    Neutrophils Absolute 4.79 1.60 - 5.50 x10*3/uL    Immature Granulocytes Absolute, Automated 0.35 0.00 - 0.50 x10*3/uL    Lymphocytes Absolute 0.31 (L) 0.80 - 3.00 x10*3/uL    Monocytes Absolute 0.15 0.05 - 0.80 x10*3/uL    Eosinophils Absolute 0.09 0.00 - 0.40 x10*3/uL    Basophils Absolute 0.02 0.00 - 0.10 x10*3/uL   Basic Metabolic Panel   Result Value Ref Range    Glucose 85 74 - 99 mg/dL    Sodium 135 (L) 136 - 145 mmol/L    Potassium 3.7 3.5 - 5.3 mmol/L    Chloride 95 (L) 98 - 107 mmol/L    Bicarbonate 23 21 - 32 mmol/L    Anion Gap 21 (H) 10 - 20 mmol/L    Urea Nitrogen 58 (H) 6 - 23 mg/dL    Creatinine 11.15 (H) 0.50 - 1.05 mg/dL    eGFR 3 (L) >60 mL/min/1.73m*2    Calcium 8.8 8.6 - 10.3 mg/dL   Morphology   Result Value Ref Range    RBC Morphology See Below     RBC Fragments Few     Target Cells Few     Ovalocytes Few     Duxbury Cells Few    POCT GLUCOSE   Result Value Ref Range    POCT Glucose 78 74 - 99 mg/dL   POCT GLUCOSE   Result Value Ref Range    POCT Glucose 140 (H) 74 - 99 mg/dL        DATA:   Diagnostic tests reviewed for today's visit:    New labs and imaging     Assessment and Plan:  Pt admitted with failure to thrive and poor eating, planing to get PEG?  - ESKD on PD: ADP at home  CAPD here, all 1.5%, good UF and euvolemic  BP: controlled  Lytes and acid base: acceptable  Hb 9, epogen  today    Will continue to follow.       Signature: Lyle Florez MD

## 2024-07-29 NOTE — SIGNIFICANT EVENT
During leadership rounding, patient complained of anterior chest pain, no radiation, occurs at rest, reproducible on palpation of chest wall, no sweating, no SOB.  Family reports she also complained of this yesterday.    She is not in any acute distress but given recent NSTEMI, will get EKG and troponin and notify Dr. Staples.      Patient has also been having diarrhea since Saturday requiring imodium.  She is on levaquin 250 mg every 48 h which started 7/27 and receivved ancef 2 g every 24 hour 7/18, 7/19 in setting of  CABG procedure.     After discussion with Dr. Staples she was getting miralax, no WBC, no fever, so very low suspicion for C diff.  Will continue with imodium for now.     Family interested in nastogastric feeding tube (corpak) to supplement calories.    EKG independently reviewed by me:  sinus bradycardia, HR 55, nl axis, anterior q waves  Troponin sent.    Dr. Staples updated on above.  If troponin elevated, RN will notify Dr. Staples.

## 2024-07-29 NOTE — PROGRESS NOTES
INTERNAL MEDICINE PROGRESS NOTE      Interval history    Reports that appetite is a little better.  Family reports some improvement in p.o. intake    Vital signs in last 24 hours:  Temp:  [36.5 °C (97.7 °F)-37.4 °C (99.3 °F)] 36.7 °C (98.1 °F)  Heart Rate:  [60-63] 60  Resp:  [10-25] 16  BP: (109-125)/(49-60) 115/49    Physical Examination:  Physical Exam    Constitutional:       Appearance: Elderly, asthenic build, in no distress  HENT:      Head: Normocephalic and atraumatic.  Moderate thrush present, slightly improved.  Eyes:      Extraocular Movements: Extraocular movements intact.      Pupils: Pupils are equal, round, and reactive to light.   Cardiovascular:      Rate and Rhythm: Normal rate and regular rhythm.      Pulses: Normal pulses.      Heart sounds: Normal heart sounds.   Pulmonary:      Effort: Pulmonary effort is normal.      Breath sounds: Normal breath sounds.   Abdominal:      General: Abdomen is flat. Bowel sounds are normal.      Palpations: Abdomen is soft.  Generalized tenderness present.  No masses.  Musculoskeletal:         General: Normal range of motion.      Cervical back: Normal range of motion and neck supple.   Skin:     General: Skin is warm and dry.  Thoracic incision healing well.  Neurological:      General: No focal deficit present.      Mental Status: Alert and oriented x 2, minimally somnolent but responds to questions.    Medications:    Current Facility-Administered Medications:     acetaminophen (Tylenol) tablet 650 mg, 650 mg, oral, q4h PRN, 650 mg at 07/29/24 0921 **OR** acetaminophen (Tylenol) oral liquid 650 mg, 650 mg, oral, q4h PRN, 650 mg at 07/28/24 2247 **OR** acetaminophen (Tylenol) suppository 650 mg, 650 mg, rectal, q4h PRN, Jacob Staples MD    amLODIPine (Norvasc) tablet 10 mg, 10 mg, oral, Daily, Jacob Staples MD, 10 mg at 07/29/24 0920    aspirin EC tablet 81 mg, 81 mg, oral, Daily, Jacob Staples MD, 81 mg at 07/29/24 0921    atorvastatin  (Lipitor) tablet 80 mg, 80 mg, oral, Daily, Jacob Staples MD, 80 mg at 07/29/24 0921    carvedilol (Coreg) tablet 12.5 mg, 12.5 mg, oral, BID, Jacob Staples MD, 12.5 mg at 07/29/24 0921    clopidogrel (Plavix) tablet 75 mg, 75 mg, oral, Daily, Jacob Staples MD, 75 mg at 07/29/24 0921    colchicine tablet 0.6 mg, 0.6 mg, oral, Daily, Jacob Staples MD, 0.6 mg at 07/29/24 0921    cyproheptadine (Periactin) tablet 2 mg, 2 mg, oral, TID, Jacob Staples MD, 2 mg at 07/29/24 0920    dextrose 1.5% - LOW calcium 2.5mEq/L 2,000 mL peritoneal dialysate, , intraperitoneal, TID, Gab Lynn DO, Given at 07/28/24 2023    dextrose 50 % injection 12.5 g, 12.5 g, intravenous, q15 min PRN, Shani Bain, APRN-CNP, 12.5 g at 07/25/24 2022    dextrose 50 % injection 25 g, 25 g, intravenous, q15 min PRN, Shani Bain, APRN-CNP    fluconazole (Diflucan) tablet 100 mg, 100 mg, oral, Daily, Jacob Staples MD, 100 mg at 07/29/24 0920    glucagon (Glucagen) injection 1 mg, 1 mg, intramuscular, q15 min PRN, Shani Bain, APRN-CNP    glucagon (Glucagen) injection 1 mg, 1 mg, intramuscular, q15 min PRN, Shani Bain, APRN-CNP    heparin (porcine) injection 5,000 Units, 5,000 Units, subcutaneous, q8h MARIYA, Jacob Staples MD, 5,000 Units at 07/28/24 1722    levoFLOXacin (Levaquin) tablet 250 mg, 250 mg, oral, q48h, Jacob Staples MD, 250 mg at 07/27/24 1228    loperamide (Imodium) capsule 2 mg, 2 mg, oral, 4x daily PRN, Jacob Staples MD, 2 mg at 07/28/24 2247    [Held by provider] losartan (Cozaar) tablet 100 mg, 100 mg, oral, Daily, Jacob Staples MD, 100 mg at 07/25/24 0901    nystatin (Mycostatin) 100,000 unit/mL suspension 500,000 Units, 5 mL, Swish & Swallow, TID, Jacob Staples MD, 500,000 Units at 07/29/24 0920    ondansetron (Zofran) injection 4 mg, 4 mg, intravenous, q4h PRN, Jacob Staples MD, 4 mg at 07/28/24 2244    oxyCODONE (Roxicodone) immediate release tablet 5 mg,  5 mg, oral, q6h PRN, Jacob Staples MD, 5 mg at 07/28/24 2236    sertraline (Zoloft) tablet 25 mg, 25 mg, oral, Daily, Jacob Staples MD, 25 mg at 07/29/24 0920    Laboratory Findings:  Lab Results   Component Value Date    WBC 5.7 07/29/2024    HGB 9.1 (L) 07/29/2024    HCT 27.0 (L) 07/29/2024    MCV 80 07/29/2024     07/29/2024     Lab Results   Component Value Date    INR 1.2 (H) 07/17/2024    PROTIME 13.8 (H) 07/17/2024     Lab Results   Component Value Date    GLUCOSE 85 07/29/2024    CALCIUM 8.8 07/29/2024     (L) 07/29/2024    K 3.7 07/29/2024    CO2 23 07/29/2024    CL 95 (L) 07/29/2024    BUN 58 (H) 07/29/2024    CREATININE 11.15 (H) 07/29/2024       Assessment and Plan:     Dehydration -continue to encourage p.o. intake  ESRD -continue with peritoneal dialysis  Abnormal urinalysis -empiric Levaquin as ordered for  Lethargy -suspect cognitive decline which is subtle but chronic, supportive measures.  Weakness -PT OT consulted.  Thrush -continue with nystatin, p.o. Diflucan.  Much improved.  Diarrhea-Imodium as needed.    Discussed with family at bedside.       Jacob Staples MD  07/29/24  10:22 AM

## 2024-07-29 NOTE — PROGRESS NOTES
"Occupational Therapy                 Therapy Communication Note    Patient Name: Isis Geronimo  MRN: [id number]  Today's Date: 7/29/2024     Discipline: Occupational Therapy    Missed Visit Reason: Missed Visit Reason: Other (Comment) (Attempted OT tx, per RN, defer tx d/t \"pt with extreme dizziness, hypotensive, scottie, & weakness.)    Missed Time: Attempt      "

## 2024-07-29 NOTE — PROGRESS NOTES
07/29/24 1719   Discharge Planning   Assistance Needed per IM chart review;Dehydration -continue to encourage p.o. intake  ESRD -continue with peritoneal dialysis  Abnormal urinalysis -empiric Levaquin as ordered, met patient and patient's sister at bedside updated again on [facility], [facility],-will need auth- facility updated on patient's PD home nurse contact number to facilitate staff training   Home or Post Acute Services Post acute facilities (Rehab/SNF/etc)   Type of Post Acute Facility Services Skilled nursing   Expected Discharge Disposition SNF  ([facility])        Pt ambulates to bathroom and voids. Approximately 100 ml falls into hat and an unmeasured amount falls into the toilet. Pt states she feels like she had a decent amount come out.

## 2024-07-29 NOTE — CARE PLAN
Problem: Skin  Goal: Decreased wound size/increased tissue granulation at next dressing change  Outcome: Progressing  Goal: Participates in plan/prevention/treatment measures  Outcome: Progressing  Goal: Prevent/manage excess moisture  Outcome: Progressing  Goal: Prevent/minimize sheer/friction injuries  Outcome: Progressing  Goal: Promote/optimize nutrition  Outcome: Progressing  Goal: Promote skin healing  Outcome: Progressing     Problem: Pain - Adult  Goal: Verbalizes/displays adequate comfort level or baseline comfort level  Outcome: Progressing     Problem: Safety - Adult  Goal: Free from fall injury  Outcome: Progressing     Problem: Discharge Planning  Goal: Discharge to home or other facility with appropriate resources  Outcome: Progressing     Problem: Chronic Conditions and Co-morbidities  Goal: Patient's chronic conditions and co-morbidity symptoms are monitored and maintained or improved  Outcome: Progressing     Problem: Skin  Goal: Decreased wound size/increased tissue granulation at next dressing change  Outcome: Progressing  Goal: Participates in plan/prevention/treatment measures  Outcome: Progressing  Goal: Prevent/manage excess moisture  Outcome: Progressing  Goal: Prevent/minimize sheer/friction injuries  Outcome: Progressing  Goal: Promote/optimize nutrition  Outcome: Progressing  Goal: Promote skin healing  Outcome: Progressing     Problem: Pain - Adult  Goal: Verbalizes/displays adequate comfort level or baseline comfort level  Outcome: Progressing     Problem: Safety - Adult  Goal: Free from fall injury  Outcome: Progressing     Problem: Discharge Planning  Goal: Discharge to home or other facility with appropriate resources  Outcome: Progressing     Problem: Chronic Conditions and Co-morbidities  Goal: Patient's chronic conditions and co-morbidity symptoms are monitored and maintained or improved  Outcome: Progressing   The patient's goals for the shift include rest    The clinical goals  for the shift include Pt will remain hemodynamically stable throughout this shift.    Over the shift, the patient did not make progress toward the following goals. Barriers to progression include poor appetite. Recommendations to address these barriers include peg tube placement.

## 2024-07-30 ENCOUNTER — APPOINTMENT (OUTPATIENT)
Dept: RADIOLOGY | Facility: HOSPITAL | Age: 79
DRG: 640 | End: 2024-07-30
Payer: COMMERCIAL

## 2024-07-30 LAB
ANION GAP SERPL CALC-SCNC: 16 MMOL/L (ref 10–20)
ATRIAL RATE: 55 BPM
BASOPHILS # BLD MANUAL: 0.03 X10*3/UL (ref 0–0.1)
BASOPHILS NFR BLD MANUAL: 1 %
BUN SERPL-MCNC: 56 MG/DL (ref 6–23)
CALCIUM SERPL-MCNC: 8.1 MG/DL (ref 8.6–10.3)
CARDIAC TROPONIN I PNL SERPL HS: 160 NG/L (ref 0–13)
CARDIAC TROPONIN I PNL SERPL HS: 180 NG/L (ref 0–13)
CHLORIDE SERPL-SCNC: 93 MMOL/L (ref 98–107)
CK SERPL-CCNC: 209 U/L (ref 0–215)
CO2 SERPL-SCNC: 29 MMOL/L (ref 21–32)
CREAT SERPL-MCNC: 10.53 MG/DL (ref 0.5–1.05)
EGFRCR SERPLBLD CKD-EPI 2021: 3 ML/MIN/1.73M*2
EOSINOPHIL # BLD MANUAL: 0.03 X10*3/UL (ref 0–0.4)
EOSINOPHIL NFR BLD MANUAL: 1 %
ERYTHROCYTE [DISTWIDTH] IN BLOOD BY AUTOMATED COUNT: 15.3 % (ref 11.5–14.5)
GLUCOSE BLD MANUAL STRIP-MCNC: 75 MG/DL (ref 74–99)
GLUCOSE BLD MANUAL STRIP-MCNC: 85 MG/DL (ref 74–99)
GLUCOSE BLD MANUAL STRIP-MCNC: 97 MG/DL (ref 74–99)
GLUCOSE SERPL-MCNC: 77 MG/DL (ref 74–99)
HCT VFR BLD AUTO: 23.8 % (ref 36–46)
HGB BLD-MCNC: 8.3 G/DL (ref 12–16)
IMM GRANULOCYTES # BLD AUTO: 0.15 X10*3/UL (ref 0–0.5)
IMM GRANULOCYTES NFR BLD AUTO: 5.5 % (ref 0–0.9)
LYMPHOCYTES # BLD MANUAL: 0.3 X10*3/UL (ref 0.8–3)
LYMPHOCYTES NFR BLD MANUAL: 11 %
MCH RBC QN AUTO: 26.6 PG (ref 26–34)
MCHC RBC AUTO-ENTMCNC: 34.9 G/DL (ref 32–36)
MCV RBC AUTO: 76 FL (ref 80–100)
METAMYELOCYTES # BLD MANUAL: 0.03 X10*3/UL
METAMYELOCYTES NFR BLD MANUAL: 1 %
MONOCYTES # BLD MANUAL: 0.11 X10*3/UL (ref 0.05–0.8)
MONOCYTES NFR BLD MANUAL: 4 %
MYELOCYTES # BLD MANUAL: 0.08 X10*3/UL
MYELOCYTES NFR BLD MANUAL: 3 %
NEUTROPHILS # BLD MANUAL: 2.13 X10*3/UL (ref 1.6–5.5)
NEUTS BAND # BLD MANUAL: 0.24 X10*3/UL (ref 0–0.5)
NEUTS BAND NFR BLD MANUAL: 9 %
NEUTS SEG # BLD MANUAL: 1.89 X10*3/UL (ref 1.6–5)
NEUTS SEG NFR BLD MANUAL: 70 %
NRBC BLD-RTO: 0 /100 WBCS (ref 0–0)
OVALOCYTES BLD QL SMEAR: ABNORMAL
P AXIS: 4 DEGREES
P OFFSET: 192 MS
P ONSET: 139 MS
PLATELET # BLD AUTO: 201 X10*3/UL (ref 150–450)
POTASSIUM SERPL-SCNC: 3.2 MMOL/L (ref 3.5–5.3)
PR INTERVAL: 156 MS
Q ONSET: 217 MS
QRS COUNT: 9 BEATS
QRS DURATION: 98 MS
QT INTERVAL: 486 MS
QTC CALCULATION(BAZETT): 464 MS
QTC FREDERICIA: 472 MS
R AXIS: -6 DEGREES
RBC # BLD AUTO: 3.12 X10*6/UL (ref 4–5.2)
RBC MORPH BLD: ABNORMAL
SCHISTOCYTES BLD QL SMEAR: ABNORMAL
SODIUM SERPL-SCNC: 135 MMOL/L (ref 136–145)
T AXIS: 168 DEGREES
T OFFSET: 460 MS
TARGETS BLD QL SMEAR: ABNORMAL
TOTAL CELLS COUNTED BLD: 100
VENTRICULAR RATE: 55 BPM
WBC # BLD AUTO: 2.7 X10*3/UL (ref 4.4–11.3)

## 2024-07-30 PROCEDURE — 82947 ASSAY GLUCOSE BLOOD QUANT: CPT

## 2024-07-30 PROCEDURE — 71045 X-RAY EXAM CHEST 1 VIEW: CPT | Performed by: RADIOLOGY

## 2024-07-30 PROCEDURE — 2500000002 HC RX 250 W HCPCS SELF ADMINISTERED DRUGS (ALT 637 FOR MEDICARE OP, ALT 636 FOR OP/ED): Performed by: INTERNAL MEDICINE

## 2024-07-30 PROCEDURE — 2500000004 HC RX 250 GENERAL PHARMACY W/ HCPCS (ALT 636 FOR OP/ED): Performed by: INTERNAL MEDICINE

## 2024-07-30 PROCEDURE — 3E0G76Z INTRODUCTION OF NUTRITIONAL SUBSTANCE INTO UPPER GI, VIA NATURAL OR ARTIFICIAL OPENING: ICD-10-PCS | Performed by: INTERNAL MEDICINE

## 2024-07-30 PROCEDURE — 85027 COMPLETE CBC AUTOMATED: CPT | Performed by: INTERNAL MEDICINE

## 2024-07-30 PROCEDURE — 1200000002 HC GENERAL ROOM WITH TELEMETRY DAILY

## 2024-07-30 PROCEDURE — 82550 ASSAY OF CK (CPK): CPT | Performed by: NURSE PRACTITIONER

## 2024-07-30 PROCEDURE — 99233 SBSQ HOSP IP/OBS HIGH 50: CPT | Performed by: INTERNAL MEDICINE

## 2024-07-30 PROCEDURE — 84484 ASSAY OF TROPONIN QUANT: CPT | Performed by: NURSE PRACTITIONER

## 2024-07-30 PROCEDURE — 85007 BL SMEAR W/DIFF WBC COUNT: CPT | Performed by: INTERNAL MEDICINE

## 2024-07-30 PROCEDURE — 2500000001 HC RX 250 WO HCPCS SELF ADMINISTERED DRUGS (ALT 637 FOR MEDICARE OP): Performed by: INTERNAL MEDICINE

## 2024-07-30 PROCEDURE — 97530 THERAPEUTIC ACTIVITIES: CPT | Mod: GO,CO

## 2024-07-30 PROCEDURE — 36415 COLL VENOUS BLD VENIPUNCTURE: CPT | Performed by: INTERNAL MEDICINE

## 2024-07-30 PROCEDURE — 71045 X-RAY EXAM CHEST 1 VIEW: CPT

## 2024-07-30 PROCEDURE — 2500000005 HC RX 250 GENERAL PHARMACY W/O HCPCS: Performed by: NURSE PRACTITIONER

## 2024-07-30 PROCEDURE — 80048 BASIC METABOLIC PNL TOTAL CA: CPT | Performed by: INTERNAL MEDICINE

## 2024-07-30 RX ORDER — LIDOCAINE 560 MG/1
1 PATCH PERCUTANEOUS; TOPICAL; TRANSDERMAL DAILY
Status: DISCONTINUED | OUTPATIENT
Start: 2024-07-30 | End: 2024-08-02 | Stop reason: HOSPADM

## 2024-07-30 RX ORDER — POTASSIUM CHLORIDE 20 MEQ/1
20 TABLET, EXTENDED RELEASE ORAL DAILY
Status: DISCONTINUED | OUTPATIENT
Start: 2024-07-30 | End: 2024-08-02 | Stop reason: HOSPADM

## 2024-07-30 ASSESSMENT — COGNITIVE AND FUNCTIONAL STATUS - GENERAL
MOVING TO AND FROM BED TO CHAIR: A LOT
HELP NEEDED FOR BATHING: TOTAL
STANDING UP FROM CHAIR USING ARMS: A LOT
DRESSING REGULAR UPPER BODY CLOTHING: TOTAL
DAILY ACTIVITIY SCORE: 13
TURNING FROM BACK TO SIDE WHILE IN FLAT BAD: A LOT
EATING MEALS: A LOT
CLIMB 3 TO 5 STEPS WITH RAILING: A LOT
MOVING FROM LYING ON BACK TO SITTING ON SIDE OF FLAT BED WITH BEDRAILS: A LOT
HELP NEEDED FOR BATHING: TOTAL
MOBILITY SCORE: 13
PERSONAL GROOMING: A LITTLE
TOILETING: TOTAL
MOBILITY SCORE: 11
MOVING TO AND FROM BED TO CHAIR: A LOT
EATING MEALS: TOTAL
DRESSING REGULAR LOWER BODY CLOTHING: TOTAL
DRESSING REGULAR LOWER BODY CLOTHING: TOTAL
PERSONAL GROOMING: TOTAL
TOILETING: A LOT
WALKING IN HOSPITAL ROOM: A LOT
EATING MEALS: A LITTLE
DAILY ACTIVITIY SCORE: 11
STANDING UP FROM CHAIR USING ARMS: A LOT
TURNING FROM BACK TO SIDE WHILE IN FLAT BAD: A LOT
WALKING IN HOSPITAL ROOM: A LOT
DRESSING REGULAR UPPER BODY CLOTHING: A LOT
TOILETING: TOTAL
HELP NEEDED FOR BATHING: A LOT
CLIMB 3 TO 5 STEPS WITH RAILING: TOTAL
DRESSING REGULAR LOWER BODY CLOTHING: A LOT
PERSONAL GROOMING: A LITTLE
DRESSING REGULAR UPPER BODY CLOTHING: A LOT
DAILY ACTIVITIY SCORE: 6
MOVING FROM LYING ON BACK TO SITTING ON SIDE OF FLAT BED WITH BEDRAILS: A LITTLE

## 2024-07-30 ASSESSMENT — PAIN SCALES - GENERAL
PAINLEVEL_OUTOF10: 0 - NO PAIN
PAINLEVEL_OUTOF10: 5 - MODERATE PAIN
PAINLEVEL_OUTOF10: 10 - WORST POSSIBLE PAIN
PAINLEVEL_OUTOF10: 0 - NO PAIN
PAINLEVEL_OUTOF10: 3

## 2024-07-30 ASSESSMENT — PAIN - FUNCTIONAL ASSESSMENT
PAIN_FUNCTIONAL_ASSESSMENT: 0-10

## 2024-07-30 ASSESSMENT — PAIN DESCRIPTION - LOCATION: LOCATION: ABDOMEN

## 2024-07-30 NOTE — PROGRESS NOTES
Care Coordinator Note:    Plan: patient in with anorexia and dehydration. Family wants to discuss NG / corpak for supplemental calorie intake. Auth was started and has been approved starting 7/31/24.    Status: inpatient  Payor:[facility] dual complete  Disposition: [facility] with PD.   Barrier: Auth aproved starting tomorrow.  ?feeding tube  ADOD: 1-2 days    Emperatriz Barrientos TCC      07/30/24 1400   Discharge Planning   Expected Discharge Disposition SNF

## 2024-07-30 NOTE — SIGNIFICANT EVENT
This is a 80yo female with history of ESRD on PD, anemia, RA, emphysema, current smoker, vertigo, HTN, HLD, and s/p left anterior thoracotomy off pump MIDCAB CABGx1 on 7/17 and discharged home on 7/20 in stable condition.  Patient was readmitted on 7/23 from home with very poor oral intake, refusing to eat, dehydration and failure to thrive.  Patient during stay has been refusing oral intake, workup cultures (BC, UC, PD fluid Cx) have all been negative.  CXR was unrevealing for any acute pathology.  Patient's ongoing course has been relatively uneventful otherwise.  CT surgery service is being asked to see patient for chest pain occurring last night.     Was seen by quality rounding team and pain reproducible with palpation, EKG reviewed.  Troponin 144 --> 160.  Patient at this time not complaining of chest pain, exam revealed pain with palpation, patient verbalizing the pain occurring near left anterior MIDCAB incision and across the chest moving towards the right side then no pain once I palpated across the right side of chest.  She does not appear to be in any acute distress.       Const: laying in bed on tele and in no acute distress s c/o chest pain  CV: RRR, ppp, no peripheral edema  Pulm: Patent airy way, shallow symmetrical chest expansion  HEENT: oral mucosa dry, Corepack clamped and in right nare.  GI: soft, nontender, no distention  Skin: left breast midcab incision well approximated, no erythema or drainage noted.  There is chest pain anteriorly with palpation along incision and moving across midsternum and absent on right side with palpation.    Ext: no edema noted  Neuro: Appears to be at baseline.     Vitals and labs reviewed    Imaging reviewed    Plan:     Obtain third troponin, doubtful this is an MI as patient has ESRD and is on peritoneal dialysis and has recent CABG which all could be factors in elevated troponin  Obtain CK   Trial lidoderm patch along incision  Already has tylenol and as needed  oxycodone   Ongoing medical management per primary and consulting services.     Will reach out and discuss with Dr. Cristina Clifton

## 2024-07-30 NOTE — CARE PLAN
Problem: Pain - Adult  Goal: Verbalizes/displays adequate comfort level or baseline comfort level  Outcome: Progressing     Problem: Safety - Adult  Goal: Free from fall injury  Outcome: Progressing   The patient's goals for the shift include rest    The clinical goals for the shift include pt will not fall during the shift    Over the shift, the patient did not make progress toward the following goals. Barriers to progression include . Recommendations to address these barriers include .

## 2024-07-30 NOTE — PROGRESS NOTES
Nephrology Consult Progress Note    Admit Date: 7/23/2024    Interval history:  No complaints   Still refusing to eat    CURRENT MEDICATIONS:    Current Facility-Administered Medications:     acetaminophen (Tylenol) tablet 650 mg, 650 mg, oral, q4h PRN, 650 mg at 07/29/24 0921 **OR** acetaminophen (Tylenol) oral liquid 650 mg, 650 mg, oral, q4h PRN, 650 mg at 07/29/24 1832 **OR** acetaminophen (Tylenol) suppository 650 mg, 650 mg, rectal, q4h PRN, Jacob Staples MD    amLODIPine (Norvasc) tablet 10 mg, 10 mg, oral, Daily, Jacob Staples MD, 10 mg at 07/30/24 0843    aspirin EC tablet 81 mg, 81 mg, oral, Daily, Jacob Staples MD, 81 mg at 07/30/24 0843    atorvastatin (Lipitor) tablet 80 mg, 80 mg, oral, Daily, Jacob Staples MD, 80 mg at 07/30/24 0843    carvedilol (Coreg) tablet 12.5 mg, 12.5 mg, oral, BID, Jacob Staples MD, 12.5 mg at 07/30/24 0843    clopidogrel (Plavix) tablet 75 mg, 75 mg, oral, Daily, Jacob Staples MD, 75 mg at 07/30/24 0843    colchicine tablet 0.6 mg, 0.6 mg, oral, Daily, Jacob Staples MD, 0.6 mg at 07/30/24 0843    cyproheptadine (Periactin) tablet 2 mg, 2 mg, oral, TID, Jacob Staples MD, 2 mg at 07/30/24 0844    dextrose 1.5% - LOW calcium 2.5mEq/L 2,000 mL peritoneal dialysate, , intraperitoneal, TID, Gab Lynn DO, Given at 07/30/24 1118    dextrose 50 % injection 12.5 g, 12.5 g, intravenous, q15 min PRN, Shani Bain, APRN-CNP, 12.5 g at 07/25/24 2022    dextrose 50 % injection 25 g, 25 g, intravenous, q15 min PRN, ALEXIS Mae-CNP    fluconazole (Diflucan) tablet 100 mg, 100 mg, oral, Daily, Jacob Staples MD, 100 mg at 07/30/24 0843    glucagon (Glucagen) injection 1 mg, 1 mg, intramuscular, q15 min PRN, RACHELLE Mae    glucagon (Glucagen) injection 1 mg, 1 mg, intramuscular, q15 min PRN, RACHELLE Mae    heparin (porcine) injection 5,000 Units, 5,000 Units, subcutaneous, q8h MARIYA, Jacob Staples MD,  "5,000 Units at 07/30/24 0644    levoFLOXacin (Levaquin) tablet 250 mg, 250 mg, oral, q48h, Jacob Staples MD, 250 mg at 07/29/24 1456    loperamide (Imodium) capsule 2 mg, 2 mg, oral, 4x daily PRN, Jacob Staples MD, 2 mg at 07/29/24 1714    [Held by provider] losartan (Cozaar) tablet 100 mg, 100 mg, oral, Daily, Jacob Staples MD, 100 mg at 07/25/24 0901    nystatin (Mycostatin) 100,000 unit/mL suspension 500,000 Units, 5 mL, Swish & Swallow, TID, Jacob Staples MD, 500,000 Units at 07/30/24 0843    ondansetron (Zofran) injection 4 mg, 4 mg, intravenous, q4h PRN, Jacob Staples MD, 4 mg at 07/28/24 2244    oxyCODONE (Roxicodone) immediate release tablet 5 mg, 5 mg, oral, q6h PRN, Jacob Staples MD, 5 mg at 07/28/24 2236    sertraline (Zoloft) tablet 25 mg, 25 mg, oral, Daily, Jacob Staples MD, 25 mg at 07/30/24 0844       Intake/Output Summary (Last 24 hours) at 7/30/2024 1122  Last data filed at 7/30/2024 0000  Gross per 24 hour   Intake 4000 ml   Output 4000 ml   Net 0 ml       PHYSICAL EXAM:  /52   Pulse 69   Temp 36.9 °C (98.4 °F) (Temporal)   Resp 18   Ht 1.6 m (5' 2.99\")   Wt 47.3 kg (104 lb 4.4 oz)   SpO2 95%   BMI 18.48 kg/m²     Intake/Output Summary (Last 24 hours) at 7/30/2024 1122  Last data filed at 7/30/2024 0000  Gross per 24 hour   Intake 4000 ml   Output 4000 ml   Net 0 ml     Gen: AAO, NAD  Neck: No JVD  Cardiac: RRR  Resp: clear BS  Abd: Soft, non tender, +BS, non distended   PD catheter site OK  Ext: No edema   Neuro: moves 4 ext  Peripheral Pulses: Capillary refill <2secs, strong peripheral pulses.  Skin: Skin color, texture, turgor normal, no suspicious rashes or lesions.    Labs:  Results for orders placed or performed during the hospital encounter of 07/23/24 (from the past 24 hour(s))   POCT GLUCOSE   Result Value Ref Range    POCT Glucose 140 (H) 74 - 99 mg/dL   Electrocardiogram, 12-lead PRN ACS symptoms   Result Value Ref Range    Ventricular Rate 55 " BPM    Atrial Rate 55 BPM    KS Interval 156 ms    QRS Duration 98 ms    QT Interval 486 ms    QTC Calculation(Bazett) 464 ms    P Axis 4 degrees    R Axis -6 degrees    T Axis 168 degrees    QRS Count 9 beats    Q Onset 217 ms    P Onset 139 ms    P Offset 192 ms    T Offset 460 ms    QTC Fredericia 472 ms   Troponin I, High Sensitivity   Result Value Ref Range    Troponin I, High Sensitivity 144 (HH) 0 - 13 ng/L   POCT GLUCOSE   Result Value Ref Range    POCT Glucose 145 (H) 74 - 99 mg/dL   Troponin I, High Sensitivity   Result Value Ref Range    Troponin I, High Sensitivity 160 (HH) 0 - 13 ng/L   CBC and Auto Differential   Result Value Ref Range    WBC 2.7 (L) 4.4 - 11.3 x10*3/uL    nRBC 0.0 0.0 - 0.0 /100 WBCs    RBC 3.12 (L) 4.00 - 5.20 x10*6/uL    Hemoglobin 8.3 (L) 12.0 - 16.0 g/dL    Hematocrit 23.8 (L) 36.0 - 46.0 %    MCV 76 (L) 80 - 100 fL    MCH 26.6 26.0 - 34.0 pg    MCHC 34.9 32.0 - 36.0 g/dL    RDW 15.3 (H) 11.5 - 14.5 %    Platelets 201 150 - 450 x10*3/uL    Immature Granulocytes %, Automated 5.5 (H) 0.0 - 0.9 %    Immature Granulocytes Absolute, Automated 0.15 0.00 - 0.50 x10*3/uL   Basic Metabolic Panel   Result Value Ref Range    Glucose 77 74 - 99 mg/dL    Sodium 135 (L) 136 - 145 mmol/L    Potassium 3.2 (L) 3.5 - 5.3 mmol/L    Chloride 93 (L) 98 - 107 mmol/L    Bicarbonate 29 21 - 32 mmol/L    Anion Gap 16 10 - 20 mmol/L    Urea Nitrogen 56 (H) 6 - 23 mg/dL    Creatinine 10.53 (H) 0.50 - 1.05 mg/dL    eGFR 3 (L) >60 mL/min/1.73m*2    Calcium 8.1 (L) 8.6 - 10.3 mg/dL   Manual Differential   Result Value Ref Range    Neutrophils %, Manual 70.0 40.0 - 80.0 %    Bands %, Manual 9.0 0.0 - 5.0 %    Lymphocytes %, Manual 11.0 13.0 - 44.0 %    Monocytes %, Manual 4.0 2.0 - 10.0 %    Eosinophils %, Manual 1.0 0.0 - 6.0 %    Basophils %, Manual 1.0 0.0 - 2.0 %    Metamyelocytes %, Manual 1.0 0.0 - 0.0 %    Myelocytes %, Manual 3.0 0.0 - 0.0 %    Seg Neutrophils Absolute, Manual 1.89 1.60 - 5.00 x10*3/uL     Bands Absolute, Manual 0.24 0.00 - 0.50 x10*3/uL    Lymphocytes Absolute, Manual 0.30 (L) 0.80 - 3.00 x10*3/uL    Monocytes Absolute, Manual 0.11 0.05 - 0.80 x10*3/uL    Eosinophils Absolute, Manual 0.03 0.00 - 0.40 x10*3/uL    Basophils Absolute, Manual 0.03 0.00 - 0.10 x10*3/uL    Metamyelocytes Absolute, Manual 0.03 0.00 - 0.00 x10*3/uL    Myelocytes Absolute, Manual 0.08 0.00 - 0.00 x10*3/uL    Total Cells Counted 100     Neutrophils Absolute, Manual 2.13 1.60 - 5.50 x10*3/uL    RBC Morphology See Below     RBC Fragments Few     Target Cells Few     Ovalocytes Few    POCT GLUCOSE   Result Value Ref Range    POCT Glucose 75 74 - 99 mg/dL        DATA:   Diagnostic tests reviewed for today's visit:    New labs and imaging     Assessment and Plan:  Pt admitted with failure to thrive and poor eating, planing to get PEG?  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       Signature: Lyle Florez MD

## 2024-07-30 NOTE — PROGRESS NOTES
Occupational Therapy    Occupational Therapy Treatment    Name: Isis Geronimo  MRN: [id number]  : 1945  Date: 24  Time Calculation  Start Time: 1336  Stop Time: 1359  Time Calculation (min): 23 min    Assessment:  Medical Staff Made Aware: Yes  End of Session Communication: Bedside nurse  End of Session Patient Position: Bed, 3 rail up, Alarm on  Plan:  Treatment Interventions: ADL retraining, Functional transfer training, UE strengthening/ROM, Endurance training, Patient/family training, Equipment evaluation/education, Compensatory technique education  OT Frequency: 3 times per week  OT Discharge Recommendations: Moderate intensity level of continued care  Equipment Recommended upon Discharge: Wheeled walker  OT Recommended Transfer Status: Assist of 2  OT - OK to Discharge: Yes (per POC)    Subjective   Previous Visit Info:  OT Last Visit  OT Received On: 24  General:  General  Reason for Referral: Pt is a 80 yo female presenting due to poor food intake/dehydration/FTT. 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 Self limiting, flat affect, deconditioned. Pt educated on importance of getting OOB.  Precautions:  Medical Precautions: Fall precautions  Post-Surgical Precautions: Move in the Tube  Precautions Comment: NG tube  Vitals:  Vital Signs  BP: (!) 92/47 (Back in bed /49 MAP 60)  MAP (mmHg): 56  Pain Assessment:  Pain Assessment  Pain Assessment: 0-10  0-10 (Numeric) Pain Score: 3  Pain Type: Acute pain  Pain Location: Chest     Objective   Cognition:  Orientation Level: Oriented X4  Activities of Daily Living: Grooming  Grooming Comments: pt declined all ADL tasks    Functional Standing Tolerance:     Bed Mobility/Transfers: Bed  Mobility  Bed Mobility: Yes  Bed Mobility 1  Bed Mobility 1: Supine to sitting, Sitting to supine  Level of Assistance 1: Maximum assistance  Bed Mobility Comments 1: assist for LE advancement and trunk control with use of log rolling technique. VC/TC provided for hand placement.  Bed Mobility 2  Bed Mobility  2: Scooting  Level of Assistance 2: Moderate assistance  Bed Mobility Comments 2: to bring hips towards EOB with use of draw a sheet.    Transfers  Transfer: Yes  Transfer 1  Transfer From 1: Bed to  Transfer to 1: Chair with drop arm  Technique 1: Sit to stand, Stand to sit  Transfer Device 1: Walker  Transfer Level of Assistance 1: Moderate assistance, +2, Moderate verbal cues  Trials/Comments 1: assist to stand from EOB>FWW with VC/TC for hand placement. Pt tolerated taking ~5 step and transferred to chair. (Pt assisted back to bed d/t hypotensive.)    Sitting Balance:  Static Sitting Balance  Static Sitting-Balance Support: Bilateral upper extremity supported, Feet supported  Static Sitting-Level of Assistance: Minimum assistance  Static Sitting-Comment/Number of Minutes: Pt tolerated sitting EOB ~5 min at min assist to steady d/t retropulsion.    Outcome Measures:  [facility] Daily Activity  Putting on and taking off regular lower body clothing: Total  Bathing (including washing, rinsing, drying): Total  Putting on and taking off regular upper body clothing: Total  Toileting, which includes using toilet, bedpan or urinal: Total  Taking care of personal grooming such as brushing teeth: Total  Eating Meals: Total  Daily Activity - Total Score: 6    Education Documentation  Body Mechanics, taught by TRISTON Khan at 7/30/2024  2:07 PM.  Learner: Patient  Readiness: Nonacceptance  Method: Explanation  Response: Needs Reinforcement, No Evidence of Learning    Handouts, taught by TRISTON Khan at 7/30/2024  2:07 PM.  Learner: Patient  Readiness: Nonacceptance  Method: Explanation  Response: Needs  Reinforcement, No Evidence of Learning    Precautions, taught by TRISTON Khan at 7/30/2024  2:07 PM.  Learner: Patient  Readiness: Nonacceptance  Method: Explanation  Response: Needs Reinforcement, No Evidence of Learning    Home Exercise Program, taught by TRISTON Khan at 7/30/2024  2:07 PM.  Learner: Patient  Readiness: Nonacceptance  Method: Explanation  Response: Needs Reinforcement, No Evidence of Learning    ADL Training, taught by TRISTON Khan at 7/30/2024  2:07 PM.  Learner: Patient  Readiness: Nonacceptance  Method: Explanation  Response: Needs Reinforcement, No Evidence of Learning    Education Comments  No comments found.      Goals:  Encounter Problems       Encounter Problems (Active)       ADLs       Patient will perform UB and LB sponge bathing with minimal assist  level of assistance and long-handled sponge. (Not Progressing)       Start:  07/24/24    Expected End:  08/07/24            Patient with complete upper body dressing with stand by assist level of assistance donning and doffing all UE clothes with PRN adaptive equipment. (Not Progressing)       Start:  07/24/24    Expected End:  08/07/24            Patient with complete lower body dressing with minimal assist  level of assistance donning and doffing all LE clothes  with PRN adaptive equipment. (Not Progressing)       Start:  07/24/24    Expected End:  08/07/24            Patient will complete daily grooming tasks with modified independent level of assistance and PRN adaptive equipment. (Not Progressing)       Start:  07/24/24    Expected End:  08/07/24            Patient will complete toileting including hygiene clothing management/hygiene with minimal assist  level of assistance and raised toilet seat and grab bars. (Not Progressing)       Start:  07/24/24    Expected End:  08/07/24               MOBILITY       Patient will perform Functional mobility x Household distances/Community Distances with stand by  assist level of assistance and least restrictive device in order to improve safety and functional mobility. (Not Progressing)       Start:  07/24/24    Expected End:  08/07/24               TRANSFERS       Patient will perform bed mobility contact guard assist level of assistance and bed rails in order to improve safety and independence with mobility (Progressing)       Start:  07/24/24    Expected End:  08/07/24            Patient will complete functional transfers with least restrictive device with contact guard assist level of assistance. (Progressing)       Start:  07/24/24    Expected End:  08/07/24

## 2024-07-30 NOTE — NURSING NOTE
Charis Placement     Number of Attempts- 1  Location- R Nare  Size- 12 F  Distal Tube Measurement- 70 cm  Confirmation of Placement- yes

## 2024-07-31 LAB
ALBUMIN SERPL BCP-MCNC: 2.1 G/DL (ref 3.4–5)
ALP SERPL-CCNC: 518 U/L (ref 33–136)
ALT SERPL W P-5'-P-CCNC: 94 U/L (ref 7–45)
ANION GAP SERPL CALC-SCNC: 16 MMOL/L (ref 10–20)
ANION GAP SERPL CALC-SCNC: 22 MMOL/L (ref 10–20)
AST SERPL W P-5'-P-CCNC: 588 U/L (ref 9–39)
BASOPHILS # BLD MANUAL: 0 X10*3/UL (ref 0–0.1)
BASOPHILS NFR BLD MANUAL: 0 %
BASOPHILS NFR FLD MANUAL: 0 %
BILIRUB SERPL-MCNC: 0.7 MG/DL (ref 0–1.2)
BLASTS NFR FLD MANUAL: 0 %
BUN SERPL-MCNC: 57 MG/DL (ref 6–23)
BUN SERPL-MCNC: 57 MG/DL (ref 6–23)
CALCIUM SERPL-MCNC: 8.5 MG/DL (ref 8.6–10.3)
CALCIUM SERPL-MCNC: 8.6 MG/DL (ref 8.6–10.3)
CHLORIDE SERPL-SCNC: 94 MMOL/L (ref 98–107)
CHLORIDE SERPL-SCNC: 94 MMOL/L (ref 98–107)
CLARITY FLD: ABNORMAL
CO2 SERPL-SCNC: 24 MMOL/L (ref 21–32)
CO2 SERPL-SCNC: 28 MMOL/L (ref 21–32)
COLOR FLD: ABNORMAL
CREAT SERPL-MCNC: 10.08 MG/DL (ref 0.5–1.05)
CREAT SERPL-MCNC: 9.92 MG/DL (ref 0.5–1.05)
DAT-POLYSPECIFIC: NORMAL
EGFRCR SERPLBLD CKD-EPI 2021: 4 ML/MIN/1.73M*2
EGFRCR SERPLBLD CKD-EPI 2021: 4 ML/MIN/1.73M*2
EOSINOPHIL # BLD MANUAL: 0 X10*3/UL (ref 0–0.4)
EOSINOPHIL NFR BLD MANUAL: 0 %
EOSINOPHIL NFR FLD MANUAL: 0 %
ERYTHROCYTE [DISTWIDTH] IN BLOOD BY AUTOMATED COUNT: 16.1 % (ref 11.5–14.5)
GLUCOSE BLD MANUAL STRIP-MCNC: 152 MG/DL (ref 74–99)
GLUCOSE BLD MANUAL STRIP-MCNC: 99 MG/DL (ref 74–99)
GLUCOSE SERPL-MCNC: 94 MG/DL (ref 74–99)
GLUCOSE SERPL-MCNC: 96 MG/DL (ref 74–99)
HCT VFR BLD AUTO: 22.4 % (ref 36–46)
HGB BLD-MCNC: 7.5 G/DL (ref 12–16)
HGB RETIC QN: 32 PG (ref 28–38)
IMM GRANULOCYTES # BLD AUTO: 0.04 X10*3/UL (ref 0–0.5)
IMM GRANULOCYTES NFR BLD AUTO: 3.3 % (ref 0–0.9)
IMMATURE GRANULOCYTES IN FLUID: 0 %
IMMATURE RETIC FRACTION: 13.6 %
LDH SERPL L TO P-CCNC: 890 U/L (ref 84–246)
LYMPHOCYTES # BLD MANUAL: 0.1 X10*3/UL (ref 0.8–3)
LYMPHOCYTES NFR BLD MANUAL: 8 %
LYMPHOCYTES NFR FLD MANUAL: 42 %
MCH RBC QN AUTO: 27.2 PG (ref 26–34)
MCHC RBC AUTO-ENTMCNC: 33.5 G/DL (ref 32–36)
MCV RBC AUTO: 81 FL (ref 80–100)
METAMYELOCYTES # BLD MANUAL: 0.02 X10*3/UL
METAMYELOCYTES NFR BLD MANUAL: 2 %
MONOCYTES # BLD MANUAL: 0.02 X10*3/UL (ref 0.05–0.8)
MONOCYTES NFR BLD MANUAL: 2 %
MONOS+MACROS NFR FLD MANUAL: 23 %
NEUTROPHILS NFR FLD MANUAL: 30 %
NEUTS SEG # BLD MANUAL: 1.06 X10*3/UL (ref 1.6–5)
NEUTS SEG NFR BLD MANUAL: 88 %
NRBC BLD-RTO: 0 /100 WBCS (ref 0–0)
OTHER CELLS NFR FLD MANUAL: 5 %
OVALOCYTES BLD QL SMEAR: ABNORMAL
PLASMA CELLS NFR FLD MANUAL: 0 %
PLATELET # BLD AUTO: 148 X10*3/UL (ref 150–450)
POTASSIUM SERPL-SCNC: 3.1 MMOL/L (ref 3.5–5.3)
POTASSIUM SERPL-SCNC: 3.1 MMOL/L (ref 3.5–5.3)
PROT SERPL-MCNC: 5.4 G/DL (ref 6.4–8.2)
RBC # BLD AUTO: 2.76 X10*6/UL (ref 4–5.2)
RBC # FLD AUTO: 3000 /UL
RBC MORPH BLD: ABNORMAL
RETICS #: 0.01 X10*6/UL (ref 0.02–0.11)
RETICS/RBC NFR AUTO: 0.5 % (ref 0.5–2)
SCHISTOCYTES BLD QL SMEAR: ABNORMAL
SODIUM SERPL-SCNC: 135 MMOL/L (ref 136–145)
SODIUM SERPL-SCNC: 137 MMOL/L (ref 136–145)
TARGETS BLD QL SMEAR: ABNORMAL
TOTAL CELLS COUNTED BLD: 50
TOTAL CELLS COUNTED FLD: 100
WBC # BLD AUTO: 1.2 X10*3/UL (ref 4.4–11.3)
WBC # FLD AUTO: 40 /UL

## 2024-07-31 PROCEDURE — 89051 BODY FLUID CELL COUNT: CPT | Mod: AHULAB | Performed by: INTERNAL MEDICINE

## 2024-07-31 PROCEDURE — 80048 BASIC METABOLIC PNL TOTAL CA: CPT | Performed by: NURSE PRACTITIONER

## 2024-07-31 PROCEDURE — 85045 AUTOMATED RETICULOCYTE COUNT: CPT | Performed by: NURSE PRACTITIONER

## 2024-07-31 PROCEDURE — 82947 ASSAY GLUCOSE BLOOD QUANT: CPT

## 2024-07-31 PROCEDURE — 83615 LACTATE (LD) (LDH) ENZYME: CPT | Performed by: NURSE PRACTITIONER

## 2024-07-31 PROCEDURE — 80048 BASIC METABOLIC PNL TOTAL CA: CPT | Performed by: INTERNAL MEDICINE

## 2024-07-31 PROCEDURE — 86880 COOMBS TEST DIRECT: CPT

## 2024-07-31 PROCEDURE — 2500000004 HC RX 250 GENERAL PHARMACY W/ HCPCS (ALT 636 FOR OP/ED): Performed by: INTERNAL MEDICINE

## 2024-07-31 PROCEDURE — 36415 COLL VENOUS BLD VENIPUNCTURE: CPT | Performed by: INTERNAL MEDICINE

## 2024-07-31 PROCEDURE — 85027 COMPLETE CBC AUTOMATED: CPT | Performed by: INTERNAL MEDICINE

## 2024-07-31 PROCEDURE — 99223 1ST HOSP IP/OBS HIGH 75: CPT | Performed by: NURSE PRACTITIONER

## 2024-07-31 PROCEDURE — 85007 BL SMEAR W/DIFF WBC COUNT: CPT | Performed by: INTERNAL MEDICINE

## 2024-07-31 PROCEDURE — 2500000002 HC RX 250 W HCPCS SELF ADMINISTERED DRUGS (ALT 637 FOR MEDICARE OP, ALT 636 FOR OP/ED): Performed by: INTERNAL MEDICINE

## 2024-07-31 PROCEDURE — 99233 SBSQ HOSP IP/OBS HIGH 50: CPT | Performed by: INTERNAL MEDICINE

## 2024-07-31 PROCEDURE — 87493 C DIFF AMPLIFIED PROBE: CPT | Mod: AHULAB | Performed by: INTERNAL MEDICINE

## 2024-07-31 PROCEDURE — 2500000001 HC RX 250 WO HCPCS SELF ADMINISTERED DRUGS (ALT 637 FOR MEDICARE OP): Performed by: INTERNAL MEDICINE

## 2024-07-31 PROCEDURE — 1200000002 HC GENERAL ROOM WITH TELEMETRY DAILY

## 2024-07-31 PROCEDURE — 36415 COLL VENOUS BLD VENIPUNCTURE: CPT | Performed by: NURSE PRACTITIONER

## 2024-07-31 PROCEDURE — 2500000005 HC RX 250 GENERAL PHARMACY W/O HCPCS: Performed by: NURSE PRACTITIONER

## 2024-07-31 RX ORDER — DEXTROMETHORPHAN/PSEUDOEPHED 2.5-7.5/.8
250 DROPS ORAL 4 TIMES DAILY PRN
Status: DISCONTINUED | OUTPATIENT
Start: 2024-07-31 | End: 2024-08-02 | Stop reason: HOSPADM

## 2024-07-31 RX ORDER — DIPHENHYDRAMINE HYDROCHLORIDE 50 MG/ML
50 INJECTION INTRAMUSCULAR; INTRAVENOUS EVERY 5 MIN PRN
Status: DISCONTINUED | OUTPATIENT
Start: 2024-07-31 | End: 2024-08-02 | Stop reason: HOSPADM

## 2024-07-31 ASSESSMENT — ENCOUNTER SYMPTOMS
FEVER: 0
SHORTNESS OF BREATH: 0
BLOOD IN STOOL: 0
ACTIVITY CHANGE: 1
JOINT SWELLING: 0
APPETITE CHANGE: 1
NAUSEA: 1
ABDOMINAL PAIN: 1
CHILLS: 0
VOMITING: 0
NECK PAIN: 0
WEAKNESS: 1
DIARRHEA: 1
PALPITATIONS: 0
TROUBLE SWALLOWING: 0
FATIGUE: 1
ABDOMINAL DISTENTION: 1
ADENOPATHY: 0
COUGH: 0
ARTHRALGIAS: 0
HEADACHES: 0

## 2024-07-31 ASSESSMENT — PAIN SCALES - GENERAL
PAINLEVEL_OUTOF10: 3
PAINLEVEL_OUTOF10: 0 - NO PAIN

## 2024-07-31 ASSESSMENT — COGNITIVE AND FUNCTIONAL STATUS - GENERAL
DAILY ACTIVITIY SCORE: 11
MOVING TO AND FROM BED TO CHAIR: A LOT
TURNING FROM BACK TO SIDE WHILE IN FLAT BAD: A LOT
MOBILITY SCORE: 13
CLIMB 3 TO 5 STEPS WITH RAILING: A LOT
DRESSING REGULAR LOWER BODY CLOTHING: A LOT
STANDING UP FROM CHAIR USING ARMS: A LOT
DRESSING REGULAR UPPER BODY CLOTHING: A LOT
EATING MEALS: A LOT
TOILETING: TOTAL
HELP NEEDED FOR BATHING: TOTAL
MOVING FROM LYING ON BACK TO SITTING ON SIDE OF FLAT BED WITH BEDRAILS: A LITTLE
PERSONAL GROOMING: A LITTLE
WALKING IN HOSPITAL ROOM: A LOT

## 2024-07-31 ASSESSMENT — PAIN - FUNCTIONAL ASSESSMENT
PAIN_FUNCTIONAL_ASSESSMENT: 0-10
PAIN_FUNCTIONAL_ASSESSMENT: 0-10

## 2024-07-31 NOTE — PROGRESS NOTES
Physical Therapy                 Therapy Communication Note    Patient Name: Isis Geronimo  MRN: [id number]  Today's Date: 7/31/2024     Discipline: Physical Therapy    Missed Visit Reason: Missed Visit Reason: Patient refused (Pt states she has been experiencing diarrhea and loose stools and does not want to get up at the moment. Pt declined pericare and all OOB activity. Education provided on importance of OOB activity and continued to decline. RN notified,.)    Missed Time: Attempt

## 2024-07-31 NOTE — CONSULTS
Inpatient consult to Hematology  Consult performed by: ALEXIS Leyva-CNP  Consult ordered by: Jacob Staples MD  Reason for consult: Leukopenia  Assessment/Recommendations: Isis Geronimo is a 79 y.o. female presenting on 7/23 with FTT s/p CABGX1 on 7/17, discharged on 7/20. PMH ESRD on PD, anemia, RA, emphysema, current smoker, vertigo, HTN, HLD. Patient was started on Periactin for appetite stimulation and she has developed leukopenia so the medication was discontinued. Hematology was consulted to further work up the leukopenia.       CBC with diff noted to be at baseline until yesterday. WBC 5.7 on 7/29 H&H 9.1/27 -> 2.7 8.3/23.8 210-> 1.2 7.5/22.4 148. Abnormal morphology of RBCs noted through hospital course. Diff revealed ANC 1.06 ALC 0.10 AMC 0.02. Liver enzymes are elevated, CT chest abd and pelvis done in ED revealed- Dilated partially sludge filled gallbladder with ill-defined wall thickening concerning for cholecystitis. Clinical correlation was recommended. Mild fluid distention of small-bowel loops and right colon, possible mild ileus or nonspecific enterocolitis. Patient has been c/o diarrhea she is currently being treated with imodium.     Leukopenia, predominantly lymphopenia, pancytopenia noted yesterday and appears worse today  - Attending suspected it to be 2/2 Periactin, side effects include ;Agranulocytosis, hemolytic anemia, leukopenia, thrombocytopenia  - Attending physician discontinued Periactin  - will add LDH retic CMP and ANNA  - no apparent bleeding  - ESRD on PD renal is following, patient receives epogen, h/o JORGITO ferritin 800 on 7/14 with low iron and TSAT, nephrology ordered venofer today  -  PBS with occasional smudge cell, marked lymphopenia, RBCs with few fragments, target cells and sabino cells noted occasional NRBC as well, large platelets noted, no giant platelets or clumping noted  - consider DIC work up if platelet count continues to trend down   - CBC w diff  daily please  -  retic absolute 0.013  - patient is c/o abdominal pain, diarrhea and her CT revealed possible enterocolitis and her liver enzymes are markedly elevated compared to 7/23, bilirubin is normal, low suspicion for hemolysis, CT also revealed cf partially sludge filled gallbladder with ill-defined wall thickening concerning for cholecystitis  - lymphopenia /pancytopenia may be present iso c-diff/acute abdomen/cholecystitis if she lacks reserve to fight an infection, would consider further work up, deferred to attending  - patient was generally active, smoking and alert prior to her CABG on 7/17, she stated her weakness has overall worsened, she has quit smoking     Discussed with Dr. Fernandez             Reason For Consult  Leukopenia    History Of Present Illness  Isis Geronimo is a 79 y.o. female presenting on 7/23 with FTT s/p CABGX1 on 7/17, discharged on 7/20. PMH ESRD on PD, anemia, RA, emphysema, current smoker, vertigo, HTN, HLD. Patient was started on Periactin for appetite stimulation and she has developed leukopenia so the medication was discontinued. Hematology was consulted to further work up the leukopenia.       CBC with diff noted to be at baseline until yesterday. WBC 5.7 on 7/29 H&H 9.1/27 -> 2.7 8.3/23.8 210-> 1.2 7.5/22.4 148. Abnormal morphology of RBCs noted through hospital course. Diff revealed ANC 1.06 ALC 0.10 AMC 0.02. Liver enzymes are elevated, CT chest abd and pelvis done in ED revealed- Dilated partially sludge filled gallbladder with ill-defined wall thickening concerning for cholecystitis. Clinical correlation was recommended. Mild fluid distention of small-bowel loops and right colon, possible mild ileus or nonspecific enterocolitis. Patient has been c/o diarrhea she is currently being treated with imodium.      Past Medical History  She has a past medical history of Arthritis, CAD (coronary artery disease), COPD (chronic obstructive pulmonary disease)  (Multi), ESRD (end stage renal disease) (Multi), Hypertension, Non-sustained ventricular tachycardia (Multi), and Rheumatoid arthritis (Multi).    Surgical History  She has a past surgical history that includes Cardiac catheterization (N/A, 07/08/2024); Esophagogastroduodenoscopy; Peritoneal catheter insertion (05/23/2022); Colonoscopy; and Dilation and curettage of uterus.     Social History  She reports that she has been smoking cigarettes. She has been exposed to tobacco smoke. She does not have any smokeless tobacco history on file. She reports that she does not currently use alcohol. She reports that she does not use drugs.  States she lives home alone    Family History  No family history on file.     Allergies  Chlorothiazide, Metoprolol, Ibuprofen, and Penicillins    Review of Systems   Constitutional:  Positive for activity change, appetite change and fatigue. Negative for chills and fever.   HENT:  Negative for trouble swallowing.    Respiratory:  Negative for cough and shortness of breath.    Cardiovascular:  Negative for chest pain and palpitations.   Gastrointestinal:  Positive for abdominal distention, abdominal pain, diarrhea and nausea. Negative for blood in stool and vomiting.   Musculoskeletal:  Negative for arthralgias, joint swelling and neck pain.   Skin:  Negative for rash.   Neurological:  Positive for weakness. Negative for headaches.   Hematological:  Negative for adenopathy.        Physical Exam  Vitals and nursing note reviewed.   Constitutional:       General: She is in acute distress.      Appearance: She is ill-appearing.      Comments: Extreme weakness, noted difficulty in talking   HENT:      Mouth/Throat:      Comments: Corpack feeding tube in place  Cardiovascular:      Rate and Rhythm: Normal rate.   Pulmonary:      Effort: No respiratory distress.   Abdominal:      General: There is distension.      Tenderness: There is abdominal tenderness.      Comments: PD catheter in place   "  Musculoskeletal:      Right lower leg: Edema present.      Left lower leg: Edema present.   Lymphadenopathy:      Cervical: No cervical adenopathy.   Skin:     General: Skin is warm and dry.      Findings: No rash.   Neurological:      Mental Status: She is oriented to person, place, and time.      Comments: Answered questions appropriately - difficulty engaging in conversation          Last Recorded Vitals  Blood pressure 123/52, pulse 72, temperature 37.1 °C (98.7 °F), temperature source Oral, resp. rate 18, height 1.6 m (5' 2.99\"), weight 47.3 kg (104 lb 4.4 oz), SpO2 98%.    Relevant Results  Results for orders placed or performed during the hospital encounter of 07/23/24 (from the past 96 hour(s))   POCT GLUCOSE   Result Value Ref Range    POCT Glucose 74 74 - 99 mg/dL   POCT GLUCOSE   Result Value Ref Range    POCT Glucose 106 (H) 74 - 99 mg/dL   POCT GLUCOSE   Result Value Ref Range    POCT Glucose 120 (H) 74 - 99 mg/dL   POCT GLUCOSE   Result Value Ref Range    POCT Glucose 127 (H) 74 - 99 mg/dL   CBC and Auto Differential   Result Value Ref Range    WBC 8.0 4.4 - 11.3 x10*3/uL    nRBC 0.0 0.0 - 0.0 /100 WBCs    RBC 3.27 (L) 4.00 - 5.20 x10*6/uL    Hemoglobin 9.0 (L) 12.0 - 16.0 g/dL    Hematocrit 25.9 (L) 36.0 - 46.0 %    MCV 79 (L) 80 - 100 fL    MCH 27.5 26.0 - 34.0 pg    MCHC 34.7 32.0 - 36.0 g/dL    RDW 16.3 (H) 11.5 - 14.5 %    Platelets 250 150 - 450 x10*3/uL    Neutrophils % 88.4 40.0 - 80.0 %    Immature Granulocytes %, Automated 2.0 (H) 0.0 - 0.9 %    Lymphocytes % 4.7 13.0 - 44.0 %    Monocytes % 2.9 2.0 - 10.0 %    Eosinophils % 1.6 0.0 - 6.0 %    Basophils % 0.4 0.0 - 2.0 %    Neutrophils Absolute 7.08 (H) 1.60 - 5.50 x10*3/uL    Immature Granulocytes Absolute, Automated 0.16 0.00 - 0.50 x10*3/uL    Lymphocytes Absolute 0.38 (L) 0.80 - 3.00 x10*3/uL    Monocytes Absolute 0.23 0.05 - 0.80 x10*3/uL    Eosinophils Absolute 0.13 0.00 - 0.40 x10*3/uL    Basophils Absolute 0.03 0.00 - 0.10 x10*3/uL "   Basic Metabolic Panel   Result Value Ref Range    Glucose 84 74 - 99 mg/dL    Sodium 136 136 - 145 mmol/L    Potassium 4.1 3.5 - 5.3 mmol/L    Chloride 96 (L) 98 - 107 mmol/L    Bicarbonate 24 21 - 32 mmol/L    Anion Gap 20 10 - 20 mmol/L    Urea Nitrogen 65 (H) 6 - 23 mg/dL    Creatinine 12.47 (H) 0.50 - 1.05 mg/dL    eGFR 3 (L) >60 mL/min/1.73m*2    Calcium 8.2 (L) 8.6 - 10.3 mg/dL   POCT GLUCOSE   Result Value Ref Range    POCT Glucose 91 74 - 99 mg/dL   POCT GLUCOSE   Result Value Ref Range    POCT Glucose 94 74 - 99 mg/dL   POCT GLUCOSE   Result Value Ref Range    POCT Glucose 126 (H) 74 - 99 mg/dL   POCT GLUCOSE   Result Value Ref Range    POCT Glucose 129 (H) 74 - 99 mg/dL   CBC and Auto Differential   Result Value Ref Range    WBC 5.7 4.4 - 11.3 x10*3/uL    nRBC 0.0 0.0 - 0.0 /100 WBCs    RBC 3.38 (L) 4.00 - 5.20 x10*6/uL    Hemoglobin 9.1 (L) 12.0 - 16.0 g/dL    Hematocrit 27.0 (L) 36.0 - 46.0 %    MCV 80 80 - 100 fL    MCH 26.9 26.0 - 34.0 pg    MCHC 33.7 32.0 - 36.0 g/dL    RDW 16.2 (H) 11.5 - 14.5 %    Platelets 219 150 - 450 x10*3/uL    Neutrophils % 83.9 40.0 - 80.0 %    Immature Granulocytes %, Automated 6.1 (H) 0.0 - 0.9 %    Lymphocytes % 5.4 13.0 - 44.0 %    Monocytes % 2.6 2.0 - 10.0 %    Eosinophils % 1.6 0.0 - 6.0 %    Basophils % 0.4 0.0 - 2.0 %    Neutrophils Absolute 4.79 1.60 - 5.50 x10*3/uL    Immature Granulocytes Absolute, Automated 0.35 0.00 - 0.50 x10*3/uL    Lymphocytes Absolute 0.31 (L) 0.80 - 3.00 x10*3/uL    Monocytes Absolute 0.15 0.05 - 0.80 x10*3/uL    Eosinophils Absolute 0.09 0.00 - 0.40 x10*3/uL    Basophils Absolute 0.02 0.00 - 0.10 x10*3/uL   Basic Metabolic Panel   Result Value Ref Range    Glucose 85 74 - 99 mg/dL    Sodium 135 (L) 136 - 145 mmol/L    Potassium 3.7 3.5 - 5.3 mmol/L    Chloride 95 (L) 98 - 107 mmol/L    Bicarbonate 23 21 - 32 mmol/L    Anion Gap 21 (H) 10 - 20 mmol/L    Urea Nitrogen 58 (H) 6 - 23 mg/dL    Creatinine 11.15 (H) 0.50 - 1.05 mg/dL    eGFR 3  (L) >60 mL/min/1.73m*2    Calcium 8.8 8.6 - 10.3 mg/dL   Morphology   Result Value Ref Range    RBC Morphology See Below     RBC Fragments Few     Target Cells Few     Ovalocytes Few     [place] Cells Few    POCT GLUCOSE   Result Value Ref Range    POCT Glucose 78 74 - 99 mg/dL   POCT GLUCOSE   Result Value Ref Range    POCT Glucose 140 (H) 74 - 99 mg/dL   Electrocardiogram, 12-lead PRN ACS symptoms   Result Value Ref Range    Ventricular Rate 55 BPM    Atrial Rate 55 BPM    ND Interval 156 ms    QRS Duration 98 ms    QT Interval 486 ms    QTC Calculation(Bazett) 464 ms    P Axis 4 degrees    R Axis -6 degrees    T Axis 168 degrees    QRS Count 9 beats    Q Onset 217 ms    P Onset 139 ms    P Offset 192 ms    T Offset 460 ms    QTC Fredericia 472 ms   Troponin I, High Sensitivity   Result Value Ref Range    Troponin I, High Sensitivity 144 (HH) 0 - 13 ng/L   POCT GLUCOSE   Result Value Ref Range    POCT Glucose 145 (H) 74 - 99 mg/dL   Troponin I, High Sensitivity   Result Value Ref Range    Troponin I, High Sensitivity 160 (HH) 0 - 13 ng/L   Troponin I, High Sensitivity   Result Value Ref Range    Troponin I, High Sensitivity 180 (HH) 0 - 13 ng/L   CBC and Auto Differential   Result Value Ref Range    WBC 2.7 (L) 4.4 - 11.3 x10*3/uL    nRBC 0.0 0.0 - 0.0 /100 WBCs    RBC 3.12 (L) 4.00 - 5.20 x10*6/uL    Hemoglobin 8.3 (L) 12.0 - 16.0 g/dL    Hematocrit 23.8 (L) 36.0 - 46.0 %    MCV 76 (L) 80 - 100 fL    MCH 26.6 26.0 - 34.0 pg    MCHC 34.9 32.0 - 36.0 g/dL    RDW 15.3 (H) 11.5 - 14.5 %    Platelets 201 150 - 450 x10*3/uL    Immature Granulocytes %, Automated 5.5 (H) 0.0 - 0.9 %    Immature Granulocytes Absolute, Automated 0.15 0.00 - 0.50 x10*3/uL   Basic Metabolic Panel   Result Value Ref Range    Glucose 77 74 - 99 mg/dL    Sodium 135 (L) 136 - 145 mmol/L    Potassium 3.2 (L) 3.5 - 5.3 mmol/L    Chloride 93 (L) 98 - 107 mmol/L    Bicarbonate 29 21 - 32 mmol/L    Anion Gap 16 10 - 20 mmol/L    Urea Nitrogen 56 (H) 6 -  23 mg/dL    Creatinine 10.53 (H) 0.50 - 1.05 mg/dL    eGFR 3 (L) >60 mL/min/1.73m*2    Calcium 8.1 (L) 8.6 - 10.3 mg/dL   Manual Differential   Result Value Ref Range    Neutrophils %, Manual 70.0 40.0 - 80.0 %    Bands %, Manual 9.0 0.0 - 5.0 %    Lymphocytes %, Manual 11.0 13.0 - 44.0 %    Monocytes %, Manual 4.0 2.0 - 10.0 %    Eosinophils %, Manual 1.0 0.0 - 6.0 %    Basophils %, Manual 1.0 0.0 - 2.0 %    Metamyelocytes %, Manual 1.0 0.0 - 0.0 %    Myelocytes %, Manual 3.0 0.0 - 0.0 %    Seg Neutrophils Absolute, Manual 1.89 1.60 - 5.00 x10*3/uL    Bands Absolute, Manual 0.24 0.00 - 0.50 x10*3/uL    Lymphocytes Absolute, Manual 0.30 (L) 0.80 - 3.00 x10*3/uL    Monocytes Absolute, Manual 0.11 0.05 - 0.80 x10*3/uL    Eosinophils Absolute, Manual 0.03 0.00 - 0.40 x10*3/uL    Basophils Absolute, Manual 0.03 0.00 - 0.10 x10*3/uL    Metamyelocytes Absolute, Manual 0.03 0.00 - 0.00 x10*3/uL    Myelocytes Absolute, Manual 0.08 0.00 - 0.00 x10*3/uL    Total Cells Counted 100     Neutrophils Absolute, Manual 2.13 1.60 - 5.50 x10*3/uL    RBC Morphology See Below     RBC Fragments Few     Target Cells Few     Ovalocytes Few    Creatine Kinase   Result Value Ref Range    Creatine Kinase 209 0 - 215 U/L   POCT GLUCOSE   Result Value Ref Range    POCT Glucose 75 74 - 99 mg/dL   POCT GLUCOSE   Result Value Ref Range    POCT Glucose 97 74 - 99 mg/dL   POCT GLUCOSE   Result Value Ref Range    POCT Glucose 85 74 - 99 mg/dL   POCT GLUCOSE   Result Value Ref Range    POCT Glucose 99 74 - 99 mg/dL   CBC and Auto Differential   Result Value Ref Range    WBC 1.2 (L) 4.4 - 11.3 x10*3/uL    nRBC 0.0 0.0 - 0.0 /100 WBCs    RBC 2.76 (L) 4.00 - 5.20 x10*6/uL    Hemoglobin 7.5 (L) 12.0 - 16.0 g/dL    Hematocrit 22.4 (L) 36.0 - 46.0 %    MCV 81 80 - 100 fL    MCH 27.2 26.0 - 34.0 pg    MCHC 33.5 32.0 - 36.0 g/dL    RDW 16.1 (H) 11.5 - 14.5 %    Platelets 148 (L) 150 - 450 x10*3/uL    Immature Granulocytes %, Automated 3.3 (H) 0.0 - 0.9 %     Immature Granulocytes Absolute, Automated 0.04 0.00 - 0.50 x10*3/uL   Basic metabolic panel   Result Value Ref Range    Glucose 96 74 - 99 mg/dL    Sodium 135 (L) 136 - 145 mmol/L    Potassium 3.1 (L) 3.5 - 5.3 mmol/L    Chloride 94 (L) 98 - 107 mmol/L    Bicarbonate 28 21 - 32 mmol/L    Anion Gap 16 10 - 20 mmol/L    Urea Nitrogen 57 (H) 6 - 23 mg/dL    Creatinine 9.92 (H) 0.50 - 1.05 mg/dL    eGFR 4 (L) >60 mL/min/1.73m*2    Calcium 8.6 8.6 - 10.3 mg/dL   Manual Differential   Result Value Ref Range    Neutrophils %, Manual 88.0 40.0 - 80.0 %    Lymphocytes %, Manual 8.0 13.0 - 44.0 %    Monocytes %, Manual 2.0 2.0 - 10.0 %    Eosinophils %, Manual 0.0 0.0 - 6.0 %    Basophils %, Manual 0.0 0.0 - 2.0 %    Metamyelocytes %, Manual 2.0 0.0 - 0.0 %    Seg Neutrophils Absolute, Manual 1.06 (L) 1.60 - 5.00 x10*3/uL    Lymphocytes Absolute, Manual 0.10 (L) 0.80 - 3.00 x10*3/uL    Monocytes Absolute, Manual 0.02 (L) 0.05 - 0.80 x10*3/uL    Eosinophils Absolute, Manual 0.00 0.00 - 0.40 x10*3/uL    Basophils Absolute, Manual 0.00 0.00 - 0.10 x10*3/uL    Metamyelocytes Absolute, Manual 0.02 0.00 - 0.00 x10*3/uL    Total Cells Counted 50     RBC Morphology See Below     RBC Fragments Few     Target Cells Few     Ovalocytes Few      === 07/23/24 ===    CT ABDOMEN PELVIS WO IV CONTRAST    - Impression -  Dilated partially sludge filled gallbladder with ill-defined wall  thickening concerning for cholecystitis. Clinical correlation  recommended.    Small amount of free air and moderate peritoneal fluid presumably  related to presence of PD catheter. Subtle density layer in the fluid  in the pelvis could reflect blood products or inflammatory/infectious  debris. clinical correlation suggested.    Mild fluid distention of small-bowel loops and right colon, possible  mild ileus or nonspecific enterocolitis.    Bibasilar infiltrates or atelectasis with pleural effusions.    Slight increased density to small right renal nodule  since  07/06/2024, possible evolving proteinaceous or hemorrhagic cyst.    Additional findings as described above.    MACRO:  None.    Signed by: Franny Marti 7/29/2024 9:20 AM  Dictation workstation:   [id number]        Small bowel enteroscopy 2022 [facility]  - A single actively bleeding angioectasia in the                         jejunum. Treated with argon plasma coagulation (APC).                         - No specimens collected.   Colonoscopy 4/1/22   - Preparation of the colon was poor.                        - The ileum was intubated and showed melanotic/maroon                        colored contents.                        - Visualization in the colon was challenging. Dark                        stool was encountered in the rectum and left side of                        the colon that became progressively marroon colored                        toward the cecum. No over lesions identified. Given                        TI findings will proceed with capsule deployment as a                        small bowel source is suspected.                        - Diverticulosis in the sigmoid colon and in the                        descending colon.                        - No specimens collected.   Small bowel enteroscopy 7/7/16:  Esophagus, stomach, and duodenal bulb/ D2 normal     EGD 7/7/16:  One angiectasia with no bleeding was found in the proximal jejunum.  Unable to visualize AVM on repeat maneuvers.  No active bleeding in duodenem or jejunum     Colonoscopy 7/7/16:  Hematin found in entire colon.  No source of bleed, normal mucosa in entire colon.  Unable to intubate ileum.  Nonbleeding internal hemorrhoids     EGD 7/4/16:   Medium hiatal hernia noted.  Esophagus normal, patchy moderately erythematous mucosa in gastric antrum and duodenal bulb      I spent 80 minutes in the professional and overall care of this patient.

## 2024-07-31 NOTE — PROGRESS NOTES
INTERNAL MEDICINE PROGRESS NOTE      Interval history    Tolerating tube feeds.  Rate increased to 35 so far.  Still has minimal p.o. intake otherwise.    Vital signs in last 24 hours:  Temp:  [36.5 °C (97.7 °F)-37.1 °C (98.7 °F)] 37.1 °C (98.7 °F)  Heart Rate:  [64-73] 72  Resp:  [16-20] 18  BP: ()/(47-60) 123/52    Physical Examination:  Physical Exam    Constitutional:       Appearance: Elderly, asthenic build, in no distress  HENT:      Head: Normocephalic and atraumatic.  Moderate thrush present, slightly improved.  Eyes:      Extraocular Movements: Extraocular movements intact.      Pupils: Pupils are equal, round, and reactive to light.   Cardiovascular:      Rate and Rhythm: Normal rate and regular rhythm.      Pulses: Normal pulses.      Heart sounds: Normal heart sounds.   Pulmonary:      Effort: Pulmonary effort is normal.      Breath sounds: Normal breath sounds.   Abdominal:      General: Abdomen is flat. Bowel sounds are normal.      Palpations: Abdomen is soft.  Generalized tenderness present.  No masses.  Musculoskeletal:         General: Normal range of motion.      Cervical back: Normal range of motion and neck supple.   Skin:     General: Skin is warm and dry.  Thoracic incision healing well.  Neurological:      General: No focal deficit present.      Mental Status: Alert and oriented x 2, somnolent but responds to questions and arouses easily.  Moves all extremities.    Medications:    Current Facility-Administered Medications:     acetaminophen (Tylenol) tablet 650 mg, 650 mg, oral, q4h PRN, 650 mg at 07/29/24 0921 **OR** acetaminophen (Tylenol) oral liquid 650 mg, 650 mg, oral, q4h PRN, 650 mg at 07/29/24 1832 **OR** acetaminophen (Tylenol) suppository 650 mg, 650 mg, rectal, q4h PRN, Jacob Staples MD    amLODIPine (Norvasc) tablet 10 mg, 10 mg, oral, Daily, Jacob Staples MD, 10 mg at 07/30/24 0843    aspirin EC tablet 81 mg, 81 mg, oral, Daily, Jacob Staples MD, 81  mg at 07/30/24 0843    atorvastatin (Lipitor) tablet 80 mg, 80 mg, oral, Daily, Jacob Staples MD, 80 mg at 07/30/24 0843    carvedilol (Coreg) tablet 12.5 mg, 12.5 mg, oral, BID, Jacob Staples MD, 12.5 mg at 07/30/24 2047    clopidogrel (Plavix) tablet 75 mg, 75 mg, oral, Daily, Jacob Staples MD, 75 mg at 07/30/24 0843    colchicine tablet 0.6 mg, 0.6 mg, oral, Daily, Jacob tSaples MD, 0.6 mg at 07/30/24 0843    cyproheptadine (Periactin) tablet 2 mg, 2 mg, oral, TID, Jacob Staples MD, 2 mg at 07/30/24 2047    dextrose 1.5% - LOW calcium 2.5mEq/L 2,000 mL peritoneal dialysate, , intraperitoneal, TID, Gab Lynn DO, Given at 07/30/24 2017    dextrose 50 % injection 12.5 g, 12.5 g, intravenous, q15 min PRN, Shani Bain, ALEXIS-CNP, 12.5 g at 07/25/24 2022    dextrose 50 % injection 25 g, 25 g, intravenous, q15 min PRN, Shani Bain, APRN-CNP    diphenhydrAMINE (BENADryl) injection 50 mg, 50 mg, intravenous, q5 min PRN, Lyle Florez MD    fluconazole (Diflucan) tablet 100 mg, 100 mg, oral, Daily, Jacob Staples MD, 100 mg at 07/30/24 0843    glucagon (Glucagen) injection 1 mg, 1 mg, intramuscular, q15 min PRN, Shani Bain, APRN-CNP    glucagon (Glucagen) injection 1 mg, 1 mg, intramuscular, q15 min PRN, Shani Bain, ALEXIS-CNP    heparin (porcine) injection 5,000 Units, 5,000 Units, subcutaneous, q8h MARIYA, Jacob Staples MD, 5,000 Units at 07/31/24 0712    iron sucrose (Venofer) 300 mg in sodium chloride 0.9% 282 mL IV, 300 mg, intravenous, Once, Lyle Florez MD    levoFLOXacin (Levaquin) tablet 250 mg, 250 mg, oral, q48h, Jacob Staples MD, 250 mg at 07/29/24 1456    lidocaine 4 % patch 1 patch, 1 patch, transdermal, Daily, Cristi Mendoza, APRN-CNP, 1 patch at 07/30/24 1515    loperamide (Imodium) capsule 2 mg, 2 mg, oral, 4x daily PRN, Jacob Staples MD, 2 mg at 07/29/24 1714    [Held by provider] losartan (Cozaar) tablet 100 mg, 100 mg, oral, Daily,  Jacob Staples MD, 100 mg at 07/25/24 0901    nystatin (Mycostatin) 100,000 unit/mL suspension 500,000 Units, 5 mL, Swish & Swallow, TID, Jacob Staples MD, 500,000 Units at 07/30/24 0843    ondansetron (Zofran) injection 4 mg, 4 mg, intravenous, q4h PRN, Jacob Staples MD, 4 mg at 07/28/24 2244    oxyCODONE (Roxicodone) immediate release tablet 5 mg, 5 mg, oral, q6h PRN, Jacob Staples MD, 5 mg at 07/30/24 2258    potassium chloride CR (Klor-Con M20) ER tablet 20 mEq, 20 mEq, oral, Daily, Lyle Florez MD, 20 mEq at 07/30/24 1429    sertraline (Zoloft) tablet 25 mg, 25 mg, oral, Daily, Jacob Staples MD, 25 mg at 07/30/24 0844    Laboratory Findings:  Lab Results   Component Value Date    WBC 1.2 (L) 07/31/2024    HGB 7.5 (L) 07/31/2024    HCT 22.4 (L) 07/31/2024    MCV 81 07/31/2024     (L) 07/31/2024     Lab Results   Component Value Date    INR 1.2 (H) 07/17/2024    PROTIME 13.8 (H) 07/17/2024     Lab Results   Component Value Date    GLUCOSE 96 07/31/2024    CALCIUM 8.6 07/31/2024     (L) 07/31/2024    K 3.1 (L) 07/31/2024    CO2 28 07/31/2024    CL 94 (L) 07/31/2024    BUN 57 (H) 07/31/2024    CREATININE 9.92 (H) 07/31/2024       Assessment and Plan:     Dehydration -corpak placed, tolerating tube feeds  ESRD -continue with peritoneal dialysis  Abnormal urinalysis - adequately treated, urine culture negative  Lethargy -continue with supportive measures for  Weakness -PT OT following.  Thrush -continue with nystatin, better  Diarrhea-Imodium as needed.  Leucopenia -Labs noted, hematology to see, discontinue Periactin.  Likely medication related.    Discussed with daughter at bedside. Will need SNF upon discharge.       Jacob Staples MD  07/31/24  9:58 AM

## 2024-07-31 NOTE — PROGRESS NOTES
"Nutrition Follow-up Note  Nutrition Assessment       Hospital day #8 for dehydration.  Continued lethargy with inadequate oral intake.  NG tube placed and EN started with diet. Thrush resolved but still no significant oral intake.  Diarrhea resolving, 1 BM 7/30/24.  EN meets >75% estimated energy needs.  Continue to encourage intake when patient alert and willing.    History:  Food and Nutrient History  Energy Intake: Poor < 50 %  Food and Nutrient History: Taking very little by mouth, ONS at bedside. Corpak inserted and EN  running at goal rate without issues.    Anthropometrics:  Height: 160 cm (5' 2.99\")  Weight: 47.3 kg (104 lb 4.4 oz)  BMI (Calculated): 18.48  Weight Change: 0.75  Weight Change  Weight History / % Weight Change: Family notes 49kg as UBW. 7/20/24: 50.6 kg at hospital d/c.  Significant Weight Loss:  (unable to verify)    Energy Needs:  Calculated Energy Needs Using Equations  Height: 160 cm (5' 2.99\")  Temp: 37 °C (98.6 °F)    Estimated Energy Needs  Method for Estimating Needs: [id number] @ 30-32 kcal/kg    Estimated Protein Needs  Method for Estimating Needs: 63-73 @ 1.2-1.4 gr/kg IBW    Estimated Fluid Needs  Method for Estimating Needs: per MD     Dietary Orders   Enteral feeding WITH diet order   Diet type: Regular  Tube feeding formula: TwoCal HN   Feeding route: PO/NG (by mouth/nasogastric tube)   Tube feeding continuous rate (mL/hr):35   Start TF @ 15 ml/hr. Advance by 10 ml every 8 hours as tolerated to goal rate 35 ml/hr  Tube feeding flush (mL): 60   Flush type: Water   Water type: Tap water   Flush frequency: Every 6 hours      Nutrition Diagnosis   Malnutrition Diagnosis  Patient has Malnutrition Diagnosis: Yes  Diagnosis Status: Ongoing  Malnutrition Diagnosis: Severe malnutrition related to chronic disease or condition  As Evidenced by: reported intake <75% estimated needs for > 1 month, sever muscle & moderate subcutaneous fat depletion, low BMI.    Patient has Nutrition Diagnosis: " Yes  Nutrition Diagnosis 1: Inadequate protein energy intake  Diagnosis Status (1): Ongoing  Related to (1): anorexia  As Evidenced by (1): refusing meals, increased nutrient needs for healing post-op     Results for  07/23/24 (from the past 24 hour(s)  POCT GLUCOSE  Result Value Ref Range   POCT Glucose 99 74 - 99 mg/dL  CBC and Auto Differential  Result Value Ref Range   RBC 2.76 (L) 4.00 - 5.20 x10*6/uL   Hemoglobin 7.5 (L) 12.0 - 16.0 g/dL   Hematocrit 22.4 (L) 36.0 - 46.0 %  Comprehensive Metabolic Panel  Result Value Ref Range   Glucose 94 74 - 99 mg/dL   Sodium 137 136 - 145 mmol/L   Potassium 3.1 (L) 3.5 - 5.3 mmol/L   Chloride 94 (L) 98 - 107 mmol/L   Bicarbonate 24 21 - 32 mmol/L   Anion Gap 22 (H) 10 - 20 mmol/L   Urea Nitrogen 57 (H) 6 - 23 mg/dL   Creatinine 10.08 (H) 0.50 - 1.05 mg/dL   eGFR 4 (L) >60 mL/min/1.73m*2   Calcium 8.5 (L) 8.6 - 10.3 mg/dL   Albumin 2.1 (L) 3.4 - 5.0 g/dL   Alkaline Phosphatase 518 (H) 33 - 136 U/L   Total Protein 5.4 (L) 6.4 - 8.2 g/dL    (H) 9 - 39 U/L   Bilirubin, Total 0.7 0.0 - 1.2 mg/dL   ALT 94 (H) 7 - 45 U/L  POCT GLUCOSE  Result Value Ref Range   POCT Glucose 152 (H) 74 - 99 mg/dL     Nutrition Interventions/Recommendations   Nutrition Prescription  Individualized Nutrition Prescription Provided for : EN via NG tube started due to poor oral intake.  Nepro Carb Steady tolerated at 35ml/hr without issues, but noted low potassium levels. Staff reports no oral intake and ONS at bedside.  Discussed with medical team and agree to change EN to TwoCalHN @ 35 ml/hr to provide additional calories & potassium, but less protein.  Stop ONS at this time.  May need additional water flushes, MD to manage.  If long term nutrition support parallels medical GOC, will need to consider PEG placement.    Food and/or Nutrient Delivery Interventions  Interventions: Enteral intake, Meals and snacks    Meals and Snacks: General healthful diet  Goal: sips daily to maintain swallow  function  Enteral Intake: Enteral nutrition site care, Feeding tube flush, Modify composition of enteral nutrition  Goal: meets >75% estimated nutrient needs.     Additional Interventions: Absorbs ~ 185 kcal from peritoneal dialysis solution daily, if restarted at 1.5% dextrose, 1.5l exchange 3 times/day.    Nutrition Monitoring and Evaluation   Food and Nutrient Related History   Enteral and Parenteral Nutrition Intake: Enteral nutrition formula/solution, Enteral nutrition intake  Criteria: tolerate at goal rate.     Anthropometrics: Body Composition/Growth/Weight History  Weight: Measured weight  Criteria: daily  Weight Change: Weight change percentage  Criteria: weekly    Biochemical Data, Medical Tests and Procedures  Electrolyte and Renal Panel: BUN, Calcium, serum, Chloride, Creatinine, Magnesium, Phosphorus, Potassium, Sodium  Criteria: as indicated  Glucose/Endocrine Profile: Glucose, casual, Hemoglobin A1c (HgbA1c)  Criteria: as indicated  Nutritional Anemia Profile: Folate, serum, Hematocrit, Hemoglobin, Iron, serum  Criteria: as indicated  Vitamin Profile: Vitamin D, 25 hydroxy, Other (Comment)  Criteria: as indicated    Nutrition Focused Physical Findings   Digestive System: Anorexia, Constipation, Diarrhea, Early satiety, Nausea, Vomiting  Criteria: daily    Follow Up  Last Date of Nutrition Visit: 07/31/24  Nutrition Follow-Up Needed?: Dietitian to reassess per policy  Follow up Comment: svr mal/EN-TR

## 2024-07-31 NOTE — PROGRESS NOTES
INTERNAL MEDICINE PROGRESS NOTE      Interval history    Persistent poor oral intake reported despite improved thrush. Corpak placed this am. Mentation same.    Vital signs in last 24 hours:  Temp:  [36.5 °C (97.7 °F)-37.1 °C (98.7 °F)] 36.9 °C (98.4 °F)  Heart Rate:  [64-73] 72  Resp:  [16-20] 18  BP: ()/(47-60) 129/55    Physical Examination:  Physical Exam    Constitutional:       Appearance: Elderly, asthenic build, in no distress  HENT:      Head: Normocephalic and atraumatic.  Moderate thrush present, slightly improved.  Eyes:      Extraocular Movements: Extraocular movements intact.      Pupils: Pupils are equal, round, and reactive to light.   Cardiovascular:      Rate and Rhythm: Normal rate and regular rhythm.      Pulses: Normal pulses.      Heart sounds: Normal heart sounds.   Pulmonary:      Effort: Pulmonary effort is normal.      Breath sounds: Normal breath sounds.   Abdominal:      General: Abdomen is flat. Bowel sounds are normal.      Palpations: Abdomen is soft.  Generalized tenderness present.  No masses.  Musculoskeletal:         General: Normal range of motion.      Cervical back: Normal range of motion and neck supple.   Skin:     General: Skin is warm and dry.  Thoracic incision healing well.  Neurological:      General: No focal deficit present.      Mental Status: Alert and oriented x 2, minimally somnolent but responds to questions.    Medications:    Current Facility-Administered Medications:     acetaminophen (Tylenol) tablet 650 mg, 650 mg, oral, q4h PRN, 650 mg at 07/29/24 0921 **OR** acetaminophen (Tylenol) oral liquid 650 mg, 650 mg, oral, q4h PRN, 650 mg at 07/29/24 1832 **OR** acetaminophen (Tylenol) suppository 650 mg, 650 mg, rectal, q4h PRN, Jacob Staples MD    amLODIPine (Norvasc) tablet 10 mg, 10 mg, oral, Daily, Jacob Staples MD, 10 mg at 07/30/24 0843    aspirin EC tablet 81 mg, 81 mg, oral, Daily, Jacob Staples MD, 81 mg at 07/30/24 0843     atorvastatin (Lipitor) tablet 80 mg, 80 mg, oral, Daily, Jacob Staples MD, 80 mg at 07/30/24 0843    carvedilol (Coreg) tablet 12.5 mg, 12.5 mg, oral, BID, Jacob Staples MD, 12.5 mg at 07/30/24 2047    clopidogrel (Plavix) tablet 75 mg, 75 mg, oral, Daily, Jacob Staples MD, 75 mg at 07/30/24 0843    colchicine tablet 0.6 mg, 0.6 mg, oral, Daily, Jacob Staples MD, 0.6 mg at 07/30/24 0843    cyproheptadine (Periactin) tablet 2 mg, 2 mg, oral, TID, Jacob Satples MD, 2 mg at 07/30/24 2047    dextrose 1.5% - LOW calcium 2.5mEq/L 2,000 mL peritoneal dialysate, , intraperitoneal, TID, Gab Lynn DO, Given at 07/30/24 2017    dextrose 50 % injection 12.5 g, 12.5 g, intravenous, q15 min PRN, Shani Bain, APRN-CNP, 12.5 g at 07/25/24 2022    dextrose 50 % injection 25 g, 25 g, intravenous, q15 min PRN, Shani Bain, APRN-CNP    fluconazole (Diflucan) tablet 100 mg, 100 mg, oral, Daily, Jacob Staples MD, 100 mg at 07/30/24 0843    glucagon (Glucagen) injection 1 mg, 1 mg, intramuscular, q15 min PRN, Shani Bain, APRN-CNP    glucagon (Glucagen) injection 1 mg, 1 mg, intramuscular, q15 min PRN, Shani Bain, APRN-CNP    heparin (porcine) injection 5,000 Units, 5,000 Units, subcutaneous, q8h MARIYA, Jacob Staples MD, 5,000 Units at 07/30/24 2049    levoFLOXacin (Levaquin) tablet 250 mg, 250 mg, oral, q48h, Jacob Staples MD, 250 mg at 07/29/24 1456    lidocaine 4 % patch 1 patch, 1 patch, transdermal, Daily, Cristi C Gerrick, APRN-CNP, 1 patch at 07/30/24 1515    loperamide (Imodium) capsule 2 mg, 2 mg, oral, 4x daily PRN, Jacob Staples MD, 2 mg at 07/29/24 1714    [Held by provider] losartan (Cozaar) tablet 100 mg, 100 mg, oral, Daily, Jacob Staples MD, 100 mg at 07/25/24 0901    nystatin (Mycostatin) 100,000 unit/mL suspension 500,000 Units, 5 mL, Swish & Swallow, TID, Jacob Staples MD, 500,000 Units at 07/30/24 0843    ondansetron (Zofran) injection 4 mg, 4  mg, intravenous, q4h PRN, Jacob Staples MD, 4 mg at 07/28/24 2244    oxyCODONE (Roxicodone) immediate release tablet 5 mg, 5 mg, oral, q6h PRN, Jacob Staples MD, 5 mg at 07/30/24 2258    potassium chloride CR (Klor-Con M20) ER tablet 20 mEq, 20 mEq, oral, Daily, Lyle Florez MD, 20 mEq at 07/30/24 1429    sertraline (Zoloft) tablet 25 mg, 25 mg, oral, Daily, Jacob Staples MD, 25 mg at 07/30/24 0844    Laboratory Findings:  Lab Results   Component Value Date    WBC 2.7 (L) 07/30/2024    HGB 8.3 (L) 07/30/2024    HCT 23.8 (L) 07/30/2024    MCV 76 (L) 07/30/2024     07/30/2024     Lab Results   Component Value Date    INR 1.2 (H) 07/17/2024    PROTIME 13.8 (H) 07/17/2024     Lab Results   Component Value Date    GLUCOSE 77 07/30/2024    CALCIUM 8.1 (L) 07/30/2024     (L) 07/30/2024    K 3.2 (L) 07/30/2024    CO2 29 07/30/2024    CL 93 (L) 07/30/2024    BUN 56 (H) 07/30/2024    CREATININE 10.53 (H) 07/30/2024       Assessment and Plan:     Dehydration -corpak placed, starting TF  ESRD -continue with peritoneal dialysis  Abnormal urinalysis - adequately treated, urine culture negative  Lethargy -suspect cognitive decline which is subtle but chronic, supportive measures.  Weakness -PT OT following.  Thrush -continue with nystatin, better  Diarrhea-Imodium as needed.  Leucopenia - monitor CBC, no fever.    Discussed with daughter at bedside. Will need SNF.       Jacob Staples MD  07/30/24  11:58 AM

## 2024-07-31 NOTE — CARE PLAN
The patient's goals for the shift include rest    The clinical goals for the shift include no falls through shift      Problem: Skin  Goal: Participates in plan/prevention/treatment measures  Flowsheets (Taken 7/31/2024 1012)  Participates in plan/prevention/treatment measures: Elevate heels  Goal: Prevent/manage excess moisture  Flowsheets (Taken 7/31/2024 1012)  Prevent/manage excess moisture:   Monitor for/manage infection if present   Cleanse incontinence/protect with barrier cream

## 2024-07-31 NOTE — PROGRESS NOTES
Nephrology Consult Progress Note    Admit Date: 7/23/2024    Interval history:  No complaints   Still refusing to eat, corpak in place now and started enteral feeding     CURRENT MEDICATIONS:    Current Facility-Administered Medications:     acetaminophen (Tylenol) tablet 650 mg, 650 mg, oral, q4h PRN, 650 mg at 07/29/24 0921 **OR** acetaminophen (Tylenol) oral liquid 650 mg, 650 mg, oral, q4h PRN, 650 mg at 07/29/24 1832 **OR** acetaminophen (Tylenol) suppository 650 mg, 650 mg, rectal, q4h PRN, Jacob Staples MD    amLODIPine (Norvasc) tablet 10 mg, 10 mg, oral, Daily, Jacob Staples MD, 10 mg at 07/31/24 0959    aspirin EC tablet 81 mg, 81 mg, oral, Daily, Jacob Staples MD, 81 mg at 07/31/24 0959    atorvastatin (Lipitor) tablet 80 mg, 80 mg, oral, Daily, Jacob Staples MD, 80 mg at 07/31/24 0959    carvedilol (Coreg) tablet 12.5 mg, 12.5 mg, oral, BID, Jacob Staples MD, 12.5 mg at 07/31/24 0959    clopidogrel (Plavix) tablet 75 mg, 75 mg, oral, Daily, Jacob Staples MD, 75 mg at 07/31/24 0959    colchicine tablet 0.6 mg, 0.6 mg, oral, Daily, Jacob Staples MD, 0.6 mg at 07/31/24 0959    dextrose 1.5% - LOW calcium 2.5mEq/L 2,000 mL peritoneal dialysate, , intraperitoneal, TID, Gab Lynn DO, Given at 07/31/24 1123    dextrose 50 % injection 12.5 g, 12.5 g, intravenous, q15 min PRN, ALEXIS Mae-CNP, 12.5 g at 07/25/24 2022    dextrose 50 % injection 25 g, 25 g, intravenous, q15 min PRN, ALEXIS Mae-JUSTIN    diphenhydrAMINE (BENADryl) injection 50 mg, 50 mg, intravenous, q5 min PRN, Lyle Florez MD    glucagon (Glucagen) injection 1 mg, 1 mg, intramuscular, q15 min PRN, ALEXIS Mae-CNP    glucagon (Glucagen) injection 1 mg, 1 mg, intramuscular, q15 min PRN, RACHELLE Mae    heparin (porcine) injection 5,000 Units, 5,000 Units, subcutaneous, q8h MARIYA, Jacob Staples MD, 5,000 Units at 07/31/24 0712    iron sucrose (Venofer) 300 mg in  "sodium chloride 0.9% 282 mL IV, 300 mg, intravenous, Once, Lyle Florez MD, Last Rate: 188 mL/hr at 07/31/24 1130, 300 mg at 07/31/24 1130    lidocaine 4 % patch 1 patch, 1 patch, transdermal, Daily, Cristi Mendoza, APRN-CNP, 1 patch at 07/30/24 1515    loperamide (Imodium) capsule 2 mg, 2 mg, oral, 4x daily PRN, Jacob Staples MD, 2 mg at 07/29/24 1714    [Held by provider] losartan (Cozaar) tablet 100 mg, 100 mg, oral, Daily, Jacob Staples MD, 100 mg at 07/25/24 0901    nystatin (Mycostatin) 100,000 unit/mL suspension 500,000 Units, 5 mL, Swish & Swallow, TID, Jacob Staples MD, 500,000 Units at 07/30/24 0843    ondansetron (Zofran) injection 4 mg, 4 mg, intravenous, q4h PRN, Jacob Staples MD, 4 mg at 07/28/24 2244    oxyCODONE (Roxicodone) immediate release tablet 5 mg, 5 mg, oral, q6h PRN, Jacob Staples MD, 5 mg at 07/30/24 2258    potassium chloride CR (Klor-Con M20) ER tablet 20 mEq, 20 mEq, oral, Daily, Lyle Florez MD, 20 mEq at 07/31/24 0959    sertraline (Zoloft) tablet 25 mg, 25 mg, oral, Daily, Jacob Staples MD, 25 mg at 07/31/24 0959       Intake/Output Summary (Last 24 hours) at 7/31/2024 1250  Last data filed at 7/31/2024 1130  Gross per 24 hour   Intake 4500 ml   Output 3300 ml   Net 1200 ml       PHYSICAL EXAM:  /52 (BP Location: Left arm, Patient Position: Lying)   Pulse 72   Temp 37.1 °C (98.7 °F) (Oral)   Resp 18   Ht 1.6 m (5' 2.99\")   Wt 47.3 kg (104 lb 4.4 oz)   SpO2 98%   BMI 18.48 kg/m²     Intake/Output Summary (Last 24 hours) at 7/31/2024 1250  Last data filed at 7/31/2024 1130  Gross per 24 hour   Intake 4500 ml   Output 3300 ml   Net 1200 ml     Gen: AAO, NAD  Neck: No JVD  Cardiac: RRR  Resp: clear BS  Abd: Soft, non tender, +BS, non distended   PD catheter site OK  Ext: No edema   Neuro: moves 4 ext  Peripheral Pulses: Capillary refill <2secs, strong peripheral pulses.  Skin: Skin color, texture, turgor normal, no suspicious rashes or " lesions.    Labs:  Results for orders placed or performed during the hospital encounter of 07/23/24 (from the past 24 hour(s))   POCT GLUCOSE   Result Value Ref Range    POCT Glucose 85 74 - 99 mg/dL   POCT GLUCOSE   Result Value Ref Range    POCT Glucose 99 74 - 99 mg/dL   CBC and Auto Differential   Result Value Ref Range    WBC 1.2 (L) 4.4 - 11.3 x10*3/uL    nRBC 0.0 0.0 - 0.0 /100 WBCs    RBC 2.76 (L) 4.00 - 5.20 x10*6/uL    Hemoglobin 7.5 (L) 12.0 - 16.0 g/dL    Hematocrit 22.4 (L) 36.0 - 46.0 %    MCV 81 80 - 100 fL    MCH 27.2 26.0 - 34.0 pg    MCHC 33.5 32.0 - 36.0 g/dL    RDW 16.1 (H) 11.5 - 14.5 %    Platelets 148 (L) 150 - 450 x10*3/uL    Immature Granulocytes %, Automated 3.3 (H) 0.0 - 0.9 %    Immature Granulocytes Absolute, Automated 0.04 0.00 - 0.50 x10*3/uL   Basic metabolic panel   Result Value Ref Range    Glucose 96 74 - 99 mg/dL    Sodium 135 (L) 136 - 145 mmol/L    Potassium 3.1 (L) 3.5 - 5.3 mmol/L    Chloride 94 (L) 98 - 107 mmol/L    Bicarbonate 28 21 - 32 mmol/L    Anion Gap 16 10 - 20 mmol/L    Urea Nitrogen 57 (H) 6 - 23 mg/dL    Creatinine 9.92 (H) 0.50 - 1.05 mg/dL    eGFR 4 (L) >60 mL/min/1.73m*2    Calcium 8.6 8.6 - 10.3 mg/dL   Manual Differential   Result Value Ref Range    Neutrophils %, Manual 88.0 40.0 - 80.0 %    Lymphocytes %, Manual 8.0 13.0 - 44.0 %    Monocytes %, Manual 2.0 2.0 - 10.0 %    Eosinophils %, Manual 0.0 0.0 - 6.0 %    Basophils %, Manual 0.0 0.0 - 2.0 %    Metamyelocytes %, Manual 2.0 0.0 - 0.0 %    Seg Neutrophils Absolute, Manual 1.06 (L) 1.60 - 5.00 x10*3/uL    Lymphocytes Absolute, Manual 0.10 (L) 0.80 - 3.00 x10*3/uL    Monocytes Absolute, Manual 0.02 (L) 0.05 - 0.80 x10*3/uL    Eosinophils Absolute, Manual 0.00 0.00 - 0.40 x10*3/uL    Basophils Absolute, Manual 0.00 0.00 - 0.10 x10*3/uL    Metamyelocytes Absolute, Manual 0.02 0.00 - 0.00 x10*3/uL    Total Cells Counted 50     RBC Morphology See Below     RBC Fragments Few     Target Cells Few     Ovalocytes  Few    Comprehensive Metabolic Panel   Result Value Ref Range    Glucose 94 74 - 99 mg/dL    Sodium 137 136 - 145 mmol/L    Potassium 3.1 (L) 3.5 - 5.3 mmol/L    Chloride 94 (L) 98 - 107 mmol/L    Bicarbonate 24 21 - 32 mmol/L    Anion Gap 22 (H) 10 - 20 mmol/L    Urea Nitrogen 57 (H) 6 - 23 mg/dL    Creatinine 10.08 (H) 0.50 - 1.05 mg/dL    eGFR 4 (L) >60 mL/min/1.73m*2    Calcium 8.5 (L) 8.6 - 10.3 mg/dL    Albumin 2.1 (L) 3.4 - 5.0 g/dL    Alkaline Phosphatase 518 (H) 33 - 136 U/L    Total Protein 5.4 (L) 6.4 - 8.2 g/dL     (H) 9 - 39 U/L    Bilirubin, Total 0.7 0.0 - 1.2 mg/dL    ALT 94 (H) 7 - 45 U/L   Reticulocytes   Result Value Ref Range    Retic % 0.5 0.5 - 2.0 %    Retic Absolute 0.013 (L) 0.017 - 0.110 x10*6/uL    Reticulocyte Hemoglobin 32 28 - 38 pg    Immature Retic fraction 13.6 <=16.0 %   Lactate Dehydrogenase   Result Value Ref Range     (H) 84 - 246 U/L        DATA:   Diagnostic tests reviewed for today's visit:    New labs and imaging     Assessment and Plan:  Pt admitted with failure to thrive and poor eating  - ESKD on PD: ADP at home  CAPD here, all 1.5%, good UF and euvolemic  Stable BUN and Scr load   No changes on her PD regimen  BP: controlled  Lytes and acid base: mild HypoK, daily KCL, replete more prn  Hb 7.5, had epogen this week. Pt is iron deficient, giving iv iron today     Will continue to follow.       Signature: Lyle Florez MD

## 2024-07-31 NOTE — SIGNIFICANT EVENT
This is a 80yo female with history of ESRD on PD, anemia, RA, emphysema, current smoker, vertigo, HTN, HLD, MVD right dominant system s/p left anterior thoracotomy off pump MIDCAB CABGx1 LIMA-LAD on 7/17 and discharged home on 7/20 in stable condition.  Patient was readmitted on 7/23 from home with very poor oral intake, refusing to eat, dehydration and failure to thrive.      Patient during stay has been refusing oral intake, workup cultures (BC, UC, PD fluid Cx) have all been negative.  CXR was unrevealing for any acute pathology.  Patient's ongoing course has been relatively uneventful otherwise. Current c/o of chest pain is reproducible, likely not cardiac in nature. Ms Geronimo continues to have MVD however due to multiple comorbidity and fragility/failure to thrive she is not a candidate for surgical interventions.     Patient seen and discussed with Dr Cristina Clifton    Failure to thrive/Malnutrition: Continue Enteral feeding with PO as tolerated  Diarrhea likely r/t Colchicine: discontinue colchicine  Weakness: discontinue statin  Anemia/Leukopenia: discontinue Plavix    Dr Staples updated     Time spent on the assessment of patient, gathering and interpreting data, review of medical record/patient history, personally reviewing radiographic imaging and formulation of this note. With greater than 50% spent in personal discussion with patient/ family.  Time: 30 minutes   ALEXIS Mae-CNP

## 2024-08-01 ENCOUNTER — APPOINTMENT (OUTPATIENT)
Dept: RADIOLOGY | Facility: HOSPITAL | Age: 79
DRG: 640 | End: 2024-08-01
Payer: COMMERCIAL

## 2024-08-01 LAB
ALBUMIN SERPL BCP-MCNC: 2.2 G/DL (ref 3.4–5)
ALP SERPL-CCNC: 505 U/L (ref 33–136)
ALT SERPL W P-5'-P-CCNC: 171 U/L (ref 7–45)
AMMONIA PLAS-SCNC: 21 UMOL/L (ref 16–53)
ANION GAP SERPL CALC-SCNC: 21 MMOL/L (ref 10–20)
AST SERPL W P-5'-P-CCNC: 932 U/L (ref 9–39)
BASOPHILS # BLD AUTO: 0.02 X10*3/UL (ref 0–0.1)
BASOPHILS NFR BLD AUTO: 2.5 %
BILIRUB DIRECT SERPL-MCNC: 0.2 MG/DL (ref 0–0.3)
BILIRUB SERPL-MCNC: 0.3 MG/DL (ref 0–1.2)
BUN SERPL-MCNC: 66 MG/DL (ref 6–23)
BURR CELLS BLD QL SMEAR: NORMAL
C DIF TOX TCDA+TCDB STL QL NAA+PROBE: NOT DETECTED
CALCIUM SERPL-MCNC: 9.7 MG/DL (ref 8.6–10.3)
CHLORIDE SERPL-SCNC: 92 MMOL/L (ref 98–107)
CO2 SERPL-SCNC: 26 MMOL/L (ref 21–32)
CREAT SERPL-MCNC: 9.27 MG/DL (ref 0.5–1.05)
EGFRCR SERPLBLD CKD-EPI 2021: 4 ML/MIN/1.73M*2
EOSINOPHIL # BLD AUTO: 0 X10*3/UL (ref 0–0.4)
EOSINOPHIL NFR BLD AUTO: 0 %
ERYTHROCYTE [DISTWIDTH] IN BLOOD BY AUTOMATED COUNT: 16.3 % (ref 11.5–14.5)
GLUCOSE BLD MANUAL STRIP-MCNC: 108 MG/DL (ref 74–99)
GLUCOSE BLD MANUAL STRIP-MCNC: 119 MG/DL (ref 74–99)
GLUCOSE BLD MANUAL STRIP-MCNC: 128 MG/DL (ref 74–99)
GLUCOSE BLD MANUAL STRIP-MCNC: 88 MG/DL (ref 74–99)
GLUCOSE SERPL-MCNC: 116 MG/DL (ref 74–99)
HCT VFR BLD AUTO: 25.1 % (ref 36–46)
HGB BLD-MCNC: 8.2 G/DL (ref 12–16)
HIV 1+2 AB+HIV1 P24 AG SERPL QL IA: NONREACTIVE
IGA SERPL-MCNC: 352 MG/DL (ref 70–400)
IGG SERPL-MCNC: 937 MG/DL (ref 700–1600)
IGM SERPL-MCNC: 25 MG/DL (ref 40–230)
IMM GRANULOCYTES # BLD AUTO: 0.04 X10*3/UL (ref 0–0.5)
IMM GRANULOCYTES NFR BLD AUTO: 4.9 % (ref 0–0.9)
LYMPHOCYTES # BLD AUTO: 0.13 X10*3/UL (ref 0.8–3)
LYMPHOCYTES NFR BLD AUTO: 16 %
MCH RBC QN AUTO: 26.3 PG (ref 26–34)
MCHC RBC AUTO-ENTMCNC: 32.7 G/DL (ref 32–36)
MCV RBC AUTO: 80 FL (ref 80–100)
MONOCYTES # BLD AUTO: 0.04 X10*3/UL (ref 0.05–0.8)
MONOCYTES NFR BLD AUTO: 4.9 %
NEUTROPHILS # BLD AUTO: 0.58 X10*3/UL (ref 1.6–5.5)
NEUTROPHILS NFR BLD AUTO: 71.7 %
NRBC BLD-RTO: 2.5 /100 WBCS (ref 0–0)
PLATELET # BLD AUTO: 154 X10*3/UL (ref 150–450)
POTASSIUM SERPL-SCNC: 2.8 MMOL/L (ref 3.5–5.3)
PROT SERPL-MCNC: 5.6 G/DL (ref 6.4–8.2)
PROT SERPL-MCNC: 5.8 G/DL (ref 6.4–8.2)
RBC # BLD AUTO: 3.12 X10*6/UL (ref 4–5.2)
RBC MORPH BLD: NORMAL
SCHISTOCYTES BLD QL SMEAR: NORMAL
SODIUM SERPL-SCNC: 136 MMOL/L (ref 136–145)
TARGETS BLD QL SMEAR: NORMAL
WBC # BLD AUTO: 0.8 X10*3/UL (ref 4.4–11.3)

## 2024-08-01 PROCEDURE — 36415 COLL VENOUS BLD VENIPUNCTURE: CPT | Performed by: INTERNAL MEDICINE

## 2024-08-01 PROCEDURE — 82784 ASSAY IGA/IGD/IGG/IGM EACH: CPT | Mod: AHULAB | Performed by: NURSE PRACTITIONER

## 2024-08-01 PROCEDURE — 2500000002 HC RX 250 W HCPCS SELF ADMINISTERED DRUGS (ALT 637 FOR MEDICARE OP, ALT 636 FOR OP/ED): Performed by: INTERNAL MEDICINE

## 2024-08-01 PROCEDURE — 87389 HIV-1 AG W/HIV-1&-2 AB AG IA: CPT | Mod: AHULAB | Performed by: NURSE PRACTITIONER

## 2024-08-01 PROCEDURE — 86665 EPSTEIN-BARR CAPSID VCA: CPT | Mod: AHULAB | Performed by: NURSE PRACTITIONER

## 2024-08-01 PROCEDURE — 2500000004 HC RX 250 GENERAL PHARMACY W/ HCPCS (ALT 636 FOR OP/ED): Performed by: INTERNAL MEDICINE

## 2024-08-01 PROCEDURE — 97530 THERAPEUTIC ACTIVITIES: CPT | Mod: GP,CQ

## 2024-08-01 PROCEDURE — 99232 SBSQ HOSP IP/OBS MODERATE 35: CPT | Performed by: NURSE PRACTITIONER

## 2024-08-01 PROCEDURE — 99221 1ST HOSP IP/OBS SF/LOW 40: CPT | Performed by: SURGERY

## 2024-08-01 PROCEDURE — 74018 RADEX ABDOMEN 1 VIEW: CPT

## 2024-08-01 PROCEDURE — 86664 EPSTEIN-BARR NUCLEAR ANTIGEN: CPT | Mod: AHULAB | Performed by: NURSE PRACTITIONER

## 2024-08-01 PROCEDURE — 83521 IG LIGHT CHAINS FREE EACH: CPT | Mod: AHULAB | Performed by: NURSE PRACTITIONER

## 2024-08-01 PROCEDURE — 36415 COLL VENOUS BLD VENIPUNCTURE: CPT | Performed by: NURSE PRACTITIONER

## 2024-08-01 PROCEDURE — 99233 SBSQ HOSP IP/OBS HIGH 50: CPT | Performed by: INTERNAL MEDICINE

## 2024-08-01 PROCEDURE — S4991 NICOTINE PATCH NONLEGEND: HCPCS | Performed by: INTERNAL MEDICINE

## 2024-08-01 PROCEDURE — 82947 ASSAY GLUCOSE BLOOD QUANT: CPT

## 2024-08-01 PROCEDURE — 2500000001 HC RX 250 WO HCPCS SELF ADMINISTERED DRUGS (ALT 637 FOR MEDICARE OP): Performed by: INTERNAL MEDICINE

## 2024-08-01 PROCEDURE — 80074 ACUTE HEPATITIS PANEL: CPT | Mod: AHULAB | Performed by: NURSE PRACTITIONER

## 2024-08-01 PROCEDURE — 84075 ASSAY ALKALINE PHOSPHATASE: CPT | Performed by: NURSE PRACTITIONER

## 2024-08-01 PROCEDURE — 83010 ASSAY OF HAPTOGLOBIN QUANT: CPT | Performed by: NURSE PRACTITIONER

## 2024-08-01 PROCEDURE — 1200000002 HC GENERAL ROOM WITH TELEMETRY DAILY

## 2024-08-01 PROCEDURE — 74018 RADEX ABDOMEN 1 VIEW: CPT | Performed by: RADIOLOGY

## 2024-08-01 PROCEDURE — 2500000005 HC RX 250 GENERAL PHARMACY W/O HCPCS: Performed by: NURSE PRACTITIONER

## 2024-08-01 PROCEDURE — 84155 ASSAY OF PROTEIN SERUM: CPT | Mod: AHULAB | Performed by: NURSE PRACTITIONER

## 2024-08-01 PROCEDURE — 82140 ASSAY OF AMMONIA: CPT | Performed by: INTERNAL MEDICINE

## 2024-08-01 PROCEDURE — 85025 COMPLETE CBC W/AUTO DIFF WBC: CPT | Performed by: INTERNAL MEDICINE

## 2024-08-01 PROCEDURE — 86334 IMMUNOFIX E-PHORESIS SERUM: CPT | Mod: AHULAB | Performed by: NURSE PRACTITIONER

## 2024-08-01 PROCEDURE — 97530 THERAPEUTIC ACTIVITIES: CPT | Mod: GO | Performed by: OCCUPATIONAL THERAPIST

## 2024-08-01 PROCEDURE — 86663 EPSTEIN-BARR ANTIBODY: CPT | Mod: AHULAB | Performed by: NURSE PRACTITIONER

## 2024-08-01 RX ORDER — IBUPROFEN 200 MG
1 TABLET ORAL DAILY
Status: DISCONTINUED | OUTPATIENT
Start: 2024-08-01 | End: 2024-08-02 | Stop reason: HOSPADM

## 2024-08-01 RX ORDER — POTASSIUM CHLORIDE 1.5 G/1.58G
20 POWDER, FOR SOLUTION ORAL 3 TIMES DAILY
Status: DISPENSED | OUTPATIENT
Start: 2024-08-01 | End: 2024-08-02

## 2024-08-01 RX ORDER — IBUPROFEN 200 MG
1 TABLET ORAL DAILY
Status: DISCONTINUED | OUTPATIENT
Start: 2024-08-01 | End: 2024-08-01

## 2024-08-01 RX ORDER — POTASSIUM CHLORIDE 14.9 MG/ML
20 INJECTION INTRAVENOUS
Status: COMPLETED | OUTPATIENT
Start: 2024-08-01 | End: 2024-08-01

## 2024-08-01 ASSESSMENT — COGNITIVE AND FUNCTIONAL STATUS - GENERAL
TOILETING: TOTAL
DAILY ACTIVITIY SCORE: 12
PERSONAL GROOMING: A LOT
DRESSING REGULAR UPPER BODY CLOTHING: A LOT
MOVING TO AND FROM BED TO CHAIR: A LOT
MOVING TO AND FROM BED TO CHAIR: A LOT
TURNING FROM BACK TO SIDE WHILE IN FLAT BAD: A LOT
MOBILITY SCORE: 7
CLIMB 3 TO 5 STEPS WITH RAILING: TOTAL
DRESSING REGULAR UPPER BODY CLOTHING: A LOT
CLIMB 3 TO 5 STEPS WITH RAILING: TOTAL
WALKING IN HOSPITAL ROOM: A LOT
MOBILITY SCORE: 11
DRESSING REGULAR LOWER BODY CLOTHING: TOTAL
MOBILITY SCORE: 11
TURNING FROM BACK TO SIDE WHILE IN FLAT BAD: TOTAL
MOBILITY SCORE: 7
TURNING FROM BACK TO SIDE WHILE IN FLAT BAD: TOTAL
MOVING FROM LYING ON BACK TO SITTING ON SIDE OF FLAT BED WITH BEDRAILS: A LOT
STANDING UP FROM CHAIR USING ARMS: TOTAL
WALKING IN HOSPITAL ROOM: A LOT
EATING MEALS: A LITTLE
PERSONAL GROOMING: A LOT
MOVING FROM LYING ON BACK TO SITTING ON SIDE OF FLAT BED WITH BEDRAILS: A LOT
HELP NEEDED FOR BATHING: TOTAL
MOVING FROM LYING ON BACK TO SITTING ON SIDE OF FLAT BED WITH BEDRAILS: A LOT
DRESSING REGULAR LOWER BODY CLOTHING: TOTAL
PERSONAL GROOMING: A LITTLE
STANDING UP FROM CHAIR USING ARMS: A LOT
MOVING FROM LYING ON BACK TO SITTING ON SIDE OF FLAT BED WITH BEDRAILS: A LOT
DRESSING REGULAR LOWER BODY CLOTHING: A LOT
DAILY ACTIVITIY SCORE: 12
STANDING UP FROM CHAIR USING ARMS: A LOT
WALKING IN HOSPITAL ROOM: TOTAL
MOVING TO AND FROM BED TO CHAIR: A LOT
STANDING UP FROM CHAIR USING ARMS: TOTAL
EATING MEALS: A LOT
DRESSING REGULAR LOWER BODY CLOTHING: TOTAL
DAILY ACTIVITIY SCORE: 11
TURNING FROM BACK TO SIDE WHILE IN FLAT BAD: A LOT
DRESSING REGULAR UPPER BODY CLOTHING: TOTAL
DRESSING REGULAR UPPER BODY CLOTHING: A LOT
EATING MEALS: A LOT
MOVING TO AND FROM BED TO CHAIR: TOTAL
TOILETING: TOTAL
WALKING IN HOSPITAL ROOM: TOTAL
HELP NEEDED FOR BATHING: A LOT
CLIMB 3 TO 5 STEPS WITH RAILING: TOTAL
CLIMB 3 TO 5 STEPS WITH RAILING: TOTAL
WALKING IN HOSPITAL ROOM: A LOT
TOILETING: TOTAL
HELP NEEDED FOR BATHING: A LOT
EATING MEALS: A LITTLE
PERSONAL GROOMING: A LOT
MOBILITY SCORE: 11
MOVING TO AND FROM BED TO CHAIR: TOTAL
STANDING UP FROM CHAIR USING ARMS: A LOT
DAILY ACTIVITIY SCORE: 8
TOILETING: A LOT
MOVING FROM LYING ON BACK TO SITTING ON SIDE OF FLAT BED WITH BEDRAILS: A LOT
HELP NEEDED FOR BATHING: A LOT
TURNING FROM BACK TO SIDE WHILE IN FLAT BAD: A LOT
CLIMB 3 TO 5 STEPS WITH RAILING: TOTAL

## 2024-08-01 ASSESSMENT — PAIN DESCRIPTION - LOCATION: LOCATION: ABDOMEN

## 2024-08-01 ASSESSMENT — PAIN SCALES - GENERAL
PAINLEVEL_OUTOF10: 2
PAINLEVEL_OUTOF10: 3
PAINLEVEL_OUTOF10: 6
PAINLEVEL_OUTOF10: 5 - MODERATE PAIN
PAINLEVEL_OUTOF10: 3
PAINLEVEL_OUTOF10: 3
PAINLEVEL_OUTOF10: 6

## 2024-08-01 ASSESSMENT — PAIN - FUNCTIONAL ASSESSMENT
PAIN_FUNCTIONAL_ASSESSMENT: 0-10

## 2024-08-01 ASSESSMENT — PAIN SCALES - WONG BAKER: WONGBAKER_NUMERICALRESPONSE: HURTS LITTLE BIT

## 2024-08-01 ASSESSMENT — PAIN DESCRIPTION - ORIENTATION: ORIENTATION: RIGHT;LOWER

## 2024-08-01 NOTE — PROGRESS NOTES
INTERNAL MEDICINE PROGRESS NOTE      Interval history    Events noted.  Clinically unchanged.  However pulled out the Corpak overnight.  She also had an emesis this morning.  Responds to questions.    Vital signs in last 24 hours:  Temp:  [36.6 °C (97.9 °F)-37.1 °C (98.8 °F)] 36.7 °C (98 °F)  Heart Rate:  [69-80] 80  Resp:  [16-22] 22  BP: (119-136)/(54-63) 132/56    Physical Examination:  Physical Exam    Constitutional:       Appearance: Elderly, asthenic build, in no distress  HENT:      Head: Normocephalic and atraumatic.  Moderate thrush present, slightly improved.  Eyes:      Extraocular Movements: Extraocular movements intact.      Pupils: Pupils are equal, round, and reactive to light.   Cardiovascular:      Rate and Rhythm: Normal rate and regular rhythm.      Pulses: Normal pulses.      Heart sounds: Normal heart sounds.   Pulmonary:      Effort: Pulmonary effort is normal.      Breath sounds: Normal breath sounds.   Abdominal:      General: Abdomen is flat. Bowel sounds are normal.      Palpations: Abdomen is soft.  Generalized tenderness present.  No masses.  Musculoskeletal:         General: Normal range of motion.      Cervical back: Normal range of motion and neck supple.   Skin:     General: Skin is warm and dry.  Thoracic incision healing well.  Neurological:      General: No focal deficit present.      Mental Status: Alert and oriented x 2, somnolent but responds to questions and arouses easily.  Moves all extremities.    Medications:    Current Facility-Administered Medications:     acetaminophen (Tylenol) tablet 650 mg, 650 mg, oral, q4h PRN, 650 mg at 07/29/24 0921 **OR** acetaminophen (Tylenol) oral liquid 650 mg, 650 mg, oral, q4h PRN, 650 mg at 07/29/24 1832 **OR** acetaminophen (Tylenol) suppository 650 mg, 650 mg, rectal, q4h PRN, Jacob Staples MD    amLODIPine (Norvasc) tablet 10 mg, 10 mg, oral, Daily, Jacob Staples MD, 10 mg at 07/31/24 0959    aspirin EC tablet 81 mg,  81 mg, oral, Daily, Jacob Staples MD, 81 mg at 07/31/24 0959    carvedilol (Coreg) tablet 12.5 mg, 12.5 mg, oral, BID, Jacob Staples MD, 12.5 mg at 07/31/24 2044    dextrose 1.5% - LOW calcium 2.5mEq/L 2,000 mL peritoneal dialysate, , intraperitoneal, TID, Gab Lynn DO, Given at 07/31/24 2106    dextrose 50 % injection 12.5 g, 12.5 g, intravenous, q15 min PRN, Shani Bain, APRN-CNP, 12.5 g at 07/25/24 2022    dextrose 50 % injection 25 g, 25 g, intravenous, q15 min PRN, ALEXIS Mae-JUSTIN    diphenhydrAMINE (BENADryl) injection 50 mg, 50 mg, intravenous, q5 min PRN, Lyle Florez MD    glucagon (Glucagen) injection 1 mg, 1 mg, intramuscular, q15 min PRN, Shani Bain, APRN-CNP    glucagon (Glucagen) injection 1 mg, 1 mg, intramuscular, q15 min PRN, Shani Bain APRN-CNP    heparin (porcine) injection 5,000 Units, 5,000 Units, subcutaneous, q8h MARIYA, Jacob Staples MD, 5,000 Units at 08/01/24 0545    lidocaine 4 % patch 1 patch, 1 patch, transdermal, Daily, Cristi Mendoza, ALEXIS-CNP, 1 patch at 07/31/24 1410    loperamide (Imodium) capsule 2 mg, 2 mg, oral, 4x daily PRN, Jacob Staples MD, 2 mg at 07/29/24 1714    nicotine (Nicoderm CQ) 21 mg/24 hr patch 1 patch, 1 patch, transdermal, Daily, Jacob Staples MD, 1 patch at 08/01/24 0925    nystatin (Mycostatin) 100,000 unit/mL suspension 500,000 Units, 5 mL, Swish & Swallow, TID, Jacob Staples MD, 500,000 Units at 07/31/24 2044    ondansetron (Zofran) injection 4 mg, 4 mg, intravenous, q4h PRN, Jacob Staples MD, 4 mg at 07/28/24 2244    oxyCODONE (Roxicodone) immediate release tablet 5 mg, 5 mg, oral, q6h PRN, Jacob Staples MD, 5 mg at 07/30/24 2258    oxygen (O2) therapy, , inhalation, Continuous - Inhalation, Jacob Stapels MD    potassium chloride (Klor-Con) packet 20 mEq, 20 mEq, oral, TID, Lyle Florez MD    potassium chloride CR (Klor-Con M20) ER tablet 20 mEq, 20 mEq, oral, Daily, Lyle  MD Gautam, 20 mEq at 07/31/24 0959    sertraline (Zoloft) tablet 25 mg, 25 mg, oral, Daily, Jacob Staples MD, 25 mg at 07/31/24 0959    simethicone (Mylicon) drops 250 mg, 250 mg, oral, 4x daily PRN, Jacob Staples MD, 250 mg at 07/31/24 2222    Laboratory Findings:  Lab Results   Component Value Date    WBC 0.8 (LL) 08/01/2024    HGB 8.2 (L) 08/01/2024    HCT 25.1 (L) 08/01/2024    MCV 80 08/01/2024     08/01/2024     Lab Results   Component Value Date    INR 1.2 (H) 07/17/2024    PROTIME 13.8 (H) 07/17/2024     Lab Results   Component Value Date    GLUCOSE 116 (H) 08/01/2024    CALCIUM 9.7 08/01/2024     08/01/2024    K 2.8 (LL) 08/01/2024    CO2 26 08/01/2024    CL 92 (L) 08/01/2024    BUN 66 (H) 08/01/2024    CREATININE 9.27 (H) 08/01/2024       Assessment and Plan:     Abdominal pain -possible cholecystitis.   will consult general surgery.    Elevated liver enzymes -statin discontinued.  Above-noted.  Monitor labs.  Malnutrition -will reinsert Corpak and resume tube feeds after KUB.  Thrush -improved with swish and swallow.  Diarrhea-colchicine discontinued, Imodium as needed.  Leukopenia -likely secondary to liver pathology, hematology following.    Patient discussed with general surgery.  Tenuous, will discuss with family.       Jacob Staples MD  08/1/24  9:58 AM

## 2024-08-01 NOTE — PROGRESS NOTES
Occupational Therapy    OT Treatment    Patient Name: Isis Geronimo  MRN: [id number]  Today's Date: 8/1/2024  Time Calculation  Start Time: 1423  Stop Time: 1443  Time Calculation (min): 20 min        Assessment:  End of Session Communication: Bedside nurse  End of Session Patient Position: Bed, 3 rail up, Alarm on     Plan:  Treatment Interventions: ADL retraining, Functional transfer training, UE strengthening/ROM, Endurance training, Patient/family training, Equipment evaluation/education, Compensatory technique education  OT Frequency: 3 times per week  OT Discharge Recommendations: Moderate intensity level of continued care  Equipment Recommended upon Discharge: Wheeled walker  OT Recommended Transfer Status: Assist of 2  OT - OK to Discharge: Yes (continue per OT POC)  Treatment Interventions: ADL retraining, Functional transfer training, UE strengthening/ROM, Endurance training, Patient/family training, Equipment evaluation/education, Compensatory technique education    Subjective   Previous Visit Info:  OT Last Visit  OT Received On: 08/01/24  General:  General  Reason for Referral: Pt is a 80 yo female presenting due to poor food intake/dehydration/FTT. Pt recently dc'd home from hospital due to CABGx1  and anterior thoracotomy  Referred By: Jacob Staples MD  Past Medical History Relevant to Rehab:   Past Medical History:   Diagnosis Date    Arthritis     CAD (coronary artery disease)     COPD (chronic obstructive pulmonary disease) (Multi)     ESRD (end stage renal disease) (Multi)     on peritoneal dialysis    Hypertension     Non-sustained ventricular tachycardia (Multi)     Rheumatoid arthritis (Multi)       Family/Caregiver Present: No  Co-Treatment: PT  Co-Treatment Reason: to maximize safety and participation with skilled intervention  Prior to Session Communication: Bedside nurse  Patient Position Received: Bed, 3 rail up, Alarm on  General Comment: Pt supine in bed upon arrival and agreeable to  treatment with max education/encouragement from this OT. Pt self limiting, flat affect throughout and c/o nausea, no emesis.  Precautions:  Medical Precautions: Fall precautions  Post-Surgical Precautions: Move in the Tube  Precautions Comment: pt on peritoneal dialysis during session, RN cleared for participation       Pain:  Pain Assessment  Pain Assessment: 0-10  0-10 (Numeric) Pain Score: 6  Pain Type: Acute pain  Pain Location: Abdomen  Pain Orientation: Right, Left  Pain Interventions: Medication (See MAR), Repositioned    Objective    Cognition:  Cognition  Overall Cognitive Status:  (flat affect)  Orientation Level: Disoriented to time       Activities of Daily Living: Grooming  Grooming Level of Assistance: Close supervision, Minimal verbal cues  Grooming Where Assessed: Bed level  Grooming Comments: pt washed face, cues for sequencing and safety    LE Dressing  LE Dressing: Yes  Sock Level of Assistance: Dependent  LE Dressing Where Assessed: Bed level  Functional Standing Tolerance:     Bed Mobility/Transfers: Bed Mobility  Bed Mobility: Yes  Bed Mobility 1  Bed Mobility 1: Supine to sitting, Sitting to supine, Log roll  Level of Assistance 1: Maximum assistance, +2, Maximum verbal cues, Maximum tactile cues  Bed Mobility Comments 1: HOB elevated, cues for sequencing steps of log roll and UE placement on bed rail, assist at trunk and B LEs  Bed Mobility 2  Bed Mobility  2: Scooting  Level of Assistance 2: Dependent, +2  Bed Mobility Comments 2: supine towards HOB    Transfers  Transfer: No (unable to attempt d/t safety concerns, pt reports increased pain sitting at EOB and requesting to return supine)      Functional Mobility:     Sitting Balance:  Static Sitting Balance  Static Sitting-Balance Support: Bilateral upper extremity supported, Feet supported  Static Sitting-Level of Assistance:  (CGA-Mod A)  Static Sitting-Comment/Number of Minutes: ~3 minute duration, max cues for upright posture and  increased duration       Therapy/Activity: Therapeutic Activity  Therapeutic Activity Performed: Yes  Therapeutic Activity 1: Pt sat at EOB for ~3 minute duration with varied CGA-Mod A to increase balance, strength and endurance needed for increased independence with ADLs and functional transfers. Max cues for upright posture and increased duration, pt requesting to return supine d/t pain and fatigue.        Outcome Measures:[facility] Daily Activity  Putting on and taking off regular lower body clothing: Total  Bathing (including washing, rinsing, drying): A lot  Putting on and taking off regular upper body clothing: A lot  Toileting, which includes using toilet, bedpan or urinal: Total  Taking care of personal grooming such as brushing teeth: A little  Eating Meals: A little  Daily Activity - Total Score: 12        Education Documentation  Body Mechanics, taught by Rhea Romano OT at 8/1/2024  4:46 PM.  Learner: Patient  Readiness: Acceptance  Method: Explanation  Response: Needs Reinforcement    Precautions, taught by Rhea Romano OT at 8/1/2024  4:46 PM.  Learner: Patient  Readiness: Acceptance  Method: Explanation  Response: Needs Reinforcement    ADL Training, taught by Rhea Romano OT at 8/1/2024  4:46 PM.  Learner: Patient  Readiness: Acceptance  Method: Explanation  Response: Needs Reinforcement    Education Comments  No comments found.             Goals:  Encounter Problems       Encounter Problems (Active)       ADLs       Patient will perform UB and LB sponge bathing with minimal assist  level of assistance and long-handled sponge. (Not Progressing)       Start:  07/24/24    Expected End:  08/07/24            Patient with complete upper body dressing with stand by assist level of assistance donning and doffing all UE clothes with PRN adaptive equipment. (Not Progressing)       Start:  07/24/24    Expected End:  08/07/24            Patient with complete lower body dressing with minimal assist  level  of assistance donning and doffing all LE clothes  with PRN adaptive equipment. (Not Progressing)       Start:  07/24/24    Expected End:  08/07/24            Patient will complete daily grooming tasks with modified independent level of assistance and PRN adaptive equipment. (Progressing)       Start:  07/24/24    Expected End:  08/07/24            Patient will complete toileting including hygiene clothing management/hygiene with minimal assist  level of assistance and raised toilet seat and grab bars. (Not Progressing)       Start:  07/24/24    Expected End:  08/07/24               MOBILITY       Patient will perform Functional mobility x Household distances/Community Distances with stand by assist level of assistance and least restrictive device in order to improve safety and functional mobility. (Not Progressing)       Start:  07/24/24    Expected End:  08/07/24               TRANSFERS       Patient will perform bed mobility contact guard assist level of assistance and bed rails in order to improve safety and independence with mobility (Not Progressing)       Start:  07/24/24    Expected End:  08/07/24            Patient will complete functional transfers with least restrictive device with contact guard assist level of assistance. (Not Progressing)       Start:  07/24/24    Expected End:  08/07/24

## 2024-08-01 NOTE — PROGRESS NOTES
Nephrology Consult Progress Note    Admit Date: 7/23/2024    Interval history:  With nausea and vomiting this am  Accidentally removed her corpak last night     CURRENT MEDICATIONS:    Current Facility-Administered Medications:     acetaminophen (Tylenol) tablet 650 mg, 650 mg, oral, q4h PRN, 650 mg at 07/29/24 0921 **OR** acetaminophen (Tylenol) oral liquid 650 mg, 650 mg, oral, q4h PRN, 650 mg at 07/29/24 1832 **OR** acetaminophen (Tylenol) suppository 650 mg, 650 mg, rectal, q4h PRN, Jacob Staples MD    amLODIPine (Norvasc) tablet 10 mg, 10 mg, oral, Daily, Jacob Staples MD, 10 mg at 07/31/24 0959    aspirin EC tablet 81 mg, 81 mg, oral, Daily, Jacob Staples MD, 81 mg at 07/31/24 0959    carvedilol (Coreg) tablet 12.5 mg, 12.5 mg, oral, BID, Jacob Staples MD, 12.5 mg at 07/31/24 2044    dextrose 1.5% - LOW calcium 2.5mEq/L 2,000 mL peritoneal dialysate, , intraperitoneal, TID, Gab Lynn DO, Given at 07/31/24 2106    dextrose 50 % injection 12.5 g, 12.5 g, intravenous, q15 min PRN, ALEXIS Mae-CNP, 12.5 g at 07/25/24 2022    dextrose 50 % injection 25 g, 25 g, intravenous, q15 min PRN, Shani Bain APRN-JUSTIN    diphenhydrAMINE (BENADryl) injection 50 mg, 50 mg, intravenous, q5 min PRN, Lyle Florez MD    glucagon (Glucagen) injection 1 mg, 1 mg, intramuscular, q15 min PRN, ALEXIS Mae-CNP    glucagon (Glucagen) injection 1 mg, 1 mg, intramuscular, q15 min PRN, ALEXIS Mae-CNP    heparin (porcine) injection 5,000 Units, 5,000 Units, subcutaneous, q8h MARIYA, Jacob Staples MD, 5,000 Units at 08/01/24 0545    lidocaine 4 % patch 1 patch, 1 patch, transdermal, Daily, Cristi Mendoza, APRN-CNP, 1 patch at 07/31/24 1410    loperamide (Imodium) capsule 2 mg, 2 mg, oral, 4x daily PRN, Jacob Staples MD, 2 mg at 07/29/24 1714    nicotine (Nicoderm CQ) 21 mg/24 hr patch 1 patch, 1 patch, transdermal, Daily, Jacob Staples MD, 1 patch at 08/01/24  "0925    nystatin (Mycostatin) 100,000 unit/mL suspension 500,000 Units, 5 mL, Swish & Swallow, TID, Jacob Staples MD, 500,000 Units at 07/31/24 2044    ondansetron (Zofran) injection 4 mg, 4 mg, intravenous, q4h PRN, Jacob Staples MD, 4 mg at 08/01/24 1223    oxyCODONE (Roxicodone) immediate release tablet 5 mg, 5 mg, oral, q6h PRN, Jacob Staples MD, 5 mg at 07/30/24 2258    oxygen (O2) therapy, , inhalation, Continuous - Inhalation, Jacob Staples MD    potassium chloride (Klor-Con) packet 20 mEq, 20 mEq, oral, TID, Lyle Florez MD    potassium chloride 20 mEq in sterile water for injection 100 mL, 20 mEq, intravenous, q2h, Lyle Florez MD, Last Rate: 50 mL/hr at 08/01/24 1210, 20 mEq at 08/01/24 1210    potassium chloride CR (Klor-Con M20) ER tablet 20 mEq, 20 mEq, oral, Daily, Lyle Florez MD, 20 mEq at 07/31/24 0959    sertraline (Zoloft) tablet 25 mg, 25 mg, oral, Daily, Jacob Staples MD, 25 mg at 07/31/24 0959    simethicone (Mylicon) drops 250 mg, 250 mg, oral, 4x daily PRN, Jacob Staples MD, 250 mg at 07/31/24 2242       Intake/Output Summary (Last 24 hours) at 8/1/2024 1253  Last data filed at 8/1/2024 0200  Gross per 24 hour   Intake 3000 ml   Output 3902 ml   Net -902 ml       PHYSICAL EXAM:  /53 (BP Location: Left arm, Patient Position: Lying)   Pulse 76   Temp 37.3 °C (99.2 °F) (Oral)   Resp 18   Ht 1.6 m (5' 2.99\")   Wt 47.3 kg (104 lb 4.4 oz)   SpO2 100%   BMI 18.48 kg/m²     Intake/Output Summary (Last 24 hours) at 8/1/2024 1253  Last data filed at 8/1/2024 0200  Gross per 24 hour   Intake 3000 ml   Output 3902 ml   Net -902 ml     Gen: AAO, NAD  Neck: No JVD  Cardiac: RRR  Resp: clear BS  Abd: Soft, non tender, +BS, non distended   PD catheter site OK  Ext: No edema   Neuro: moves 4 ext  Peripheral Pulses: Capillary refill <2secs, strong peripheral pulses.  Skin: Skin color, texture, turgor normal, no suspicious rashes or lesions.    Labs:  Results for " orders placed or performed during the hospital encounter of 07/23/24 (from the past 24 hour(s))   POCT GLUCOSE   Result Value Ref Range    POCT Glucose 152 (H) 74 - 99 mg/dL   Body Fluid Cell Count   Result Value Ref Range    Color, Fluid Pink (A) Colorless, Straw, Yellow    Clarity, Fluid Hazy (A) Clear    WBC, Fluid 40 See Comment /uL    RBC, Fluid 3,000 0  /uL /uL   Body Fluid Differential   Result Value Ref Range    Neutrophils %, Manual, Fluid 30 <25 % %    Lymphocytes %, Manual, Fluid 42 <75 % %    Mono/Macrophages %, Manual, Fluid 23 <70 % %    Eosinophils %, Manual, Fluid 0 0 % %    Basophils %, Manual, Fluid 0 0 % %    Immature Granulocytes %, Manual, Fluid 0 0 % %    Blasts %, Manual, Fluid 0 0 % %    Unclassified Cells %, Manual, Fluid 5 (H) 0 % %    Plasma Cells %, Manual, Fluid 0 0 % %    Total Cells Counted, Fluid 100    CBC and Auto Differential   Result Value Ref Range    WBC 0.8 (LL) 4.4 - 11.3 x10*3/uL    nRBC 2.5 (H) 0.0 - 0.0 /100 WBCs    RBC 3.12 (L) 4.00 - 5.20 x10*6/uL    Hemoglobin 8.2 (L) 12.0 - 16.0 g/dL    Hematocrit 25.1 (L) 36.0 - 46.0 %    MCV 80 80 - 100 fL    MCH 26.3 26.0 - 34.0 pg    MCHC 32.7 32.0 - 36.0 g/dL    RDW 16.3 (H) 11.5 - 14.5 %    Platelets 154 150 - 450 x10*3/uL    Neutrophils % 71.7 40.0 - 80.0 %    Immature Granulocytes %, Automated 4.9 (H) 0.0 - 0.9 %    Lymphocytes % 16.0 13.0 - 44.0 %    Monocytes % 4.9 2.0 - 10.0 %    Eosinophils % 0.0 0.0 - 6.0 %    Basophils % 2.5 0.0 - 2.0 %    Neutrophils Absolute 0.58 (L) 1.60 - 5.50 x10*3/uL    Immature Granulocytes Absolute, Automated 0.04 0.00 - 0.50 x10*3/uL    Lymphocytes Absolute 0.13 (L) 0.80 - 3.00 x10*3/uL    Monocytes Absolute 0.04 (L) 0.05 - 0.80 x10*3/uL    Eosinophils Absolute 0.00 0.00 - 0.40 x10*3/uL    Basophils Absolute 0.02 0.00 - 0.10 x10*3/uL   Basic Metabolic Panel   Result Value Ref Range    Glucose 116 (H) 74 - 99 mg/dL    Sodium 136 136 - 145 mmol/L    Potassium 2.8 (LL) 3.5 - 5.3 mmol/L    Chloride 92  (L) 98 - 107 mmol/L    Bicarbonate 26 21 - 32 mmol/L    Anion Gap 21 (H) 10 - 20 mmol/L    Urea Nitrogen 66 (H) 6 - 23 mg/dL    Creatinine 9.27 (H) 0.50 - 1.05 mg/dL    eGFR 4 (L) >60 mL/min/1.73m*2    Calcium 9.7 8.6 - 10.3 mg/dL   Hepatic Function Panel   Result Value Ref Range    Albumin 2.2 (L) 3.4 - 5.0 g/dL    Bilirubin, Total 0.3 0.0 - 1.2 mg/dL    Bilirubin, Direct 0.2 0.0 - 0.3 mg/dL    Alkaline Phosphatase 505 (H) 33 - 136 U/L     (H) 7 - 45 U/L     (H) 9 - 39 U/L    Total Protein 5.6 (L) 6.4 - 8.2 g/dL   Morphology   Result Value Ref Range    RBC Morphology See Below     RBC Fragments Few     Target Cells Few     Tye Cells Few    Ammonia   Result Value Ref Range    Ammonia 21 16 - 53 umol/L   POCT GLUCOSE   Result Value Ref Range    POCT Glucose 128 (H) 74 - 99 mg/dL   POCT GLUCOSE   Result Value Ref Range    POCT Glucose 108 (H) 74 - 99 mg/dL        DATA:   Diagnostic tests reviewed for today's visit:    New labs and imaging     Assessment and Plan:  Pt admitted with failure to thrive and poor eating  - ESKD on PD: ADP at home  CAPD here, all 1.5%, good UF and euvolemic  Stable BUN and Scr load   No changes on her PD regimen  Not with constipation, had deposition this am  BP: controlled  Lytes and acid base: hypokalamia, worsening because of poor intake, giving iv replacement since she is vomiting and has no TF  Hb 8.2, had epogen this week. Had iv iron on 7/31    Will continue to follow.       Signature: Lyle Florez MD

## 2024-08-01 NOTE — CONSULTS
"Reason For Consult  Abdominal pain     History Of Present Illness  79-year-old female with multiple medical comorbid conditions to include CAD status post CABG 7/17/2024, renal failure on peritoneal dialysis.    Readmitted after her bypass with decreased appetite, failure to thrive was discharged home but readmitted again 7/23 with poor p.o. intake, refusing to eat and dehydration.  Apparently she is complained of some abdominal pain.  Workup includes a right upper quadrant ultrasound as well as a CT scan that shows gallbladder sludge and nonspecific gallbladder wall thickening.  Noted to have elevated LFTs.    She states that she has had some vague abdominal pain since her PD catheter was placed a year ago.  Denies any significant pain, nausea or vomiting at this point.     Past Medical History  She has a past medical history of Arthritis, CAD (coronary artery disease), COPD (chronic obstructive pulmonary disease) (Multi), ESRD (end stage renal disease) (Multi), Hypertension, Non-sustained ventricular tachycardia (Multi), and Rheumatoid arthritis (Multi).    Surgical History  She has a past surgical history that includes Cardiac catheterization (N/A, 07/08/2024); Esophagogastroduodenoscopy; Peritoneal catheter insertion (05/23/2022); Colonoscopy; and Dilation and curettage of uterus.     Social History  She reports that she has been smoking cigarettes. She has been exposed to tobacco smoke. She does not have any smokeless tobacco history on file. She reports that she does not currently use alcohol. She reports that she does not use drugs.    Family History  No family history on file.     Allergies  Chlorothiazide, Metoprolol, Ibuprofen, and Penicillins        Physical Exam  /53 (BP Location: Left arm, Patient Position: Lying)   Pulse 76   Temp 37.3 °C (99.2 °F) (Oral)   Resp 18   Ht 1.6 m (5' 2.99\")   Wt 47.3 kg (104 lb 4.4 oz)   SpO2 100%   BMI 18.48 kg/m²    Frail elderly female, somewhat somnolent " "but does give appropriate answers. Comfortable   Abdomen soft, slightly distended, nontender.  PD catheter in place  Neurologic exam is without focal deficit.      Last Recorded Vitals  Blood pressure 134/53, pulse 76, temperature 37.3 °C (99.2 °F), temperature source Oral, resp. rate 18, height 1.6 m (5' 2.99\"), weight 47.3 kg (104 lb 4.4 oz), SpO2 100%.    Relevant Results  === 07/23/24 ===    CT ABDOMEN PELVIS WO IV CONTRAST    - Impression -  Dilated partially sludge filled gallbladder with ill-defined wall  thickening concerning for cholecystitis. Clinical correlation  recommended.    Small amount of free air and moderate peritoneal fluid presumably  related to presence of PD catheter. Subtle density layer in the fluid  in the pelvis could reflect blood products or inflammatory/infectious  debris. clinical correlation suggested.    Mild fluid distention of small-bowel loops and right colon, possible  mild ileus or nonspecific enterocolitis.    Bibasilar infiltrates or atelectasis with pleural effusions.    Slight increased density to small right renal nodule since  07/06/2024, possible evolving proteinaceous or hemorrhagic cyst.       US RIGHT UPPER QUADRANT    - Impression -  Chronic changes in the right kidney including small size and  increased echogenicity.    Mild perihepatic ascites. Most likely related to patient's peritoneal  dialysis.    Moderate sludge in the gallbladder. Nonspecific gallbladder wall  thickening. No shadowing stone.    Remainder        Assessment/Plan        Impression: Patient with numerous comorbid medical conditions.  Past surgical history includes peritoneal placement of peritoneal dialysis catheter.  No compelling evidence that to suggest that she has has symptomatic cholelithiasis. Gallbladder wall thickening likely multifactorial.     before recommending Suggest  a HIDA scan to further evaluate      I spent 45 minutes in the professional and overall care of this " patient.      David Paul MD

## 2024-08-01 NOTE — CARE PLAN
The patient's goals for the shift include Patient will get relief from abdominal gas/pain.    The clinical goals for the shift include no falls through shift      Problem: Skin  Goal: Decreased wound size/increased tissue granulation at next dressing change  Outcome: Progressing  Goal: Participates in plan/prevention/treatment measures  Outcome: Progressing  Goal: Prevent/manage excess moisture  Outcome: Progressing  Goal: Prevent/minimize sheer/friction injuries  Outcome: Progressing  Goal: Promote/optimize nutrition  Outcome: Progressing  Goal: Promote skin healing  Outcome: Progressing     Problem: Pain - Adult  Goal: Verbalizes/displays adequate comfort level or baseline comfort level  Outcome: Progressing     Problem: Safety - Adult  Goal: Free from fall injury  Outcome: Progressing     Problem: Discharge Planning  Goal: Discharge to home or other facility with appropriate resources  Outcome: Progressing     Problem: Chronic Conditions and Co-morbidities  Goal: Patient's chronic conditions and co-morbidity symptoms are monitored and maintained or improved  Outcome: Progressing

## 2024-08-01 NOTE — PROGRESS NOTES
08/01/24 0802   Discharge Planning   Assistance Needed 7/31-8/2. NRD 8/2. auth approval,; per facililty PD training complete; facility is ready for admission; but patient has a corpak in place d/t poor intake, received iron  yesterday, patient needs GOC discussion to consisder peg placement   Home or Post Acute Services Post acute facilities (Rehab/SNF/etc)  ([facility] 7/31-8/2. NRD 8/2.)   Expected Discharge Disposition SNF   Does the patient need discharge transport arranged? Yes   RoundTrip coordination needed? Yes   Has discharge transport been arranged? No

## 2024-08-01 NOTE — PROGRESS NOTES
Physical Therapy    Physical Therapy Treatment    Patient Name: Isis Geronimo  MRN: [id number]  Today's Date: 8/1/2024  Time Calculation  Start Time: 1422  Stop Time: 1442  Time Calculation (min): 20 min    Assessment/Plan   PT Assessment  Rehab Prognosis: Good  End of Session Communication: Bedside nurse  Assessment Comment: Pt limited by abdominal pain and complains of nausea when sitting at EOB. Pt unable to perform transfers and ambulation this session 2/2 nausea and pain.  End of Session Patient Position: Bed, 3 rail up, Alarm on  PT Plan  Inpatient/Swing Bed or Outpatient: Inpatient  PT Plan  Treatment/Interventions: Bed mobility  PT Plan: Ongoing PT  PT Frequency: 3 times per week  PT Discharge Recommendations: Moderate intensity level of continued care  Equipment Recommended upon Discharge: Wheeled walker  PT Recommended Transfer Status: Assist x2  PT - OK to Discharge: Yes (per POC)      General Visit Information:   PT  Visit  PT Received On: 08/01/24  General  Reason for Referral: Pt is a 80 yo female presenting due to poor food intake/dehydration/FTT. Pt recently dc'd home from hospital due to CABGx1  and anterior thoracotomy  Referred By: Jacob Staples MD  Past Medical History Relevant to Rehab:   Past Medical History:   Diagnosis Date    Arthritis     CAD (coronary artery disease)     COPD (chronic obstructive pulmonary disease) (Multi)     ESRD (end stage renal disease) (Multi)     on peritoneal dialysis    Hypertension     Non-sustained ventricular tachycardia (Multi)     Rheumatoid arthritis (Multi)        Prior to Session Communication: Bedside nurse  Patient Position Received: Bed, 3 rail up, Alarm on  General Comment: Pt requiring max VC for encouragement to participate. Self limiting, flat affect, deconditioned.    Subjective   Precautions:  Precautions  Medical Precautions: Fall precautions         Objective   Pain:  Pain Assessment  Pain Assessment: 0-10  0-10 (Numeric) Pain Score: 6  Pain  Type: Acute pain  Pain Location: Abdomen  Pain Orientation: Right, Lower  Pain Interventions: Medication (See MAR)  Cognition:  Cognition  Orientation Level: Disoriented to time       Postural Control:  Static Sitting Balance  Static Sitting-Balance Support: Feet unsupported, Bilateral upper extremity supported  Static Sitting-Level of Assistance: Contact guard, Moderate assistance  Static Sitting-Comment/Number of Minutes: Pt ranging from contact guard to ModA, pt attempting to lay down requiring max enocuragement and ModA to maintain upright. Pt able to maintain for ~3 minutes       Treatments:        Bed Mobility  Bed Mobility: Yes  Bed Mobility 1  Bed Mobility 1: Supine to sitting, Sitting to supine  Level of Assistance 1: Maximum assistance, +2  Bed Mobility Comments 1: HOB elevated. Max VC/TC for hand placement and sequencing. MaxA for trunk elevation and BLE assist towards EOB. MaxA for trunk control sit>supine    Ambulation/Gait Training  Ambulation/Gait Training Performed: No  Transfers  Transfer: No (pt unable to tolerate sitting at EOB for extended periods of time)    Outcome Measures:  [facility] Basic Mobility  Turning from your back to your side while in a flat bed without using bedrails: A lot  Moving from lying on your back to sitting on the side of a flat bed without using bedrails: Total  Moving to and from bed to chair (including a wheelchair): Total  Standing up from a chair using your arms (e.g. wheelchair or bedside chair): Total  To walk in hospital room: Total  Climbing 3-5 steps with railing: Total  Basic Mobility - Total Score: 7    Education Documentation  Handouts, taught by Angella Quiroz PTA at 8/1/2024  4:12 PM.  Learner: Patient  Readiness: Acceptance  Method: Explanation  Response: Verbalizes Understanding    Precautions, taught by Angella Quiroz PTA at 8/1/2024  4:12 PM.  Learner: Patient  Readiness: Acceptance  Method: Explanation  Response: Verbalizes Understanding    Body Mechanics,  taught by Angella Quiroz PTA at 8/1/2024  4:12 PM.  Learner: Patient  Readiness: Acceptance  Method: Explanation  Response: Verbalizes Understanding    Mobility Training, taught by Angella Quiroz PTA at 8/1/2024  4:12 PM.  Learner: Patient  Readiness: Acceptance  Method: Explanation  Response: Verbalizes Understanding    Education Comments  No comments found.           Encounter Problems       Encounter Problems (Active)       Balance       LTG - Patient will tolerate standing (Not Progressing)       Start:  07/24/24    Expected End:  08/07/24       >2 min SUP using FWW            Mobility       STG - Patient will ambulate (Not Progressing)       Start:  07/24/24    Expected End:  08/07/24       SBA using FWW >15ft            PT Transfers       STG - Patient will perform bed mobility (Not Progressing)       Start:  07/24/24    Expected End:  08/07/24       SUP         STG - Patient will transfer sit to and from stand (Not Progressing)       Start:  07/24/24    Expected End:  08/07/24       CGA            Pain - Adult

## 2024-08-01 NOTE — PROGRESS NOTES
GI Daily Progress Note    Assessment/Plan:    79 y.o. female with h/o CAD, s/p CABG, ESRD, anemia, RA, nonsustained V. tach, hypertension, hyperlipidemia, vertigo, emphysema, carpal tunnel syndrome,  s/p Left Anterior Thoracatomy  Off Pump MIDCAB (LIMA to LAD) 7/17,  presented with failure to thrive, confusion, weakness.  GI requested to see pt for abdominal pain. PE with RUQ TT{, possible acute cholecystitis, musculoskeletal pain, ileus     - recommend surgical consultation  - supportive care  - may need NG to LIWS        LOS: 9 days     Isis Geronimo is a 79 y.o. female who was admitted with Dehydration. GI requested to see the pt for abdominal pain. She was seen last week, suspected her pain was multifactorial. She reports no appetite, the pain now radiates to the right shoulder. CT reviewed, showed possible acute cholecystitis, cholelithiasis, possible ileus. LFT are elevated, bilirubin normal.     Subjective:    Patient expresses she has no appetite    Objective:    Vital signs in last 24 hours:  Temp:  [36.6 °C (97.9 °F)-37.3 °C (99.2 °F)] 37.3 °C (99.2 °F)  Heart Rate:  [69-80] 76  Resp:  [16-22] 18  BP: (119-136)/(53-63) 134/53    Intake/Output last 3 shifts:  I/O last 3 completed shifts:  In: 7500 (158.6 mL/kg) [Other:7500]  Out: 5202 (110 mL/kg) [Other:5200; Stool:2]  Weight: 47.3 kg   Intake/Output this shift:  No intake/output data recorded.    Physical Exam  Constitutional:       General: She is sleeping. She is not in acute distress.     Appearance: She is ill-appearing.   HENT:      Nose: Nose normal.   Eyes:      Conjunctiva/sclera: Conjunctivae normal.   Cardiovascular:      Rate and Rhythm: Regular rhythm.   Pulmonary:      Effort: Pulmonary effort is normal.   Abdominal:      General: Bowel sounds are normal. There is no distension.      Palpations: Abdomen is soft. There is no mass.      Tenderness: There is abdominal tenderness in the right upper quadrant. There is no guarding or rebound.       Hernia: No hernia is present.   Musculoskeletal:      Cervical back: Neck supple.   Neurological:      General: No focal deficit present.      Mental Status: Mental status is at baseline. She is lethargic.   Psychiatric:         Mood and Affect: Mood normal.         Behavior: Behavior normal.          Results for orders placed or performed during the hospital encounter of 07/23/24 (from the past 24 hour(s))   POCT GLUCOSE   Result Value Ref Range    POCT Glucose 152 (H) 74 - 99 mg/dL   Body Fluid Cell Count   Result Value Ref Range    Color, Fluid Pink (A) Colorless, Straw, Yellow    Clarity, Fluid Hazy (A) Clear    WBC, Fluid 40 See Comment /uL    RBC, Fluid 3,000 0  /uL /uL   Body Fluid Differential   Result Value Ref Range    Neutrophils %, Manual, Fluid 30 <25 % %    Lymphocytes %, Manual, Fluid 42 <75 % %    Mono/Macrophages %, Manual, Fluid 23 <70 % %    Eosinophils %, Manual, Fluid 0 0 % %    Basophils %, Manual, Fluid 0 0 % %    Immature Granulocytes %, Manual, Fluid 0 0 % %    Blasts %, Manual, Fluid 0 0 % %    Unclassified Cells %, Manual, Fluid 5 (H) 0 % %    Plasma Cells %, Manual, Fluid 0 0 % %    Total Cells Counted, Fluid 100    CBC and Auto Differential   Result Value Ref Range    WBC 0.8 (LL) 4.4 - 11.3 x10*3/uL    nRBC 2.5 (H) 0.0 - 0.0 /100 WBCs    RBC 3.12 (L) 4.00 - 5.20 x10*6/uL    Hemoglobin 8.2 (L) 12.0 - 16.0 g/dL    Hematocrit 25.1 (L) 36.0 - 46.0 %    MCV 80 80 - 100 fL    MCH 26.3 26.0 - 34.0 pg    MCHC 32.7 32.0 - 36.0 g/dL    RDW 16.3 (H) 11.5 - 14.5 %    Platelets 154 150 - 450 x10*3/uL    Neutrophils % 71.7 40.0 - 80.0 %    Immature Granulocytes %, Automated 4.9 (H) 0.0 - 0.9 %    Lymphocytes % 16.0 13.0 - 44.0 %    Monocytes % 4.9 2.0 - 10.0 %    Eosinophils % 0.0 0.0 - 6.0 %    Basophils % 2.5 0.0 - 2.0 %    Neutrophils Absolute 0.58 (L) 1.60 - 5.50 x10*3/uL    Immature Granulocytes Absolute, Automated 0.04 0.00 - 0.50 x10*3/uL    Lymphocytes Absolute 0.13 (L) 0.80 - 3.00 x10*3/uL     Monocytes Absolute 0.04 (L) 0.05 - 0.80 x10*3/uL    Eosinophils Absolute 0.00 0.00 - 0.40 x10*3/uL    Basophils Absolute 0.02 0.00 - 0.10 x10*3/uL   Basic Metabolic Panel   Result Value Ref Range    Glucose 116 (H) 74 - 99 mg/dL    Sodium 136 136 - 145 mmol/L    Potassium 2.8 (LL) 3.5 - 5.3 mmol/L    Chloride 92 (L) 98 - 107 mmol/L    Bicarbonate 26 21 - 32 mmol/L    Anion Gap 21 (H) 10 - 20 mmol/L    Urea Nitrogen 66 (H) 6 - 23 mg/dL    Creatinine 9.27 (H) 0.50 - 1.05 mg/dL    eGFR 4 (L) >60 mL/min/1.73m*2    Calcium 9.7 8.6 - 10.3 mg/dL   Hepatic Function Panel   Result Value Ref Range    Albumin 2.2 (L) 3.4 - 5.0 g/dL    Bilirubin, Total 0.3 0.0 - 1.2 mg/dL    Bilirubin, Direct 0.2 0.0 - 0.3 mg/dL    Alkaline Phosphatase 505 (H) 33 - 136 U/L     (H) 7 - 45 U/L     (H) 9 - 39 U/L    Total Protein 5.6 (L) 6.4 - 8.2 g/dL   Morphology   Result Value Ref Range    RBC Morphology See Below     RBC Fragments Few     Target Cells Few     Tye Cells Few    Ammonia   Result Value Ref Range    Ammonia 21 16 - 53 umol/L   POCT GLUCOSE   Result Value Ref Range    POCT Glucose 128 (H) 74 - 99 mg/dL   POCT GLUCOSE   Result Value Ref Range    POCT Glucose 108 (H) 74 - 99 mg/dL

## 2024-08-01 NOTE — PROGRESS NOTES
"Isis Geronimo is a 79 y.o. female on day 9 of admission presenting with Dehydration.    Subjective   \"I feel about the same\"  Seen at bedside with her two sons, she is more awake today, still c/o abdominal tenderness       Objective     Physical Exam  Vitals and nursing note reviewed.   Constitutional:       Appearance: She is ill-appearing.      Comments: Eyes open - remains weak - difficulty holding conversation   Eyes:      General: No scleral icterus.  Cardiovascular:      Rate and Rhythm: Normal rate.      Pulses: Normal pulses.   Pulmonary:      Effort: No respiratory distress.      Breath sounds: Normal breath sounds.   Abdominal:      General: There is distension.      Palpations: Abdomen is soft.      Tenderness: There is abdominal tenderness.      Comments: PD cath intact - site clean dry- no overlying erythema   Musculoskeletal:      Cervical back: Neck supple.      Right lower leg: Edema present.      Left lower leg: Edema present.   Skin:     General: Skin is warm and dry.   Neurological:      General: No focal deficit present.      Mental Status: She is alert and oriented to person, place, and time.      Motor: Weakness present.         Last Recorded Vitals  Blood pressure 134/53, pulse 76, temperature 37.3 °C (99.2 °F), temperature source Oral, resp. rate 18, height 1.6 m (5' 2.99\"), weight 47.3 kg (104 lb 4.4 oz), SpO2 100%.  Intake/Output last 3 Shifts:  I/O last 3 completed shifts:  In: 7500 (158.6 mL/kg) [Other:7500]  Out: 5202 (110 mL/kg) [Other:5200; Stool:2]  Weight: 47.3 kg     Relevant Results  Results for orders placed or performed during the hospital encounter of 07/23/24 (from the past 96 hour(s))   POCT GLUCOSE   Result Value Ref Range    POCT Glucose 129 (H) 74 - 99 mg/dL   CBC and Auto Differential   Result Value Ref Range    WBC 5.7 4.4 - 11.3 x10*3/uL    nRBC 0.0 0.0 - 0.0 /100 WBCs    RBC 3.38 (L) 4.00 - 5.20 x10*6/uL    Hemoglobin 9.1 (L) 12.0 - 16.0 g/dL    Hematocrit 27.0 (L) " 36.0 - 46.0 %    MCV 80 80 - 100 fL    MCH 26.9 26.0 - 34.0 pg    MCHC 33.7 32.0 - 36.0 g/dL    RDW 16.2 (H) 11.5 - 14.5 %    Platelets 219 150 - 450 x10*3/uL    Neutrophils % 83.9 40.0 - 80.0 %    Immature Granulocytes %, Automated 6.1 (H) 0.0 - 0.9 %    Lymphocytes % 5.4 13.0 - 44.0 %    Monocytes % 2.6 2.0 - 10.0 %    Eosinophils % 1.6 0.0 - 6.0 %    Basophils % 0.4 0.0 - 2.0 %    Neutrophils Absolute 4.79 1.60 - 5.50 x10*3/uL    Immature Granulocytes Absolute, Automated 0.35 0.00 - 0.50 x10*3/uL    Lymphocytes Absolute 0.31 (L) 0.80 - 3.00 x10*3/uL    Monocytes Absolute 0.15 0.05 - 0.80 x10*3/uL    Eosinophils Absolute 0.09 0.00 - 0.40 x10*3/uL    Basophils Absolute 0.02 0.00 - 0.10 x10*3/uL   Basic Metabolic Panel   Result Value Ref Range    Glucose 85 74 - 99 mg/dL    Sodium 135 (L) 136 - 145 mmol/L    Potassium 3.7 3.5 - 5.3 mmol/L    Chloride 95 (L) 98 - 107 mmol/L    Bicarbonate 23 21 - 32 mmol/L    Anion Gap 21 (H) 10 - 20 mmol/L    Urea Nitrogen 58 (H) 6 - 23 mg/dL    Creatinine 11.15 (H) 0.50 - 1.05 mg/dL    eGFR 3 (L) >60 mL/min/1.73m*2    Calcium 8.8 8.6 - 10.3 mg/dL   Morphology   Result Value Ref Range    RBC Morphology See Below     RBC Fragments Few     Target Cells Few     Ovalocytes Few     [place] Cells Few    POCT GLUCOSE   Result Value Ref Range    POCT Glucose 78 74 - 99 mg/dL   POCT GLUCOSE   Result Value Ref Range    POCT Glucose 140 (H) 74 - 99 mg/dL   Electrocardiogram, 12-lead PRN ACS symptoms   Result Value Ref Range    Ventricular Rate 55 BPM    Atrial Rate 55 BPM    NE Interval 156 ms    QRS Duration 98 ms    QT Interval 486 ms    QTC Calculation(Bazett) 464 ms    P Axis 4 degrees    R Axis -6 degrees    T Axis 168 degrees    QRS Count 9 beats    Q Onset 217 ms    P Onset 139 ms    P Offset 192 ms    T Offset 460 ms    QTC Fredericia 472 ms   Troponin I, High Sensitivity   Result Value Ref Range    Troponin I, High Sensitivity 144 (HH) 0 - 13 ng/L   POCT GLUCOSE   Result Value Ref Range     POCT Glucose 145 (H) 74 - 99 mg/dL   Troponin I, High Sensitivity   Result Value Ref Range    Troponin I, High Sensitivity 160 (HH) 0 - 13 ng/L   Troponin I, High Sensitivity   Result Value Ref Range    Troponin I, High Sensitivity 180 (HH) 0 - 13 ng/L   CBC and Auto Differential   Result Value Ref Range    WBC 2.7 (L) 4.4 - 11.3 x10*3/uL    nRBC 0.0 0.0 - 0.0 /100 WBCs    RBC 3.12 (L) 4.00 - 5.20 x10*6/uL    Hemoglobin 8.3 (L) 12.0 - 16.0 g/dL    Hematocrit 23.8 (L) 36.0 - 46.0 %    MCV 76 (L) 80 - 100 fL    MCH 26.6 26.0 - 34.0 pg    MCHC 34.9 32.0 - 36.0 g/dL    RDW 15.3 (H) 11.5 - 14.5 %    Platelets 201 150 - 450 x10*3/uL    Immature Granulocytes %, Automated 5.5 (H) 0.0 - 0.9 %    Immature Granulocytes Absolute, Automated 0.15 0.00 - 0.50 x10*3/uL   Basic Metabolic Panel   Result Value Ref Range    Glucose 77 74 - 99 mg/dL    Sodium 135 (L) 136 - 145 mmol/L    Potassium 3.2 (L) 3.5 - 5.3 mmol/L    Chloride 93 (L) 98 - 107 mmol/L    Bicarbonate 29 21 - 32 mmol/L    Anion Gap 16 10 - 20 mmol/L    Urea Nitrogen 56 (H) 6 - 23 mg/dL    Creatinine 10.53 (H) 0.50 - 1.05 mg/dL    eGFR 3 (L) >60 mL/min/1.73m*2    Calcium 8.1 (L) 8.6 - 10.3 mg/dL   Manual Differential   Result Value Ref Range    Neutrophils %, Manual 70.0 40.0 - 80.0 %    Bands %, Manual 9.0 0.0 - 5.0 %    Lymphocytes %, Manual 11.0 13.0 - 44.0 %    Monocytes %, Manual 4.0 2.0 - 10.0 %    Eosinophils %, Manual 1.0 0.0 - 6.0 %    Basophils %, Manual 1.0 0.0 - 2.0 %    Metamyelocytes %, Manual 1.0 0.0 - 0.0 %    Myelocytes %, Manual 3.0 0.0 - 0.0 %    Seg Neutrophils Absolute, Manual 1.89 1.60 - 5.00 x10*3/uL    Bands Absolute, Manual 0.24 0.00 - 0.50 x10*3/uL    Lymphocytes Absolute, Manual 0.30 (L) 0.80 - 3.00 x10*3/uL    Monocytes Absolute, Manual 0.11 0.05 - 0.80 x10*3/uL    Eosinophils Absolute, Manual 0.03 0.00 - 0.40 x10*3/uL    Basophils Absolute, Manual 0.03 0.00 - 0.10 x10*3/uL    Metamyelocytes Absolute, Manual 0.03 0.00 - 0.00 x10*3/uL     Myelocytes Absolute, Manual 0.08 0.00 - 0.00 x10*3/uL    Total Cells Counted 100     Neutrophils Absolute, Manual 2.13 1.60 - 5.50 x10*3/uL    RBC Morphology See Below     RBC Fragments Few     Target Cells Few     Ovalocytes Few    Creatine Kinase   Result Value Ref Range    Creatine Kinase 209 0 - 215 U/L   POCT GLUCOSE   Result Value Ref Range    POCT Glucose 75 74 - 99 mg/dL   POCT GLUCOSE   Result Value Ref Range    POCT Glucose 97 74 - 99 mg/dL   POCT GLUCOSE   Result Value Ref Range    POCT Glucose 85 74 - 99 mg/dL   POCT GLUCOSE   Result Value Ref Range    POCT Glucose 99 74 - 99 mg/dL   CBC and Auto Differential   Result Value Ref Range    WBC 1.2 (L) 4.4 - 11.3 x10*3/uL    nRBC 0.0 0.0 - 0.0 /100 WBCs    RBC 2.76 (L) 4.00 - 5.20 x10*6/uL    Hemoglobin 7.5 (L) 12.0 - 16.0 g/dL    Hematocrit 22.4 (L) 36.0 - 46.0 %    MCV 81 80 - 100 fL    MCH 27.2 26.0 - 34.0 pg    MCHC 33.5 32.0 - 36.0 g/dL    RDW 16.1 (H) 11.5 - 14.5 %    Platelets 148 (L) 150 - 450 x10*3/uL    Immature Granulocytes %, Automated 3.3 (H) 0.0 - 0.9 %    Immature Granulocytes Absolute, Automated 0.04 0.00 - 0.50 x10*3/uL   Basic metabolic panel   Result Value Ref Range    Glucose 96 74 - 99 mg/dL    Sodium 135 (L) 136 - 145 mmol/L    Potassium 3.1 (L) 3.5 - 5.3 mmol/L    Chloride 94 (L) 98 - 107 mmol/L    Bicarbonate 28 21 - 32 mmol/L    Anion Gap 16 10 - 20 mmol/L    Urea Nitrogen 57 (H) 6 - 23 mg/dL    Creatinine 9.92 (H) 0.50 - 1.05 mg/dL    eGFR 4 (L) >60 mL/min/1.73m*2    Calcium 8.6 8.6 - 10.3 mg/dL   Manual Differential   Result Value Ref Range    Neutrophils %, Manual 88.0 40.0 - 80.0 %    Lymphocytes %, Manual 8.0 13.0 - 44.0 %    Monocytes %, Manual 2.0 2.0 - 10.0 %    Eosinophils %, Manual 0.0 0.0 - 6.0 %    Basophils %, Manual 0.0 0.0 - 2.0 %    Metamyelocytes %, Manual 2.0 0.0 - 0.0 %    Seg Neutrophils Absolute, Manual 1.06 (L) 1.60 - 5.00 x10*3/uL    Lymphocytes Absolute, Manual 0.10 (L) 0.80 - 3.00 x10*3/uL    Monocytes  Absolute, Manual 0.02 (L) 0.05 - 0.80 x10*3/uL    Eosinophils Absolute, Manual 0.00 0.00 - 0.40 x10*3/uL    Basophils Absolute, Manual 0.00 0.00 - 0.10 x10*3/uL    Metamyelocytes Absolute, Manual 0.02 0.00 - 0.00 x10*3/uL    Total Cells Counted 50     RBC Morphology See Below     RBC Fragments Few     Target Cells Few     Ovalocytes Few    Comprehensive Metabolic Panel   Result Value Ref Range    Glucose 94 74 - 99 mg/dL    Sodium 137 136 - 145 mmol/L    Potassium 3.1 (L) 3.5 - 5.3 mmol/L    Chloride 94 (L) 98 - 107 mmol/L    Bicarbonate 24 21 - 32 mmol/L    Anion Gap 22 (H) 10 - 20 mmol/L    Urea Nitrogen 57 (H) 6 - 23 mg/dL    Creatinine 10.08 (H) 0.50 - 1.05 mg/dL    eGFR 4 (L) >60 mL/min/1.73m*2    Calcium 8.5 (L) 8.6 - 10.3 mg/dL    Albumin 2.1 (L) 3.4 - 5.0 g/dL    Alkaline Phosphatase 518 (H) 33 - 136 U/L    Total Protein 5.4 (L) 6.4 - 8.2 g/dL     (H) 9 - 39 U/L    Bilirubin, Total 0.7 0.0 - 1.2 mg/dL    ALT 94 (H) 7 - 45 U/L   Reticulocytes   Result Value Ref Range    Retic % 0.5 0.5 - 2.0 %    Retic Absolute 0.013 (L) 0.017 - 0.110 x10*6/uL    Reticulocyte Hemoglobin 32 28 - 38 pg    Immature Retic fraction 13.6 <=16.0 %   Lactate Dehydrogenase   Result Value Ref Range     (H) 84 - 246 U/L   Direct Antiglobulin Test   Result Value Ref Range    ANNA-POLYSPECIFIC NEG    POCT GLUCOSE   Result Value Ref Range    POCT Glucose 152 (H) 74 - 99 mg/dL   Body Fluid Cell Count   Result Value Ref Range    Color, Fluid Pink (A) Colorless, Straw, Yellow    Clarity, Fluid Hazy (A) Clear    WBC, Fluid 40 See Comment /uL    RBC, Fluid 3,000 0  /uL /uL   Body Fluid Differential   Result Value Ref Range    Neutrophils %, Manual, Fluid 30 <25 % %    Lymphocytes %, Manual, Fluid 42 <75 % %    Mono/Macrophages %, Manual, Fluid 23 <70 % %    Eosinophils %, Manual, Fluid 0 0 % %    Basophils %, Manual, Fluid 0 0 % %    Immature Granulocytes %, Manual, Fluid 0 0 % %    Blasts %, Manual, Fluid 0 0 % %    Unclassified  Cells %, Manual, Fluid 5 (H) 0 % %    Plasma Cells %, Manual, Fluid 0 0 % %    Total Cells Counted, Fluid 100    C. difficile, PCR    Specimen: Stool   Result Value Ref Range    C. difficile, PCR Not Detected Not Detected   CBC and Auto Differential   Result Value Ref Range    WBC 0.8 (LL) 4.4 - 11.3 x10*3/uL    nRBC 2.5 (H) 0.0 - 0.0 /100 WBCs    RBC 3.12 (L) 4.00 - 5.20 x10*6/uL    Hemoglobin 8.2 (L) 12.0 - 16.0 g/dL    Hematocrit 25.1 (L) 36.0 - 46.0 %    MCV 80 80 - 100 fL    MCH 26.3 26.0 - 34.0 pg    MCHC 32.7 32.0 - 36.0 g/dL    RDW 16.3 (H) 11.5 - 14.5 %    Platelets 154 150 - 450 x10*3/uL    Neutrophils % 71.7 40.0 - 80.0 %    Immature Granulocytes %, Automated 4.9 (H) 0.0 - 0.9 %    Lymphocytes % 16.0 13.0 - 44.0 %    Monocytes % 4.9 2.0 - 10.0 %    Eosinophils % 0.0 0.0 - 6.0 %    Basophils % 2.5 0.0 - 2.0 %    Neutrophils Absolute 0.58 (L) 1.60 - 5.50 x10*3/uL    Immature Granulocytes Absolute, Automated 0.04 0.00 - 0.50 x10*3/uL    Lymphocytes Absolute 0.13 (L) 0.80 - 3.00 x10*3/uL    Monocytes Absolute 0.04 (L) 0.05 - 0.80 x10*3/uL    Eosinophils Absolute 0.00 0.00 - 0.40 x10*3/uL    Basophils Absolute 0.02 0.00 - 0.10 x10*3/uL   Basic Metabolic Panel   Result Value Ref Range    Glucose 116 (H) 74 - 99 mg/dL    Sodium 136 136 - 145 mmol/L    Potassium 2.8 (LL) 3.5 - 5.3 mmol/L    Chloride 92 (L) 98 - 107 mmol/L    Bicarbonate 26 21 - 32 mmol/L    Anion Gap 21 (H) 10 - 20 mmol/L    Urea Nitrogen 66 (H) 6 - 23 mg/dL    Creatinine 9.27 (H) 0.50 - 1.05 mg/dL    eGFR 4 (L) >60 mL/min/1.73m*2    Calcium 9.7 8.6 - 10.3 mg/dL   Hepatic Function Panel   Result Value Ref Range    Albumin 2.2 (L) 3.4 - 5.0 g/dL    Bilirubin, Total 0.3 0.0 - 1.2 mg/dL    Bilirubin, Direct 0.2 0.0 - 0.3 mg/dL    Alkaline Phosphatase 505 (H) 33 - 136 U/L     (H) 7 - 45 U/L     (H) 9 - 39 U/L    Total Protein 5.6 (L) 6.4 - 8.2 g/dL   Morphology   Result Value Ref Range    RBC Morphology See Below     RBC Fragments Few      Target Cells Few     [place] Cells Few    Ammonia   Result Value Ref Range    Ammonia 21 16 - 53 umol/L   POCT GLUCOSE   Result Value Ref Range    POCT Glucose 128 (H) 74 - 99 mg/dL   POCT GLUCOSE   Result Value Ref Range    POCT Glucose 108 (H) 74 - 99 mg/dL     === 07/23/24 ===    CT ABDOMEN PELVIS WO IV CONTRAST    - Impression -  Dilated partially sludge filled gallbladder with ill-defined wall  thickening concerning for cholecystitis. Clinical correlation  recommended.    Small amount of free air and moderate peritoneal fluid presumably  related to presence of PD catheter. Subtle density layer in the fluid  in the pelvis could reflect blood products or inflammatory/infectious  debris. clinical correlation suggested.    Mild fluid distention of small-bowel loops and right colon, possible  mild ileus or nonspecific enterocolitis.    Bibasilar infiltrates or atelectasis with pleural effusions.    Slight increased density to small right renal nodule since  07/06/2024, possible evolving proteinaceous or hemorrhagic cyst.    Additional findings as described above.    MACRO:  None.    Signed by: Franny Marti 7/29/2024 9:20 AM  Dictation workstation:   [id number]        Assessment/Plan   Principal Problem:    Dehydration    Assessment/Recommendations: Isis Geronimo is a 79 y.o. female presenting on 7/23 with FTT s/p CABGX1 on 7/17, discharged on 7/20. PMH ESRD on PD, anemia, RA, emphysema, current smoker, vertigo, HTN, HLD. Patient was started on Periactin for appetite stimulation and she has developed leukopenia so the medication was discontinued. Hematology was consulted to further work up the leukopenia.        CBC with diff noted to be at baseline until yesterday. WBC 5.7 on 7/29 H&H 9.1/27 -> 2.7 8.3/23.8 210-> 1.2 7.5/22.4 148. Abnormal morphology of RBCs noted through hospital course. Diff revealed ANC 1.06 ALC 0.10 AMC 0.02. Liver enzymes are elevated, CT chest abd and pelvis done in ED revealed-  Dilated partially sludge filled gallbladder with ill-defined wall thickening concerning for cholecystitis. Clinical correlation was recommended. Mild fluid distention of small-bowel loops and right colon, possible mild ileus or nonspecific enterocolitis. Patient has been c/o diarrhea she is currently being treated with imodium.      Leukopenia, predominantly lymphopenia, pancytopenia noted yesterday PLT CT is trending up, anemia improved following blood lymphopenia is worse and now neutropenia is noted  - Attending suspected it to be 2/2 Periactin, side effects include ;Agranulocytosis, hemolytic anemia, leukopenia, thrombocytopenia  - Attending physician discontinued Periactin, issue unresolved, continue to monitor, other meds less likely to be contributing to agranulocytosis  - no apparent bleeding  - ESRD on PD renal is following, patient receives epogen, h/o JORGITO ferritin 800 on 7/14 with low iron and TSAT, nephrology ordered venofer today  -  PBS with occasional smudge cell, marked lymphopenia, RBCs with few fragments, target cells and sabino cells noted occasional NRBC as well, large platelets noted, no giant platelets or clumping noted  - consider DIC work up if platelet count continues to trend down   - CBC w diff daily please  -  retic absolute 0.013  - patient is c/o abdominal pain, diarrhea and her CT revealed possible enterocolitis and her liver enzymes are markedly elevated compared to 7/23, bilirubin is normal, low suspicion for hemolysis, CT also revealed cf partially sludge filled gallbladder with ill-defined wall thickening concerning for cholecystitis, C-diff PCR negative, GI following suggested surgery consult  - lymphopenia (chronic iso smoking) marked decline ALC 0.13 neutropenia 0.58- flow cytometry sent  - Granulocytopenia noted - Reduced number of neutrophils, eosinophils, and basophils   - CBC with diff daily to monitor for agranulocytosis (ANC < 200)  - further work up for Infectious  causes of neutropenia - hepatitis panel ordered given acute elevated liver enzymes and abdominal pain - EBV, CMV, and HIV   - lactate was normal on 7/25, patient is afebrile BP stable, continue to monitor for infection       Discussed with Dr. Jim MEJIA spent 45 minutes in the professional and overall care of this patient.      ALEXIS Leyva-CNP

## 2024-08-02 ENCOUNTER — APPOINTMENT (OUTPATIENT)
Dept: RADIOLOGY | Facility: HOSPITAL | Age: 79
DRG: 640 | End: 2024-08-02
Payer: COMMERCIAL

## 2024-08-02 VITALS
WEIGHT: 114.42 LBS | HEART RATE: 73 BPM | BODY MASS INDEX: 20.27 KG/M2 | RESPIRATION RATE: 3 BRPM | DIASTOLIC BLOOD PRESSURE: 44 MMHG | OXYGEN SATURATION: 94 % | SYSTOLIC BLOOD PRESSURE: 96 MMHG | HEIGHT: 63 IN | TEMPERATURE: 98 F

## 2024-08-02 LAB
ALBUMIN SERPL BCP-MCNC: 2 G/DL (ref 3.4–5)
ALP SERPL-CCNC: 358 U/L (ref 33–136)
ALT SERPL W P-5'-P-CCNC: 141 U/L (ref 7–45)
ANION GAP SERPL CALC-SCNC: 21 MMOL/L (ref 10–20)
AST SERPL W P-5'-P-CCNC: 699 U/L (ref 9–39)
BASOPHILS # BLD MANUAL: 0.01 X10*3/UL (ref 0–0.1)
BASOPHILS NFR BLD MANUAL: 1 %
BILIRUB SERPL-MCNC: 0.3 MG/DL (ref 0–1.2)
BUN SERPL-MCNC: 88 MG/DL (ref 6–23)
BURR CELLS BLD QL SMEAR: ABNORMAL
CALCIUM SERPL-MCNC: 9.4 MG/DL (ref 8.6–10.3)
CHLORIDE SERPL-SCNC: 97 MMOL/L (ref 98–107)
CMV IGG AVIDITY SERPL IA-RTO: REACTIVE %
CO2 SERPL-SCNC: 23 MMOL/L (ref 21–32)
CREAT SERPL-MCNC: 10.36 MG/DL (ref 0.5–1.05)
EBV EA IGG SER QL: NEGATIVE
EBV NA AB SER QL: POSITIVE
EBV VCA IGG SER IA-ACNC: POSITIVE
EBV VCA IGM SER IA-ACNC: NEGATIVE
EGFRCR SERPLBLD CKD-EPI 2021: 3 ML/MIN/1.73M*2
EOSINOPHIL # BLD MANUAL: 0.01 X10*3/UL (ref 0–0.4)
EOSINOPHIL NFR BLD MANUAL: 1 %
ERYTHROCYTE [DISTWIDTH] IN BLOOD BY AUTOMATED COUNT: 16.4 % (ref 11.5–14.5)
GLUCOSE SERPL-MCNC: 68 MG/DL (ref 74–99)
HAPTOGLOB SERPL-MCNC: 408 MG/DL (ref 37–246)
HAV IGM SER QL: NONREACTIVE
HBV CORE IGM SER QL: NONREACTIVE
HBV SURFACE AG SERPL QL IA: NONREACTIVE
HCT VFR BLD AUTO: 19.6 % (ref 36–46)
HCV AB SER QL: NONREACTIVE
HGB BLD-MCNC: 6.4 G/DL (ref 12–16)
IMM GRANULOCYTES # BLD AUTO: 0.04 X10*3/UL (ref 0–0.5)
IMM GRANULOCYTES NFR BLD AUTO: 7.5 % (ref 0–0.9)
KAPPA LC SERPL-MCNC: 28.3 MG/DL (ref 0.33–1.94)
KAPPA LC/LAMBDA SER: 1.1 {RATIO} (ref 0.26–1.65)
LAMBDA LC SERPL-MCNC: 25.69 MG/DL (ref 0.57–2.63)
LYMPHOCYTES # BLD MANUAL: 0.19 X10*3/UL (ref 0.8–3)
LYMPHOCYTES NFR BLD MANUAL: 38 %
MCH RBC QN AUTO: 26.4 PG (ref 26–34)
MCHC RBC AUTO-ENTMCNC: 32.7 G/DL (ref 32–36)
MCV RBC AUTO: 81 FL (ref 80–100)
MONOCYTES # BLD MANUAL: 0.04 X10*3/UL (ref 0.05–0.8)
MONOCYTES NFR BLD MANUAL: 7 %
MYELOCYTES # BLD MANUAL: 0.01 X10*3/UL
MYELOCYTES NFR BLD MANUAL: 1 %
NEUTROPHILS # BLD MANUAL: 0.24 X10*3/UL (ref 1.6–5.5)
NEUTS BAND # BLD MANUAL: 0.02 X10*3/UL (ref 0–0.5)
NEUTS BAND NFR BLD MANUAL: 3 %
NEUTS SEG # BLD MANUAL: 0.22 X10*3/UL (ref 1.6–5)
NEUTS SEG NFR BLD MANUAL: 43 %
NRBC BLD-RTO: 0 /100 WBCS (ref 0–0)
OVALOCYTES BLD QL SMEAR: ABNORMAL
PATH REVIEW-CELL CT,FLUID: NORMAL
PLATELET # BLD AUTO: 109 X10*3/UL (ref 150–450)
POTASSIUM SERPL-SCNC: 3.5 MMOL/L (ref 3.5–5.3)
PROT SERPL-MCNC: 4.9 G/DL (ref 6.4–8.2)
RBC # BLD AUTO: 2.42 X10*6/UL (ref 4–5.2)
RBC MORPH BLD: ABNORMAL
SCHISTOCYTES BLD QL SMEAR: ABNORMAL
SODIUM SERPL-SCNC: 137 MMOL/L (ref 136–145)
TARGETS BLD QL SMEAR: ABNORMAL
TOTAL CELLS COUNTED BLD: 100
VARIANT LYMPHS # BLD MANUAL: 0.03 X10*3/UL (ref 0–0.3)
VARIANT LYMPHS NFR BLD: 6 %
WBC # BLD AUTO: 0.5 X10*3/UL (ref 4.4–11.3)

## 2024-08-02 PROCEDURE — 99233 SBSQ HOSP IP/OBS HIGH 50: CPT | Performed by: INTERNAL MEDICINE

## 2024-08-02 PROCEDURE — 36415 COLL VENOUS BLD VENIPUNCTURE: CPT | Performed by: INTERNAL MEDICINE

## 2024-08-02 PROCEDURE — 2500000004 HC RX 250 GENERAL PHARMACY W/ HCPCS (ALT 636 FOR OP/ED): Performed by: INTERNAL MEDICINE

## 2024-08-02 PROCEDURE — 88184 FLOWCYTOMETRY/ TC 1 MARKER: CPT | Mod: TC,AHULAB | Performed by: NURSE PRACTITIONER

## 2024-08-02 PROCEDURE — 85027 COMPLETE CBC AUTOMATED: CPT | Performed by: INTERNAL MEDICINE

## 2024-08-02 PROCEDURE — 86644 CMV ANTIBODY: CPT | Mod: AHULAB | Performed by: NURSE PRACTITIONER

## 2024-08-02 PROCEDURE — 85007 BL SMEAR W/DIFF WBC COUNT: CPT | Performed by: INTERNAL MEDICINE

## 2024-08-02 PROCEDURE — 2500000001 HC RX 250 WO HCPCS SELF ADMINISTERED DRUGS (ALT 637 FOR MEDICARE OP): Performed by: INTERNAL MEDICINE

## 2024-08-02 PROCEDURE — 2500000004 HC RX 250 GENERAL PHARMACY W/ HCPCS (ALT 636 FOR OP/ED): Performed by: SURGERY

## 2024-08-02 PROCEDURE — 86645 CMV ANTIBODY IGM: CPT | Performed by: NURSE PRACTITIONER

## 2024-08-02 PROCEDURE — 92950 HEART/LUNG RESUSCITATION CPR: CPT

## 2024-08-02 PROCEDURE — 80053 COMPREHEN METABOLIC PANEL: CPT | Performed by: INTERNAL MEDICINE

## 2024-08-02 RX ORDER — EPINEPHRINE 0.1 MG/ML
INJECTION INTRACARDIAC; INTRAVENOUS CODE/TRAUMA/SEDATION MEDICATION
Status: COMPLETED | OUTPATIENT
Start: 2024-08-02 | End: 2024-08-02

## 2024-08-02 RX ORDER — SODIUM CHLORIDE, SODIUM LACTATE, POTASSIUM CHLORIDE, CALCIUM CHLORIDE 600; 310; 30; 20 MG/100ML; MG/100ML; MG/100ML; MG/100ML
50 INJECTION, SOLUTION INTRAVENOUS CONTINUOUS
Status: DISCONTINUED | OUTPATIENT
Start: 2024-08-02 | End: 2024-08-02 | Stop reason: HOSPADM

## 2024-08-02 NOTE — PROGRESS NOTES
Nephrology Consult Progress Note    Admit Date: 7/23/2024    Interval history:  Still with TF, not eating     CURRENT MEDICATIONS:    Current Facility-Administered Medications:     acetaminophen (Tylenol) tablet 650 mg, 650 mg, oral, q4h PRN, 650 mg at 08/02/24 0537 **OR** acetaminophen (Tylenol) oral liquid 650 mg, 650 mg, oral, q4h PRN, 650 mg at 07/29/24 1832 **OR** acetaminophen (Tylenol) suppository 650 mg, 650 mg, rectal, q4h PRN, Jacob Staples MD    amLODIPine (Norvasc) tablet 10 mg, 10 mg, oral, Daily, Jacob Staples MD, 10 mg at 07/31/24 0959    aspirin EC tablet 81 mg, 81 mg, oral, Daily, Jacob Staples MD, 81 mg at 07/31/24 0959    carvedilol (Coreg) tablet 12.5 mg, 12.5 mg, oral, BID, Jacob Staples MD, 12.5 mg at 08/01/24 2144    dextrose 1.5% - LOW calcium 2.5mEq/L 2,000 mL peritoneal dialysate, , intraperitoneal, TID, Gab Lynn DO, Given at 08/01/24 1417    dextrose 50 % injection 12.5 g, 12.5 g, intravenous, q15 min PRN, ALEXIS Mae-CNP, 12.5 g at 07/25/24 2022    dextrose 50 % injection 25 g, 25 g, intravenous, q15 min PRN, Shani Bain APRN-CNP    diphenhydrAMINE (BENADryl) injection 50 mg, 50 mg, intravenous, q5 min PRN, Lyle Florez MD    glucagon (Glucagen) injection 1 mg, 1 mg, intramuscular, q15 min PRN, ALEXIS Mae-CNP    glucagon (Glucagen) injection 1 mg, 1 mg, intramuscular, q15 min PRN, Shani Bain APRN-CNP    heparin (porcine) injection 5,000 Units, 5,000 Units, subcutaneous, q8h MARIYA, Jacob Staples MD, 5,000 Units at 08/02/24 0537    lactated Ringer's infusion, 50 mL/hr, intravenous, Continuous, Lyle Florez MD    lidocaine 4 % patch 1 patch, 1 patch, transdermal, Daily, Cristi Mendoza, APRN-CNP, 1 patch at 08/01/24 1413    loperamide (Imodium) capsule 2 mg, 2 mg, oral, 4x daily PRN, Jacob Staples MD, 2 mg at 08/02/24 0537    nicotine (Nicoderm CQ) 21 mg/24 hr patch 1 patch, 1 patch, transdermal, Daily, Jacob OLIVA  "MD Bel, 1 patch at 08/01/24 0925    nystatin (Mycostatin) 100,000 unit/mL suspension 500,000 Units, 5 mL, Swish & Swallow, TID, Jacob Staples MD, 500,000 Units at 07/31/24 2044    ondansetron (Zofran) injection 4 mg, 4 mg, intravenous, q4h PRN, Jacob Staples MD, 4 mg at 08/01/24 1223    oxyCODONE (Roxicodone) immediate release tablet 5 mg, 5 mg, oral, q6h PRN, Jacob Staples MD, 5 mg at 07/30/24 2258    oxygen (O2) therapy, , inhalation, Continuous - Inhalation, Jacob Staples MD    potassium chloride CR (Klor-Con M20) ER tablet 20 mEq, 20 mEq, oral, Daily, Lyle Florez MD, 20 mEq at 07/31/24 0959    sertraline (Zoloft) tablet 25 mg, 25 mg, oral, Daily, Jacob Staples MD, 25 mg at 07/31/24 0959    simethicone (Mylicon) drops 250 mg, 250 mg, oral, 4x daily PRN, Jacob Staples MD, 250 mg at 07/31/24 2242       Intake/Output Summary (Last 24 hours) at 8/2/2024 1253  Last data filed at 8/1/2024 1413  Gross per 24 hour   Intake 1500 ml   Output --   Net 1500 ml       PHYSICAL EXAM:  BP (!) 96/44   Pulse 73   Temp 36.7 °C (98 °F) (Temporal)   Resp (!) 3   Ht 1.6 m (5' 2.99\")   Wt 51.9 kg (114 lb 6.7 oz)   SpO2 94%   BMI 20.27 kg/m²     Intake/Output Summary (Last 24 hours) at 8/2/2024 1253  Last data filed at 8/1/2024 1413  Gross per 24 hour   Intake 1500 ml   Output --   Net 1500 ml     Gen: sleepy, cachectic, NAD  Neck: No JVD  Cardiac: RRR  Resp: clear BS  Abd: Soft, non tender, +BS, non distended   PD catheter site OK  Ext: No edema   Neuro: moves 4 ext  Peripheral Pulses: Capillary refill <2secs, strong peripheral pulses.  Skin: Skin color, texture, turgor normal, no suspicious rashes or lesions.    Labs:  Results for orders placed or performed during the hospital encounter of 07/23/24 (from the past 24 hour(s))   POCT GLUCOSE   Result Value Ref Range    POCT Glucose 119 (H) 74 - 99 mg/dL   POCT GLUCOSE   Result Value Ref Range    POCT Glucose 88 74 - 99 mg/dL   Comprehensive " metabolic panel   Result Value Ref Range    Glucose 68 (L) 74 - 99 mg/dL    Sodium 137 136 - 145 mmol/L    Potassium 3.5 3.5 - 5.3 mmol/L    Chloride 97 (L) 98 - 107 mmol/L    Bicarbonate 23 21 - 32 mmol/L    Anion Gap 21 (H) 10 - 20 mmol/L    Urea Nitrogen 88 (H) 6 - 23 mg/dL    Creatinine 10.36 (H) 0.50 - 1.05 mg/dL    eGFR 3 (L) >60 mL/min/1.73m*2    Calcium 9.4 8.6 - 10.3 mg/dL    Albumin 2.0 (L) 3.4 - 5.0 g/dL    Alkaline Phosphatase 358 (H) 33 - 136 U/L    Total Protein 4.9 (L) 6.4 - 8.2 g/dL     (H) 9 - 39 U/L    Bilirubin, Total 0.3 0.0 - 1.2 mg/dL     (H) 7 - 45 U/L   CBC and Auto Differential   Result Value Ref Range    WBC 0.5 (LL) 4.4 - 11.3 x10*3/uL    nRBC 0.0 0.0 - 0.0 /100 WBCs    RBC 2.42 (L) 4.00 - 5.20 x10*6/uL    Hemoglobin 6.4 (LL) 12.0 - 16.0 g/dL    Hematocrit 19.6 (L) 36.0 - 46.0 %    MCV 81 80 - 100 fL    MCH 26.4 26.0 - 34.0 pg    MCHC 32.7 32.0 - 36.0 g/dL    RDW 16.4 (H) 11.5 - 14.5 %    Platelets 109 (L) 150 - 450 x10*3/uL    Immature Granulocytes %, Automated 7.5 (H) 0.0 - 0.9 %    Immature Granulocytes Absolute, Automated 0.04 0.00 - 0.50 x10*3/uL   Manual Differential   Result Value Ref Range    Neutrophils %, Manual 43.0 40.0 - 80.0 %    Bands %, Manual 3.0 0.0 - 5.0 %    Lymphocytes %, Manual 38.0 13.0 - 44.0 %    Monocytes %, Manual 7.0 2.0 - 10.0 %    Eosinophils %, Manual 1.0 0.0 - 6.0 %    Basophils %, Manual 1.0 0.0 - 2.0 %    Atypical Lymphocytes %, Manual 6.0 0.0 - 2.0 %    Myelocytes %, Manual 1.0 0.0 - 0.0 %    Seg Neutrophils Absolute, Manual 0.22 (L) 1.60 - 5.00 x10*3/uL    Bands Absolute, Manual 0.02 0.00 - 0.50 x10*3/uL    Lymphocytes Absolute, Manual 0.19 (L) 0.80 - 3.00 x10*3/uL    Monocytes Absolute, Manual 0.04 (L) 0.05 - 0.80 x10*3/uL    Eosinophils Absolute, Manual 0.01 0.00 - 0.40 x10*3/uL    Basophils Absolute, Manual 0.01 0.00 - 0.10 x10*3/uL    Atypical Lymphs Absolute, Manual 0.03 0.00 - 0.30 x10*3/uL    Myelocytes Absolute, Manual 0.01 0.00 -  0.00 x10*3/uL    Total Cells Counted 100     Neutrophils Absolute, Manual 0.24 (L) 1.60 - 5.50 x10*3/uL    RBC Morphology See Below     RBC Fragments Few     Target Cells Few     Ovalocytes Few     [place] Cells Few         DATA:   Diagnostic tests reviewed for today's visit:    New labs and imaging     Assessment and Plan:  Pt admitted with failure to thrive and poor eating  - ESKD on PD: ADP at home  CAPD here, all 1.5%, good UF and seems a bit hypovolemic to me, had only 1 cycle yesterday   Stable BUN and Scr load   Will resume PD again today   Not with constipation  BP: controlled  Lytes and acid base: improved hypokalemia   Hb down to 6.4, had epogen this week. Had iv iron on 7/31. Worsening anemia per 1ry team, GI bleed?  Adding LR infusion to maintain volume status     Will continue to follow.       Signature: Lyle Florez MD

## 2024-08-02 NOTE — PROGRESS NOTES
24 1054   Current Planned Discharge Disposition   Current Planned Discharge Disposition      Facility updated about patient's status, patient's PD equipment is at facility

## 2024-08-02 NOTE — CARE PLAN
Problem: Skin  Goal: Decreased wound size/increased tissue granulation at next dressing change  Flowsheets (Taken 8/1/2024 2045)  Decreased wound size/increased tissue granulation at next dressing change: Protective dressings over bony prominences  Note: Waffle mattress on, mepilex on coccyx   Goal: Participates in plan/prevention/treatment measures  Flowsheets (Taken 8/1/2024 2045)  Participates in plan/prevention/treatment measures: Elevate heels  Goal: Prevent/manage excess moisture  Flowsheets (Taken 8/1/2024 2045)  Prevent/manage excess moisture: Moisturize dry skin  Goal: Prevent/minimize sheer/friction injuries  Flowsheets (Taken 8/1/2024 2045)  Prevent/minimize sheer/friction injuries: Turn/reposition every 2 hours/use positioning/transfer devices  Goal: Promote/optimize nutrition  Flowsheets (Taken 8/1/2024 2045)  Promote/optimize nutrition: Assist with feeding  Goal: Promote skin healing  Flowsheets (Taken 8/1/2024 2045)  Promote skin healing: Assess skin/pad under line(s)/device(s)   The patient's goals for the shift include wants to rest    The clinical goals for the shift include patient will have pain maintained to level she can rest, deep breaths

## 2024-08-02 NOTE — SIGNIFICANT EVENT
Responded to Code blue for this 80 YO female. Last known well unclear presenting rhythm PEA. Initially a RRT call changed nearly simultaneously to a CODE Blue. Upon my entry the code was in progress having started at 0921 hrs. . Back board in place RRT assisting ventilations with AmBU mask/ O21. Reported good pulses with compressions. Three successive rounds of CLS Epinephrine at three minute intervals. Successive pulse checks persistent PEA.  The code was called after 10 minutes of ACLS after family who had prior to the code been reported to have been at bedside were located and requested discontinuation of resuscitative efforts.  Time to death 0931 hrs. August 2, 2024. Attending of Record consultants notified. Condolence expressed to family by ICU staff in attendance. Code particulars reviewed with involved providers   . Primary to facilitate death certificate, Cause of death Cause of death CAD exacerbation s/p CABG 7/17/2024 contributing causes COPD (chronic obstructive pulmonary disease)  ESRD (end stage renal disease dialysis dependent ), Hypertension, Non-sustained ventricular tachycardia , and Rheumatoid arthritis

## 2024-08-02 NOTE — PROGRESS NOTES
Spiritual Care Visit    Clinical Encounter Type  Visited With: Family  Routine Visit: Introduction  Continue Visiting: No (Pt is )  Crisis Visit: Critical care, Death (Response to code blue)  Referral From: Verbal  Referral To:     Roman Catholic Encounters  Roman Catholic Needs: Prayer    Family Spiritual Care Encounters  Family Coping: Sadness    Upon arrival the daughter was in the family waiting area understandably weeping.  She had been in the room at the time of the code.  She was offered comfort care by the nurse. Other family members arrived becoming distraught when they were told that the pt had . The family was escort to the pt's room. There was prayer with the family members (4). The family was left comforting one another.    Chaplain Evaristo Naik

## 2024-08-03 NOTE — PROGRESS NOTES
INTERNAL MEDICINE PROGRESS NOTE      Interval history    Events noted.  Appears weak. Pulled out corpak, awaiting replacement and HIDA scan. Some loose stools still.     Vital signs in last 24 hours:  Temp:  [36.7 °C (98 °F)-37.2 °C (99 °F)] 36.7 °C (98 °F)  Heart Rate:  [30-83] 73  Resp:  [3-20] 3  BP: ()/(44-53) 96/44    Physical Examination:  Physical Exam    Constitutional:       Appearance: Elderly, asthenic build, in no distress, appears weak  HENT:      Head: Normocephalic and atraumatic.  Mild thrush present  Eyes:      Extraocular Movements: Extraocular movements intact.      Pupils: Pupils are equal, round, and reactive to light.   Cardiovascular:      Rate and Rhythm: Normal rate and regular rhythm.      Pulses: Normal pulses.      Heart sounds: Normal heart sounds.   Pulmonary:      Effort: Pulmonary effort is normal.      Breath sounds: Normal breath sounds.   Abdominal:      General: Abdomen is flat. Bowel sounds are normal.      Palpations: Abdomen is soft.  Generalized tenderness present.  No masses.  Musculoskeletal:         General: Normal range of motion.      Cervical back: Normal range of motion and neck supple.   Skin:     General: Skin is warm and dry.  Thoracic incision healing well.  Neurological:      General: No focal deficit present.      Mental Status: Alert and oriented x 1-2, somnolent but responds to questions and arouses easily.  Moves all extremities.    Medications:  No current facility-administered medications for this encounter.    Current Outpatient Medications:     amLODIPine (Norvasc) 10 mg tablet, Take by mouth once daily., Disp: , Rfl:     aspirin 81 mg EC tablet, Take 1 tablet (81 mg) by mouth once daily., Disp: 30 tablet, Rfl: 1    atorvastatin (Lipitor) 80 mg tablet, TAKE 1 TABLET(80 MG) BY MOUTH DAILY, Disp: 90 tablet, Rfl: 0    carvedilol (Coreg) 12.5 mg tablet, TAKE 1 TABLET(12.5 MG) BY MOUTH TWICE DAILY, Disp: 180 tablet, Rfl: 0    clopidogrel (Plavix)  75 mg tablet, TAKE 1 TABLET(75 MG) BY MOUTH DAILY, Disp: 90 tablet, Rfl: 0    colchicine 0.6 mg tablet, Take 1 tablet (0.6 mg) by mouth once daily., Disp: 30 tablet, Rfl: 0    losartan (Cozaar) 100 mg tablet, Take 1 tablet (100 mg) by mouth once daily., Disp: , Rfl:     acetaminophen (Tylenol) 325 mg tablet, Take 2 tablets (650 mg) by mouth every 6 hours if needed for mild pain (1 - 3)., Disp: 240 tablet, Rfl: 0    cyproheptadine (Periactin) 4 mg tablet, Take 1 tablet (4 mg) by mouth 3 times a day as needed for allergies., Disp: , Rfl:     polyethylene glycol (Glycolax, Miralax) 17 gram packet, Take 17 g by mouth once daily., Disp: 30 packet, Rfl: 0    Laboratory Findings:  Lab Results   Component Value Date    WBC 0.5 (LL) 08/02/2024    HGB 6.4 (LL) 08/02/2024    HCT 19.6 (L) 08/02/2024    MCV 81 08/02/2024     (L) 08/02/2024     Lab Results   Component Value Date    INR 1.2 (H) 07/17/2024    PROTIME 13.8 (H) 07/17/2024     Lab Results   Component Value Date    GLUCOSE 68 (L) 08/02/2024    CALCIUM 9.4 08/02/2024     08/02/2024    K 3.5 08/02/2024    CO2 23 08/02/2024    CL 97 (L) 08/02/2024    BUN 88 (H) 08/02/2024    CREATININE 10.36 (H) 08/02/2024       Assessment and Plan:     Abdominal pain -possible cholecystitis.   Will have a HIDA scan    Elevated liver enzymes - work up ongoing  Malnutrition -will reinsert Corpak and resume tube feeds.  Thrush -improved with swish and swallow.  Diarrhea-colchicine discontinued, Imodium as needed.  Pancytopenia -likely secondary to liver pathology, hematology following.  ESRD - renal following  CAD - meds as per CTS  Anemia - will transfuse today    Guarded prognosis. Seen with sister at bedside. Will consult palliative care.        Jacob Staples MD  08/2/24  8:58 AM

## 2024-08-04 LAB — CMV IGM SERPL-ACNC: <8 AU/ML

## 2024-08-05 LAB
CELL COUNT (BLOOD): 0.5 X10*3/UL
CELL POPULATIONS: NORMAL
DIAGNOSIS: NORMAL
FLOW DIFFERENTIAL: NORMAL
FLOW TEST ORDERED: NORMAL
LAB TEST METHOD: NORMAL
NUMBER OF CELLS COLLECTED: NORMAL
PATH REPORT.TOTAL CANCER: NORMAL
SIGNATURE COMMENT: NORMAL
SPECIMEN VIABILITY: NORMAL

## 2024-08-06 LAB
ALBUMIN: 1.4 G/DL (ref 3.4–5)
ALPHA 1 GLOBULIN: 0.8 G/DL (ref 0.2–0.6)
ALPHA 2 GLOBULIN: 1.1 G/DL (ref 0.4–1.1)
BETA GLOBULIN: 1.7 G/DL (ref 0.5–1.2)
GAMMA GLOBULIN: 0.8 G/DL (ref 0.5–1.4)
IMMUNOFIXATION COMMENT: ABNORMAL
PATH REVIEW - SERUM IMMUNOFIXATION: ABNORMAL
PATH REVIEW-SERUM PROTEIN ELECTROPHORESIS: ABNORMAL
PROTEIN ELECTROPHORESIS COMMENT: ABNORMAL

## 2024-08-07 NOTE — DISCHARGE SUMMARY
Discharge Diagnosis  Dehydration    Issues Requiring Follow-Up      Discharge Meds     Your medication list        ASK your doctor about these medications        Instructions Last Dose Given Next Dose Due   acetaminophen 325 mg tablet  Commonly known as: Tylenol      Take 2 tablets (650 mg) by mouth every 6 hours if needed for mild pain (1 - 3).       amLODIPine 10 mg tablet  Commonly known as: Norvasc           aspirin 81 mg EC tablet      Take 1 tablet (81 mg) by mouth once daily.       atorvastatin 80 mg tablet  Commonly known as: Lipitor      TAKE 1 TABLET(80 MG) BY MOUTH DAILY       carvedilol 12.5 mg tablet  Commonly known as: Coreg      TAKE 1 TABLET(12.5 MG) BY MOUTH TWICE DAILY       clopidogrel 75 mg tablet  Commonly known as: Plavix      TAKE 1 TABLET(75 MG) BY MOUTH DAILY       colchicine 0.6 mg tablet      Take 1 tablet (0.6 mg) by mouth once daily.       cyproheptadine 4 mg tablet  Commonly known as: Periactin           losartan 100 mg tablet  Commonly known as: Cozaar  Ask about: Which instructions should I use?           oxyCODONE 5 mg immediate release tablet  Commonly known as: Roxicodone  Ask about: Should I take this medication?      Take 1 tablet (5 mg) by mouth every 6 hours if needed for severe pain (7 - 10) or moderate pain (4 - 6) for up to 5 days.       polyethylene glycol 17 gram packet  Commonly known as: Glycolax, Miralax      Take 17 g by mouth once daily.                Test Results Pending At Discharge  Pending Labs       No current pending labs.            Hospital Course   Isis Geronimo is a 79 y.o. female presenting with generalized weakness from home.  Patient has a recent history of admission for acute MI.  She underwent cardiac catheterization and subsequently left anterior thoracotomy.  She was discharged on 7/20 and per family since discharge has had minimal p.o. intake and increased somnolence.  There has also been reports of confusion beyond her baseline.  Patient reports  very poor appetite.  She states that food does not taste good.  She lives alone but family reports that she has had someone with her 24 hours a day since discharge.  She has not been active.  She was brought back to the hospital due to the above and currently she continues to state that she has very poor appetite.  She did not have her peritoneal dialysis yesterday.  She denies shortness of breath.  She has had no fever or chills.  She was admitted for further treatment.     The patient had a complicated hospital course, renal, sx, pall med followed closely. She passed away during this hospital admit.     Pertinent Physical Exam At Time of Discharge      Outpatient Follow-Up  No future appointments.     Time and care for discharge management > 30 minutes.     Jacob Staples MD

## 2024-08-08 ENCOUNTER — APPOINTMENT (OUTPATIENT)
Dept: CARDIAC SURGERY | Facility: HOSPITAL | Age: 79
End: 2024-08-08
Payer: COMMERCIAL

## 2024-08-19 ENCOUNTER — APPOINTMENT (OUTPATIENT)
Dept: CARDIAC SURGERY | Facility: HOSPITAL | Age: 79
End: 2024-08-19
Payer: COMMERCIAL

## 2024-10-24 DIAGNOSIS — Z95.1 S/P CABG (CORONARY ARTERY BYPASS GRAFT): ICD-10-CM

## 2024-10-24 RX ORDER — CLOPIDOGREL BISULFATE 75 MG/1
75 TABLET ORAL DAILY
Qty: 90 TABLET | Refills: 0 | OUTPATIENT
Start: 2024-10-24

## 2024-10-24 RX ORDER — CARVEDILOL 12.5 MG/1
TABLET ORAL
Qty: 180 TABLET | Refills: 0 | OUTPATIENT
Start: 2024-10-24

## (undated) DEVICE — SUTURE, VICRYL, 0, 36 IN, CT-1, UNDYED

## (undated) DEVICE — GUIDEWIRE, HI-TORQUE, VERSACORE, 145CM, FLOPPY

## (undated) DEVICE — GUIDEWIRE, INQWIRE, 3MM J, .035, 260

## (undated) DEVICE — DRESSING, ISLAND, TELFA, 4 X 5 IN

## (undated) DEVICE — SUTURE, MONOCRYL, 4-0, 18 IN, PS2, UNDYED

## (undated) DEVICE — DRESSING, WOUND, ALLEVYN LIFE, SACRUM, 17.2 X 17.5 CM

## (undated) DEVICE — GEL, ULTRASOUND, AQUASONIC 100, 20 GM, STERILE

## (undated) DEVICE — TR BAND, RADIAL COMPRESSION, STANDARD, 24CM

## (undated) DEVICE — COLLECTION UNIT, DRAINAGE, THORACIC, SINGLE TUBE, DRY SUCTION, ATS COMPATIBLE, OASIS 3600, LF

## (undated) DEVICE — SUTURE, PROLENE, 6-0, 30 IN, C-1, CV-11, BLUE

## (undated) DEVICE — DEVICE KIT, COR-KNOT MICRO

## (undated) DEVICE — PAD, GROUNDING, ELECTROSURGICAL, W/9 FT CABLE, POLYHESIVE II, ADULT, LF

## (undated) DEVICE — CATHETER, GUIDING, SHERPA ACTIVE, 5 FR JR 4.0

## (undated) DEVICE — CATHETER, DIAGNOSTIC, 4 FR-JR 4

## (undated) DEVICE — ELECTRODE, ELECTROSURGICAL, BLADE, STANDARD, 2.75 IN

## (undated) DEVICE — Device

## (undated) DEVICE — MICROCOAGULATION TEST, ACT+ TEST CUVETTE

## (undated) DEVICE — SUTURE, ETHIBOND, XTRA, 30 IN, 0, CT-1, GREEN

## (undated) DEVICE — SUTURE, PROLENE, 7-0, 30 IN, BV1, DA, BLUE

## (undated) DEVICE — DRAPE, INSTRUMENT, W/POUCH, STERI DRAPE, 7 X 11 IN, DISPOSABLE, STERILE

## (undated) DEVICE — INTRODUCER, GLIDESHEATH SLENDER A-KIT, 6FR 10CM

## (undated) DEVICE — SYSTEM, OCTOPUS NUVO TISSUE STABILIZER

## (undated) DEVICE — COVER HANDLE LIGHT, STERIS, BLUE, STERILE

## (undated) DEVICE — FILTER, IV, BLOOD, MICROAGGREGATE, 40 MIC, RBC TRANSFUSION

## (undated) DEVICE — DRAPE, SHEET, CARDIOVASCULAR, ANTIMICROBIAL, W/ANESTHESIA SCREEN, IOBAN 2, STERI DRAPE, 107 X 133 IN, DISPOSABLE, FABRIC, BLUE, STERILE

## (undated) DEVICE — DRESSING, ADHESIVE, ISLAND, TELFA, 2 X 3.75 IN, LF

## (undated) DEVICE — SOLUTION, IRRIGATION, SODIUM CHLORIDE 0.9%, 1000 ML, POUR BOTTLE

## (undated) DEVICE — CATHETER, DIAGNOSTIC, 4 FR-JL 3.5

## (undated) DEVICE — DRAPE, FLUID WARMER

## (undated) DEVICE — DRESSING, ALLEVYN, GENTAL LITE BORDER, 2 X 2

## (undated) DEVICE — ELECTRODE, ELECTROSURGICAL, BLADE EXT 4 INCH, INSULATED

## (undated) DEVICE — SUTURE, NUROLON, 0, 18 IN, CT1, DETACHABLE, MULTIPACK, BLACK

## (undated) DEVICE — PERFUSION SERVICES

## (undated) DEVICE — SUTURE, ETHIBOND XTRA, 5 CCS, GRN/BR, LF

## (undated) DEVICE — SUTURE, PROLENE, 5-0, 36 IN, C-1, CV-11, BLUE

## (undated) DEVICE — TIP,  ELECTRODE COATED INSULATED, EXTENDED, LF